# Patient Record
Sex: FEMALE | Race: BLACK OR AFRICAN AMERICAN | NOT HISPANIC OR LATINO | Employment: OTHER | URBAN - METROPOLITAN AREA
[De-identification: names, ages, dates, MRNs, and addresses within clinical notes are randomized per-mention and may not be internally consistent; named-entity substitution may affect disease eponyms.]

---

## 2017-01-13 ENCOUNTER — OFFICE VISIT (OUTPATIENT)
Dept: CARDIOLOGY | Facility: CLINIC | Age: 82
End: 2017-01-13
Payer: MEDICARE

## 2017-01-13 VITALS
SYSTOLIC BLOOD PRESSURE: 136 MMHG | HEART RATE: 74 BPM | WEIGHT: 85.56 LBS | BODY MASS INDEX: 15.16 KG/M2 | DIASTOLIC BLOOD PRESSURE: 63 MMHG | HEIGHT: 63 IN

## 2017-01-13 DIAGNOSIS — I25.83 CORONARY ARTERY DISEASE DUE TO LIPID RICH PLAQUE: ICD-10-CM

## 2017-01-13 DIAGNOSIS — E78.5 DYSLIPIDEMIA: Chronic | ICD-10-CM

## 2017-01-13 DIAGNOSIS — I25.10 CORONARY ARTERY DISEASE DUE TO LIPID RICH PLAQUE: ICD-10-CM

## 2017-01-13 DIAGNOSIS — I10 ESSENTIAL HYPERTENSION: Primary | Chronic | ICD-10-CM

## 2017-01-13 PROCEDURE — 1160F RVW MEDS BY RX/DR IN RCRD: CPT | Mod: S$GLB,,, | Performed by: INTERNAL MEDICINE

## 2017-01-13 PROCEDURE — 1126F AMNT PAIN NOTED NONE PRSNT: CPT | Mod: S$GLB,,, | Performed by: INTERNAL MEDICINE

## 2017-01-13 PROCEDURE — 99213 OFFICE O/P EST LOW 20 MIN: CPT | Mod: S$GLB,,, | Performed by: INTERNAL MEDICINE

## 2017-01-13 PROCEDURE — 1157F ADVNC CARE PLAN IN RCRD: CPT | Mod: S$GLB,,, | Performed by: INTERNAL MEDICINE

## 2017-01-13 PROCEDURE — 3075F SYST BP GE 130 - 139MM HG: CPT | Mod: S$GLB,,, | Performed by: INTERNAL MEDICINE

## 2017-01-13 PROCEDURE — 99499 UNLISTED E&M SERVICE: CPT | Mod: S$GLB,,, | Performed by: INTERNAL MEDICINE

## 2017-01-13 PROCEDURE — 99999 PR PBB SHADOW E&M-EST. PATIENT-LVL III: CPT | Mod: PBBFAC,,, | Performed by: INTERNAL MEDICINE

## 2017-01-13 PROCEDURE — 3078F DIAST BP <80 MM HG: CPT | Mod: S$GLB,,, | Performed by: INTERNAL MEDICINE

## 2017-01-13 PROCEDURE — 1159F MED LIST DOCD IN RCRD: CPT | Mod: S$GLB,,, | Performed by: INTERNAL MEDICINE

## 2017-01-13 RX ORDER — VALSARTAN 160 MG/1
320 TABLET ORAL
Refills: 0 | COMMUNITY
Start: 2016-12-06 | End: 2017-02-24 | Stop reason: ALTCHOICE

## 2017-01-13 RX ORDER — CHLORTHALIDONE 25 MG/1
TABLET ORAL
Qty: 30 TABLET | Refills: 5 | Status: SHIPPED | OUTPATIENT
Start: 2017-01-13 | End: 2017-12-21 | Stop reason: SDUPTHER

## 2017-01-13 NOTE — MR AVS SNAPSHOT
[facility] - Cardiology  [address]  [place] [address]  Phone: [phone]                  jM Summers   2017 1:30 PM   Office Visit    Description:  Female : 1935   Provider:  Man Morrow MD   Department:  [facility] - Cardiology           Reason for Visit     Coronary Artery Disease     Medication Problem           Diagnoses this Visit        Comments    Essential hypertension    -  Primary     Dyslipidemia         Coronary artery disease due to lipid rich plaque                To Do List           Future Appointments        Provider Department Dept Phone    2017 10:15 AM PERIMETRY, [facility] - Ophthalmology [phone]    2017 10:45 AM Aysha Triplett MD [facility] Ophthalmology [phone]    2017 1:45 PM Shasta Urbina MD [facility] - Internal Medicine [phone]      Goals (5 Years of Data)     None      Follow-Up and Disposition     Return in about 3 months (around 2017).       These Medications        Disp Refills Start End    chlorthalidone (HYGROTEN) 25 MG Tab 30 tablet 5 2017     1/2 tablet daily    [facility]: [facility] [address]  #: [phone]         [facility]     [facility] -  Assistance  Registered nurses in the [facility] provide clinical advisement, health education, appointment booking, and other advisory services.  Call for this free service at [phone].             Medications           Message regarding Medications     Verify the changes and/or additions to your medication regime listed below are the same as discussed with your clinician today.  If any of these changes or additions are incorrect, please notify your healthcare provider.        START taking these NEW medications        Refills    chlorthalidone (HYGROTEN) 25 MG Tab 5    Si/2 tablet daily    Class: Normal      STOP taking these medications      carvedilol (COREG) 6.25 MG tablet Take 1 tablet (6.25 mg total) by mouth 2 (two) times daily with meals.    hydrochlorothiazide (HYDRODIURIL) 12.5 MG Tab Take 1 tablet (12.5 mg total) by mouth once daily.           Verify that the below list of medications is an accurate representation of the medications you are currently taking.  If none reported, the list may be blank. If incorrect, please contact your healthcare provider. Carry this list with you in case of emergency.           Current Medications     aspirin 81 mg Tab Take 81 mg by mouth every evening. 1 Tablet Oral Every day    atorvastatin (LIPITOR) 40 MG tablet Take 1 tablet (40 mg total) by mouth once daily.    bimatoprost (LUMIGAN) 0.01 % Drop Place 1 drop into the right eye every evening.    brimonidine 0.15 % OPTH DROP (ALPHAGAN) 0.15 % ophthalmic solution Place 1 drop into the right eye 3 (three) times daily.    calcium carbonate (OS-KACI) 600 mg (1,500 mg) Tab Take 600 mg by mouth 2 (two) times daily with meals.    ergocalciferol (ERGOCALCIFEROL) 50,000 unit Cap Take 1 capsule (50,000 Units total) by mouth every 30 days.    ferrous sulfate 325 (65 FE) MG EC tablet Take 1 tablet (325 mg total) by mouth once daily.    multivitamin (ONE DAILY MULTIVITAMIN) per tablet Take 1 tablet by mouth once daily.    NEXIUM 40 mg capsule take 1 capsule by mouth once daily    pilocarpine HCL 4% (PILOCAR) 4 % ophthalmic solution Place 1 drop into the right eye 3 (three) times daily.    senna-docusate 8.6-50 mg (PERICOLACE) 8.6-50 mg per tablet Take 1 tablet by mouth 2 (two) times daily.    timolol maleate 0.5% (TIMOPTIC-XE) 0.5 % SolG Place 1 drop into the right eye every morning.    valsartan (DIOVAN) 160 MG tablet 320 mg. Pt taken  160 mg , 2 tablet once daily    chlorthalidone (HYGROTEN) 25 MG Tab 1/2 tablet daily           Clinical Reference Information           Vital Signs - Last Recorded  Most recent update: 1/13/2017  1:26 PM by Stephy Traylor MA    BP  "Pulse Ht Wt LMP BMI    136/63 (BP Location: Left arm, Patient Position: Sitting, BP Method: Automatic) 74 5' 3" (1.6 m) 38.8 kg (85 lb 8.6 oz) (LMP Unknown) 15.15 kg/m2      Blood Pressure          Most Recent Value    Right Arm BP - Sitting  148/65    Left Arm BP - Sitting  136/63    BP  136/63      Allergies as of 1/13/2017     Pcn [Penicillins]    Sulfa (Sulfonamide Antibiotics)      Immunizations Administered on Date of Encounter - 1/13/2017     None      Orders Placed During Today's Visit     Future Labs/Procedures Expected by Expires    Basic metabolic panel  1/27/2017 (Approximate) 1/13/2018      Instructions    Stop Carvedilol  Stop Hydrochlorothiazide and begin Chlorthalidone 1/2 tablet daily       "

## 2017-01-13 NOTE — PATIENT INSTRUCTIONS
Stop Carvedilol  Stop Hydrochlorothiazide and begin Chlorthalidone 1/2 tablet daily  Take Valsartan twice a day ( 160 mg twice a day )

## 2017-01-13 NOTE — LETTER
January 13, 2017      Shasta Urbina MD  [address]  [place] [address]           [facility] - Cardiology  [address]  [place] [address]  Phone: [phone]          Patient: Mj Summers   MR Number: [id number]   YOB: 1935   Date of Visit: 1/13/2017       Dear Dr. Shasta Urbina:    Thank you for referring Mj Summers to me for evaluation. Attached you will find relevant portions of my assessment and plan of care.    If you have questions, please do not hesitate to call me. I look forward to following Mj Summers along with you.    Sincerely,    Man Morrow MD    Enclosure  CC:  No Recipients    If you would like to receive this communication electronically, please contact [email or web address] or [phone] to request more information on [employer] Link access.    For providers and/or their staff who would like to refer a patient to [facility], please contact us through our one-stop-shop provider referral line, [facility] , at [phone].    If you feel you have received this communication in error or would no longer like to receive these types of communications, please e-mail [email or web address]

## 2017-01-13 NOTE — PROGRESS NOTES
Subjective:   Patient ID:  Mj Summers is a 81 y.o. female who presents for follow-up of Coronary Artery Disease (7 month f/u ) and Medication Problem (carvedilol)      HPI:   Mj Summers presents for follow up of Hypertension. States her blood pressure has ranges from the 120s/ to 150s/ at home. Feels fatigued when she takes the Carvedilol. Was placed on low dose amlodipine and didn't feel well. Palpitations have not been an issue. Mj Summers has known nonobstructive coronary artery disease . Mj Summers denies chest pain, shortness of breath, palpitations, presyncope , or syncope. Mj Summers has dyslipidemia  on high intensity statin.    Review of Systems   Constitution: Negative for weakness, malaise/fatigue, weight gain and weight loss.   HENT: Negative for headaches.    Eyes: Negative for blurred vision.   Cardiovascular: Negative for chest pain, claudication, cyanosis, dyspnea on exertion, irregular heartbeat, leg swelling, near-syncope, orthopnea, palpitations, paroxysmal nocturnal dyspnea and syncope.   Respiratory: Negative for cough, shortness of breath and wheezing.    Musculoskeletal: Positive for joint pain. Negative for falls and myalgias.   Gastrointestinal: Negative for abdominal pain, heartburn, nausea and vomiting.   Genitourinary: Negative for nocturia.   Neurological: Positive for loss of balance. Negative for brief paralysis, dizziness, focal weakness, numbness and paresthesias.   Psychiatric/Behavioral: Negative for altered mental status.       Current Outpatient Prescriptions   Medication Sig    aspirin 81 mg Tab Take 81 mg by mouth every evening. 1 Tablet Oral Every day    atorvastatin (LIPITOR) 40 MG tablet Take 1 tablet (40 mg total) by mouth once daily.    bimatoprost (LUMIGAN) 0.01 % Drop Place 1 drop into the right eye every evening.    brimonidine 0.15 % OPTH DROP (ALPHAGAN) 0.15 % ophthalmic solution Place 1 drop into the right eye 3 (three) times daily.  "   calcium carbonate (OS-KACI) 600 mg (1,500 mg) Tab Take 600 mg by mouth 2 (two) times daily with meals.    ergocalciferol (ERGOCALCIFEROL) 50,000 unit Cap Take 1 capsule (50,000 Units total) by mouth every 30 days.    ferrous sulfate 325 (65 FE) MG EC tablet Take 1 tablet (325 mg total) by mouth once daily.    multivitamin (ONE DAILY MULTIVITAMIN) per tablet Take 1 tablet by mouth once daily.    NEXIUM 40 mg capsule take 1 capsule by mouth once daily    pilocarpine HCL 4% (PILOCAR) 4 % ophthalmic solution Place 1 drop into the right eye 3 (three) times daily.    senna-docusate 8.6-50 mg (PERICOLACE) 8.6-50 mg per tablet Take 1 tablet by mouth 2 (two) times daily. (Patient taking differently: Take 1 tablet by mouth 2 (two) times daily as needed. )    timolol maleate 0.5% (TIMOPTIC-XE) 0.5 % SolG Place 1 drop into the right eye every morning.    valsartan (DIOVAN) 160 MG tablet 320 mg. Pt taken  160 mg , 2 tablet once daily    chlorthalidone (HYGROTEN) 25 MG Tab 1/2 tablet daily     Current Facility-Administered Medications   Medication    denosumab (PROLIA) injection 60 mg    denosumab (PROLIA) injection 60 mg     Objective:   Physical Exam   Constitutional: She is oriented to person, place, and time. She appears well-developed. No distress.   /63 (BP Location: Left arm, Patient Position: Sitting, BP Method: Automatic)  Pulse 74  Ht 5' 3" (1.6 m)  Wt 38.8 kg (85 lb 8.6 oz)  LMP  (LMP Unknown)  BMI 15.15 kg/m2   HENT:   Head: Normocephalic.   Eyes: Conjunctivae are normal. Pupils are equal, round, and reactive to light. No scleral icterus.   Neck: Neck supple. No JVD present. No thyromegaly present.   Cardiovascular: Normal rate, regular rhythm and intact distal pulses.  PMI is not displaced.  Exam reveals no gallop and no friction rub.    Murmur heard.   Medium-pitched midsystolic murmur is present with a grade of 2/6  at the lower left sternal border, apex Trace pedal edema " bilateral.  Pulmonary/Chest: Effort normal and breath sounds normal. No respiratory distress. She has no wheezes. She has no rales.   Abdominal: Soft. She exhibits no distension. There is no splenomegaly or hepatomegaly. There is no tenderness.   Musculoskeletal: She exhibits no edema or tenderness.   In a wheelchair and uses a walker and cane at home.   Neurological: She is alert and oriented to person, place, and time.   Skin: Skin is warm and dry. She is not diaphoretic.   Psychiatric: She has a normal mood and affect. Her behavior is normal.       Lab Results   Component Value Date     12/07/2016    K 4.3 12/07/2016     12/07/2016    CO2 28 12/07/2016    BUN 15 12/07/2016    CREATININE 0.8 12/07/2016    GLU 86 12/07/2016    MG 2.3 12/11/2015    AST 24 12/07/2016    ALT 26 12/07/2016    ALBUMIN 3.4 (L) 12/07/2016    PROT 7.3 12/07/2016    BILITOT 0.4 12/07/2016    WBC 5.10 07/27/2016    HGB 12.1 07/27/2016    HCT 38 07/27/2016    HCT 36.5 (L) 07/27/2016    MCV 90 07/27/2016     (L) 07/27/2016    TSH 1.757 04/05/2016    CHOL 194 12/30/2015    HDL 73 12/30/2015    LDLCALC 103.0 12/30/2015    TRIG 90 12/30/2015       Assessment:     1. Essential hypertension : Variation in readings at home   2. Dyslipidemia :  on high intensity statin   3. Coronary artery disease due to lipid rich plaque : nonobstructive.       Plan:     Mj was seen today for coronary artery disease and medication problem.    Diagnoses and all orders for this visit:    Essential hypertension  -     chlorthalidone (HYGROTEN) 25 MG Tab; 1/2 tablet daily in place of HCTZ  -     Basic metabolic panel; Future; Expected date: 1/27/17             D/C Carvedilol  Dyslipidemia  Continue current regimen    Coronary artery disease due to lipid rich plaque    Other orders  -     valsartan (DIOVAN) 160 MG tablet; 320 mg. Pt taken  160 mg , 2 tablet once daily ( To take twice daily )

## 2017-01-27 ENCOUNTER — CLINICAL SUPPORT (OUTPATIENT)
Dept: OPHTHALMOLOGY | Facility: CLINIC | Age: 82
End: 2017-01-27
Attending: OPHTHALMOLOGY
Payer: MEDICARE

## 2017-01-27 ENCOUNTER — LAB VISIT (OUTPATIENT)
Dept: LAB | Facility: HOSPITAL | Age: 82
End: 2017-01-27
Attending: INTERNAL MEDICINE
Payer: MEDICARE

## 2017-01-27 DIAGNOSIS — H16.142 SPK (SUPERFICIAL PUNCTATE KERATITIS), LEFT: ICD-10-CM

## 2017-01-27 DIAGNOSIS — H21.561 AFFERENT PUPILLARY DEFECT, RIGHT: ICD-10-CM

## 2017-01-27 DIAGNOSIS — H40.89 GLAUCOMA DUE TO COMBINATION OF MECHANISMS: ICD-10-CM

## 2017-01-27 DIAGNOSIS — H21.541 POSTERIOR SYNECHIAE, RIGHT: ICD-10-CM

## 2017-01-27 DIAGNOSIS — Z96.1 PSEUDOPHAKIA OF LEFT EYE: ICD-10-CM

## 2017-01-27 DIAGNOSIS — H18.421 BAND KERATOPATHY, RIGHT: ICD-10-CM

## 2017-01-27 DIAGNOSIS — I10 ESSENTIAL HYPERTENSION: Chronic | ICD-10-CM

## 2017-01-27 DIAGNOSIS — H35.031 BLIND HYPERTENSIVE RIGHT EYE: ICD-10-CM

## 2017-01-27 DIAGNOSIS — H40.89 GLAUCOMA DUE TO COMBINATION OF MECHANISMS: Primary | ICD-10-CM

## 2017-01-27 DIAGNOSIS — H25.11 SENILE NUCLEAR SCLEROSIS, RIGHT: ICD-10-CM

## 2017-01-27 DIAGNOSIS — H43.22 ASTEROID HYALOSIS, LEFT: ICD-10-CM

## 2017-01-27 LAB
ANION GAP SERPL CALC-SCNC: 7 MMOL/L
BUN SERPL-MCNC: 19 MG/DL
CALCIUM SERPL-MCNC: 9.9 MG/DL
CHLORIDE SERPL-SCNC: 105 MMOL/L
CO2 SERPL-SCNC: 29 MMOL/L
CREAT SERPL-MCNC: 0.8 MG/DL
EST. GFR  (AFRICAN AMERICAN): >60 ML/MIN/1.73 M^2
EST. GFR  (NON AFRICAN AMERICAN): >60 ML/MIN/1.73 M^2
GLUCOSE SERPL-MCNC: 88 MG/DL
POTASSIUM SERPL-SCNC: 4.5 MMOL/L
SODIUM SERPL-SCNC: 141 MMOL/L

## 2017-01-27 PROCEDURE — 99999 PR PBB SHADOW E&M-EST. PATIENT-LVL III: CPT | Mod: PBBFAC,,, | Performed by: OPHTHALMOLOGY

## 2017-01-27 PROCEDURE — 92134 CPTRZ OPH DX IMG PST SGM RTA: CPT | Mod: S$GLB,,, | Performed by: OPHTHALMOLOGY

## 2017-01-27 PROCEDURE — 92012 INTRM OPH EXAM EST PATIENT: CPT | Mod: S$GLB,,, | Performed by: OPHTHALMOLOGY

## 2017-01-27 PROCEDURE — 99499 UNLISTED E&M SERVICE: CPT | Mod: S$GLB,,, | Performed by: OPHTHALMOLOGY

## 2017-01-27 RX ORDER — MELOXICAM 7.5 MG/1
1 TABLET ORAL DAILY PRN
COMMUNITY
Start: 2017-01-26 | End: 2018-01-08

## 2017-01-27 NOTE — PROGRESS NOTES
"HPI     Glaucoma    Additional comments: HRT review today           Comments   DLS: 9/30/16    1) MMG  2) Blind Hypertensive OD  3) NS OD  4) FABY  5) APD OD  6) Band Keratopathy  7) Asteroid Hyalosis OS  8) Hx Acute Dacryocystitis   9) CRVO OD  10) Ant. Capsule Phimosis OS  11) PCIOL OS    MEDS:  T1/2 GFS QAM OD   Alphagan TID OD   Camacho 4% TID OD   Lumugan QHS OD  AT's PRN OU       Last edited by Yuridia Talamantes MA on 1/27/2017 11:01 AM. (History)            Assessment /Plan     For exam results, see Encounter Report.    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   Angle open after PI's ou   -Followed at [facility] since at least '01   First HVF 2001   First photos 2001   S/p PI OU   s/p Gonioplasty OU     Family history none   Glaucoma meds Lumigan, Istalol qAM, Agan tid, Camacho tid  - all OD ((off all gtts os a/p combined)   H/O adverse rxn to glaucoma drops Azopt "felt weird"   LASERS S/p PI OU, s/p Gonioplasty OU   GLAUCOMA SURGERIES    combined phaco/trab with mini shunt OS 5/22/2013   OTHER EYE SURGERIES    S/P DCR sx OD x 2 or 3 with Damaris    Combined phaco/IOL/ trab with mini shunt OS 5/22/2013   CDR 0.99 /0.9   Tbase 42/23   Tmax 42/23 (when stopped gtts)   Ttarget pain control od // 18 os   HVF 16 VF '01 -'16 - Ext to stim V od  // SALT / IAD os   Gonio +4/+3   /539   OCT 8 test 2006 to 2015 - RNFL - OD:dec. S/N/I , bord T // OS:dec N/I, bord S/T   HRT 13 test 2004 to 2017 - MR -  Dec. S od // dec. S/N/I os /// CDR 0.61 od // 0.84 os  Disc photos - 2001, 2003, 2006 - slides // 2008, 2010, 2016  - OIS     Ta " today 40/13 (states good compliance with gtts od - still pain free)   Test today - IOP, HRT     2.  Blind hypertensive Right Eye   Pain free - eye comfortable   Very limited vision CF at 3'   End stage glaucoma and S/P CRVO   No rubeosis   IOP is high but eye is pain free    3. Cataract OD -However, OD VA limited 2/2 CRVO and end stge glaucoma   S/P phaco/IOL/trab OS 5/22/2013    4. FABY OU -cont ATs     5. APD OD -   Old / stable 2/2 CRVO and glaucoma    6. Band Keratopathy OD -stable. Cont ATs- being followed by Dr. Saul;    had EDTA chelation on 2/13/14 OS - now clear     7. Asteroid Hyalosis OS -stable     8. Hx of Acute Dacryocystitis OS and recent NLD OD s/p surgery 11/12 and repeat for exposure w/ Dr. Ocampo 12/5/12 -stable. Cont f/u with Damaris     9. Hx of CRVO OD -limiting visual potential OD   -No NV on todays exam     10. Ant capsule phimosis os    Monitor     11. PC IOL os    Combined  W/ mini shunt 5/22/2013   doing well VA is 20/25 and the IOP is 13    Plan:   Off all gtts for now OS - s/p combined -- IOP stable - at 13 and VA is 20/25     Cont glaucoma gtts OD -- IOP very high, but comfortable/no pain // Blind hypertensive but pain free eye   Old  crvo     Ok to use OTC readers for now a +3.00 to +3.50 - not really able to improve distance vision with glasses at this time  Much improved from before the combined  Procedure    F/u 4  months - IOP check - gonio - other test are up to date   Keep IOP low os - only seeing eye  Keep eye comfortable od     I have seen and personally examined the patient.  I agree with the findings, assessment and plan of the resident and/or fellow.     Aysha Triplett MD

## 2017-01-30 ENCOUNTER — TELEPHONE (OUTPATIENT)
Dept: CARDIOLOGY | Facility: CLINIC | Age: 82
End: 2017-01-30

## 2017-01-30 NOTE — TELEPHONE ENCOUNTER
----- Message from Man Morrow MD sent at 1/27/2017 12:17 PM CST -----  Please inform the patient her lab looks good.

## 2017-02-24 ENCOUNTER — OFFICE VISIT (OUTPATIENT)
Dept: INTERNAL MEDICINE | Facility: CLINIC | Age: 82
End: 2017-02-24
Payer: MEDICARE

## 2017-02-24 VITALS
BODY MASS INDEX: 15.43 KG/M2 | WEIGHT: 87.06 LBS | HEIGHT: 63 IN | SYSTOLIC BLOOD PRESSURE: 152 MMHG | DIASTOLIC BLOOD PRESSURE: 78 MMHG

## 2017-02-24 DIAGNOSIS — Z87.81 HISTORY OF FRACTURE OF LEFT HIP: ICD-10-CM

## 2017-02-24 DIAGNOSIS — E78.5 DYSLIPIDEMIA: Chronic | ICD-10-CM

## 2017-02-24 DIAGNOSIS — K21.9 GASTROESOPHAGEAL REFLUX DISEASE, ESOPHAGITIS PRESENCE NOT SPECIFIED: ICD-10-CM

## 2017-02-24 DIAGNOSIS — E46 PROTEIN CALORIE MALNUTRITION: ICD-10-CM

## 2017-02-24 DIAGNOSIS — I77.9 BILATERAL CAROTID ARTERY DISEASE: ICD-10-CM

## 2017-02-24 DIAGNOSIS — M81.0 OSTEOPOROSIS, POSTMENOPAUSAL: ICD-10-CM

## 2017-02-24 DIAGNOSIS — I10 ESSENTIAL HYPERTENSION: Primary | ICD-10-CM

## 2017-02-24 DIAGNOSIS — D64.9 NORMOCYTIC ANEMIA: ICD-10-CM

## 2017-02-24 DIAGNOSIS — J43.9 PULMONARY EMPHYSEMA, UNSPECIFIED EMPHYSEMA TYPE: ICD-10-CM

## 2017-02-24 DIAGNOSIS — Z00.00 ENCOUNTER FOR PREVENTIVE HEALTH EXAMINATION: Primary | ICD-10-CM

## 2017-02-24 DIAGNOSIS — H34.8192 CRVO (CENTRAL RETINAL VEIN OCCLUSION): ICD-10-CM

## 2017-02-24 DIAGNOSIS — M81.0 OSTEOPOROSIS: ICD-10-CM

## 2017-02-24 DIAGNOSIS — I70.0 AORTIC ATHEROSCLEROSIS: ICD-10-CM

## 2017-02-24 DIAGNOSIS — I25.10 CORONARY ARTERY DISEASE INVOLVING NATIVE CORONARY ARTERY OF NATIVE HEART WITHOUT ANGINA PECTORIS: ICD-10-CM

## 2017-02-24 DIAGNOSIS — I10 ESSENTIAL HYPERTENSION: Chronic | ICD-10-CM

## 2017-02-24 DIAGNOSIS — H40.89 GLAUCOMA DUE TO COMBINATION OF MECHANISMS: ICD-10-CM

## 2017-02-24 PROCEDURE — 99499 UNLISTED E&M SERVICE: CPT | Mod: S$GLB,,, | Performed by: INTERNAL MEDICINE

## 2017-02-24 PROCEDURE — 99999 PR PBB SHADOW E&M-EST. PATIENT-LVL IV: CPT | Mod: PBBFAC,,, | Performed by: INTERNAL MEDICINE

## 2017-02-24 PROCEDURE — 1160F RVW MEDS BY RX/DR IN RCRD: CPT | Mod: S$GLB,,, | Performed by: INTERNAL MEDICINE

## 2017-02-24 PROCEDURE — 3075F SYST BP GE 130 - 139MM HG: CPT | Mod: S$GLB,,, | Performed by: INTERNAL MEDICINE

## 2017-02-24 PROCEDURE — 3079F DIAST BP 80-89 MM HG: CPT | Mod: S$GLB,,, | Performed by: INTERNAL MEDICINE

## 2017-02-24 PROCEDURE — 99215 OFFICE O/P EST HI 40 MIN: CPT | Mod: S$GLB,,, | Performed by: INTERNAL MEDICINE

## 2017-02-24 PROCEDURE — G0439 PPPS, SUBSEQ VISIT: HCPCS | Mod: S$GLB,,, | Performed by: NURSE PRACTITIONER

## 2017-02-24 PROCEDURE — 99499 UNLISTED E&M SERVICE: CPT | Mod: S$GLB,,, | Performed by: NURSE PRACTITIONER

## 2017-02-24 PROCEDURE — 3078F DIAST BP <80 MM HG: CPT | Mod: S$GLB,,, | Performed by: NURSE PRACTITIONER

## 2017-02-24 PROCEDURE — 1157F ADVNC CARE PLAN IN RCRD: CPT | Mod: S$GLB,,, | Performed by: INTERNAL MEDICINE

## 2017-02-24 PROCEDURE — 1126F AMNT PAIN NOTED NONE PRSNT: CPT | Mod: S$GLB,,, | Performed by: INTERNAL MEDICINE

## 2017-02-24 PROCEDURE — 3077F SYST BP >= 140 MM HG: CPT | Mod: S$GLB,,, | Performed by: NURSE PRACTITIONER

## 2017-02-24 PROCEDURE — 99999 PR PBB SHADOW E&M-EST. PATIENT-LVL IV: CPT | Mod: PBBFAC,,, | Performed by: NURSE PRACTITIONER

## 2017-02-24 PROCEDURE — 1159F MED LIST DOCD IN RCRD: CPT | Mod: S$GLB,,, | Performed by: INTERNAL MEDICINE

## 2017-02-24 RX ORDER — ATORVASTATIN CALCIUM 40 MG/1
40 TABLET, FILM COATED ORAL DAILY
Qty: 30 TABLET | Refills: 3 | Status: SHIPPED | OUTPATIENT
Start: 2017-02-24 | End: 2017-10-31 | Stop reason: SDUPTHER

## 2017-02-24 RX ORDER — VALSARTAN 160 MG/1
160 TABLET ORAL 2 TIMES DAILY
Qty: 60 TABLET | Refills: 3 | Status: SHIPPED | OUTPATIENT
Start: 2017-02-24 | End: 2017-10-31 | Stop reason: SDUPTHER

## 2017-02-24 RX ORDER — HYDROCHLOROTHIAZIDE 12.5 MG/1
12.5 CAPSULE ORAL DAILY
Refills: 0 | COMMUNITY
Start: 2016-12-16 | End: 2017-02-24

## 2017-02-24 NOTE — PATIENT INSTRUCTIONS
Counseling and Referral of Other Preventative  (Italic type indicates deductible and co-insurance are waived)    Patient Name: Mj Summers  Today's Date: 2/24/2017      SERVICE LIMITATIONS RECOMMENDATION    Vaccines    · Pneumococcal (once after 65)    · Influenza (annually)    · Hepatitis B (if medium/high risk)    · Prevnar 13      Hepatitis B medium/high risk factors:       - End-stage renal disease       - Hemophiliacs who received Factor VII or         IX concentrates       - Clients of institutions for the mentally             retarded       - Persons who live in the same house as          a HepB carrier       - Homosexual men       - Illicit injectable drug abusers     Pneumococcal: Done, no repeat necessary     Influenza: Done, repeat in one year     Hepatitis B: N/A Not indicated     Prevnar 13: N/A Done, no repeat necessary    Mammogram (biennial age 50-74)  Annually (age 40 or over)  Last done 8/24/16, recommend to repeat every 1  years    Pap (up to age 70 and after 70 if unknown history or abnormal study last 10 years)    N/A Not indicated     The USPSTF recommends against screening for cervical cancer in women older than age 65 years who have had adequate prior screening and are not otherwise at high risk for cervical cancer.      Colorectal cancer screening (to age 75)    · Fecal occult blood test (annual)  · Flexible sigmoidoscopy (5y)  · Screening colonoscopy (10y)  · Barium enema   N/A Not indicated    Diabetes self-management training (no USPSTF recommendations)  Requires referral by treating physician for patient with diabetes or renal disease. 10 hours of initial DSMT sessions of no less than 30 minutes each in a continuous 12-month period. 2 hours of follow-up DSMT in subsequent years.  N/A Not indicated    Bone mass measurements (age 65 & older, biennial)  Requires diagnosis related to osteoporosis or estrogen deficiency. Biennial benefit unless patient has history of long-term  glucocorticoid  Last done 12/07/16, recommend to repeat every 2  years    Glaucoma screening (no USPSTF recommendation)  Diabetes mellitus, family history   , age 50 or over    American, age 65 or over  Last done 1/27/17, recommend to repeat every 6  months    Medical nutrition therapy for diabetes or renal disease (no recommended schedule)  Requires referral by treating physician for patient with diabetes or renal disease or kidney transplant within the past 3 years.  Can be provided in same year as diabetes self-management training (DSMT), and CMS recommends medical nutrition therapy take place after DSMT. Up to 3 hours for initial year and 2 hours in subsequent years.  N/A Defer PCP    Cardiovascular screening blood tests (every 5 years)  · Fasting lipid panel  Order as a panel if possible  Last done 12/30/15, recommend to repeat every 3-5  years    Diabetes screening tests (at least every 3 years, Medicare covers annually or at 6-month intervals for prediabetic patients)  · Fasting blood sugar (FBS) or glucose tolerance test (GTT)  Patient must be diagnosed with one of the following:       - Hypertension       - Dyslipidemia       - Obesity (BMI 30kg/m2)       - Previous elevated impaired FBS or GTT       ... or any two of the following:       - Overweight (BMI 25 but <30)       - Family history of diabetes       - Age 65 or older       - History of gestational diabetes or birth of baby weighing more than 9 pounds  Last done 12/07/16, recommend to repeat every 1  years    Abdominal aortic aneurysm screening (once)  · Sonogram   Limited to patients who meet one of the following criteria:       - Men who are 65-75 years old and have smoked more than 100 cigarette in their lifetime       - Anyone with a family history of abdominal aortic aneurysm       - Anyone recommended for screening by the USPSTF  N/A Not indicated    HIV screening (annually for increased risk patients)  · HIV-1 and  HIV-2 by EIA, or MIRACLE, rapid antibody test or oral mucosa transudate  Patients must be at increased risk for HIV infection per USPSTF guidelines or pregnant. Tests covered annually for patient at increased risk or as requested by the patient. Pregnant patients may receive up to 3 tests during pregnancy.  Risks discussed, screening is not recommended    Smoking cessation counseling (up to 8 sessions per year)  Patients must be asymptomatic of tobacco-related conditions to receive as a preventative service.  Not indicated    Subsequent annual wellness visit  At least 12 months since last AWV  Return in one year     The following information is provided to all patients.  This information is to help you find resources for any of the problems found today that may be affecting your health:                Living healthy guide: www.[facility].[place].[facility]      Understanding Diabetes: www.diabetes.org      Eating healthy: www.cdc.gov/healthyweight      CDC home safety checklist: www.cdc.gov/steadi/patient.html      Agency on Aging: www.goea.[place].[facility]      Alcoholics anonymous (AA): www.aa.org      Physical Activity: www.cameron.nih.gov/ct7jads      Tobacco use: www.quitwithusla.org

## 2017-02-24 NOTE — MR AVS SNAPSHOT
[facility]  [address]  [facility] [address]  Phone: [phone]  Fax: [phone]                  Mj Summers   2017 3:00 PM   Office Visit    Description:  Female : 1935   Provider:  HRA, NOM 3   Department:  [facility]           Diagnoses this Visit        Comments    Encounter for preventive health examination    -  Primary            To Do List           Future Appointments        Provider Department Dept Phone    3/2/2017 9:40 AM LAB, APPOINTMENT [facility] [phone]    2017 10:45 AM Aysha Triplett MD [facility] Ophthalmology [phone]    2017 2:30 PM Shasta Urbina MD [facility] [phone]      Goals (5 Years of Data)     None      [facility]     [facility] Care [facility] -  Assistance  Registered nurses in the [facility] provide clinical advisement, health education, appointment booking, and other advisory services.  Call for this free service at [phone].             Medications           Message regarding Medications     Verify the changes and/or additions to your medication regime listed below are the same as discussed with your clinician today.  If any of these changes or additions are incorrect, please notify your healthcare provider.             Verify that the below list of medications is an accurate representation of the medications you are currently taking.  If none reported, the list may be blank. If incorrect, please contact your healthcare provider. Carry this list with you in case of emergency.           Current Medications     aspirin 81 mg Tab Take 81 mg by mouth every evening. 1 Tablet Oral Every day    atorvastatin (LIPITOR) 40 MG tablet Take 1 tablet (40 mg total) by mouth once daily.    bimatoprost (LUMIGAN) 0.01 % Drop Place 1 drop into the right eye every evening.    brimonidine 0.15 % OPTH DROP (ALPHAGAN)  0.15 % ophthalmic solution Place 1 drop into the right eye 3 (three) times daily.    calcium carbonate (OS-KACI) 600 mg (1,500 mg) Tab Take 600 mg by mouth 2 (two) times daily with meals.    chlorthalidone (HYGROTEN) 25 MG Tab 1/2 tablet daily    ergocalciferol (ERGOCALCIFEROL) 50,000 unit Cap Take 1 capsule (50,000 Units total) by mouth every 30 days.    ferrous sulfate 325 (65 FE) MG EC tablet Take 1 tablet (325 mg total) by mouth once daily.    meloxicam (MOBIC) 7.5 MG tablet Take 1 tablet by mouth daily as needed.    multivitamin (ONE DAILY MULTIVITAMIN) per tablet Take 1 tablet by mouth once daily.    NEXIUM 40 mg capsule take 1 capsule by mouth once daily    pilocarpine HCL 4% (PILOCAR) 4 % ophthalmic solution Place 1 drop into the right eye 3 (three) times daily.    senna-docusate 8.6-50 mg (PERICOLACE) 8.6-50 mg per tablet Take 1 tablet by mouth 2 (two) times daily.    timolol maleate 0.5% (TIMOPTIC-XE) 0.5 % SolG Place 1 drop into the right eye every morning.    valsartan (DIOVAN) 160 MG tablet Take 1 tablet (160 mg total) by mouth 2 (two) times daily.           Clinical Reference Information           Your Vitals Were     BP                   152/78           Blood Pressure          Most Recent Value    BP  (!)  152/78      Allergies as of 2/24/2017     Pcn [Penicillins]    Sulfa (Sulfonamide Antibiotics)      Immunizations Administered on Date of Encounter - 2/24/2017     None      [facility] Sign-Up     Activating your [facility] account is as easy as 1-2-3!     1) Visit [email or web address], select Sign Up Now, enter this activation code and your date of birth, then select Next.  Activation code not generated  Current Patient Portal Status: Account disabled      2) Create a username and password to use when you visit [facility] in the future and select a security question in case you lose your password and select Next.    3) Enter your e-mail address and click Sign Up!    Additional Information  If you have  questions, please e-mail [email or web address] or call [phone] to talk to our [facility] staff. Remember, [facility] is NOT to be used for urgent needs. For medical emergencies, dial 911.         Instructions      Counseling and Referral of Other Preventative  (Italic type indicates deductible and co-insurance are waived)    Patient Name: Mj Summers  Today's Date: 2/24/2017      SERVICE LIMITATIONS RECOMMENDATION    Vaccines    · Pneumococcal (once after 65)    · Influenza (annually)    · Hepatitis B (if medium/high risk)    · Prevnar 13      Hepatitis B medium/high risk factors:       - End-stage renal disease       - Hemophiliacs who received Factor VII or         IX concentrates       - Clients of institutions for the mentally             retarded       - Persons who live in the same house as          a HepB carrier       - Homosexual men       - Illicit injectable drug abusers     Pneumococcal: Done, no repeat necessary     Influenza: Done, repeat in one year     Hepatitis B: N/A Not indicated     Prevnar 13: N/A Done, no repeat necessary    Mammogram (biennial age 50-74)  Annually (age 40 or over)  Last done 8/24/16, recommend to repeat every 1  years    Pap (up to age 70 and after 70 if unknown history or abnormal study last 10 years)    N/A Not indicated     The USPSTF recommends against screening for cervical cancer in women older than age 65 years who have had adequate prior screening and are not otherwise at high risk for cervical cancer.      Colorectal cancer screening (to age 75)    · Fecal occult blood test (annual)  · Flexible sigmoidoscopy (5y)  · Screening colonoscopy (10y)  · Barium enema   N/A Not indicated    Diabetes self-management training (no USPSTF recommendations)  Requires referral by treating physician for patient with diabetes or renal disease. 10 hours of initial DSMT sessions of no less than 30 minutes each in a continuous 12-month period. 2 hours of follow-up DSMT in  subsequent years.  N/A Not indicated    Bone mass measurements (age 65 & older, biennial)  Requires diagnosis related to osteoporosis or estrogen deficiency. Biennial benefit unless patient has history of long-term glucocorticoid  Last done 12/07/16, recommend to repeat every 2  years    Glaucoma screening (no USPSTF recommendation)  Diabetes mellitus, family history   , age 50 or over    American, age 65 or over  Last done 1/27/17, recommend to repeat every 6  months    Medical nutrition therapy for diabetes or renal disease (no recommended schedule)  Requires referral by treating physician for patient with diabetes or renal disease or kidney transplant within the past 3 years.  Can be provided in same year as diabetes self-management training (DSMT), and CMS recommends medical nutrition therapy take place after DSMT. Up to 3 hours for initial year and 2 hours in subsequent years.  N/A Defer PCP    Cardiovascular screening blood tests (every 5 years)  · Fasting lipid panel  Order as a panel if possible  Last done 12/30/15, recommend to repeat every 3-5  years    Diabetes screening tests (at least every 3 years, Medicare covers annually or at 6-month intervals for prediabetic patients)  · Fasting blood sugar (FBS) or glucose tolerance test (GTT)  Patient must be diagnosed with one of the following:       - Hypertension       - Dyslipidemia       - Obesity (BMI 30kg/m2)       - Previous elevated impaired FBS or GTT       ... or any two of the following:       - Overweight (BMI 25 but <30)       - Family history of diabetes       - Age 65 or older       - History of gestational diabetes or birth of baby weighing more than 9 pounds  Last done 12/07/16, recommend to repeat every 1  years    Abdominal aortic aneurysm screening (once)  · Sonogram   Limited to patients who meet one of the following criteria:       - Men who are 65-75 years old and have smoked more than 100 cigarette in their  lifetime       - Anyone with a family history of abdominal aortic aneurysm       - Anyone recommended for screening by the USPSTF  N/A Not indicated    HIV screening (annually for increased risk patients)  · HIV-1 and HIV-2 by EIA, or MIRACLE, rapid antibody test or oral mucosa transudate  Patients must be at increased risk for HIV infection per USPSTF guidelines or pregnant. Tests covered annually for patient at increased risk or as requested by the patient. Pregnant patients may receive up to 3 tests during pregnancy.  Risks discussed, screening is not recommended    Smoking cessation counseling (up to 8 sessions per year)  Patients must be asymptomatic of tobacco-related conditions to receive as a preventative service.  Not indicated    Subsequent annual wellness visit  At least 12 months since last AWV  Return in one year     The following information is provided to all patients.  This information is to help you find resources for any of the problems found today that may be affecting your health:                Living healthy guide: www.[facility].[place].[facility]      Understanding Diabetes: www.diabetes.org      Eating healthy: www.cdc.gov/healthyweight      [facility] home safety checklist: www.cdc.gov/steadi/patient.html      Agency on Aging: www.goea.[place].[facility]      Alcoholics anonymous (AA): www.aa.org      Physical Activity: www.cameron.nih.gov/qw6wlhi      Tobacco use: www.quitwithusla.org          Language Assistance Services     ATTENTION: Language assistance services are available, free of charge. Please call [phone].      ATENCIÓN: Si habla español, tiene a gonzalez disposición servicios gratuitos de asistencia lingüística. Llame al [phone].     CHÚ Ý: N?u b?n nói Ti?ng Vi?t, có các d?ch v? h? tr? ngôn ng? mi?n phí dành cho b?n. G?i s? [phone].         [facility] complies with applicable Federal civil rights laws and does not discriminate on the basis of race, color, national origin,  age, disability, or sex.

## 2017-02-24 NOTE — PROGRESS NOTES
"Mj Summers presented for a  Medicare AWV and comprehensive Health Risk Assessment today. The following components were reviewed and updated:    · Medical history  · Family History  · Social history  · Allergies and Current Medications  · Health Risk Assessment  · Health Maintenance  · Care Team     ** See Completed Assessments for Annual Wellness Visit within the encounter summary.**       The following assessments were completed:  · Living Situation  · CAGE  · Depression Screening  · Timed Get Up and Go  · Whisper Test  · Cognitive Function Screening  ·   ·   ·   · Nutrition Screening  · ADL Screening  · PAQ Screening    Vitals:    02/24/17 1330   BP: (!) 152/78   Weight: 39.5 kg (87 lb 1.3 oz)   Height: 5' 3" (1.6 m)     Body mass index is 15.43 kg/(m^2).  Physical Exam   Constitutional: She is oriented to person, place, and time.   Thin, frail   Musculoskeletal:   In wheelchair - ambulates with cane at home   Neurological: She is alert and oriented to person, place, and time.   Skin: Skin is warm and dry.   Psychiatric: She has a normal mood and affect.         Diagnoses and health risks identified today and associated recommendations/orders:    1. Encounter for preventive health examination  Here for Health Risk Assessment. Follow up in one year.    2. Essential hypertension  Chronic, stable on current medications. Followed by PCP.    3. Aortic atherosclerosis  Chronic, stable on current medications. Noted on CT Chest 2/10/16 Followed by PCP, Cardiology.    4. Bilateral carotid artery disease  Chronic, stable on current medications. 20-39% bilateral stenosis noted Ultrasound 10/14/15. Followed by PCP.    5. Coronary artery disease involving native coronary artery of native heart without angina pectoris  Chronic, stable on current medications. Followed by Cardiology, PCP.    6. CRVO (central retinal vein occlusion) - Right Eye  Chronic, stable. Followed by Ophthalmology    7. Pulmonary emphysema, " unspecified emphysema type  Chronic, stable.  Followed by PCP.    8. Normocytic anemia  Chronic, stable on current medication. Followed by PCP.    9. Dyslipidemia  Chronic, stable on current medication. Followed by PCP.    10. Gastroesophageal reflux disease, esophagitis presence not specified  Chronic, stable on current medication. Followed by PCP.    11. Protein calorie malnutrition  Chronic, weight stable. BMI 15.43. Albumin  3.4 Followed by PCP.    12. 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 Followed by Ophthalmology.    13. Osteoporosis, postmenopausal  Chronic, stable on current medications. Followed by PCP.    14. History of fracture of left hip  Stable. Followed by Orthopedics.       Provided Mj with a 5-10 year written screening schedule and personal prevention plan. Recommendations were developed using the USPSTF age appropriate recommendations. Education, counseling, and referrals were provided as needed. After Visit Summary printed and given to patient which includes a list of additional screenings\tests needed.    Return in 4 months (on 6/26/2017).with PCP.    Isela Chaudhary NP

## 2017-02-24 NOTE — MR AVS SNAPSHOT
[facility]  [address] [address]  Phone: [phone]  Fax: [phone]                  Mj Summers   2017 1:45 PM   Office Visit    Description:  Female : 1935   Provider:  Shasta Urbina MD   Department:  [facility]           Reason for Visit     Annual Exam           Diagnoses this Visit        Comments    Essential hypertension    -  Primary     Dyslipidemia         Osteoporosis                To Do List           Future Appointments        Provider Department Dept Phone    2017 3:00 PM HRA, NOM 3 [facility] [phone]    3/2/2017 9:40 AM LAB, APPOINTMENT [facility] [phone]    2017 10:45 AM Aysha Triplett MD [facility] [phone]    2017 2:30 PM Shasta Urbina MD [facility] [phone]      Goals (5 Years of Data)     None      Follow-Up and Disposition     Return for EPP 4 months.       These Medications        Disp Refills Start End    atorvastatin (LIPITOR) 40 MG tablet 30 tablet 3 2017    Take 1 tablet (40 mg total) by mouth once daily. - Oral    Pharmacy: [address] Ph #: [phone]       valsartan (DIOVAN) 160 MG tablet 60 tablet 3 2017    Take 1 tablet (160 mg total) by mouth 2 (two) times daily. - Oral    Pharmacy: [address] Ph #: [phone]         [facility]     [facility] -  Assistance  Registered nurses in the [facility] provide clinical advisement, health education, appointment booking, and other advisory services.  Call for this free service at [phone].             Medications           Message regarding Medications     Verify the changes and/or additions to your medication regime  listed below are the same as discussed with your clinician today.  If any of these changes or additions are incorrect, please notify your healthcare provider.        START taking these NEW medications        Refills    valsartan (DIOVAN) 160 MG tablet 3    Sig: Take 1 tablet (160 mg total) by mouth 2 (two) times daily.    Class: Normal    Route: Oral      STOP taking these medications     hydrochlorothiazide (MICROZIDE) 12.5 mg capsule Take 12.5 mg by mouth once daily.           Verify that the below list of medications is an accurate representation of the medications you are currently taking.  If none reported, the list may be blank. If incorrect, please contact your healthcare provider. Carry this list with you in case of emergency.           Current Medications     aspirin 81 mg Tab Take 81 mg by mouth every evening. 1 Tablet Oral Every day    atorvastatin (LIPITOR) 40 MG tablet Take 1 tablet (40 mg total) by mouth once daily.    bimatoprost (LUMIGAN) 0.01 % Drop Place 1 drop into the right eye every evening.    brimonidine 0.15 % OPTH DROP (ALPHAGAN) 0.15 % ophthalmic solution Place 1 drop into the right eye 3 (three) times daily.    calcium carbonate (OS-KACI) 600 mg (1,500 mg) Tab Take 600 mg by mouth 2 (two) times daily with meals.    chlorthalidone (HYGROTEN) 25 MG Tab 1/2 tablet daily    ergocalciferol (ERGOCALCIFEROL) 50,000 unit Cap Take 1 capsule (50,000 Units total) by mouth every 30 days.    ferrous sulfate 325 (65 FE) MG EC tablet Take 1 tablet (325 mg total) by mouth once daily.    meloxicam (MOBIC) 7.5 MG tablet Take 1 tablet by mouth daily as needed.    multivitamin (ONE DAILY MULTIVITAMIN) per tablet Take 1 tablet by mouth once daily.    NEXIUM 40 mg capsule take 1 capsule by mouth once daily    pilocarpine HCL 4% (PILOCAR) 4 % ophthalmic solution Place 1 drop into the right eye 3 (three) times daily.    senna-docusate 8.6-50 mg (PERICOLACE) 8.6-50 mg per tablet Take 1 tablet by mouth 2 (two) times  daily.    timolol maleate 0.5% (TIMOPTIC-XE) 0.5 % SolG Place 1 drop into the right eye every morning.    valsartan (DIOVAN) 160 MG tablet Take 1 tablet (160 mg total) by mouth 2 (two) times daily.           Clinical Reference Information           Your Vitals Were     BP                   152/78           Blood Pressure          Most Recent Value    BP  (!)  152/78      Allergies as of 2/24/2017     Pcn [Penicillins]    Sulfa (Sulfonamide Antibiotics)      Immunizations Administered on Date of Encounter - 2/24/2017     None      Orders Placed During Today's Visit      Normal Orders This Visit    Ambulatory Referral to Endocrinology     Future Labs/Procedures Expected by Expires    Comprehensive metabolic panel  2/24/2017 2/24/2018    Lipid panel  2/24/2017 2/24/2018      [facility] Sign-Up     Activating your [facility] account is as easy as 1-2-3!     1) Visit [email or web address], select Sign Up Now, enter this activation code and your date of birth, then select Next.  Activation code not generated  Current Patient Portal Status: Account disabled      2) Create a username and password to use when you visit [facility] in the future and select a security question in case you lose your password and select Next.    3) Enter your e-mail address and click Sign Up!    Additional Information  If you have questions, please e-mail [email or web address] or call [phone] to talk to our [facility] staff. Remember, [facility] is NOT to be used for urgent needs. For medical emergencies, dial 911.         Language Assistance Services     ATTENTION: Language assistance services are available, free of charge. Please call [phone].      ATENCIÓN: Si habla español, tiene a gonzalez disposición servicios gratuitos de asistencia lingüística. Llame al [phone].     CHÚ Ý: N?u b?n nói Ti?ng Vi?t, có các d?ch v? h? tr? ngôn ng? mi?n phí dành cho b?n. G?i s? [phone].         [facility] complies with applicable  Federal civil rights laws and does not discriminate on the basis of race, color, national origin, age, disability, or sex.

## 2017-02-28 VITALS
SYSTOLIC BLOOD PRESSURE: 134 MMHG | HEIGHT: 63 IN | WEIGHT: 87.06 LBS | HEART RATE: 60 BPM | DIASTOLIC BLOOD PRESSURE: 82 MMHG | OXYGEN SATURATION: 99 % | BODY MASS INDEX: 15.43 KG/M2

## 2017-03-01 NOTE — PROGRESS NOTES
Patient, Mj Summers (MRN #[id number]), presented with a recent Platelet count less than 150 K/uL consistent with the definition of thrombocytopenia (ICD10 - D69.6).    Platelets   Date Value Ref Range Status   07/27/2016 110 (L) 150 - 350 K/uL Final     The patient's thrombocytopenia was monitored, evaluated, addressed and/or treated. This addendum to the medical record is made on 03/01/2017.

## 2017-03-01 NOTE — PROGRESS NOTES
Subjective:       Patient ID: Mj Summers is a 81 y.o. female.    Chief Complaint: Hypertension    HPI: She returns for management of hypertension.  She has had hypertension for over a year.  Current treatment has included medications outlined in medication list.  She denies chest pain or shortness of breath.  No palpitations.  Denies left arm or neck pain.  Review of Systems   Constitutional: Negative for chills, fatigue, fever and unexpected weight change.   Respiratory: Negative for chest tightness and shortness of breath.    Cardiovascular: Negative for chest pain and palpitations.   Gastrointestinal: Negative for abdominal pain and blood in stool.   Neurological: Negative for dizziness, syncope, numbness and headaches.       Objective:      Physical Exam   HENT:   Right Ear: External ear normal.   Left Ear: External ear normal.   Nose: Nose normal.   Mouth/Throat: Oropharynx is clear and moist.   Eyes: Pupils are equal, round, and reactive to light.   Neck: Normal range of motion.   Cardiovascular: Normal rate and regular rhythm.    Murmur heard.  Pulmonary/Chest: Breath sounds normal.   Abdominal: She exhibits no distension. There is no hepatosplenomegaly. There is no tenderness.   Lymphadenopathy:     She has no cervical adenopathy.     She has no axillary adenopathy.   Neurological: She has normal strength and normal reflexes. No cranial nerve deficit or sensory deficit.     rectal exam: Heme-negative, no mass palpated  Assessment:         assessment and plan: Hypertension: Check CMP and lipid panel  Plan:       As above

## 2017-03-02 ENCOUNTER — LAB VISIT (OUTPATIENT)
Dept: LAB | Facility: HOSPITAL | Age: 82
End: 2017-03-02
Attending: INTERNAL MEDICINE
Payer: MEDICARE

## 2017-03-02 DIAGNOSIS — I10 ESSENTIAL HYPERTENSION: ICD-10-CM

## 2017-03-02 LAB
ALBUMIN SERPL BCP-MCNC: 3.3 G/DL
ALP SERPL-CCNC: 79 U/L
ALT SERPL W/O P-5'-P-CCNC: 38 U/L
ANION GAP SERPL CALC-SCNC: 5 MMOL/L
AST SERPL-CCNC: 32 U/L
BILIRUB SERPL-MCNC: 0.4 MG/DL
BUN SERPL-MCNC: 17 MG/DL
CALCIUM SERPL-MCNC: 9.7 MG/DL
CHLORIDE SERPL-SCNC: 104 MMOL/L
CHOLEST/HDLC SERPL: 3.9 {RATIO}
CO2 SERPL-SCNC: 31 MMOL/L
CREAT SERPL-MCNC: 0.8 MG/DL
EST. GFR  (AFRICAN AMERICAN): >60 ML/MIN/1.73 M^2
EST. GFR  (NON AFRICAN AMERICAN): >60 ML/MIN/1.73 M^2
GLUCOSE SERPL-MCNC: 87 MG/DL
HDL/CHOLESTEROL RATIO: 25.6 %
HDLC SERPL-MCNC: 246 MG/DL
HDLC SERPL-MCNC: 63 MG/DL
LDLC SERPL CALC-MCNC: 165.6 MG/DL
NONHDLC SERPL-MCNC: 183 MG/DL
POTASSIUM SERPL-SCNC: 4.5 MMOL/L
PROT SERPL-MCNC: 7.3 G/DL
SODIUM SERPL-SCNC: 140 MMOL/L
TRIGL SERPL-MCNC: 87 MG/DL

## 2017-03-02 PROCEDURE — 80053 COMPREHEN METABOLIC PANEL: CPT

## 2017-03-02 PROCEDURE — 36415 COLL VENOUS BLD VENIPUNCTURE: CPT

## 2017-03-02 PROCEDURE — 80061 LIPID PANEL: CPT

## 2017-03-04 ENCOUNTER — TELEPHONE (OUTPATIENT)
Dept: INTERNAL MEDICINE | Facility: CLINIC | Age: 82
End: 2017-03-04

## 2017-03-04 DIAGNOSIS — E78.5 HYPERLIPIDEMIA, UNSPECIFIED HYPERLIPIDEMIA TYPE: Primary | ICD-10-CM

## 2017-03-04 NOTE — TELEPHONE ENCOUNTER
Contacted patient about her lab result. She admits to medication and diet noncompliance. Stressed the importance of compliance. Check lipid panel in 3 months

## 2017-03-22 ENCOUNTER — TELEPHONE (OUTPATIENT)
Dept: INTERNAL MEDICINE | Facility: CLINIC | Age: 82
End: 2017-03-22

## 2017-03-22 DIAGNOSIS — E78.5 HYPERLIPIDEMIA, UNSPECIFIED HYPERLIPIDEMIA TYPE: Primary | ICD-10-CM

## 2017-03-22 NOTE — TELEPHONE ENCOUNTER
----- Message from Matilde Rincon sent at 3/22/2017 11:32 AM CDT -----  Contact: self [phone]  Patient needs to speak with office regarding physical therapy that she was supposed to have started this was discussed previously and she has not heard from PT, please call patient back and advise.    Thank you

## 2017-03-22 NOTE — TELEPHONE ENCOUNTER
Spoke with patient informed patient that referral was put in and advised her to call back back if needs assist

## 2017-04-06 RX ORDER — TIMOLOL MALEATE 5 MG/ML
SOLUTION/ DROPS OPHTHALMIC
Qty: 5 ML | Refills: 12 | Status: SHIPPED | OUTPATIENT
Start: 2017-04-06 | End: 2017-05-19 | Stop reason: SDUPTHER

## 2017-05-05 ENCOUNTER — TELEPHONE (OUTPATIENT)
Dept: ENDOCRINOLOGY | Facility: CLINIC | Age: 82
End: 2017-05-05

## 2017-05-05 NOTE — TELEPHONE ENCOUNTER
Orders were still in from Dr Shah. Scheduled for next Friday at 1:00PM.  Pt accepted and mailed to her

## 2017-05-05 NOTE — TELEPHONE ENCOUNTER
----- Message from Rosamaria Marie sent at 5/4/2017  4:17 PM CDT -----  Contact: Self [phone]  Pablo's pt     Pt has not had her Prolia injection in almost two years. She states she is suppose to have the shot twice and a year and wants to know if she can have the shot sooner than September.

## 2017-05-12 ENCOUNTER — INFUSION (OUTPATIENT)
Dept: INFECTIOUS DISEASES | Facility: HOSPITAL | Age: 82
End: 2017-05-12
Attending: INTERNAL MEDICINE
Payer: MEDICARE

## 2017-05-12 VITALS — HEIGHT: 63 IN | WEIGHT: 87.06 LBS | BODY MASS INDEX: 15.43 KG/M2

## 2017-05-12 DIAGNOSIS — M81.0 OSTEOPOROSIS, POSTMENOPAUSAL: Primary | ICD-10-CM

## 2017-05-12 PROCEDURE — 63600175 PHARM REV CODE 636 W HCPCS: Performed by: INTERNAL MEDICINE

## 2017-05-12 PROCEDURE — 96401 CHEMO ANTI-NEOPL SQ/IM: CPT

## 2017-05-12 RX ADMIN — DENOSUMAB 60 MG: 60 INJECTION SUBCUTANEOUS at 01:05

## 2017-05-19 ENCOUNTER — OFFICE VISIT (OUTPATIENT)
Dept: OPHTHALMOLOGY | Facility: CLINIC | Age: 82
End: 2017-05-19
Payer: MEDICARE

## 2017-05-19 DIAGNOSIS — H21.541 POSTERIOR SYNECHIAE, RIGHT: ICD-10-CM

## 2017-05-19 DIAGNOSIS — H26.40 CAPSULAR OPACIFICATION: ICD-10-CM

## 2017-05-19 DIAGNOSIS — H34.8192 CRVO (CENTRAL RETINAL VEIN OCCLUSION): ICD-10-CM

## 2017-05-19 DIAGNOSIS — H18.421 BAND KERATOPATHY, RIGHT: ICD-10-CM

## 2017-05-19 DIAGNOSIS — H21.561 AFFERENT PUPILLARY DEFECT, RIGHT: ICD-10-CM

## 2017-05-19 DIAGNOSIS — H25.11 SENILE NUCLEAR SCLEROSIS, RIGHT: ICD-10-CM

## 2017-05-19 DIAGNOSIS — Z98.83 GLAUCOMA FILTERING BLEB OF LEFT EYE: ICD-10-CM

## 2017-05-19 DIAGNOSIS — H40.89 GLAUCOMA DUE TO COMBINATION OF MECHANISMS: Primary | ICD-10-CM

## 2017-05-19 DIAGNOSIS — H16.142 SPK (SUPERFICIAL PUNCTATE KERATITIS), LEFT: ICD-10-CM

## 2017-05-19 DIAGNOSIS — H35.031 BLIND HYPERTENSIVE RIGHT EYE: ICD-10-CM

## 2017-05-19 PROCEDURE — 99999 PR PBB SHADOW E&M-EST. PATIENT-LVL III: CPT | Mod: PBBFAC,,, | Performed by: OPHTHALMOLOGY

## 2017-05-19 PROCEDURE — 99499 UNLISTED E&M SERVICE: CPT | Mod: S$GLB,,, | Performed by: OPHTHALMOLOGY

## 2017-05-19 PROCEDURE — 92012 INTRM OPH EXAM EST PATIENT: CPT | Mod: S$GLB,,, | Performed by: OPHTHALMOLOGY

## 2017-05-19 RX ORDER — PILOCARPINE HYDROCHLORIDE 40 MG/ML
1 SOLUTION/ DROPS OPHTHALMIC 3 TIMES DAILY
Qty: 15 ML | Refills: 12 | Status: SHIPPED | OUTPATIENT
Start: 2017-05-19 | End: 2018-03-05 | Stop reason: SDUPTHER

## 2017-05-19 RX ORDER — BRIMONIDINE TARTRATE 1.5 MG/ML
1 SOLUTION/ DROPS OPHTHALMIC 3 TIMES DAILY
Qty: 1 BOTTLE | Refills: 12 | Status: SHIPPED | OUTPATIENT
Start: 2017-05-19 | End: 2018-03-05 | Stop reason: SDUPTHER

## 2017-05-19 RX ORDER — TIMOLOL MALEATE 5 MG/ML
1 SOLUTION OPHTHALMIC EVERY MORNING
Qty: 5 ML | Refills: 12 | Status: SHIPPED | OUTPATIENT
Start: 2017-05-19 | End: 2018-03-05 | Stop reason: SDUPTHER

## 2017-05-19 NOTE — PROGRESS NOTES
HPI     Glaucoma    Additional comments: IOP ck today           Dry Eye    Additional comments: Pt c/o dry eyes           Comments   PAtient denies eye pain, redness, photophobia. Taking gtts as prescribed.     DLS: 1/27/17    1) MMG  2) Blind Hypertensive OD  3) NS OD  4) FABY  5) APD OD  6) Band Keratopathy  7) Asteroid Hyalosis OS  8) Hx Acute Dacryocystitis   9) CRVO OD  10) Ant. Capsule Phimosis OS  11) PCIOL OS    MEDS:  T1/2 GFS QAM OD   Alphagan TID OD   Camacho 4% TID OD   Lumigan QHS OD  AT's PRN OU       Last edited by Thien Sheppard MD on 5/19/2017 11:45 AM. (History)            Assessment /Plan     For exam results, see Encounter Report.    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  Dispense: 1 Bottle; Refill: 12  -     brimonidine 0.15 % OPTH DROP (ALPHAGAN) 0.15 % ophthalmic solution; Place 1 drop into the right eye 3 (three) times daily.  Dispense: 1 Bottle; Refill: 12  -     pilocarpine HCL 4% (PILOCAR) 4 % ophthalmic solution; Place 1 drop into the right eye 3 (three) times daily.  Dispense: 15 mL; Refill: 12  -     timolol maleate 0.5% (TIMOPTIC-XE) 0.5 % SolG; Place 1 drop into the right eye every morning.  Dispense: 5 mL; Refill: 12    CRVO (central retinal vein occlusion) - Right Eye    Blind hypertensive right eye    Posterior synechiae, right    Senile nuclear sclerosis, right    Band keratopathy, right    Afferent pupillary defect, right    SPK (superficial punctate keratitis), left    Glaucoma filtering bleb of left eye    Capsular opacification            1. 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   Angle open after PI's ou   -Followed at [facility] since at least '01   First HVF 2001   First photos 2001   S/p PI OU   s/p Gonioplasty OU     Family history none   Glaucoma meds Lumigan, Istalol qAM, Agan tid, Camacho tid  - all OD ((off all gtts os a/p combined)  "  H/O adverse rxn to glaucoma drops Azopt "felt weird"   LASERS S/p PI OU, s/p Gonioplasty OU   GLAUCOMA SURGERIES    combined phaco/trab with mini shunt OS 5/22/2013   OTHER EYE SURGERIES    S/P DCR sx OD x 2 or 3 with Damaris    Combined phaco/IOL/ trab with mini shunt OS 5/22/2013   CDR 0.99 /0.9   Tbase 42/23   Tmax 42/23 (when stopped gtts)   Ttarget pain control od // 18 os   HVF 16 VF '01 -'16 - Ext to stim V od  // SALT / IAD os   Gonio +3/+3   /539   OCT 8 test 2006 to 2015 - RNFL - OD:dec. S/N/I , bord T // OS:dec N/I, bord S/T   HRT 13 test 2004 to 2017 - MR -  Dec. S od // dec. S/N/I os /// CDR 0.61 od // 0.84 os  Disc photos - 2001, 2003, 2006 - slides // 2008, 2010, 2016  - OIS     Ta today 36/11 (states good compliance with gtts od - still pain free)   Test today - IOP, gonio    2.  Blind hypertensive Right Eye   Pain free - eye comfortable   Very limited vision CF at 3'   End stage glaucoma and S/P CRVO   No rubeosis   IOP is high but eye is pain free    3. Cataract OD -However, OD VA limited 2/2 CRVO and end stge glaucoma   S/P phaco/IOL/trab OS 5/22/2013    4. FABY OU -cont ATs     5. APD OD -   Old / stable 2/2 CRVO and glaucoma    6. Band Keratopathy OD -stable. Cont ATs- being followed by Dr. Saul;    had EDTA chelation on 2/13/14 OS - now clear     7. Asteroid Hyalosis OS -stable     8. Hx of Acute Dacryocystitis OS and recent NLD OD s/p surgery 11/12 and repeat for exposure w/ Dr. Ocampo 12/5/12 -stable. Cont f/u with Damaris     9. Hx of CRVO OD -limiting visual potential OD   -No NV on todays exam     10. Ant capsule phimosis os    Monitor     11. PC IOL os    Combined  W/ mini shunt 5/22/2013   doing well VA is 20/25 and the IOP is 13    Plan:   Off all gtts for now OS - s/p combined -- IOP stable - at 13 and VA is 20/25     Cont glaucoma gtts OD -- IOP very high, but comfortable/no pain // Blind hypertensive but pain free eye   Old  crvo     Ok to use OTC readers for now a +3.00 to " +3.50 - not really able to improve distance vision with glasses at this time  Much improved from before the combined  Procedure      Pt C/o blurry vision os - ?? 2/2 ant capsule phimosis   F/u yag cap os - + ant capsule fibrosis and opacification - ? Cause vision disturbance and IOL decentration   Keep IOP low os - only seeing eye  Keep eye comfortable od     I have seen and personally examined the patient.  I agree with the findings, assessment and plan of the resident and/or fellow.     Aysha Triplett MD

## 2017-06-21 ENCOUNTER — OFFICE VISIT (OUTPATIENT)
Dept: CARDIOLOGY | Facility: CLINIC | Age: 82
End: 2017-06-21
Payer: MEDICARE

## 2017-06-21 VITALS
HEIGHT: 63 IN | HEART RATE: 54 BPM | DIASTOLIC BLOOD PRESSURE: 72 MMHG | SYSTOLIC BLOOD PRESSURE: 178 MMHG | BODY MASS INDEX: 15.12 KG/M2 | WEIGHT: 85.31 LBS

## 2017-06-21 DIAGNOSIS — D64.9 NORMOCYTIC ANEMIA: ICD-10-CM

## 2017-06-21 DIAGNOSIS — I25.10 CORONARY ARTERY DISEASE INVOLVING NATIVE CORONARY ARTERY OF NATIVE HEART WITHOUT ANGINA PECTORIS: Primary | Chronic | ICD-10-CM

## 2017-06-21 DIAGNOSIS — I45.10 RIGHT BUNDLE BRANCH BLOCK: ICD-10-CM

## 2017-06-21 DIAGNOSIS — R63.6 UNDERWEIGHT DUE TO INADEQUATE CALORIC INTAKE: ICD-10-CM

## 2017-06-21 DIAGNOSIS — I10 ESSENTIAL HYPERTENSION: Chronic | ICD-10-CM

## 2017-06-21 DIAGNOSIS — I77.9 BILATERAL CAROTID ARTERY DISEASE: Chronic | ICD-10-CM

## 2017-06-21 DIAGNOSIS — I70.0 AORTIC ATHEROSCLEROSIS: ICD-10-CM

## 2017-06-21 DIAGNOSIS — R01.1 MURMUR: ICD-10-CM

## 2017-06-21 DIAGNOSIS — J43.9 PULMONARY EMPHYSEMA, UNSPECIFIED EMPHYSEMA TYPE: ICD-10-CM

## 2017-06-21 DIAGNOSIS — E78.5 DYSLIPIDEMIA: Chronic | ICD-10-CM

## 2017-06-21 PROCEDURE — 99999 PR PBB SHADOW E&M-EST. PATIENT-LVL III: CPT | Mod: PBBFAC,,, | Performed by: NURSE PRACTITIONER

## 2017-06-21 PROCEDURE — 1126F AMNT PAIN NOTED NONE PRSNT: CPT | Mod: S$GLB,,, | Performed by: NURSE PRACTITIONER

## 2017-06-21 PROCEDURE — 99499 UNLISTED E&M SERVICE: CPT | Mod: S$GLB,,, | Performed by: NURSE PRACTITIONER

## 2017-06-21 PROCEDURE — 1159F MED LIST DOCD IN RCRD: CPT | Mod: S$GLB,,, | Performed by: NURSE PRACTITIONER

## 2017-06-21 PROCEDURE — 99214 OFFICE O/P EST MOD 30 MIN: CPT | Mod: S$GLB,,, | Performed by: NURSE PRACTITIONER

## 2017-06-21 NOTE — Clinical Note
Hi Dr. Urbina, I had the pleasure of seeing this patient today.  I am concerned about her weight and nutrition. She has lost 5 pounds over the last year that she didn't have to lose and is only 85 pounds.  We do not have social work in the office here.  I wasn't sure what resources you have and I thought I would mention it to you.   Thanks! Isela Faith, MARCELINO-C

## 2017-06-21 NOTE — PROGRESS NOTES
Ms. Summers is a patient of Dr. Morrow and was last seen in [facility] 1/13/2017.    Subjective:   Patient ID:  Mj Summers is a 81 y.o. female who presents for follow-up of Essential hypertension (3 months fu)    HPI  Ms. Summers is an 81y.o. Female with coronary artery disease ([facility] 2010 mid LAD 40%), mild bilateral carotid disease (20-39%), hyperlipidemia, hypertension, and pulmonary emphysema.  CAD treated with low dose ASA and high intensity statin therapy.  Patient denies chest pain with exertion or at rest, palpitations, SOB, YOUNGBLOOD, dizziness, syncope, edema, orthopnea, PND, or claudication.  Last echo 2009 with concentric remodeling but normal systolic and diastolic function.  Hyperlipidemia is treated with atorvastatin 40mg and last  on 3/2/17.  Hypertension treated with a thiazide diuretic (chlorthalidone 12.5mg) and an ARB (valsartan 160mg BID).  Reports home blood pressures are 130-140s at home.  States that she eats a low salt diet.  She participates in the [employer] fitness program after her hip fracture and surgery.  States that she has some trouble getting to [employer] because she does not drive.     Review of Systems   Constitution: Negative for decreased appetite, diaphoresis, weakness, malaise/fatigue, weight gain and weight loss.   HENT: Negative for headaches.    Eyes: Negative for visual disturbance.   Cardiovascular: Negative for chest pain, claudication, dyspnea on exertion, irregular heartbeat, leg swelling, near-syncope, orthopnea, palpitations, paroxysmal nocturnal dyspnea and syncope.        Denies chest pressure   Respiratory: Negative for cough, hemoptysis, shortness of breath, sleep disturbances due to breathing and snoring.    Endocrine: Negative for cold intolerance and heat intolerance.   Hematologic/Lymphatic: Negative for bleeding problem. Does not bruise/bleed easily.   Musculoskeletal: Negative for myalgias.   Gastrointestinal: Negative for bloating, abdominal pain,  anorexia, change in bowel habit, constipation, diarrhea, nausea and vomiting.   Neurological: Negative for difficulty with concentration, disturbances in coordination, excessive daytime sleepiness, dizziness, light-headedness, loss of balance and numbness.   Psychiatric/Behavioral: The patient does not have insomnia.      Allergies and current medications updated and reviewed:  Review of patient's allergies indicates:   Allergen Reactions    Pcn [penicillins] Rash    Sulfa (sulfonamide antibiotics) Itching     Current Outpatient Prescriptions   Medication Sig    aspirin 81 mg Tab Take 81 mg by mouth every evening. 1 Tablet Oral Every day    atorvastatin (LIPITOR) 40 MG tablet Take 1 tablet (40 mg total) by mouth once daily.    bimatoprost (LUMIGAN) 0.01 % Drop Place 1 drop into the right eye every evening.    brimonidine 0.15 % OPTH DROP (ALPHAGAN) 0.15 % ophthalmic solution Place 1 drop into the right eye 3 (three) times daily.    calcium carbonate (OS-KACI) 600 mg (1,500 mg) Tab Take 600 mg by mouth 2 (two) times daily with meals.    chlorthalidone (HYGROTEN) 25 MG Tab 1/2 tablet daily    ergocalciferol (ERGOCALCIFEROL) 50,000 unit Cap Take 1 capsule (50,000 Units total) by mouth every 30 days.    ferrous sulfate 325 (65 FE) MG EC tablet Take 1 tablet (325 mg total) by mouth once daily.    meloxicam (MOBIC) 7.5 MG tablet Take 1 tablet by mouth daily as needed.    multivitamin (ONE DAILY MULTIVITAMIN) per tablet Take 1 tablet by mouth once daily.    NEXIUM 40 mg capsule take 1 capsule by mouth once daily    pilocarpine HCL 4% (PILOCAR) 4 % ophthalmic solution Place 1 drop into the right eye 3 (three) times daily.    senna-docusate 8.6-50 mg (PERICOLACE) 8.6-50 mg per tablet Take 1 tablet by mouth 2 (two) times daily. (Patient taking differently: Take 1 tablet by mouth 2 (two) times daily as needed. )    timolol maleate 0.5% (TIMOPTIC-XE) 0.5 % SolG Place 1 drop into the right eye every morning.     "valsartan (DIOVAN) 160 MG tablet Take 1 tablet (160 mg total) by mouth 2 (two) times daily.     No current facility-administered medications for this visit.      Objective:     Right Arm BP - Sittin/68 (17 1409)  Left Arm BP - Sittin/72 (17 1409)    BP (!) 178/72 (BP Location: Left arm, Patient Position: Sitting, BP Method: Automatic)   Pulse (!) 54   Ht 5' 3" (1.6 m)   Wt 38.7 kg (85 lb 5.1 oz)   LMP  (LMP Unknown)   BMI 15.11 kg/m²       Physical Exam   Constitutional: She is oriented to person, place, and time. Vital signs are normal. She appears well-developed and well-nourished. She is active. No distress.   Exam performed in wheelchair d/t weakness of patient preventing getting onto exam table.    HENT:   Head: Normocephalic and atraumatic.   Eyes: Conjunctivae and lids are normal. No scleral icterus.   Neck: Neck supple. Normal carotid pulses, no hepatojugular reflux and no JVD present. Carotid bruit is not present.   Cardiovascular: Normal rate, regular rhythm, S1 normal, S2 normal and intact distal pulses.  PMI is not displaced.  Exam reveals no gallop and no friction rub.    No murmur heard.  Pulses:       Carotid pulses are 2+ on the right side, and 2+ on the left side.       Radial pulses are 2+ on the right side, and 2+ on the left side.        Dorsalis pedis pulses are 2+ on the right side, and 2+ on the left side.        Posterior tibial pulses are 1+ on the right side, and 1+ on the left side.   Pulmonary/Chest: Effort normal and breath sounds normal. No respiratory distress. She has no decreased breath sounds. She has no wheezes. She has no rhonchi. She has no rales. She exhibits no tenderness.   Abdominal: Soft. Normal appearance and bowel sounds are normal. She exhibits no distension, no fluid wave, no abdominal bruit, no ascites and no pulsatile midline mass. There is no hepatosplenomegaly. There is no tenderness.   Musculoskeletal: She exhibits edema (BLE trace to " +1).   Neurological: She is alert and oriented to person, place, and time. Gait normal.   Skin: Skin is warm, dry and intact. No rash noted. She is not diaphoretic. Nails show no clubbing.   Psychiatric: She has a normal mood and affect. Her speech is normal and behavior is normal. Judgment and thought content normal. Cognition and memory are normal.   Nursing note and vitals reviewed.      Chemistry        Component Value Date/Time     03/02/2017 0959    K 4.5 03/02/2017 0959     03/02/2017 0959    CO2 31 (H) 03/02/2017 0959    BUN 17 03/02/2017 0959    CREATININE 0.8 03/02/2017 0959    GLU 87 03/02/2017 0959        Component Value Date/Time    CALCIUM 9.7 03/02/2017 0959    ALKPHOS 79 03/02/2017 0959    AST 32 03/02/2017 0959    ALT 38 03/02/2017 0959    BILITOT 0.4 03/02/2017 0959          Recent Labs  Lab 04/05/16  1746  07/27/16  1915 07/27/16 2003 03/02/17  0959   WHITE BLOOD CELL COUNT 4.67  < > 5.10  --   --    HEMOGLOBIN 11.6 L  < > 12.1  --   --    POC HEMATOCRIT  --   --   --  38  --    HEMATOCRIT 34.2 L  < > 36.5 L  --   --    MCV 86  < > 90  --   --    PLATELETS 234  < > 110 L  --   --    TSH 1.757  --   --   --   --    CHOLESTEROL  --   --   --   --  246 H   HDL  --   --   --   --  63   LDL CHOLESTEROL  --   --   --   --  165.6 H   TRIGLYCERIDES  --   --   --   --  87   HDL/CHOLESTEROL RATIO  --   --   --   --  25.6   < > = values in this interval not displayed.      Recent Labs  Lab 05/23/16  1558   INR 1.0        Test(s) Reviewed  I have reviewed the following in detail:  [] Stress test   [] Angiography   [] Echocardiogram   [x] Labs   [] Other:       Assessment:     1. Coronary artery disease involving native coronary artery of native heart without angina pectoris  Asymptomatic. Prescribed GDMT.  and patient admits to missing doses d/t not taking with her other medications in the morning   2. Bilateral carotid artery disease  Mild disease, 20-39%   3. Aortic atherosclerosis      4. Essential hypertension  Home blood pressures at goal for age, <150/90 per JNC8 guidelines.   5. Dyslipidemia  Not consistently taking atorvastatin.    6. Normocytic anemia  Unclear etiology   7. Pulmonary emphysema, unspecified emphysema type     8. Right bundle branch block     9. Underweight Relies on family to bring her to the grocery store and prepares her own meals. Denies having food access issues.       Plan:     Coronary artery disease involving native coronary artery of native heart without angina pectoris  Comments:  Continue GDMT    Bilateral carotid artery disease    Aortic atherosclerosis    Essential hypertension  Comments:  Continue current regimen. Instructed to keep blood pressure log and bring to next visit. Discussed low salt diet, <2gm/day    Dyslipidemia  Comments:  encouraged to take atorvastatin when she takes her other medications so that she takes it more consistently.     Normocytic anemia    Pulmonary emphysema, unspecified emphysema type    Right bundle branch block    Underweight due to inadequate caloric intake  Comments:  Encouraged to drink 2 boosts a day in between meals.  She would benefit from meals on wheels or another program that could offer her ready made meals.  Encouraged her to discuss with PCP at next visit.     Murmur  -     2D Echo w/ Color Flow Doppler; Future      Return in about 6 months (around 12/21/2017).

## 2017-06-22 ENCOUNTER — OFFICE VISIT (OUTPATIENT)
Dept: OPHTHALMOLOGY | Facility: CLINIC | Age: 82
End: 2017-06-22
Payer: MEDICARE

## 2017-06-22 VITALS — DIASTOLIC BLOOD PRESSURE: 68 MMHG | SYSTOLIC BLOOD PRESSURE: 177 MMHG | HEART RATE: 59 BPM

## 2017-06-22 DIAGNOSIS — H18.421 BAND KERATOPATHY, RIGHT: ICD-10-CM

## 2017-06-22 DIAGNOSIS — H34.8192 CRVO (CENTRAL RETINAL VEIN OCCLUSION): ICD-10-CM

## 2017-06-22 DIAGNOSIS — Z98.83 GLAUCOMA FILTERING BLEB OF LEFT EYE: ICD-10-CM

## 2017-06-22 DIAGNOSIS — H25.11 SENILE NUCLEAR SCLEROSIS, RIGHT: ICD-10-CM

## 2017-06-22 DIAGNOSIS — H21.541 POSTERIOR SYNECHIAE, RIGHT: ICD-10-CM

## 2017-06-22 DIAGNOSIS — H21.561 AFFERENT PUPILLARY DEFECT, RIGHT: ICD-10-CM

## 2017-06-22 DIAGNOSIS — H40.89 GLAUCOMA DUE TO COMBINATION OF MECHANISMS: ICD-10-CM

## 2017-06-22 DIAGNOSIS — H26.40 CAPSULAR OPACIFICATION: Primary | ICD-10-CM

## 2017-06-22 DIAGNOSIS — Z96.1 PSEUDOPHAKIA OF LEFT EYE: ICD-10-CM

## 2017-06-22 DIAGNOSIS — H16.142 SPK (SUPERFICIAL PUNCTATE KERATITIS), LEFT: ICD-10-CM

## 2017-06-22 DIAGNOSIS — H35.031 BLIND HYPERTENSIVE RIGHT EYE: ICD-10-CM

## 2017-06-22 PROCEDURE — 66821 AFTER CATARACT LASER SURGERY: CPT | Mod: LT,S$GLB,, | Performed by: OPHTHALMOLOGY

## 2017-06-22 PROCEDURE — 99499 UNLISTED E&M SERVICE: CPT | Mod: S$GLB,,, | Performed by: OPHTHALMOLOGY

## 2017-06-22 PROCEDURE — 99999 PR PBB SHADOW E&M-EST. PATIENT-LVL II: CPT | Mod: PBBFAC,,, | Performed by: OPHTHALMOLOGY

## 2017-06-22 NOTE — PROGRESS NOTES
"HPI     Laser Treatment    Additional comments: yag cap os           Comments   YAG CAP OS TODAY    1) MMG  2) Blind Hypertensive OD  3) NS OD  4) FABY  5) APD OD  6) Band Keratopathy  7) Asteroid Hyalosis OS  8) Hx Acute Dacryocystitis   9) CRVO OD  10) Ant. Capsule Phimosis OS  11) PCIOL OS    MEDS:  T1/2 GFS QAM OD   Alphagan TID OD   Camacho 4% TID OD   Lumugan QHS OD  AT's PRN OU       Last edited by Yuridia Talamantes MA on 6/22/2017  9:59 AM. (History)            Assessment /Plan     For exam results, see Encounter Report.    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   Angle open after PI's ou   -Followed at [facility] since at least '01   First HVF 2001   First photos 2001   S/p PI OU   s/p Gonioplasty OU     Family history none   Glaucoma meds Lumigan, Istalol qAM, Agan tid, Camacho tid  - all OD ((off all gtts os a/p combined)   H/O adverse rxn to glaucoma drops Azopt "felt weird"   LASERS S/p PI OU, s/p Gonioplasty OU   GLAUCOMA SURGERIES    combined phaco/trab with mini shunt OS 5/22/2013   OTHER EYE SURGERIES    S/P DCR sx OD x 2 or 3 with Damaris    Combined phaco/IOL/ trab with mini shunt OS 5/22/2013   CDR 0.99 /0.9   Tbase 42/23   Tmax 42/23 (when stopped gtts)   Ttarget pain control od // 18 os   HVF 16 VF '01 -'16 - Ext to stim V od  // SALT / IAD os   Gonio +3/+3   /539   OCT 8 test 2006 to 2015 - RNFL - OD:dec. S/N/I , bord T // OS:dec N/I, bord S/T   HRT 13 test 2004 to 2017 - MR -  Dec. S od // dec. S/N/I os /// CDR 0.61 od // 0.84 os  Disc photos - 2001, " 2003, 2006 - slides // 2008, 2010, 2016  - OIS     Ta today 36/11 (states good compliance with gtts od - still pain free)   Test today - IOP, gonio    2.  Blind hypertensive Right Eye   Pain free - eye comfortable   Very limited vision CF at 3'   End stage glaucoma and S/P CRVO   No rubeosis   IOP is high but eye is pain free    3. Cataract OD -However, OD VA limited 2/2 CRVO and end stge glaucoma   S/P phaco/IOL/trab OS 5/22/2013    4. FABY OU -cont ATs     5. APD OD -   Old / stable 2/2 CRVO and glaucoma    6. Band Keratopathy OD -stable. Cont ATs- being followed by Dr. Saul;    had EDTA chelation on 2/13/14 OS - now clear     7. Asteroid Hyalosis OS -stable     8. Hx of Acute Dacryocystitis OS and recent NLD OD s/p surgery 11/12 and repeat for exposure w/ Dr. Ocampo 12/5/12 -stable. Cont f/u with Damaris     9. Hx of CRVO OD -limiting visual potential OD   -No NV on todays exam     10. Ant capsule phimosis os    Monitor     11. PC IOL os    Combined  W/ mini shunt 5/22/2013   doing well VA is 20/25 and the IOP is 13    Plan:   Off all gtts for now OS - s/p combined -- IOP stable - at 13 and VA is 20/25     Cont glaucoma gtts OD -- IOP very high, but comfortable/no pain // Blind hypertensive but pain free eye   Old  crvo     Ok to use OTC readers for now a +3.00 to +3.50 - not really able to improve distance vision with glasses at this time  Much improved from before the combined  Procedure      Pt C/o blurry vision os - ?? 2/2 ant capsule phimosis   F/u yag cap os - + ant capsule fibrosis and opacification - ? Cause vision disturbance and IOL decentration   Keep IOP low os - only seeing eye  Keep eye comfortable od     yag laser to opacified / phimotic capsule - very dense - took a lot of laser power to cut thru opacified / phimotic ring   -may improve centration and anterior p/posterior location of IOL in eye   - may help vision and change refraction   6/22/2017 - decadron steroid taper -OS   IOP ok os post trab      Cont all glaucoma gtts od   Blind hypertensive right eye - pain free     F/U 4 months with HVF - 24-2 ss os only // DFE // OCT - update flow sheet with yag cap for ant phimosis      I have seen and personally examined the patient.  I agree with the findings, assessment and plan of the resident and/or fellow.     Aysha Triplett MD

## 2017-06-27 ENCOUNTER — OFFICE VISIT (OUTPATIENT)
Dept: INTERNAL MEDICINE | Facility: CLINIC | Age: 82
End: 2017-06-27
Payer: MEDICARE

## 2017-06-27 VITALS
DIASTOLIC BLOOD PRESSURE: 70 MMHG | BODY MASS INDEX: 15.04 KG/M2 | SYSTOLIC BLOOD PRESSURE: 118 MMHG | HEIGHT: 63 IN | WEIGHT: 84.88 LBS | OXYGEN SATURATION: 98 % | HEART RATE: 65 BPM

## 2017-06-27 DIAGNOSIS — R26.89 POOR BALANCE: Primary | ICD-10-CM

## 2017-06-27 DIAGNOSIS — R63.6 UNDERWEIGHT DUE TO INADEQUATE CALORIC INTAKE: ICD-10-CM

## 2017-06-27 DIAGNOSIS — E46 PROTEIN CALORIE MALNUTRITION: ICD-10-CM

## 2017-06-27 DIAGNOSIS — I12.9 HYPERTENSIVE CHRONIC KIDNEY DISEASE WITH STAGE 1 THROUGH STAGE 4 CHRONIC KIDNEY DISEASE, OR UNSPECIFIED CHRONIC KIDNEY DISEASE: ICD-10-CM

## 2017-06-27 DIAGNOSIS — R53.1 WEAKNESS: ICD-10-CM

## 2017-06-27 PROCEDURE — 1159F MED LIST DOCD IN RCRD: CPT | Mod: S$GLB,,, | Performed by: NURSE PRACTITIONER

## 2017-06-27 PROCEDURE — 99999 PR PBB SHADOW E&M-EST. PATIENT-LVL V: CPT | Mod: PBBFAC,,, | Performed by: NURSE PRACTITIONER

## 2017-06-27 PROCEDURE — 99499 UNLISTED E&M SERVICE: CPT | Mod: S$GLB,,, | Performed by: NURSE PRACTITIONER

## 2017-06-27 PROCEDURE — 1126F AMNT PAIN NOTED NONE PRSNT: CPT | Mod: S$GLB,,, | Performed by: NURSE PRACTITIONER

## 2017-06-27 PROCEDURE — 99213 OFFICE O/P EST LOW 20 MIN: CPT | Mod: S$GLB,,, | Performed by: NURSE PRACTITIONER

## 2017-06-27 NOTE — PROGRESS NOTES
INTERNAL MEDICINE PROGRESS/URGENT CARE NOTE    CHIEF COMPLAINT     Chief Complaint   Patient presents with    Difficulty Walking     pt requesting walker instead of cane    Other     more PT orders     Extremity Weakness       HPI     Mj Summers is a 81 y.o. female who presents for an urgent visit today with c/o weakness.     Hip fracture 5/2016- with hip replacement. Had home PT following. Was started with cane, which she states makes her feel off balance. Was unable to coordinate two wheeled walker. Requesting rollator.     Weight loss/underweight- currently weighs 84.88 lbs. Appears stable over past year, highest weight 87lbs. Would like dietician consult to increase weight. Does not like ensure or other sweet drinks.     Lives at home, feels comfortable getting around home.     Denies weakness.     Past Medical History:  Past Medical History:   Diagnosis Date    Anemia     Arthritis 2015    back    Cataract     s/p surgery    Coronary artery disease     [facility] 1/2010 - mid LAD 40%    GERD (gastroesophageal reflux disease)     Glaucoma (increased eye pressure)     Hyperlipidemia     Hypertension     Lactose intolerance     Legally blind in right eye, as defined in [place]     Osteoporosis, post-menopausal     Pneumonia 12/13/2015    Proximal muscle weakness     Renal cyst     left kidney    Vitamin D deficiency disease        Home Medications:  Prior to Admission medications    Medication Sig Start Date End Date Taking? Authorizing Provider   aspirin 81 mg Tab Take 81 mg by mouth every evening. 1 Tablet Oral Every day    Historical Provider, MD   atorvastatin (LIPITOR) 40 MG tablet Take 1 tablet (40 mg total) by mouth once daily. 2/24/17 2/24/18  Shasta Urbina MD   bimatoprost (LUMIGAN) 0.01 % Drop Place 1 drop into the right eye every evening. 5/19/17   Aysha Triplett MD   brimonidine 0.15 % OPTH DROP (ALPHAGAN) 0.15 % ophthalmic solution Place 1 drop into the right eye 3 (three) times  daily. 5/19/17   Aysha Triplett MD   calcium carbonate (OS-KACI) 600 mg (1,500 mg) Tab Take 600 mg by mouth 2 (two) times daily with meals.    Historical Provider, MD   chlorthalidone (HYGROTEN) 25 MG Tab 1/2 tablet daily 1/13/17   Man Morrow MD   ergocalciferol (ERGOCALCIFEROL) 50,000 unit Cap Take 1 capsule (50,000 Units total) by mouth every 30 days. 6/16/16   Daniela Conrad MD   ferrous sulfate 325 (65 FE) MG EC tablet Take 1 tablet (325 mg total) by mouth once daily. 5/23/16   Layton Denney MD   meloxicam (MOBIC) 7.5 MG tablet Take 1 tablet by mouth daily as needed. 1/26/17   Historical Provider, MD   multivitamin (ONE DAILY MULTIVITAMIN) per tablet Take 1 tablet by mouth once daily.    Historical Provider, MD   NEXIUM 40 mg capsule take 1 capsule by mouth once daily 10/21/16   Shasta Urbina MD   pilocarpine HCL 4% (PILOCAR) 4 % ophthalmic solution Place 1 drop into the right eye 3 (three) times daily. 5/19/17   Aysha Triplett MD   senna-docusate 8.6-50 mg (PERICOLACE) 8.6-50 mg per tablet Take 1 tablet by mouth 2 (two) times daily.  Patient taking differently: Take 1 tablet by mouth 2 (two) times daily as needed.  5/12/16   Isabella Ernst MD   timolol maleate 0.5% (TIMOPTIC-XE) 0.5 % SolG Place 1 drop into the right eye every morning. 5/19/17   Aysha Triplett MD   valsartan (DIOVAN) 160 MG tablet Take 1 tablet (160 mg total) by mouth 2 (two) times daily. 2/24/17 2/24/18  Shasta Urbina MD       Review of Systems:  Review of Systems   Constitutional: Negative for activity change, appetite change, chills, diaphoresis, fatigue and fever.   Respiratory: Negative for chest tightness and shortness of breath.    Cardiovascular: Negative for chest pain and palpitations.   Musculoskeletal: Negative for arthralgias and myalgias.        Mild generalized weakness   Neurological: Negative for dizziness, light-headedness and numbness    Health Maintainence:  "  Immunizations:  Health Maintenance       Date Due Completion Date    Mammogram 08/24/2017 8/24/2016    Zoster Vaccine 08/31/2017 (Originally 7/17/1995) ---    TETANUS VACCINE 08/31/2017 (Originally 7/17/1953) ---    Influenza Vaccine 08/01/2017 9/15/2016    Lipid Panel 03/02/2018 3/2/2017    DEXA SCAN 12/07/2018 12/7/2016           PHYSICAL EXAM     /70 (BP Location: Left arm, Patient Position: Sitting, BP Method: Manual)   Pulse 65   Ht 5' 3" (1.6 m)   Wt 38.5 kg (84 lb 14 oz)   LMP  (LMP Unknown)   SpO2 98%   BMI 15.04 kg/m²     Physical Exam   Constitutional: She is oriented to person, place, and time. She appears well-developed.   underweight   HENT:   Head: Normocephalic.   Eyes: EOM are normal. Pupils are equal, round, and reactive to light.   Neck: Normal range of motion. Neck supple.   Cardiovascular: Normal rate and regular rhythm.    Pulmonary/Chest: Effort normal and breath sounds normal.   Musculoskeletal: She exhibits no tenderness.        Left hip: She exhibits decreased range of motion and decreased strength.        Legs:  L hip strength +2, R hip +3   Neurological: She is alert and oriented to person, place, and time.   Skin: Skin is warm and dry.   Psychiatric: She has a normal mood and affect. Her behavior is normal. Thought content normal.   Vitals reviewed.    LABS     No results found for: LABA1C, HGBA1C  CMP  Sodium   Date Value Ref Range Status   03/02/2017 140 136 - 145 mmol/L Final     Potassium   Date Value Ref Range Status   03/02/2017 4.5 3.5 - 5.1 mmol/L Final     Chloride   Date Value Ref Range Status   03/02/2017 104 95 - 110 mmol/L Final     CO2   Date Value Ref Range Status   03/02/2017 31 (H) 23 - 29 mmol/L Final     Glucose   Date Value Ref Range Status   03/02/2017 87 70 - 110 mg/dL Final     BUN, Bld   Date Value Ref Range Status   03/02/2017 17 8 - 23 mg/dL Final     Creatinine   Date Value Ref Range Status   03/02/2017 0.8 0.5 - 1.4 mg/dL Final     Calcium   Date " Value Ref Range Status   03/02/2017 9.7 8.7 - 10.5 mg/dL Final     Total Protein   Date Value Ref Range Status   03/02/2017 7.3 6.0 - 8.4 g/dL Final     Albumin   Date Value Ref Range Status   03/02/2017 3.3 (L) 3.5 - 5.2 g/dL Final     Total Bilirubin   Date Value Ref Range Status   03/02/2017 0.4 0.1 - 1.0 mg/dL Final     Comment:     For infants and newborns, interpretation of results should be based  on gestational age, weight and in agreement with clinical  observations.  Premature Infant recommended reference ranges:  Up to 24 hours.............<8.0 mg/dL  Up to 48 hours............<12.0 mg/dL  3-5 days..................<15.0 mg/dL  6-29 days.................<15.0 mg/dL       Alkaline Phosphatase   Date Value Ref Range Status   03/02/2017 79 55 - 135 U/L Final     AST   Date Value Ref Range Status   03/02/2017 32 10 - 40 U/L Final     ALT   Date Value Ref Range Status   03/02/2017 38 10 - 44 U/L Final     Anion Gap   Date Value Ref Range Status   03/02/2017 5 (L) 8 - 16 mmol/L Final     eGFR if    Date Value Ref Range Status   03/02/2017 >60.0 >60 mL/min/1.73 m^2 Final     eGFR if non    Date Value Ref Range Status   03/02/2017 >60.0 >60 mL/min/1.73 m^2 Final     Comment:     Calculation used to obtain the estimated glomerular filtration  rate (eGFR) is the CKD-EPI equation. Since race is unknown   in our information system, the eGFR values for   -American and Non--American patients are given   for each creatinine result.       Lab Results   Component Value Date    WBC 5.10 07/27/2016    HGB 12.1 07/27/2016    HCT 38 07/27/2016    MCV 90 07/27/2016     (L) 07/27/2016     Lab Results   Component Value Date    CHOL 246 (H) 03/02/2017    CHOL 194 12/30/2015    CHOL 272 (H) 11/10/2015     Lab Results   Component Value Date    HDL 63 03/02/2017    HDL 73 12/30/2015    HDL 79 (H) 11/10/2015     Lab Results   Component Value Date    LDLCALC 165.6 (H) 03/02/2017     LDLCALC 103.0 12/30/2015    LDLCALC 179.8 (H) 11/10/2015     Lab Results   Component Value Date    TRIG 87 03/02/2017    TRIG 90 12/30/2015    TRIG 66 11/10/2015     Lab Results   Component Value Date    CHOLHDL 25.6 03/02/2017    CHOLHDL 37.6 12/30/2015    CHOLHDL 29.0 11/10/2015     Lab Results   Component Value Date    TSH 1.757 04/05/2016       ASSESSMENT/PLAN     Mj Summers is a 81 y.o. female with  Past Medical History:   Diagnosis Date    Anemia     Arthritis 2015    back    Cataract     s/p surgery    Coronary artery disease     [facility] 1/2010 - mid LAD 40%    GERD (gastroesophageal reflux disease)     Glaucoma (increased eye pressure)     Hyperlipidemia     Hypertension     Lactose intolerance     Legally blind in right eye, as defined in [place]     Osteoporosis, post-menopausal     Pneumonia 12/13/2015    Proximal muscle weakness     Renal cyst     left kidney    Vitamin D deficiency disease      Poor balance  -     walker Misc; 1 each by Misc.(Non-Drug; Combo Route) route once daily.     Protein calorie malnutrition  -     Ambulatory consult to Nutrition Services  -     Ambulatory consult to Social Work     Underweight due to inadequate caloric intake  -     Ambulatory consult to Nutrition Services  -     Ambulatory consult to Social Work     Weakness  -     walker Misc; 1 each by Misc.(Non-Drug; Combo Route) route once daily.  - Will call therapist, Moshe, at [facility] to have patient receive education on use of rollator.      Hypertensive chronic kidney disease with stage 1 through stage 4 chronic kidney disease, or unspecified chronic kidney disease   -     Ambulatory consult to Nutrition Services     RTC as needed and follow up with PCP annually.        Patient education provided from [facility]. Patient was counseled on when and how to seek emergent care.       Luzmaria TOWNSEND, APRN, FNP-c   Department of Internal Medicine - [facility]  1:09 PM

## 2017-06-27 NOTE — MEDICAL/APP STUDENT
Subjective:       Patient ID: Mj Summers is a 81 y.o. female.    Chief Complaint: Difficulty Walking (pt requesting walker instead of cane); Other (more PT orders ); and Extremity Weakness    Patient presents today due to an increased amount of weakness yesterday. She had an appointment yesterday but was rescheduled due to missing appt time. Patient states she feels much better today, but is concerned with her mobility. Patient has a four-pronged cane at home, but feels unsteady with the cane recently and is afraid she will fall. Patient had a fall 5/9/2016 with a hip fracture and had surgery the following day. After the surgery she received PT/OT at [facility] inpatient facility and then home health until July 2016. Since that time she has been attending therapy at [facility]. Patient is currently hoping to have a rollator walker with a seat for more comfort and stability. Patient also interested in a referral to dietician for healthy options to gain weight. Mild weakness today, none more than usual. Feels unsteady when using the four pronged cane. Feels comfortable getting around at home. Denies numbness, tingling, fever, chills, fatigue.   Reports compliance with daily medications.      Review of Systems   Constitutional: Negative for activity change, appetite change, chills, diaphoresis, fatigue and fever.   Respiratory: Negative for chest tightness and shortness of breath.    Cardiovascular: Negative for chest pain and palpitations.   Musculoskeletal: Negative for arthralgias and myalgias.        Mild generalized weakness   Neurological: Negative for dizziness, light-headedness and numbness.       Objective:      Physical Exam   Constitutional: She is oriented to person, place, and time. She appears well-developed.   underweight   HENT:   Head: Normocephalic.   Eyes: EOM are normal. Pupils are equal, round, and reactive to light.   Neck: Normal range of motion. Neck supple.    Cardiovascular: Normal rate and regular rhythm.    Pulmonary/Chest: Effort normal and breath sounds normal.   Musculoskeletal: She exhibits no tenderness.        Left hip: She exhibits decreased range of motion and decreased strength.        Legs:  L hip strength +2, R hip +3   Neurological: She is alert and oriented to person, place, and time.   Skin: Skin is warm and dry.   Psychiatric: She has a normal mood and affect. Her behavior is normal. Thought content normal.   Vitals reviewed.      Assessment:     1. Poor balance    2. Protein calorie malnutrition    3. Underweight due to inadequate caloric intake    4. Weakness    5. Hypertensive chronic kidney disease with stage 1 through stage 4 chronic kidney disease, or unspecified chronic kidney disease           Plan:   Mj was seen today for difficulty walking, other and extremity weakness.    Diagnoses and all orders for this visit:    Poor balance  -     walker Misc; 1 each by Misc.(Non-Drug; Combo Route) route once daily.    Protein calorie malnutrition  -     Ambulatory consult to Nutrition Services  -     Ambulatory consult to Social Work    Underweight due to inadequate caloric intake  -     Ambulatory consult to Nutrition Services  -     Ambulatory consult to Social Work    Weakness  -     walker Misc; 1 each by Misc.(Non-Drug; Combo Route) route once daily.  - Will call therapist, Moshe, at [facility] to have patient receive education on use of rollator.     Hypertensive chronic kidney disease with stage 1 through stage 4 chronic kidney disease, or unspecified chronic kidney disease   -     Ambulatory consult to Nutrition Services    RTC as needed and follow up with PCP annually.

## 2017-07-06 ENCOUNTER — NUTRITION (OUTPATIENT)
Dept: NUTRITION | Facility: CLINIC | Age: 82
End: 2017-07-06
Payer: MEDICARE

## 2017-07-06 ENCOUNTER — TELEPHONE (OUTPATIENT)
Dept: INTERNAL MEDICINE | Facility: CLINIC | Age: 82
End: 2017-07-06

## 2017-07-06 VITALS — HEIGHT: 63 IN | WEIGHT: 86 LBS | BODY MASS INDEX: 15.24 KG/M2

## 2017-07-06 DIAGNOSIS — R63.4 UNINTENTIONAL WEIGHT LOSS: Primary | ICD-10-CM

## 2017-07-06 DIAGNOSIS — Z71.3 DIETARY COUNSELING: ICD-10-CM

## 2017-07-06 PROCEDURE — 99999 PR PBB SHADOW E&M-EST. PATIENT-LVL III: CPT | Mod: PBBFAC,,,

## 2017-07-06 PROCEDURE — 97803 MED NUTRITION INDIV SUBSEQ: CPT | Mod: S$GLB,,, | Performed by: DIETITIAN, REGISTERED

## 2017-07-06 NOTE — TELEPHONE ENCOUNTER
Spoke to pt, pt wanted to inform me that Sapna called and told her that the walker was approved and she shall be receiving it soon.

## 2017-07-06 NOTE — PATIENT INSTRUCTIONS
1.  Aim for at least 6 meals and snacks each day  2.  Eat more fat (butter, song, salad dressing, peanut butter)  3.  Choose high-calorie drinks, like whole milk, juice, regular soda  4.  Eat fruits to be healthy.  5.  Fix up vegetables to add calories and protein (add cheese sauce, butter, margarine, gravy, oil or salad dressing.

## 2017-07-06 NOTE — PROGRESS NOTES
"Referring Physician:Luzmaria Rios,*     Reason for visit:  Chief Complaint   Patient presents with    Weight Loss    Nutrition Counseling        :1935     Allergies Reviewed  Meds Reviewed    Anthropometrics  Weight:39 kg (85 lb 15.7 oz)  Height:5' 3" (1.6 m)  BMI:Body mass index is 15.23 kg/m².   IBW:   50 kg    Meds:  Outpatient Medications Prior to Visit   Medication Sig Dispense Refill    aspirin 81 mg Tab Take 81 mg by mouth every evening. 1 Tablet Oral Every day      atorvastatin (LIPITOR) 40 MG tablet Take 1 tablet (40 mg total) by mouth once daily. 30 tablet 3    bimatoprost (LUMIGAN) 0.01 % Drop Place 1 drop into the right eye every evening. 1 Bottle 12    brimonidine 0.15 % OPTH DROP (ALPHAGAN) 0.15 % ophthalmic solution Place 1 drop into the right eye 3 (three) times daily. 1 Bottle 12    calcium carbonate (OS-KACI) 600 mg (1,500 mg) Tab Take 600 mg by mouth 2 (two) times daily with meals.      chlorthalidone (HYGROTEN) 25 MG Tab 1/2 tablet daily 30 tablet 5    ergocalciferol (ERGOCALCIFEROL) 50,000 unit Cap Take 1 capsule (50,000 Units total) by mouth every 30 days. 3 capsule 3    ferrous sulfate 325 (65 FE) MG EC tablet Take 1 tablet (325 mg total) by mouth once daily. 30 tablet 0    meloxicam (MOBIC) 7.5 MG tablet Take 1 tablet by mouth daily as needed.      multivitamin (ONE DAILY MULTIVITAMIN) per tablet Take 1 tablet by mouth once daily.      NEXIUM 40 mg capsule take 1 capsule by mouth once daily 30 capsule 6    pilocarpine HCL 4% (PILOCAR) 4 % ophthalmic solution Place 1 drop into the right eye 3 (three) times daily. 15 mL 12    senna-docusate 8.6-50 mg (PERICOLACE) 8.6-50 mg per tablet Take 1 tablet by mouth 2 (two) times daily. (Patient taking differently: Take 1 tablet by mouth 2 (two) times daily as needed. )      timolol maleate 0.5% (TIMOPTIC-XE) 0.5 % SolG Place 1 drop into the right eye every morning. 5 mL 12    valsartan (DIOVAN) 160 MG tablet Take 1 " tablet (160 mg total) by mouth 2 (two) times daily. 60 tablet 3     No facility-administered medications prior to visit.          Labs:   Reviewed     Estimated Nutrition Needs:   1500 Kcals/day( 30 kcal/kg IBW),  39 g protein( 1.0 g/kg)     Diet Hx:   Pt lives alone, and states she does her own grocery shopping and cooking at home.  Reports she tries to eat 3 meals/day, with snacks occasionally (apple slices with peanut butter or grapes).  Drinks water with lunch & dinner; apple juice with bkfst.     Breakfast:   Cereal with Fairlife milk or grits, eggs and ham.  Apple juice.  Lunch:   Angela with song.  Water.  Dinner: last night at daughter's:  Grilled chicken breast, macaroni & cheese, green beans.  Water.    Assessment:   Pt attentive and states she has information packet/handouts from 1/26/15 dietitian visit.  Asked numerous relevant questions about foods recommended & to avoid; less-sweet alternative to Ensure; reading food labels for sodium content/what is low sodium diet.  All questions answered, and pt verbalized understanding of information.  Encouraged pt to eat 3 meals with between meal snacks daily.  Do not skip meals.  include calorie-rich beverages with meals.    Nutrition Diagnosis:   Underweight RT inadequate energy intake AEB BMI < 23 and food recall.    Recommendations:   1.  Aim for at least 6 meals and snacks each day  2.  Eat more fat (butter, song, salad dressing, peanut butter)  3.  Choose high-calorie drinks, like whole milk, juice, regular soda  4.  Eat fruits to be healthy.  5.  Fix up vegetables to add calories and protein (add cheese sauce, butter, margarine, gravy, oil or salad dressing.        Consultation Time:30 minutes.    Follow Up:  Pt provided with dietitian contact number and advised to call with questions or make future appointment if further intervention needed.

## 2017-07-06 NOTE — TELEPHONE ENCOUNTER
----- Message from Sindhu Green sent at 7/6/2017 11:37 AM CDT -----  Contact: self [phone] or [phone]  Patient would like a call back from the office in regards to her appointment she had on 06/27/17. Please advise, Thanks !

## 2017-07-07 ENCOUNTER — TELEPHONE (OUTPATIENT)
Dept: INTERNAL MEDICINE | Facility: CLINIC | Age: 82
End: 2017-07-07

## 2017-07-07 DIAGNOSIS — E55.9 VITAMIN D DEFICIENCY DISEASE: ICD-10-CM

## 2017-07-07 DIAGNOSIS — M81.0 OSTEOPOROSIS, POSTMENOPAUSAL: ICD-10-CM

## 2017-07-07 RX ORDER — ERGOCALCIFEROL 1.25 MG/1
50000 CAPSULE ORAL
Qty: 4 CAPSULE | Refills: 5 | Status: SHIPPED | OUTPATIENT
Start: 2017-07-07 | End: 2017-09-22 | Stop reason: SDUPTHER

## 2017-07-07 NOTE — TELEPHONE ENCOUNTER
----- Message from Rosamaria Marie sent at 7/7/2017 11:26 AM CDT -----  Contact: Self [phone]  sana's pt   Pt is requesting a prescription refill for ergocalciferol (ERGOCALCIFEROL) 50,000 unit Cap     [facility] [address] - [address]  [phone] (Phone)  [phone] (Fax)

## 2017-07-07 NOTE — TELEPHONE ENCOUNTER
----- Message from Sindhu Green sent at 7/6/2017 11:40 AM CDT -----  Contact: self [phone]   Patient is returning a call from the office . Please advise , Thanks !

## 2017-07-11 ENCOUNTER — TELEPHONE (OUTPATIENT)
Dept: INTERNAL MEDICINE | Facility: CLINIC | Age: 82
End: 2017-07-11

## 2017-07-11 NOTE — TELEPHONE ENCOUNTER
Spoke to DME rep and she informed me that the pt's insurance company stated that the pt is ineligible for a rolator due to pt receiving a walker in 2016.will call pt to explain information

## 2017-07-11 NOTE — TELEPHONE ENCOUNTER
----- Message from Ary Quintana sent at 7/11/2017  3:28 PM CDT -----  Contact: Self/[phone] home or [phone] cell  Patient stated that her insurance approved the walker but she called last week and has not heard anything back about picking it up. Would like to speak to you about it.    Please call and advise.    Thank You

## 2017-07-11 NOTE — TELEPHONE ENCOUNTER
Pt stated that Sapna called on July 2nd to inform her that Pola was approved, informed pt of advice from DME. Pt states that she will call Sapna

## 2017-09-08 ENCOUNTER — TELEPHONE (OUTPATIENT)
Dept: INTERNAL MEDICINE | Facility: CLINIC | Age: 82
End: 2017-09-08

## 2017-09-08 DIAGNOSIS — Z12.31 ENCOUNTER FOR SCREENING MAMMOGRAM FOR BREAST CANCER: Primary | ICD-10-CM

## 2017-09-13 ENCOUNTER — HOSPITAL ENCOUNTER (OUTPATIENT)
Dept: RADIOLOGY | Facility: HOSPITAL | Age: 82
Discharge: HOME | End: 2017-09-13
Attending: INTERNAL MEDICINE
Payer: MEDICARE

## 2017-09-13 VITALS — WEIGHT: 85 LBS | HEIGHT: 63 IN | BODY MASS INDEX: 15.06 KG/M2

## 2017-09-13 DIAGNOSIS — Z12.31 ENCOUNTER FOR SCREENING MAMMOGRAM FOR BREAST CANCER: ICD-10-CM

## 2017-09-13 PROCEDURE — 77067 SCR MAMMO BI INCL CAD: CPT | Mod: 26,,, | Performed by: RADIOLOGY

## 2017-09-13 PROCEDURE — 77067 SCR MAMMO BI INCL CAD: CPT | Mod: TC

## 2017-09-15 ENCOUNTER — LAB VISIT (OUTPATIENT)
Dept: LAB | Facility: HOSPITAL | Age: 82
End: 2017-09-15
Attending: INTERNAL MEDICINE
Payer: MEDICARE

## 2017-09-15 DIAGNOSIS — I10 ESSENTIAL HYPERTENSION: ICD-10-CM

## 2017-09-15 DIAGNOSIS — E78.5 DYSLIPIDEMIA: Chronic | ICD-10-CM

## 2017-09-15 DIAGNOSIS — M81.0 OSTEOPOROSIS, POSTMENOPAUSAL: ICD-10-CM

## 2017-09-15 DIAGNOSIS — E55.9 VITAMIN D DEFICIENCY DISEASE: ICD-10-CM

## 2017-09-15 LAB
ALBUMIN SERPL BCP-MCNC: 3.3 G/DL
ALP SERPL-CCNC: 66 U/L
ALT SERPL W/O P-5'-P-CCNC: 22 U/L
ANION GAP SERPL CALC-SCNC: 9 MMOL/L
AST SERPL-CCNC: 23 U/L
BILIRUB SERPL-MCNC: 0.5 MG/DL
BUN SERPL-MCNC: 22 MG/DL
CALCIUM SERPL-MCNC: 9.1 MG/DL
CHLORIDE SERPL-SCNC: 105 MMOL/L
CO2 SERPL-SCNC: 27 MMOL/L
CREAT SERPL-MCNC: 0.8 MG/DL
EST. GFR  (AFRICAN AMERICAN): >60 ML/MIN/1.73 M^2
EST. GFR  (NON AFRICAN AMERICAN): >60 ML/MIN/1.73 M^2
GLUCOSE SERPL-MCNC: 92 MG/DL
POTASSIUM SERPL-SCNC: 3.7 MMOL/L
PROT SERPL-MCNC: 7.3 G/DL
SODIUM SERPL-SCNC: 141 MMOL/L

## 2017-09-15 PROCEDURE — 80053 COMPREHEN METABOLIC PANEL: CPT

## 2017-09-15 PROCEDURE — 36415 COLL VENOUS BLD VENIPUNCTURE: CPT

## 2017-09-22 ENCOUNTER — OFFICE VISIT (OUTPATIENT)
Dept: ENDOCRINOLOGY | Facility: CLINIC | Age: 82
End: 2017-09-22
Payer: MEDICARE

## 2017-09-22 VITALS
HEIGHT: 63 IN | SYSTOLIC BLOOD PRESSURE: 134 MMHG | WEIGHT: 84.44 LBS | BODY MASS INDEX: 14.96 KG/M2 | DIASTOLIC BLOOD PRESSURE: 80 MMHG | HEART RATE: 78 BPM

## 2017-09-22 DIAGNOSIS — I10 ESSENTIAL HYPERTENSION: Chronic | ICD-10-CM

## 2017-09-22 DIAGNOSIS — M81.0 OSTEOPOROSIS, POSTMENOPAUSAL: Primary | ICD-10-CM

## 2017-09-22 DIAGNOSIS — E55.9 VITAMIN D DEFICIENCY DISEASE: ICD-10-CM

## 2017-09-22 DIAGNOSIS — E78.5 DYSLIPIDEMIA: Chronic | ICD-10-CM

## 2017-09-22 PROCEDURE — 99214 OFFICE O/P EST MOD 30 MIN: CPT | Mod: S$GLB,,, | Performed by: INTERNAL MEDICINE

## 2017-09-22 PROCEDURE — 99999 PR PBB SHADOW E&M-EST. PATIENT-LVL III: CPT | Mod: PBBFAC,,, | Performed by: INTERNAL MEDICINE

## 2017-09-22 PROCEDURE — 1159F MED LIST DOCD IN RCRD: CPT | Mod: S$GLB,,, | Performed by: INTERNAL MEDICINE

## 2017-09-22 PROCEDURE — 1126F AMNT PAIN NOTED NONE PRSNT: CPT | Mod: S$GLB,,, | Performed by: INTERNAL MEDICINE

## 2017-09-22 PROCEDURE — 3079F DIAST BP 80-89 MM HG: CPT | Mod: S$GLB,,, | Performed by: INTERNAL MEDICINE

## 2017-09-22 PROCEDURE — 3075F SYST BP GE 130 - 139MM HG: CPT | Mod: S$GLB,,, | Performed by: INTERNAL MEDICINE

## 2017-09-22 PROCEDURE — 3008F BODY MASS INDEX DOCD: CPT | Mod: S$GLB,,, | Performed by: INTERNAL MEDICINE

## 2017-09-22 PROCEDURE — 99499 UNLISTED E&M SERVICE: CPT | Mod: S$GLB,,, | Performed by: INTERNAL MEDICINE

## 2017-09-22 RX ORDER — ERGOCALCIFEROL 1.25 MG/1
50000 CAPSULE ORAL
Qty: 3 CAPSULE | Refills: 11 | Status: SHIPPED | OUTPATIENT
Start: 2017-09-22 | End: 2018-10-15 | Stop reason: SDUPTHER

## 2017-09-22 NOTE — PROGRESS NOTES
Subjective:      Patient ID: Mj Hernandez is a 82 y.o. female.    Chief Complaint:  Osteoporosis      History of Present Illness  Ms. Hernandez presents for follow up of osteoporosis. Last seen by Dr. Shah in 12/2016.    With Osteoporosis since 2001 that was taking Reclast IV and last dose was on 12/21/11. She got her first dose of Prolia 60 mg in Oct of 2013 followed by 4/30/14 and 7/13/15 was the last dose. She was admitted in the hospital for UTI in 12/15 and missed Prolia dose in January 2016. Also had left hip fracture surgery on May/2016. Last Prolia injection in 5/2017. She is currently tolerating Prolia without issue.    Has been doing physical therapy for exercise. Able to ambulate with cane. Denies any falls since the hip surgery.    For vitamin D deficiency she is taking vitamin D 50,000 units monthly plus 1400 units of D3 daily. Also takes calcium carbonate 1200 mg daily.    Weight loss continues to be a problem. Trying to eat more calories.    For Dyslipidemia she is taking Lipitor 40 mg daily.    Bp stable on current regimen.    Bone density 12/2016:  Lumbar Spine: Lumbar bone mineral density L1-L4 is 0.728g/cm2, which is a t-score of -2.9. The z-score is -0.8.    Total Hip: The total hip bone mineral density is 0.590g/cm2.  The t-score is -2.9, and the z-score is -1.2.  Femoral neck BMD is 0.513g/cm2 and the t-score is -3.0.      COMPARISONS:  Date Location BMD T-score  04/30/14 L-spine 0.729 -3.8  Total Hip 0.664 -2.4    Review of Systems   Constitutional: Negative for chills and fever.   Gastrointestinal: Negative for nausea.   No CP  No SOB    Objective:   Physical Exam   Nursing note and vitals reviewed.  No thyromegaly  No tremor  DTR's 2 +  Lungs CTA b/l  + 1 pitting edema at ankles    Lab Review:   Results for MJ HERNANDEZ (MRN [id number]) as of 9/22/2017 15:46   Ref. Range 9/15/2017 09:35   Sodium Latest Ref Range: 136 - 145 mmol/L 141   Potassium Latest Ref Range: 3.5 - 5.1 mmol/L 3.7    Chloride Latest Ref Range: 95 - 110 mmol/L 105   CO2 Latest Ref Range: 23 - 29 mmol/L 27   Anion Gap Latest Ref Range: 8 - 16 mmol/L 9   BUN, Bld Latest Ref Range: 8 - 23 mg/dL 22   Creatinine Latest Ref Range: 0.5 - 1.4 mg/dL 0.8   eGFR if non African American Latest Ref Range: >60 mL/min/1.73 m^2 >60.0   eGFR if African American Latest Ref Range: >60 mL/min/1.73 m^2 >60.0   Glucose Latest Ref Range: 70 - 110 mg/dL 92   Calcium Latest Ref Range: 8.7 - 10.5 mg/dL 9.1   Alkaline Phosphatase Latest Ref Range: 55 - 135 U/L 66   Total Protein Latest Ref Range: 6.0 - 8.4 g/dL 7.3   Albumin Latest Ref Range: 3.5 - 5.2 g/dL 3.3 (L)   Total Bilirubin Latest Ref Range: 0.1 - 1.0 mg/dL 0.5   AST Latest Ref Range: 10 - 40 U/L 23   ALT Latest Ref Range: 10 - 44 U/L 22     Results for PRASHANT HERNANDEZ (MRN [id number]) as of 9/22/2017 15:46   Ref. Range 3/7/2011 10:51 5/7/2013 11:17 8/19/2014 11:15 2/16/2016 10:05 4/5/2016 17:46   TSH Latest Ref Range: 0.400 - 4.000 uIU/mL 0.860  1.289  1.757   PTH Latest Ref Range: 9.0 - 77.0 pg/mL    50.0        Assessment:     1. Osteoporosis, postmenopausal    2. Vitamin D deficiency disease    3. Dyslipidemia    4. Essential hypertension        Plan:     1. Patient with severe osteoporosis based on recent hip fracture  --Continue Prolia 60 mg every 6 months, next dose due in 11/2017  --Continue calcium and vit D supplementation  --Malnutrition contributing - encouraged her to eat more calories  --Cautioned on falls avoidance  --Exercises with PT, but limited due to hip fracture      2. Vitamin D deficiency is replaced. To continue taking vitamin D 50,000 units monthly plus calcium with vitamin D twice daily.    3. Dyslipidemia   --On statin    4. HTN  --Controlled on current regimen    RTC in 1 year with BMP and Vit D prior to appt    John Mandel M.D. Staff Endocrinology

## 2017-10-09 ENCOUNTER — CLINICAL SUPPORT (OUTPATIENT)
Dept: OPHTHALMOLOGY | Facility: CLINIC | Age: 82
End: 2017-10-09
Payer: MEDICARE

## 2017-10-09 ENCOUNTER — OFFICE VISIT (OUTPATIENT)
Dept: OPHTHALMOLOGY | Facility: CLINIC | Age: 82
End: 2017-10-09
Payer: MEDICARE

## 2017-10-09 DIAGNOSIS — H18.421 BAND KERATOPATHY, RIGHT: ICD-10-CM

## 2017-10-09 DIAGNOSIS — H34.8192 CRVO (CENTRAL RETINAL VEIN OCCLUSION): Primary | ICD-10-CM

## 2017-10-09 DIAGNOSIS — H21.561 AFFERENT PUPILLARY DEFECT, RIGHT: ICD-10-CM

## 2017-10-09 DIAGNOSIS — H40.89 GLAUCOMA DUE TO COMBINATION OF MECHANISMS: ICD-10-CM

## 2017-10-09 DIAGNOSIS — Z96.1 PSEUDOPHAKIA OF LEFT EYE: ICD-10-CM

## 2017-10-09 DIAGNOSIS — H21.541 POSTERIOR SYNECHIAE, RIGHT: ICD-10-CM

## 2017-10-09 DIAGNOSIS — H25.11 SENILE NUCLEAR SCLEROSIS, RIGHT: ICD-10-CM

## 2017-10-09 DIAGNOSIS — Z98.83 GLAUCOMA FILTERING BLEB OF LEFT EYE: ICD-10-CM

## 2017-10-09 DIAGNOSIS — H16.142 SPK (SUPERFICIAL PUNCTATE KERATITIS), LEFT: ICD-10-CM

## 2017-10-09 DIAGNOSIS — H35.031 BLIND HYPERTENSIVE RIGHT EYE: ICD-10-CM

## 2017-10-09 PROCEDURE — 99499 UNLISTED E&M SERVICE: CPT | Mod: S$GLB,,, | Performed by: OPHTHALMOLOGY

## 2017-10-09 PROCEDURE — 92083 EXTENDED VISUAL FIELD XM: CPT | Mod: S$GLB,,, | Performed by: OPHTHALMOLOGY

## 2017-10-09 PROCEDURE — 99999 PR PBB SHADOW E&M-EST. PATIENT-LVL II: CPT | Mod: PBBFAC,,, | Performed by: OPHTHALMOLOGY

## 2017-10-09 PROCEDURE — 92133 CPTRZD OPH DX IMG PST SGM ON: CPT | Mod: S$GLB,,, | Performed by: OPHTHALMOLOGY

## 2017-10-09 PROCEDURE — 92014 COMPRE OPH EXAM EST PT 1/>: CPT | Mod: S$GLB,,, | Performed by: OPHTHALMOLOGY

## 2017-10-09 NOTE — PROGRESS NOTES
"HPI     DLS: 6/22/17    Pt here for HVF review;    Meds: T 1/2 GFS qam od             Alphagan tid od             Camacho 4% tid od             Lumigan qhs od             AT's prn ou    1. Capsular opacification   2. 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 CRVO (central retinal vein occlusion), right eye  4. Blind hypertensive right eye  5. Posterior synechiae, right  6. Senile nuclear sclerosis, right  7. Band keratopathy, right  8. Afferent pupillary defect, right  9. SPK (superficail punctate keratitis), left  10. Glaucoma filtering bleb of left eye  11. Pseudophakia, left eye     Last edited by Kristi Ray on 10/9/2017  2:02 PM. (History)            Assessment /Plan     For exam results, see Encounter Report.    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   Angle open after PI's ou   -Followed at [facility] since at least '01   First HVF 2001   First photos 2001   S/p PI OU   s/p Gonioplasty OU     Family history none   Glaucoma meds Lumigan, timolol gfs  qAM, Agan tid, Camacho tid  - all OD ((off all gtts os s/p combined)   H/O adverse rxn to glaucoma drops Azopt "felt weird"   LASERS S/p PI OU, s/p Gonioplasty OU   GLAUCOMA SURGERIES    combined phaco/trab with mini shunt OS 5/22/2013   OTHER EYE SURGERIES    S/P DCR sx OD x 2 or 3 with [facility]    Combined phaco/IOL/ trab with mini shunt OS 5/22/2013   CDR 0.99 /0.9   Tbase 42/23   Tmax 42/23 (when stopped gtts)   Ttarget pain control od // 18 os   HVF 17 VF '01 -'17 - Ext to stim V od  // SALT " / IAD os   Gonio +3/+3   /539   OCT 9 test 2006 to 2017 - RNFL - OD:dec. S/N/I  // OS:dec N/I, lora S/T   HRT 13 test 2004 to 2017 - MR -  Dec. S od // dec. S/N/I os /// CDR 0.61 od // 0.84 os  Disc photos - 2001, 2003, 2006 - slides // 2008, 2010, 2016  - OIS     Ta today 36/15 (states good compliance with gtts od - still pain free)   Test today - IOP, HVF / DFE / OCT     2.  Blind hypertensive Right Eye   Pain free - eye comfortable   Very limited vision CF at 3'   End stage glaucoma and S/P CRVO   No rubeosis   IOP is high but eye is pain free    3. Cataract OD -However, OD VA limited 2/2 CRVO and end stge glaucoma   S/P phaco/IOL/trab OS 5/22/2013    4. FABY OU -cont ATs     5. APD OD -   Old / stable 2/2 CRVO and glaucoma    6. Band Keratopathy OD -stable. Cont ATs- being followed by Dr. Saul;    had EDTA chelation on 2/13/14 OS - now clear     7. Asteroid Hyalosis OS -stable     8. Hx of Acute Dacryocystitis OS and recent NLD OD s/p surgery 11/12 and repeat for exposure w/ Dr. Ocampo 12/5/12 -stable. Cont f/u with Damaris     9. Hx of CRVO OD -limiting visual potential OD   -No NV on todays exam     10. Ant capsule phimosis os    Monitor     11. PC IOL os    Combined  W/ mini shunt 5/22/2013   doing well VA is 20/25 and the IOP is 13    Plan:   Off all gtts for now OS - s/p combined -- IOP stable - at 15 and VA is 20/30-20/40    Cont glaucoma gtts OD -- IOP very high, but comfortable/no pain // Blind hypertensive but pain free eye   Old  crvo     Ok to use OTC readers for now a +3.00 to +3.50 - not really able to improve distance vision with glasses at this time  Much improved from before the combined  Procedure    yag laser to opacified / phimotic capsule - very dense - took a lot of laser power to cut thru opacified / phimotic ring   -may improve centration and anterior p/posterior location of IOL in eye   - may help vision and change refraction     IOP ok os post trab     Cont all glaucoma gtts od    Blind hypertensive right eye - pain free     F/U 4 months with HRT / gonio  Pt does NOT feel that the yag laser helped her vision  Refer to [place] to see if the glasses can be improved / help her vision in her only seeing eye       I have seen and personally examined the patient.  I agree with the findings, assessment and plan of the resident and/or fellow.     Aysha Triplett MD

## 2017-10-31 ENCOUNTER — OFFICE VISIT (OUTPATIENT)
Dept: INTERNAL MEDICINE | Facility: CLINIC | Age: 82
End: 2017-10-31
Payer: MEDICARE

## 2017-10-31 ENCOUNTER — IMMUNIZATION (OUTPATIENT)
Dept: INTERNAL MEDICINE | Facility: CLINIC | Age: 82
End: 2017-10-31
Payer: MEDICARE

## 2017-10-31 DIAGNOSIS — Z01.419 ENCOUNTER FOR WELL WOMAN EXAM WITH ROUTINE GYNECOLOGICAL EXAM: ICD-10-CM

## 2017-10-31 DIAGNOSIS — E78.5 DYSLIPIDEMIA: Chronic | ICD-10-CM

## 2017-10-31 DIAGNOSIS — I10 HYPERTENSION, UNSPECIFIED TYPE: ICD-10-CM

## 2017-10-31 DIAGNOSIS — E35 DISORDERS OF ENDOCRINE GLANDS IN DISEASES CLASSIFIED ELSEWHERE: ICD-10-CM

## 2017-10-31 DIAGNOSIS — R13.10 DYSPHAGIA, UNSPECIFIED TYPE: Primary | ICD-10-CM

## 2017-10-31 PROCEDURE — 90662 IIV NO PRSV INCREASED AG IM: CPT | Mod: S$GLB,,, | Performed by: INTERNAL MEDICINE

## 2017-10-31 PROCEDURE — 99215 OFFICE O/P EST HI 40 MIN: CPT | Mod: S$GLB,,, | Performed by: INTERNAL MEDICINE

## 2017-10-31 PROCEDURE — 99499 UNLISTED E&M SERVICE: CPT | Mod: S$GLB,,, | Performed by: INTERNAL MEDICINE

## 2017-10-31 PROCEDURE — 99999 PR PBB SHADOW E&M-EST. PATIENT-LVL V: CPT | Mod: PBBFAC,,, | Performed by: INTERNAL MEDICINE

## 2017-10-31 PROCEDURE — G0008 ADMIN INFLUENZA VIRUS VAC: HCPCS | Mod: S$GLB,,, | Performed by: INTERNAL MEDICINE

## 2017-10-31 RX ORDER — ATORVASTATIN CALCIUM 40 MG/1
40 TABLET, FILM COATED ORAL DAILY
Qty: 30 TABLET | Refills: 3 | Status: SHIPPED | OUTPATIENT
Start: 2017-10-31 | End: 2017-11-13 | Stop reason: ALTCHOICE

## 2017-10-31 RX ORDER — VALSARTAN 160 MG/1
160 TABLET ORAL 2 TIMES DAILY
Qty: 60 TABLET | Refills: 3 | Status: SHIPPED | OUTPATIENT
Start: 2017-10-31 | End: 2017-12-21 | Stop reason: SDUPTHER

## 2017-10-31 NOTE — PROGRESS NOTES
High Dose Flu Vaccine ordered per Dr Urbina .Two patient identifiers , allergies reviewed and vaccine verified. Administered to  Right deltoid. Pt tolerated injection well; no swelling, redness, or bruising noted at injection site. Pt advised to remain in clinic 15 mins following injection for observation , verbalizes understanding .

## 2017-11-05 VITALS
BODY MASS INDEX: 15.24 KG/M2 | OXYGEN SATURATION: 99 % | HEART RATE: 62 BPM | DIASTOLIC BLOOD PRESSURE: 80 MMHG | WEIGHT: 86 LBS | HEIGHT: 63 IN | TEMPERATURE: 98 F | SYSTOLIC BLOOD PRESSURE: 138 MMHG

## 2017-11-05 NOTE — PROGRESS NOTES
Subjective:       Patient ID: Mj Summers is a 82 y.o. female.    Chief Complaint: Hypertension    HPI:  She returns for management of hypertension.  She has had hypertension for over a year.  Current treatment has included medications outlined in medication list.  She denies chest pain or shortness of breath.  No palpitations.  Denies left arm or neck pain.  She complains of dysphagia.  Denies vomiting.  No abdominal pain.  No blood in stool    PAST MEDICAL HISTORY: Hypertension, hyperlipidemia, osteoporosis, anemia, coronary artery disease,   maxillary fracture, leukopenia, glaucoma, positive PPD, GERD, ORIF left hip,   hyperparathyroidism, tricuspid regurgitation. She had a   colonoscopy in September 2007.     PAST SURGICAL HISTORY: Breast cyst removal, benign.     MEDICATIONS: Lipitor 40 mg daily, Nexium when necessary, one aspirin daily,   timolol drops, prolia, Diovan 320 mg daily,  chlorthalidone     ALLERGIES: Penicillin and sulfa. .       Review of Systems   Constitutional: Negative for chills, fatigue, fever and unexpected weight change.   Respiratory: Negative for chest tightness and shortness of breath.    Cardiovascular: Negative for chest pain and palpitations.   Gastrointestinal: Negative for abdominal pain and blood in stool.   Neurological: Negative for dizziness, syncope, numbness and headaches.       Objective:      Physical Exam   HENT:   Right Ear: External ear normal.   Left Ear: External ear normal.   Nose: Nose normal.   Mouth/Throat: Oropharynx is clear and moist.   Eyes: Pupils are equal, round, and reactive to light.   Neck: Normal range of motion.   Cardiovascular: Normal rate and regular rhythm.    Murmur heard.  Pulmonary/Chest: Breath sounds normal.   Abdominal: She exhibits no distension. There is no hepatosplenomegaly. There is no tenderness.   Lymphadenopathy:     She has no cervical adenopathy.     She has no axillary adenopathy.   Neurological: She has normal strength and  normal reflexes. No cranial nerve deficit or sensory deficit.       Assessment:     assessment and plan: 1.  Hypertension: Check CMP, lipid panel, CBC, TSH  2.  Dysphagia: Schedule gastroenterology appointment      Plan:       As above

## 2017-11-06 ENCOUNTER — LAB VISIT (OUTPATIENT)
Dept: LAB | Facility: HOSPITAL | Age: 82
End: 2017-11-06
Attending: INTERNAL MEDICINE
Payer: MEDICARE

## 2017-11-06 DIAGNOSIS — R13.10 DYSPHAGIA, UNSPECIFIED TYPE: ICD-10-CM

## 2017-11-06 DIAGNOSIS — E78.5 DYSLIPIDEMIA: Chronic | ICD-10-CM

## 2017-11-06 DIAGNOSIS — E35 DISORDERS OF ENDOCRINE GLANDS IN DISEASES CLASSIFIED ELSEWHERE: ICD-10-CM

## 2017-11-06 LAB
ALBUMIN SERPL BCP-MCNC: 3.4 G/DL
ALP SERPL-CCNC: 68 U/L
ALT SERPL W/O P-5'-P-CCNC: 22 U/L
ANION GAP SERPL CALC-SCNC: 8 MMOL/L
AST SERPL-CCNC: 25 U/L
BASOPHILS # BLD AUTO: 0.04 K/UL
BASOPHILS NFR BLD: 1 %
BILIRUB SERPL-MCNC: 0.4 MG/DL
BUN SERPL-MCNC: 15 MG/DL
CALCIUM SERPL-MCNC: 9.3 MG/DL
CHLORIDE SERPL-SCNC: 107 MMOL/L
CHOLEST SERPL-MCNC: 237 MG/DL
CHOLEST/HDLC SERPL: 3.9 {RATIO}
CO2 SERPL-SCNC: 27 MMOL/L
CREAT SERPL-MCNC: 0.7 MG/DL
DIFFERENTIAL METHOD: ABNORMAL
EOSINOPHIL # BLD AUTO: 0.1 K/UL
EOSINOPHIL NFR BLD: 2.1 %
ERYTHROCYTE [DISTWIDTH] IN BLOOD BY AUTOMATED COUNT: 12.7 %
EST. GFR  (AFRICAN AMERICAN): >60 ML/MIN/1.73 M^2
EST. GFR  (NON AFRICAN AMERICAN): >60 ML/MIN/1.73 M^2
GLUCOSE SERPL-MCNC: 91 MG/DL
HCT VFR BLD AUTO: 33.2 %
HDLC SERPL-MCNC: 61 MG/DL
HDLC SERPL: 25.7 %
HGB BLD-MCNC: 10.7 G/DL
LDLC SERPL CALC-MCNC: 157.6 MG/DL
LYMPHOCYTES # BLD AUTO: 1.2 K/UL
LYMPHOCYTES NFR BLD: 30.8 %
MCH RBC QN AUTO: 29.9 PG
MCHC RBC AUTO-ENTMCNC: 32.2 G/DL
MCV RBC AUTO: 93 FL
MONOCYTES # BLD AUTO: 0.4 K/UL
MONOCYTES NFR BLD: 9.8 %
NEUTROPHILS # BLD AUTO: 2.2 K/UL
NEUTROPHILS NFR BLD: 56 %
NONHDLC SERPL-MCNC: 176 MG/DL
PLATELET # BLD AUTO: 213 K/UL
PMV BLD AUTO: 10.5 FL
POTASSIUM SERPL-SCNC: 4.2 MMOL/L
PROT SERPL-MCNC: 7.3 G/DL
RBC # BLD AUTO: 3.58 M/UL
SODIUM SERPL-SCNC: 142 MMOL/L
TRIGL SERPL-MCNC: 92 MG/DL
TSH SERPL DL<=0.005 MIU/L-ACNC: 0.93 UIU/ML
WBC # BLD AUTO: 3.86 K/UL

## 2017-11-06 PROCEDURE — 85025 COMPLETE CBC W/AUTO DIFF WBC: CPT

## 2017-11-06 PROCEDURE — 80061 LIPID PANEL: CPT

## 2017-11-06 PROCEDURE — 80053 COMPREHEN METABOLIC PANEL: CPT

## 2017-11-06 PROCEDURE — 84443 ASSAY THYROID STIM HORMONE: CPT

## 2017-11-06 PROCEDURE — 36415 COLL VENOUS BLD VENIPUNCTURE: CPT

## 2017-11-07 ENCOUNTER — INITIAL CONSULT (OUTPATIENT)
Dept: OPTOMETRY | Facility: CLINIC | Age: 82
End: 2017-11-07
Payer: MEDICARE

## 2017-11-07 DIAGNOSIS — H52.202 ASTIGMATISM OF LEFT EYE, UNSPECIFIED TYPE: ICD-10-CM

## 2017-11-07 DIAGNOSIS — H53.8 BLURRED VISION, RIGHT EYE: ICD-10-CM

## 2017-11-07 DIAGNOSIS — H25.89 CATARACT MATURE, TOTAL SENILE: Primary | ICD-10-CM

## 2017-11-07 DIAGNOSIS — Z98.42 S/P CATARACT SURGERY, LEFT: ICD-10-CM

## 2017-11-07 DIAGNOSIS — H34.8192 HISTORY OF RETINAL VEIN OCCLUSION: ICD-10-CM

## 2017-11-07 DIAGNOSIS — H54.8 LEGAL BLINDNESS OF RIGHT EYE, AS DEFINED IN UNITED STATES OF AMERICA: ICD-10-CM

## 2017-11-07 DIAGNOSIS — Z96.1 PSEUDOPHAKIA OF BOTH EYES: ICD-10-CM

## 2017-11-07 DIAGNOSIS — H40.89 GLAUCOMA DUE TO COMBINATION OF MECHANISMS: ICD-10-CM

## 2017-11-07 PROCEDURE — 99999 PR PBB SHADOW E&M-EST. PATIENT-LVL III: CPT | Mod: PBBFAC,,, | Performed by: OPTOMETRIST

## 2017-11-07 PROCEDURE — 92012 INTRM OPH EXAM EST PATIENT: CPT | Mod: S$GLB,,, | Performed by: OPTOMETRIST

## 2017-11-07 PROCEDURE — 92015 DETERMINE REFRACTIVE STATE: CPT | Mod: S$GLB,,, | Performed by: OPTOMETRIST

## 2017-11-07 NOTE — LETTER
November 13, 2017      Aysha Triplett MD  [address]  [facility] [address]           [facility] - Optometry  [address]  [facility] [address]  Phone: [phone]  Fax: [phone]          Patient: Mj Summers   MR Number: [id number]   YOB: 1935   Date of Visit: 11/7/2017       Dear Dr. Aysha Triplett:    Thank you for referring Mj Summers to me for evaluation. Attached you will find relevant portions of my assessment and plan of care.    If you have questions, please do not hesitate to call me. I look forward to following Mj Summers along with you.    Sincerely,    Po Sommers, OD    Enclosure  CC:  No Recipients    If you would like to receive this communication electronically, please contact [email or web address] or [phone] to request more information on [employer] Link access.    For providers and/or their staff who would like to refer a patient to [facility], please contact us through our one-stop-shop provider referral line, [facility], at [phone].    If you feel you have received this communication in error or would no longer like to receive these types of communications, please e-mail [email or web address]

## 2017-11-07 NOTE — PATIENT INSTRUCTIONS
History of central retinal vein occlusion in the right eye.  Advanced cataract in the right eye.  Cataract surgery not done in the right eye, presumably secondary to poor visual potential in that eye.  S/P cataract surgery with posterior chamber intraocular lens in the left eye.  Followed by Dr. Triplett for bilateral glaucoma.  S/P glaucoma surgery in the left eye.  Using drops for IOP control/reduction in the right eye only since glaucoma surgery done in the left eye.   Residual astigmatic refractive error in the left eye, with satisfactory correctable visual acuity in that eye.   New spectacle lens Rx issued for use as desired.  Continue drops for IOP control in the right eye, as prescribed by Dr. Triplett.  Follow up with Dr. Triplett as she recommends.   Recheck refraction as deemed necessary by Dr. Triplett.

## 2017-11-08 NOTE — PROGRESS NOTES
"HPI     Concerns About Ocular Health    Additional comments: Eye exam - referred for refraction by Dr. Triplett.              Comments   Patient's age: 82 y.o. AA female   Occupation: Retired   Approximate date of last eye examination:  10/09/2017  Name of last eye doctor seen: Dr. Aysha Triplett  City/State: [facility]   Wears glasses? No - has no glasses presently      Any problem with VA with glasses?  Patient would like a new rx for eye   glasses  Wears CLs?:  No  Headaches?  No  Eye pain/discomfort?  No                                                                                   Flashes? No   Floaters?  No  Diplopia/Double vision?  No  Patient's Ocular History:         Any eye surgeries? Cataract surgery OU, Retinal surgery 37 years   ago          Any eye injury?  No         Any treatment for eye disease?  CRVO in the right eye in 1980 -   states "several lasers" , Glaucoma - uses drops for IOP control/reduction   in the right eye only -  Did previously use drops for IOP control in the   left eye, but drops discontinued "once the surgery was done" (in the left   eye).  Followed by Dr. Triplett.    Family history of eye disease?  Glaucoma - mother   Significant patient medical history:         1. Diabetes?  No       If yes, IDDM or NIDDM?    2. HBP?  Yes, managed with medication                 ! OTC eyedrops currently using:  No   ! Prescription eye meds currently using:  No   ! Any history of allergy/adverse reaction to any eye meds used   previously?  No    ! Any history of allergy/adverse reaction to eyedrops used during prior   eye exam(s)? No    ! Any history of allergy/adverse reaction to Novacaine or similar meds?   No   ! Any history of allergy/adverse reaction to Epinephrine or similar meds?   No    ! Patient okay with use of anesthetic eyedrops to check eye pressure?    Yes         ! Patient okay with use of eyedrops to dilate pupils today?  Patient was   recently dilated on 10/09/17   !  " "Allergies/Medications/Medical History/Family History reviewed today?    Yes       PD =   60/56  Desired reading distance =  18"                                                                Last edited by Po Sommers, OD on 11/7/2017  2:43 PM. (History)            Assessment /Plan     For exam results, see Encounter Report.    1. Cataract mature, total senile     2. History of retinal vein occlusion     3. S/P cataract surgery, left     4. Pseudophakia of both eyes     5. Astigmatism of left eye, unspecified type     6. Blurred vision, right eye     7. Legal blindness of right eye, as defined in [place]     8. 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  Advanced cataract in the right eye.  Cataract surgery not done in the right eye, presumably secondary to poor visual potential in that eye.  S/P cataract surgery with posterior chamber intraocular lens in the left eye.  Followed by Dr. Triplett for bilateral glaucoma.  S/P glaucoma surgery in the left eye.  Using drops for IOP control/reduction in the right eye only since glaucoma surgery done in the left eye.   Residual astigmatic refractive error in the left eye, with satisfactory correctable visual acuity in that eye.   New spectacle lens Rx issued for use as desired.  Continue drops for IOP control in the right eye, as prescribed by Dr. Triplett.  Follow up with Dr. Triplett as she recommends.   Recheck refraction as deemed necessary by Dr. Triplett.     "

## 2017-11-10 ENCOUNTER — OFFICE VISIT (OUTPATIENT)
Dept: OBSTETRICS AND GYNECOLOGY | Facility: CLINIC | Age: 82
End: 2017-11-10
Payer: MEDICARE

## 2017-11-10 VITALS
DIASTOLIC BLOOD PRESSURE: 64 MMHG | HEIGHT: 63 IN | WEIGHT: 86 LBS | BODY MASS INDEX: 15.24 KG/M2 | SYSTOLIC BLOOD PRESSURE: 160 MMHG

## 2017-11-10 DIAGNOSIS — Z01.419 ENCOUNTER FOR ROUTINE GYNECOLOGIC EXAMINATION IN MEDICARE PATIENT: Primary | ICD-10-CM

## 2017-11-10 PROCEDURE — 99999 PR PBB SHADOW E&M-EST. PATIENT-LVL III: CPT | Mod: PBBFAC,,, | Performed by: OBSTETRICS & GYNECOLOGY

## 2017-11-10 PROCEDURE — G0101 CA SCREEN;PELVIC/BREAST EXAM: HCPCS | Mod: S$GLB,,, | Performed by: OBSTETRICS & GYNECOLOGY

## 2017-11-10 NOTE — PROGRESS NOTES
Subjective:       Patient ID: Mj Summers is a 82 y.o. female.    Chief Complaint:  Annual Exam      History of Present Illness  HPI    Mj Summers is a 82 y.o. female  NEW TO ME here for her  GYN exam.  She states that she has not had a GYN exam in many years.  She describes her periods as stopped with menopause age 50.  Denies break through bleeding.   Denies vaginal itching or irritation.  Denies vaginal discharge.  She is not sexually active.    History of abnormal pap: No  Last Pap: was normal  Last MMG: normal--routine follow-up in 12 months  Last Colonoscopy:  Several years ago: normal  Denies domestic violence. She does feel safe at home.     Past Medical History:   Diagnosis Date    Anemia     Arthritis     back    Cataract     s/p surgery    Coronary artery disease     [facility] 2010 - mid LAD 40%    GERD (gastroesophageal reflux disease)     Glaucoma (increased eye pressure)     Hyperlipidemia     Hypertension     Lactose intolerance     Legally blind in right eye, as defined in [place]     Osteoporosis, post-menopausal     Pneumonia 2015    Proximal muscle weakness     Renal cyst     left kidney    Vitamin D deficiency disease      Past Surgical History:   Procedure Laterality Date    ADENOIDECTOMY      BREAST BIOPSY      left breast    CARDIAC CATHETERIZATION  2010    mid LAD, 40% stenosis; LCX, luminal irregularities;                 CATARACT EXTRACTION W/  INTRAOCULAR LENS IMPLANT Left 13    OS with trab (Dr. Triplett)    CATARACT EXTRACTION W/ INTRAOCULAR LENS IMPLANTW/ TRABECULECTOMY  13    OS (Dr. Triplett)    EDTA CHELATION Left     OS ()    FEMUR FRACTURE SURGERY Left 2016    TEAR DUCT SURGERY      right eye    TONSILLECTOMY      YAG CAPSULOTOMY Left 2017         Social History     Social History    Marital status:      Spouse name: N/A    Number of children: N/A    Years of education:  "N/A     Occupational History    Not on file.     Social History Main Topics    Smoking status: Never Smoker    Smokeless tobacco: Never Used    Alcohol use No    Drug use: No    Sexual activity: No     Other Topics Concern    Not on file     Social History Narrative    No narrative on file     Family History   Problem Relation Age of Onset    Asthma Sister     Rheum arthritis Sister     Osteoporosis Sister     Arthritis Sister     Hyperlipidemia Brother     Rheum arthritis Brother     Diabetes Brother     Osteoporosis Mother     Heart disease Mother     Glaucoma Mother     Hypertension Father     Peripheral vascular disease Father     Alzheimer's disease Father     Osteoporosis Sister     Hypertension Brother     Stroke Brother     Peripheral vascular disease Brother      bilateral leg amputation    Hypertension Son     Hypertension Daughter     Colon cancer Other 44     death from colon cancer at age 44    Stroke Brother     Heart disease Brother     Diabetes Daughter      GDM that is resolved    Macular degeneration Neg Hx     Retinal detachment Neg Hx     Strabismus Neg Hx     Amblyopia Neg Hx     Blindness Neg Hx      OB History      Para Term  AB Living    3 3 3          SAB TAB Ectopic Multiple Live Births                       BP (!) 160/64   Ht 5' 3" (1.6 m)   Wt 39 kg (85 lb 15.7 oz)   LMP 11/10/1987 (Approximate)   BMI 15.23 kg/m²         GYN & OB History  Patient's last menstrual period was 11/10/1987 (approximate).   Date of Last Pap: No result found    OB History    Para Term  AB Living   3 3 3         SAB TAB Ectopic Multiple Live Births                  # Outcome Date GA Lbr Jovanni/2nd Weight Sex Delivery Anes PTL Lv   3 Term            2 Term            1 Term                   Review of Systems  Review of Systems   Constitutional: Negative for activity change, appetite change, fatigue and unexpected weight change.   HENT: Negative.  "   Eyes: Negative for visual disturbance.   Respiratory: Negative for shortness of breath and wheezing.    Cardiovascular: Negative for chest pain, palpitations and leg swelling.   Gastrointestinal: Negative for abdominal pain, bloating and blood in stool.   Endocrine: Negative for diabetes and hair loss.   Genitourinary: Negative for decreased libido, dyspareunia and postmenopausal bleeding.   Musculoskeletal: Negative for back pain and joint swelling.   Skin:  Negative for no acne and hair changes.   Neurological: Negative for headaches.   Hematological: Does not bruise/bleed easily.   Psychiatric/Behavioral: Negative for depression and sleep disturbance. The patient is not nervous/anxious.    Breast: Negative for breast pain and nipple discharge          Objective:    Physical Exam:   Constitutional: She is oriented to person, place, and time. She appears well-developed and well-nourished.    HENT:   Head: Normocephalic and atraumatic.    Eyes: EOM are normal. Pupils are equal, round, and reactive to light.    Neck: Normal range of motion. Neck supple.    Cardiovascular: Normal rate and regular rhythm.     Pulmonary/Chest: Effort normal and breath sounds normal.        Abdominal: Soft. Bowel sounds are normal.     Genitourinary: Pelvic exam was performed with patient supine.   Genitourinary Comments: PELVIC: Normal external genitalia without lesions.  Normal hair distribution.  Adequate perineal body, normal urethral meatus.  Vagina  Dry and poorly rugated, atrophic, without lesions or discharge.  Cervix pink, without lesions, discharge or tenderness.  No significant cystocele or rectocele.  Bimanual exam shows uterus to be normal size, regular, mobile and nontender.  Adnexa without masses or tenderness.    RECTAL:Deferred             Musculoskeletal: Normal range of motion and moves all extremeties.       Neurological: She is alert and oriented to person, place, and time.    Skin: Skin is warm and dry.     Psychiatric: She has a normal mood and affect.          Assessment:        1. Encounter for routine gynecologic examination in Medicare patient                Plan:        1. Encounter for routine gynecologic examination in Medicare patient         Return in about 5 years (around 11/10/2022).

## 2017-11-10 NOTE — LETTER
November 10, 2017      Shasta Urbina MD  [address]  [place] [address]           [facility] - OB/GYN 5th Floor  [address]  [facility] [address]  Phone: [phone]          Patient: Mj Summers   MR Number: [id number]   YOB: 1935   Date of Visit: 11/10/2017       Dear Dr. Shasta Urbina:    Thank you for referring Mj Summers to me for evaluation. Attached you will find relevant portions of my assessment and plan of care.    If you have questions, please do not hesitate to call me. I look forward to following Mj Summers along with you.    Sincerely,    Marie Lynn MD    Enclosure  CC:  No Recipients    If you would like to receive this communication electronically, please contact [email or web address] or [phone] to request more information on [employer] Link access.    For providers and/or their staff who would like to refer a patient to [facility], please contact us through our one-stop-shop provider referral line, [facility], at [phone].    If you feel you have received this communication in error or would no longer like to receive these types of communications, please e-mail [email or web address]

## 2017-11-13 ENCOUNTER — INFUSION (OUTPATIENT)
Dept: INFECTIOUS DISEASES | Facility: HOSPITAL | Age: 82
End: 2017-11-13
Attending: INTERNAL MEDICINE
Payer: MEDICARE

## 2017-11-13 ENCOUNTER — TELEPHONE (OUTPATIENT)
Dept: INTERNAL MEDICINE | Facility: CLINIC | Age: 82
End: 2017-11-13

## 2017-11-13 VITALS — BODY MASS INDEX: 15.24 KG/M2 | HEIGHT: 63 IN | WEIGHT: 86 LBS

## 2017-11-13 DIAGNOSIS — D64.9 ANEMIA, UNSPECIFIED TYPE: Primary | ICD-10-CM

## 2017-11-13 DIAGNOSIS — R45.84 ANHEDONIA: ICD-10-CM

## 2017-11-13 DIAGNOSIS — M81.0 OSTEOPOROSIS, POSTMENOPAUSAL: Primary | ICD-10-CM

## 2017-11-13 PROCEDURE — 63600175 PHARM REV CODE 636 W HCPCS: Performed by: INTERNAL MEDICINE

## 2017-11-13 PROCEDURE — 96372 THER/PROPH/DIAG INJ SC/IM: CPT

## 2017-11-13 RX ORDER — ATORVASTATIN CALCIUM 80 MG/1
80 TABLET, FILM COATED ORAL DAILY
Qty: 30 TABLET | Refills: 3 | Status: SHIPPED | OUTPATIENT
Start: 2017-11-13 | End: 2017-12-21 | Stop reason: SINTOL

## 2017-11-13 RX ADMIN — DENOSUMAB 60 MG: 60 INJECTION SUBCUTANEOUS at 02:11

## 2017-11-13 NOTE — PLAN OF CARE
Problem: Health Knowledge, Opportunity to Enhance (Adult,NICU,[place],Obstetrics,Pediatric)  Goal: Knowledgeable about Health Subject/Topic  Patient will demonstrate the desired outcomes by discharge/transition of care.  Outcome: Ongoing (interventions implemented as appropriate)  PATIENT EDUCATED ON HAND WASHING AND INFECTION CONTROL. PATIENT VERBALIZED UNDERSTANDING

## 2017-11-13 NOTE — TELEPHONE ENCOUNTER
Contacted patient about her lab result. Will increase lipitor to 80mg daily. Will check iron, TIBC, ferritin, B12, folate retic count

## 2017-11-18 ENCOUNTER — TELEPHONE (OUTPATIENT)
Dept: INTERNAL MEDICINE | Facility: CLINIC | Age: 82
End: 2017-11-18

## 2017-11-18 DIAGNOSIS — D64.9 ANEMIA, UNSPECIFIED TYPE: Primary | ICD-10-CM

## 2017-11-18 NOTE — TELEPHONE ENCOUNTER
Please contact patient and inform her that her labwork shows she is mildly anemic. I would like for her to see hematology. Order in,please schedule

## 2017-11-21 ENCOUNTER — HOSPITAL ENCOUNTER (OUTPATIENT)
Dept: CARDIOLOGY | Facility: CLINIC | Age: 82
Discharge: HOME | End: 2017-11-21
Attending: NURSE PRACTITIONER
Payer: MEDICARE

## 2017-11-21 DIAGNOSIS — R01.1 MURMUR: ICD-10-CM

## 2017-11-21 LAB
DIASTOLIC DYSFUNCTION: NO
ESTIMATED PA SYSTOLIC PRESSURE: 30.85
RETIRED EF AND QEF - SEE NOTES: 60 (ref 55–65)
TRICUSPID VALVE REGURGITATION: NORMAL

## 2017-11-21 PROCEDURE — 93306 TTE W/DOPPLER COMPLETE: CPT | Mod: S$GLB,,, | Performed by: INTERNAL MEDICINE

## 2017-11-28 ENCOUNTER — TELEPHONE (OUTPATIENT)
Dept: CARDIOLOGY | Facility: CLINIC | Age: 82
End: 2017-11-28

## 2017-11-28 NOTE — TELEPHONE ENCOUNTER
Left message for patient letting her know that her echo looks good.  The murmur that we appreciated was from calcification on the valve leaflets, which is not concerning.  Patient has a f/u on 12/21/17.

## 2017-12-21 ENCOUNTER — OFFICE VISIT (OUTPATIENT)
Dept: CARDIOLOGY | Facility: CLINIC | Age: 82
End: 2017-12-21
Payer: MEDICARE

## 2017-12-21 VITALS
BODY MASS INDEX: 14.96 KG/M2 | WEIGHT: 84.44 LBS | HEIGHT: 63 IN | HEART RATE: 92 BPM | DIASTOLIC BLOOD PRESSURE: 83 MMHG | SYSTOLIC BLOOD PRESSURE: 201 MMHG

## 2017-12-21 DIAGNOSIS — I45.10 RIGHT BUNDLE BRANCH BLOCK: ICD-10-CM

## 2017-12-21 DIAGNOSIS — R13.10 DYSPHAGIA, UNSPECIFIED TYPE: ICD-10-CM

## 2017-12-21 DIAGNOSIS — D64.9 NORMOCYTIC ANEMIA: ICD-10-CM

## 2017-12-21 DIAGNOSIS — I10 ESSENTIAL HYPERTENSION: Chronic | ICD-10-CM

## 2017-12-21 DIAGNOSIS — I77.9 BILATERAL CAROTID ARTERY DISEASE: Chronic | ICD-10-CM

## 2017-12-21 DIAGNOSIS — J43.9 PULMONARY EMPHYSEMA, UNSPECIFIED EMPHYSEMA TYPE: ICD-10-CM

## 2017-12-21 DIAGNOSIS — E78.5 DYSLIPIDEMIA: Chronic | ICD-10-CM

## 2017-12-21 DIAGNOSIS — R64 CACHECTIC: Chronic | ICD-10-CM

## 2017-12-21 DIAGNOSIS — I70.0 AORTIC ATHEROSCLEROSIS: ICD-10-CM

## 2017-12-21 DIAGNOSIS — I25.10 CORONARY ARTERY DISEASE INVOLVING NATIVE CORONARY ARTERY OF NATIVE HEART WITHOUT ANGINA PECTORIS: Primary | Chronic | ICD-10-CM

## 2017-12-21 PROCEDURE — 99999 PR PBB SHADOW E&M-EST. PATIENT-LVL IV: CPT | Mod: PBBFAC,,, | Performed by: NURSE PRACTITIONER

## 2017-12-21 PROCEDURE — 99499 UNLISTED E&M SERVICE: CPT | Mod: S$GLB,,, | Performed by: NURSE PRACTITIONER

## 2017-12-21 PROCEDURE — 99214 OFFICE O/P EST MOD 30 MIN: CPT | Mod: S$GLB,,, | Performed by: NURSE PRACTITIONER

## 2017-12-21 RX ORDER — VALSARTAN 160 MG/1
160 TABLET ORAL 2 TIMES DAILY
Qty: 60 TABLET | Refills: 11 | Status: SHIPPED | OUTPATIENT
Start: 2017-12-21 | End: 2018-03-14 | Stop reason: SDUPTHER

## 2017-12-21 RX ORDER — LOVASTATIN 40 MG/1
40 TABLET ORAL NIGHTLY
Qty: 90 TABLET | Refills: 3 | Status: SHIPPED | OUTPATIENT
Start: 2017-12-21 | End: 2018-03-14 | Stop reason: SDUPTHER

## 2017-12-21 RX ORDER — CHLORTHALIDONE 25 MG/1
TABLET ORAL
Qty: 30 TABLET | Refills: 6 | Status: SHIPPED | OUTPATIENT
Start: 2017-12-21 | End: 2018-03-14 | Stop reason: SDUPTHER

## 2017-12-21 NOTE — PATIENT INSTRUCTIONS
Stop the atorvastatin 80mg.    Start lovastatin 40mg    Take over the counter CoQ10 200mg one time a day.    Try drinking boost 2 times a day in between meals.

## 2017-12-21 NOTE — PROGRESS NOTES
Ms. Summers is a patient of Dr. Morrow and was last seen in [facility] Cardiology Visit 6/21/17.    Subjective:   Patient ID:  Mj Summers is a 82 y.o. female who presents for follow-up of Coronary Artery Disease (6 month f/u )    Problems:  Coronary artery disease ([facility] 2010 mid LAD 40%)   mild bilateral carotid disease (20-39% bilateral in 2015 )   hyperlipidemia   hypertension   pulmonary emphysema.      HPI  Ms. Summers is in clinic today routine follow up.  Patient denies chest pain with exertion or at rest, palpitations, SOB, YOUNGBLOOD, dizziness, syncope, edema, orthopnea, PND, or claudication.  Reports no routine exercise.  She is treated with low dose ASA and high intensity statin.  Patient is taking atorvastatin 80mg and LDL is 157.  Hypertension is treated with a thiazide diuretic (chlorathalidone 12.5mg) and ARB (valsartan 160mg).  Reports following a low salt diet. Home blood pressure readings are 140-150s.      Review of Systems   Constitution: Positive for decreased appetite. Negative for diaphoresis, weakness, malaise/fatigue, weight gain and weight loss.   Eyes: Negative for visual disturbance.   Cardiovascular: Negative for chest pain, claudication, dyspnea on exertion, irregular heartbeat, leg swelling, near-syncope, orthopnea, palpitations, paroxysmal nocturnal dyspnea and syncope.        Denies chest pressure   Respiratory: Negative for cough, hemoptysis, shortness of breath, sleep disturbances due to breathing and snoring.    Endocrine: Negative for cold intolerance and heat intolerance.   Hematologic/Lymphatic: Negative for bleeding problem. Does not bruise/bleed easily.   Musculoskeletal: Negative for myalgias.   Gastrointestinal: Negative for bloating, abdominal pain, anorexia, change in bowel habit, constipation, diarrhea, nausea and vomiting.   Neurological: Negative for difficulty with concentration, disturbances in coordination, excessive daytime sleepiness, dizziness, headaches,  light-headedness, loss of balance and numbness.   Psychiatric/Behavioral: The patient does not have insomnia.      Allergies and current medications updated and reviewed:  Review of patient's allergies indicates:   Allergen Reactions    Pcn [penicillins] Rash    Sulfa (sulfonamide antibiotics) Itching     Current Outpatient Prescriptions   Medication Sig    aspirin 81 mg Tab Take 81 mg by mouth every evening. 1 Tablet Oral Every day    atorvastatin (LIPITOR) 80 MG tablet Take 1 tablet (80 mg total) by mouth once daily.    bimatoprost (LUMIGAN) 0.01 % Drop Place 1 drop into the right eye every evening.    brimonidine 0.15 % OPTH DROP (ALPHAGAN) 0.15 % ophthalmic solution Place 1 drop into the right eye 3 (three) times daily.    calcium carbonate (OS-KACI) 600 mg (1,500 mg) Tab Take 600 mg by mouth 2 (two) times daily with meals.    chlorthalidone (HYGROTEN) 25 MG Tab 1/2 tablet daily    ergocalciferol (ERGOCALCIFEROL) 50,000 unit Cap Take 1 capsule (50,000 Units total) by mouth every 30 days.    ferrous sulfate 325 (65 FE) MG EC tablet Take 1 tablet (325 mg total) by mouth once daily.    meloxicam (MOBIC) 7.5 MG tablet Take 1 tablet by mouth daily as needed.    multivitamin (ONE DAILY MULTIVITAMIN) per tablet Take 1 tablet by mouth once daily.    NEXIUM 40 mg capsule take 1 capsule by mouth once daily    pilocarpine HCL 4% (PILOCAR) 4 % ophthalmic solution Place 1 drop into the right eye 3 (three) times daily.    senna-docusate 8.6-50 mg (PERICOLACE) 8.6-50 mg per tablet Take 1 tablet by mouth 2 (two) times daily. (Patient taking differently: Take 1 tablet by mouth 2 (two) times daily as needed. )    timolol maleate 0.5% (TIMOPTIC-XE) 0.5 % SolG Place 1 drop into the right eye every morning.    valsartan (DIOVAN) 160 MG tablet Take 1 tablet (160 mg total) by mouth 2 (two) times daily.     No current facility-administered medications for this visit.      Objective:     Right Arm BP - Sittin/86  "(17 1451)  Left Arm BP - Sittin/83 (17 1451)    BP (!) 201/83 (BP Location: Left arm, Patient Position: Sitting, BP Method: Small (Automatic))   Pulse 92   Ht 5' 3" (1.6 m)   Wt 38.3 kg (84 lb 7 oz)   LMP 11/10/1987 (Approximate)   BMI 14.96 kg/m²     Physical Exam   Constitutional: She is oriented to person, place, and time. Vital signs are normal. She appears well-developed and well-nourished. She is active. No distress.   HENT:   Head: Normocephalic and atraumatic.   Eyes: Conjunctivae and lids are normal. No scleral icterus.   Neck: Neck supple. Normal carotid pulses, no hepatojugular reflux and no JVD present. Carotid bruit is not present.   Cardiovascular: Normal rate, regular rhythm, S1 normal, S2 normal and intact distal pulses.  PMI is not displaced.  Exam reveals no gallop and no friction rub.    No murmur heard.  Pulses:       Carotid pulses are 2+ on the right side, and 2+ on the left side.       Radial pulses are 2+ on the right side, and 2+ on the left side.        Dorsalis pedis pulses are 2+ on the right side, and 2+ on the left side.        Posterior tibial pulses are 1+ on the right side, and 1+ on the left side.   Pulmonary/Chest: Effort normal and breath sounds normal. No respiratory distress. She has no decreased breath sounds. She has no wheezes. She has no rhonchi. She has no rales. She exhibits no tenderness.   Abdominal: Soft. Normal appearance and bowel sounds are normal. She exhibits no distension, no fluid wave, no abdominal bruit, no ascites and no pulsatile midline mass. There is no hepatosplenomegaly. There is no tenderness.   Musculoskeletal: She exhibits no edema.   Neurological: She is alert and oriented to person, place, and time. Gait normal.   Skin: Skin is warm, dry and intact. No rash noted. She is not diaphoretic. Nails show no clubbing.   Psychiatric: She has a normal mood and affect. Her speech is normal and behavior is normal. Judgment and thought " content normal. Cognition and memory are normal.   Nursing note and vitals reviewed.      Chemistry        Component Value Date/Time     11/06/2017 0811    K 4.2 11/06/2017 0811     11/06/2017 0811    CO2 27 11/06/2017 0811    BUN 15 11/06/2017 0811    CREATININE 0.7 11/06/2017 0811    GLU 91 11/06/2017 0811        Component Value Date/Time    CALCIUM 9.3 11/06/2017 0811    ALKPHOS 68 11/06/2017 0811    AST 25 11/06/2017 0811    ALT 22 11/06/2017 0811    BILITOT 0.4 11/06/2017 0811    ESTGFRAFRICA >60 11/06/2017 0811    EGFRNONAA >60 11/06/2017 0811          Recent Labs  Lab 11/06/17  0811 11/13/17  1548   WHITE BLOOD CELL COUNT 3.86 L 4.26   HEMOGLOBIN 10.7 L 10.2 L   HEMATOCRIT 33.2 L 31.7 L   MCV 93 92   PLATELETS 213 158   TSH 0.934  --    CHOLESTEROL 237 H  --    HDL 61  --    LDL CHOLESTEROL 157.6  --    TRIGLYCERIDES 92  --    HDL/CHOLESTEROL RATIO 25.7  --        Recent Labs  Lab 05/23/16  1558   INR 1.0        Test(s) Reviewed  I have reviewed the following in detail:  [] Stress test   [] Angiography   [x] Echocardiogram   [x] Labs   [] Other:       Assessment/Plan:     Coronary artery disease involving native coronary artery of native heart without angina pectoris  Comments:  Asymptomatic. Taking low dose ASA and high intensity statin. No changes. Encouraged increased exercise of 30minutes 4-5 days a week.   Orders:  -     lovastatin (MEVACOR) 40 MG tablet; Take 1 tablet (40 mg total) by mouth every evening.  Dispense: 90 tablet; Refill: 3  -     Lipid panel; Future; Expected date: 03/21/2018    Bilateral carotid artery disease, mild  Comments:  Mild disease seen on ultrasound in 2015. Continue ASA and high intensity statin therapy.   Orders:  -     lovastatin (MEVACOR) 40 MG tablet; Take 1 tablet (40 mg total) by mouth every evening.  Dispense: 90 tablet; Refill: 3  -     Lipid panel; Future; Expected date: 03/21/2018    Aortic atherosclerosis    Dyslipidemia  Comments:  LDL is not controlled.  Reports back pain associated with atorvastatin and not taking it. Stop atorvastatin. Start lovastatin 40mg. Repeat lipid panel   Orders:  -     lovastatin (MEVACOR) 40 MG tablet; Take 1 tablet (40 mg total) by mouth every evening.  Dispense: 90 tablet; Refill: 3  -     Lipid panel; Future; Expected date: 03/21/2018  -     Comprehensive metabolic panel; Future; Expected date: 03/21/2018    Essential hypertension  Comments:  Not well controlled. Didn't take chlorthalidone today. Instructed to take bp at home and call with log in 2 weeks.  Orders:  -     valsartan (DIOVAN) 160 MG tablet; Take 1 tablet (160 mg total) by mouth 2 (two) times daily.  Dispense: 60 tablet; Refill: 11  -     chlorthalidone (HYGROTEN) 25 MG Tab; 1/2 tablet daily  Dispense: 30 tablet; Refill: 6  -     Comprehensive metabolic panel; Future; Expected date: 03/21/2018    Right bundle branch block    Normocytic anemia    Pulmonary emphysema, unspecified emphysema type    Dysphagia, unspecified type  Comments:  Reports difficulty with swallowing recently. Has GI consult next month.     Cachectic  Comments:  Emaciated on exam. BMI with clothes and shoes is only 14. Instructed to attempt drinking 2 boosts a day in between meals. Denies food access problems.        Return in about 3 months (around 3/21/2018).

## 2018-01-03 ENCOUNTER — OFFICE VISIT (OUTPATIENT)
Dept: HEMATOLOGY/ONCOLOGY | Facility: CLINIC | Age: 83
End: 2018-01-03
Payer: MEDICARE

## 2018-01-03 ENCOUNTER — LAB VISIT (OUTPATIENT)
Dept: LAB | Facility: HOSPITAL | Age: 83
End: 2018-01-03
Attending: INTERNAL MEDICINE
Payer: MEDICARE

## 2018-01-03 VITALS
OXYGEN SATURATION: 99 % | SYSTOLIC BLOOD PRESSURE: 157 MMHG | TEMPERATURE: 98 F | DIASTOLIC BLOOD PRESSURE: 65 MMHG | RESPIRATION RATE: 18 BRPM | WEIGHT: 83 LBS | HEIGHT: 63 IN | BODY MASS INDEX: 14.71 KG/M2 | HEART RATE: 57 BPM

## 2018-01-03 DIAGNOSIS — E46 PROTEIN-CALORIE MALNUTRITION, UNSPECIFIED SEVERITY: ICD-10-CM

## 2018-01-03 DIAGNOSIS — D64.9 ANEMIA, UNSPECIFIED TYPE: ICD-10-CM

## 2018-01-03 DIAGNOSIS — N18.30 STAGE 3 CHRONIC KIDNEY DISEASE: ICD-10-CM

## 2018-01-03 DIAGNOSIS — D64.9 ANEMIA, UNSPECIFIED TYPE: Primary | ICD-10-CM

## 2018-01-03 LAB
BASOPHILS # BLD AUTO: 0.03 K/UL
BASOPHILS NFR BLD: 0.7 %
DIFFERENTIAL METHOD: ABNORMAL
EOSINOPHIL # BLD AUTO: 0.1 K/UL
EOSINOPHIL NFR BLD: 2 %
ERYTHROCYTE [DISTWIDTH] IN BLOOD BY AUTOMATED COUNT: 12.3 %
HAPTOGLOB SERPL-MCNC: 109 MG/DL
HCT VFR BLD AUTO: 31.2 %
HGB BLD-MCNC: 10.2 G/DL
IMM GRANULOCYTES # BLD AUTO: 0.01 K/UL
IMM GRANULOCYTES NFR BLD AUTO: 0.2 %
LDH SERPL L TO P-CCNC: 244 U/L
LYMPHOCYTES # BLD AUTO: 1.3 K/UL
LYMPHOCYTES NFR BLD: 29.7 %
MCH RBC QN AUTO: 29.7 PG
MCHC RBC AUTO-ENTMCNC: 32.7 G/DL
MCV RBC AUTO: 91 FL
MONOCYTES # BLD AUTO: 0.5 K/UL
MONOCYTES NFR BLD: 10.1 %
NEUTROPHILS # BLD AUTO: 2.6 K/UL
NEUTROPHILS NFR BLD: 57.3 %
NRBC BLD-RTO: 0 /100 WBC
PLATELET # BLD AUTO: 237 K/UL
PMV BLD AUTO: 10.3 FL
RBC # BLD AUTO: 3.43 M/UL
WBC # BLD AUTO: 4.45 K/UL

## 2018-01-03 PROCEDURE — 83010 ASSAY OF HAPTOGLOBIN QUANT: CPT

## 2018-01-03 PROCEDURE — 86334 IMMUNOFIX E-PHORESIS SERUM: CPT

## 2018-01-03 PROCEDURE — 99499 UNLISTED E&M SERVICE: CPT | Mod: S$GLB,,, | Performed by: INTERNAL MEDICINE

## 2018-01-03 PROCEDURE — 99999 PR PBB SHADOW E&M-EST. PATIENT-LVL III: CPT | Mod: PBBFAC,,, | Performed by: INTERNAL MEDICINE

## 2018-01-03 PROCEDURE — 85025 COMPLETE CBC W/AUTO DIFF WBC: CPT

## 2018-01-03 PROCEDURE — 86334 IMMUNOFIX E-PHORESIS SERUM: CPT | Mod: 26,,, | Performed by: PATHOLOGY

## 2018-01-03 PROCEDURE — 84165 PROTEIN E-PHORESIS SERUM: CPT | Mod: 26,,, | Performed by: PATHOLOGY

## 2018-01-03 PROCEDURE — 84165 PROTEIN E-PHORESIS SERUM: CPT

## 2018-01-03 PROCEDURE — 82525 ASSAY OF COPPER: CPT

## 2018-01-03 PROCEDURE — 83615 LACTATE (LD) (LDH) ENZYME: CPT

## 2018-01-03 PROCEDURE — 99204 OFFICE O/P NEW MOD 45 MIN: CPT | Mod: S$GLB,,, | Performed by: INTERNAL MEDICINE

## 2018-01-03 NOTE — LETTER
January 3, 2018      Shasta Urbina MD  [address]  [facility] [address]           [facility] Hematology Oncology  [address]  [facility] [address]  Phone: [phone]          Patient: Mj Summers   MR Number: [id number]   YOB: 1935   Date of Visit: 1/3/2018       Dear Dr. Shasta Urbina:    Thank you for referring Mj Summers to me for evaluation. Attached you will find relevant portions of my assessment and plan of care.    If you have questions, please do not hesitate to call me. I look forward to following Mj Summers along with you.    Sincerely,    Geo Myers MD    Enclosure  CC:  No Recipients    If you would like to receive this communication electronically, please contact [email or web address] or [phone] to request more information on [facility] [employer] Link access.    For providers and/or their staff who would like to refer a patient to [facility], please contact us through our one-stop-shop provider referral line, [facility] , at [phone].    If you feel you have received this communication in error or would no longer like to receive these types of communications, please e-mail [email or web address]

## 2018-01-03 NOTE — PROGRESS NOTES
CC:  anemia    HPI:  Ms. Summers is a very pleasant 83 yo woman with HTN, CAD, b/l carotid disease, GERD, malnutrition, who presents today for further evaluation of anemia. On review of her records, her Hb has been ranging between 10 and 12 since . In May 2016 it dropped to 5-7 after she underwent a surgery on 5/10/16 for left intertrochanteric fracture from a fall. Her H/H on 16 was 11.4/35.1. On 17, WBC 3.86, H/H 10.7/33.2, plt count 213. On 11/3/17, WBC 4.26, H/H 10.2/31.7, MCV 92, plt count 158. Vit B12 338, folate 17.2, ferritin 179, iron 68, TIBC 333, iron saturation 20%, retic 1.1%. CMP on 17 showed Cr 0.7, total protein 7.3, albumin 3.4, corrected calcium 9.8. Calculated CrCl 37.46 mL/min (cockroft). She currently feels fine. She has been having dysphagia over the past 2 months, which she attributed to GERD. Has been taking iron pills for many years and has been having dark stool from the iron pills. Denies any signs of bleeding.     ECO      Past Medical History:   Diagnosis Date    Anemia     Arthritis     back    Cataract     s/p surgery    Coronary artery disease     [facility] 2010 - mid LAD 40%    GERD (gastroesophageal reflux disease)     Glaucoma (increased eye pressure)     Hyperlipidemia     Hypertension     Lactose intolerance     Legally blind in right eye, as defined in [place]     Osteoporosis, post-menopausal     Pneumonia 2015    Proximal muscle weakness     Renal cyst     left kidney    Vitamin D deficiency disease          Family History   Problem Relation Age of Onset    Asthma Sister     Rheum arthritis Sister     Osteoporosis Sister     Arthritis Sister     Hyperlipidemia Brother     Rheum arthritis Brother     Diabetes Brother     Osteoporosis Mother     Heart disease Mother     Glaucoma Mother     Hypertension Father     Peripheral vascular disease Father     Alzheimer's disease Father     Osteoporosis Sister     Hypertension  Brother     Stroke Brother     Peripheral vascular disease Brother      bilateral leg amputation    Hypertension Son     Hypertension Daughter     Colon cancer Other 44     death from colon cancer at age 44    Stroke Brother     Heart disease Brother     Diabetes Daughter      GDM that is resolved    Macular degeneration Neg Hx     Retinal detachment Neg Hx     Strabismus Neg Hx     Amblyopia Neg Hx     Blindness Neg Hx        Past Surgical History:   Procedure Laterality Date    ADENOIDECTOMY      BREAST BIOPSY      left breast    CARDIAC CATHETERIZATION  01/14/2010    mid LAD, 40% stenosis; LCX, luminal irregularities;                 CATARACT EXTRACTION W/  INTRAOCULAR LENS IMPLANT Left 5/22/13    OS with trab (Dr. Triplett)    CATARACT EXTRACTION W/ INTRAOCULAR LENS IMPLANTW/ TRABECULECTOMY  5/22/13    OS (Dr. Triplett)    EDTA CHELATION Left 2/14    OS ()    FEMUR FRACTURE SURGERY Left 05/09/2016    TEAR DUCT SURGERY      right eye    TONSILLECTOMY      YAG CAPSULOTOMY Left 06/22/2017           Social History     Social History    Marital status:      Spouse name: N/A    Number of children: N/A    Years of education: N/A     Occupational History    Not on file.     Social History Main Topics    Smoking status: Never Smoker    Smokeless tobacco: Never Used    Alcohol use No    Drug use: No    Sexual activity: No     Other Topics Concern    Not on file     Social History Narrative    No narrative on file       Review of Systems   Constitutional: Negative for activity change, appetite change, chills, fatigue, fever and unexpected weight change.   HENT: Positive for trouble swallowing. Negative for mouth sores, nosebleeds, tinnitus and voice change.    Eyes: Negative for pain, redness and visual disturbance.   Respiratory: Negative for cough, chest tightness, shortness of breath and wheezing.    Cardiovascular: Negative for chest pain, palpitations  and leg swelling.   Gastrointestinal: Negative for abdominal distention, blood in stool, constipation, diarrhea, nausea and vomiting.   Endocrine: Negative for polydipsia, polyphagia and polyuria.   Genitourinary: Negative for flank pain, hematuria and pelvic pain.   Musculoskeletal: Positive for gait problem. Negative for back pain, myalgias, neck pain and neck stiffness.   Skin: Negative for color change, pallor, rash and wound.   Neurological: Negative for dizziness, tremors, seizures, syncope, speech difficulty, weakness, numbness and headaches.   Psychiatric/Behavioral: Negative for agitation, confusion and dysphoric mood. The patient is not nervous/anxious.        Objective:  Physical Exam   Constitutional: She is oriented to person, place, and time. She appears well-developed. No distress.   Looks thin   HENT:   Head: Normocephalic and atraumatic.   Mouth/Throat: Oropharynx is clear and moist. No oropharyngeal exudate.   Eyes: Conjunctivae and EOM are normal. Pupils are equal, round, and reactive to light. No scleral icterus.   Neck: Normal range of motion. Neck supple. No JVD present. No thyromegaly present.   Cardiovascular: Normal rate, regular rhythm, normal heart sounds and intact distal pulses.  Exam reveals no gallop and no friction rub.    No murmur heard.  Pulmonary/Chest: Effort normal and breath sounds normal. No respiratory distress. She has no wheezes. She has no rales.   Abdominal: Soft. Bowel sounds are normal. She exhibits no distension and no mass. There is no hepatosplenomegaly. There is no tenderness. No hernia.   Musculoskeletal: Normal range of motion. She exhibits no edema, tenderness or deformity.   Lymphadenopathy:     She has no cervical adenopathy.   Neurological: She is alert and oriented to person, place, and time. No cranial nerve deficit. She exhibits normal muscle tone.   Skin: Skin is warm and dry. No rash noted. She is not diaphoretic. No erythema. No pallor.   Psychiatric:  She has a normal mood and affect. Her behavior is normal. Judgment and thought content normal.       Labs:  Her Hb has been ranging between 10 and 12 since 2010. In May 2016 it dropped to 5-7 after she underwent a surgery on 5/10/16 for left intertrochanteric fracture from a fall. Her H/H on 7/12/16 was 11.4/35.1. On 11/6/17, WBC 3.86, H/H 10.7/33.2, plt count 213. On 11/3/17, WBC 4.26, H/H 10.2/31.7, MCV 92, plt count 158. Vit B12 338, folate 17.2, ferritin 179, iron 68, TIBC 333, iron saturation 20%, retic 1.1%. CMP on 11/6/17 showed Cr 0.7, total protein 7.3, albumin 3.4, corrected calcium 9.8. Calculated CrCl 37.46 mL/min (cockroft).       Assessment and Plan:  1. Anemia, unspecified type    2. Stage 3 chronic kidney disease    3. Protein-calorie malnutrition, unspecified severity       - Ms. Summers is an 83 yo woman who presents today for further evaluation of chronic anemia. She has been having mild anemia with Hb above 10 since 2010. Recent workup showed normal iron levels, ferritin, vitamin B12, folic acid. She does have chronic kidney disease despite normal creatinine. Her calculated CrCl is 37 mL/min. Discussed with patient that patients with chronic kidney disease can also have anemia because kidneys are not making enough EPO, which is a stimulator for bone marrow to make red blood cells.   - check copper, LDH, haptoglobin, SPEP/IFX  - She also has dysphagia. She needs EGD and colonoscopy to r/o GI bleeds as part of the workup for anemia. She is scheduled to see GI next Monday for dysphagia. I messaged Judith Hernandez NP who is seeing her next Monday. She will order EGD and colonoscopy for her.   - I will call her when I have the lab results back. If the results are unremarkable, I am seeing her back in 3 months to monitor.   - All questions answered in the clinic. Ms. Summers understands and agrees with the plan.

## 2018-01-04 LAB
ALBUMIN SERPL ELPH-MCNC: 3.9 G/DL
ALPHA1 GLOB SERPL ELPH-MCNC: 0.32 G/DL
ALPHA2 GLOB SERPL ELPH-MCNC: 0.77 G/DL
B-GLOBULIN SERPL ELPH-MCNC: 0.85 G/DL
GAMMA GLOB SERPL ELPH-MCNC: 1.36 G/DL
INTERPRETATION SERPL IFE-IMP: NORMAL
PROT SERPL-MCNC: 7.2 G/DL

## 2018-01-05 LAB
PATHOLOGIST INTERPRETATION IFE: NORMAL
PATHOLOGIST INTERPRETATION SPE: NORMAL

## 2018-01-08 ENCOUNTER — OFFICE VISIT (OUTPATIENT)
Dept: GASTROENTEROLOGY | Facility: CLINIC | Age: 83
End: 2018-01-08
Payer: MEDICARE

## 2018-01-08 VITALS — HEIGHT: 63 IN | BODY MASS INDEX: 15.08 KG/M2 | WEIGHT: 85.13 LBS

## 2018-01-08 DIAGNOSIS — D50.8 OTHER IRON DEFICIENCY ANEMIA: Primary | ICD-10-CM

## 2018-01-08 DIAGNOSIS — R13.19 OTHER DYSPHAGIA: ICD-10-CM

## 2018-01-08 PROCEDURE — 99214 OFFICE O/P EST MOD 30 MIN: CPT | Mod: S$GLB,,, | Performed by: NURSE PRACTITIONER

## 2018-01-08 PROCEDURE — 99999 PR PBB SHADOW E&M-EST. PATIENT-LVL III: CPT | Mod: PBBFAC,,, | Performed by: NURSE PRACTITIONER

## 2018-01-08 NOTE — LETTER
January 9, 2018      Shasta Urbina MD  [address]  [place] [address]           [facility]  [address]  [place] [address]  Phone: [phone]  Fax: [phone]          Patient: Mj Summers   MR Number: [id number]   YOB: 1935   Date of Visit: 1/8/2018       Dear Dr. Shasta Urbina:    Thank you for referring Mj Summers to me for evaluation. Attached you will find relevant portions of my assessment and plan of care.    If you have questions, please do not hesitate to call me. I look forward to following Mj Summers along with you.    Sincerely,    Judith Hernandez, MARCELINO    Enclosure  CC:  No Recipients    If you would like to receive this communication electronically, please contact [email or web address] or [phone] to request more information on [employer] Link access.    For providers and/or their staff who would like to refer a patient to [facility], please contact us through our one-stop-shop provider referral line, [facility], at [phone].    If you feel you have received this communication in error or would no longer like to receive these types of communications, please e-mail [email or web address]

## 2018-01-09 LAB — COPPER SERPL-MCNC: 1496 UG/L (ref 810–1990)

## 2018-01-09 NOTE — PROGRESS NOTES
[facility] Note    Reason for Visit:  The primary encounter diagnosis was Other iron deficiency anemia. A diagnosis of Other dysphagia was also pertinent to this visit.    PCP:   Shasta Urbina   [address] / [place] [address]    Referring MD:  Shasta Urbina Md  [address] [place] [address]    HPI:  This is a 82 y.o. female here for f/u evaluation of dysphagia. Ms. Summers was last seen in clinic 10/2016 with BILL Mccall for dysphagia. Here today in wheelchair. Hx of CKD.     Hx of dysphagia. Has been feeling solids not go down. Occurring a couple times per month. Denies difficulty swallowing liquids, painful swallowing, inducing vomiting. Last EGD 6/2014 normal. NSAID usage- none. Hx of Gerd. Has not been taking Nexium daily, taking every couple days.     Hx of Anemia, recently followed up with Hematology recommending EGD and Colonoscopy to rule out GI bleed. Hgb ranging 10-12 since 2010. Taking iron daily for years and will see darker stool. Denies blood in stool and melena. Having normal formed stool daily.     ROS:  Constitutional: No fevers, no chills, No unintentional weight loss, no fatigue   ENT: No allergies  CV: No chest pain, no palpitations, no perif. edema  Pulm: No cough, No shortness of breath, no wheezes, no sputum  Ophtho: No vision changes  GI: see HPI  Derm: No rash  Heme: No lymphadenopathy, No bruising  MSK: No arthritis, no muscle pain, no muscle weakness  : No dysuria, No hematuria  Endo: No hot or cold intolerance  Neuro: No syncope, No seizure     Medical History:  has a past medical history of Anemia; Arthritis (2015); Cataract; Coronary artery disease; GERD (gastroesophageal reflux disease); Glaucoma (increased eye pressure); Hyperlipidemia; Hypertension; Lactose intolerance; Legally blind in right eye, as defined in [place]; Osteoporosis, post-menopausal; Pneumonia (12/13/2015); Proximal muscle weakness; Renal cyst; and Vitamin D  deficiency disease.    Surgical History:  has a past surgical history that includes Tonsillectomy; Tear duct surgery; Breast biopsy; Cardiac catheterization (01/14/2010); Cataract extraction w/ intraocular lens implant w/ trabeculectomy (5/22/13); EDTA CHELATION (Left, 2/14); Adenoidectomy; Femur fracture surgery (Left, 05/09/2016); Cataract extraction w/  intraocular lens implant (Left, 5/22/13); YAG CAPSULOTOMY (Left, 06/22/2017); and Upper gastrointestinal endoscopy.    Family History: family history includes Alzheimer's disease in her father; Arthritis in her sister; Asthma in her sister; Colon cancer (age of onset: 44) in her other; Diabetes in her brother and daughter; Glaucoma in her mother; Heart disease in her brother and mother; Hyperlipidemia in her brother; Hypertension in her brother, daughter, father, and son; Osteoporosis in her mother, sister, and sister; Peripheral vascular disease in her brother and father; Rheum arthritis in her brother and sister; Stroke in her brother and brother..     Social History:  reports that she has never smoked. She has never used smokeless tobacco. She reports that she does not drink alcohol or use drugs.    Review of patient's allergies indicates:   Allergen Reactions    Pcn [penicillins] Rash    Sulfa (sulfonamide antibiotics) Itching     Current Outpatient Prescriptions   Medication Sig    aspirin 81 mg Tab Take 81 mg by mouth every evening. 1 Tablet Oral Every day    bimatoprost (LUMIGAN) 0.01 % Drop Place 1 drop into the right eye every evening.    brimonidine 0.15 % OPTH DROP (ALPHAGAN) 0.15 % ophthalmic solution Place 1 drop into the right eye 3 (three) times daily.    calcium carbonate (OS-KACI) 600 mg (1,500 mg) Tab Take 600 mg by mouth 2 (two) times daily with meals.    chlorthalidone (HYGROTEN) 25 MG Tab 1/2 tablet daily    ergocalciferol (ERGOCALCIFEROL) 50,000 unit Cap Take 1 capsule (50,000 Units total) by mouth every 30 days.    ferrous sulfate  "325 (65 FE) MG EC tablet Take 1 tablet (325 mg total) by mouth once daily.    lovastatin (MEVACOR) 40 MG tablet Take 1 tablet (40 mg total) by mouth every evening.    multivitamin (ONE DAILY MULTIVITAMIN) per tablet Take 1 tablet by mouth once daily.    NEXIUM 40 mg capsule take 1 capsule by mouth once daily    pilocarpine HCL 4% (PILOCAR) 4 % ophthalmic solution Place 1 drop into the right eye 3 (three) times daily.    senna-docusate 8.6-50 mg (PERICOLACE) 8.6-50 mg per tablet Take 1 tablet by mouth 2 (two) times daily. (Patient taking differently: Take 1 tablet by mouth 2 (two) times daily as needed. )    timolol maleate 0.5% (TIMOPTIC-XE) 0.5 % SolG Place 1 drop into the right eye every morning.    valsartan (DIOVAN) 160 MG tablet Take 1 tablet (160 mg total) by mouth 2 (two) times daily.     No current facility-administered medications for this visit.      Objective Findings:    Vital Signs:  Ht 5' 3" (1.6 m)   Wt 38.6 kg (85 lb 1.6 oz)   LMP 11/10/1987 (Approximate)   BMI 15.07 kg/m²   Body mass index is 15.07 kg/m².    Physical Exam:  General Appearance: Well appearing in no acute distress  Head: Normocephalic, without obvious abnormality  Eyes: No scleral icterus, EOMI  ENT: Neck supple, Lips, mucosa, and tongue normal; teeth and gums normal  Lungs: CTA bilaterally in anterior and posterior fields, no wheezes, no crackles.  Heart: Regular rate and rhythm, no murmurs heard  Abdomen: Soft, non tender, non distended with positive bowel sounds in all four quadrants.  Extremities: No clubbing, cyanosis or edema  Skin: No rash to exposed areas  Neurologic: AAOx4    Labs:  Lab Results   Component Value Date    WBC 4.45 01/03/2018    HGB 10.2 (L) 01/03/2018    HCT 31.2 (L) 01/03/2018     01/03/2018    CHOL 237 (H) 11/06/2017    TRIG 92 11/06/2017    HDL 61 11/06/2017    ALT 22 11/06/2017    AST 25 11/06/2017     11/06/2017    K 4.2 11/06/2017     11/06/2017    CREATININE 0.7 11/06/2017    " BUN 15 11/06/2017    CO2 27 11/06/2017    TSH 0.934 11/06/2017    INR 1.0 05/23/2016     Endoscopy:    EGD- 6/2014 normal.     Colonoscopy- 2007- Tortuous colon, otherwise normal. Repeat in 5-10 years.     Assessment:  1. Other iron deficiency anemia    2. Other dysphagia      Recommendations:  1. Ordered occult stool studies x 3 to rule out GI bleed. Labs today- Iron studies. Schedule EGD and Colonoscopy to rule out GI bleed.   2. Schedule EGD to rule out stricture.     Follow up after EGD and Colonoscopy.     Order summary:  Orders Placed This Encounter    IRON AND TIBC    Occult blood x 1, stool    Occult blood x 1, stool    Occult blood x 1, stool    Case request GI: ESOPHAGOGASTRODUODENOSCOPY (EGD), COLONOSCOPY     Thank you so much for allowing me to participate in the care of EDILMA Fagan, FNP-C

## 2018-01-11 ENCOUNTER — TELEPHONE (OUTPATIENT)
Dept: HEMATOLOGY/ONCOLOGY | Facility: CLINIC | Age: 83
End: 2018-01-11

## 2018-01-11 NOTE — TELEPHONE ENCOUNTER
Spoke with Ms. Summers regarding the lab results over the phone. Her anemia is stable. The other lab results are normal. We still need to rule out GI blood loss. She has seen Gi. Stool occult blood, EGD/colonoscopy were ordered for her. She has sent the stool card back yesterday. She will follow up with me in the clinic in April. She understands and agrees with the plan.

## 2018-01-18 ENCOUNTER — TELEPHONE (OUTPATIENT)
Dept: GASTROENTEROLOGY | Facility: CLINIC | Age: 83
End: 2018-01-18

## 2018-01-18 NOTE — TELEPHONE ENCOUNTER
----- Message from Judith Hernandez NP sent at 1/18/2018  2:49 PM CST -----  Can we give MsJuan F Summers number to schedule EGD and Colonoscopy.     Judith

## 2018-01-18 NOTE — TELEPHONE ENCOUNTER
Tried calling pt. No answer and no voicemail. Sent message to the GI schedulers to call patient to schedule procedures.

## 2018-01-19 ENCOUNTER — TELEPHONE (OUTPATIENT)
Dept: ENDOSCOPY | Facility: HOSPITAL | Age: 83
End: 2018-01-19

## 2018-01-19 ENCOUNTER — TELEPHONE (OUTPATIENT)
Dept: GASTROENTEROLOGY | Facility: CLINIC | Age: 83
End: 2018-01-19

## 2018-01-19 DIAGNOSIS — D50.8 OTHER IRON DEFICIENCY ANEMIA: Primary | ICD-10-CM

## 2018-01-19 NOTE — TELEPHONE ENCOUNTER
----- Message from Ami Gifty sent at 1/19/2018  2:02 PM CST -----  Contact: nevin/internal medicine lab - [phone]  jeancarlos - has fit kit test - needs order for echo fobt - please call nevin/internal medicine lab - [phone]

## 2018-01-23 ENCOUNTER — LAB VISIT (OUTPATIENT)
Dept: LAB | Facility: HOSPITAL | Age: 83
End: 2018-01-23
Attending: NURSE PRACTITIONER
Payer: MEDICARE

## 2018-01-23 DIAGNOSIS — D50.8 OTHER IRON DEFICIENCY ANEMIA: ICD-10-CM

## 2018-01-23 LAB — HEMOCCULT STL QL IA: NEGATIVE

## 2018-01-23 PROCEDURE — 82274 ASSAY TEST FOR BLOOD FECAL: CPT

## 2018-01-26 ENCOUNTER — TELEPHONE (OUTPATIENT)
Dept: GASTROENTEROLOGY | Facility: CLINIC | Age: 83
End: 2018-01-26

## 2018-01-26 RX ORDER — ESOMEPRAZOLE MAGNESIUM 40 MG/1
CAPSULE, DELAYED RELEASE ORAL
Qty: 30 CAPSULE | Refills: 2 | Status: SHIPPED | OUTPATIENT
Start: 2018-01-26 | End: 2018-01-29 | Stop reason: SDUPTHER

## 2018-01-26 NOTE — TELEPHONE ENCOUNTER
----- Message from Matt Montoya MA sent at 1/26/2018  3:17 PM CST -----  Contact: self/[phone]  Please call and advise  ----- Message -----  From: Lor Whelan  Sent: 1/26/2018   2:32 PM  To: Carlitos ZEPEDA Staff    Patient called in regards needing to talk with Dr Urbina about patient medication and blood pressure reading. Please call and advise.       Thank you!!!

## 2018-01-26 NOTE — TELEPHONE ENCOUNTER
----- Message from Judith Hernandze NP sent at 1/25/2018  9:32 PM CST -----  Negative for blood in stool. Can we get her to schedule EGD and Colonoscopy with dx of anemia and dysphagia to evaluate. Thanks.     Judith

## 2018-01-29 RX ORDER — ESOMEPRAZOLE MAGNESIUM 40 MG/1
40 CAPSULE, DELAYED RELEASE ORAL DAILY
Qty: 30 CAPSULE | Refills: 0 | Status: SHIPPED | OUTPATIENT
Start: 2018-01-29 | End: 2018-02-20 | Stop reason: SDUPTHER

## 2018-01-30 ENCOUNTER — OFFICE VISIT (OUTPATIENT)
Dept: INTERNAL MEDICINE | Facility: CLINIC | Age: 83
End: 2018-01-30
Payer: MEDICARE

## 2018-01-30 DIAGNOSIS — M79.673 PAIN OF FOOT, UNSPECIFIED LATERALITY: Primary | ICD-10-CM

## 2018-01-30 DIAGNOSIS — I10 HYPERTENSION, UNSPECIFIED TYPE: ICD-10-CM

## 2018-01-30 PROCEDURE — 1159F MED LIST DOCD IN RCRD: CPT | Mod: S$GLB,,, | Performed by: INTERNAL MEDICINE

## 2018-01-30 PROCEDURE — 99999 PR PBB SHADOW E&M-EST. PATIENT-LVL V: CPT | Mod: PBBFAC,,, | Performed by: INTERNAL MEDICINE

## 2018-01-30 PROCEDURE — 1126F AMNT PAIN NOTED NONE PRSNT: CPT | Mod: S$GLB,,, | Performed by: INTERNAL MEDICINE

## 2018-01-30 PROCEDURE — 99499 UNLISTED E&M SERVICE: CPT | Mod: S$GLB,,, | Performed by: INTERNAL MEDICINE

## 2018-01-30 PROCEDURE — 99215 OFFICE O/P EST HI 40 MIN: CPT | Mod: S$GLB,,, | Performed by: INTERNAL MEDICINE

## 2018-01-30 PROCEDURE — 3008F BODY MASS INDEX DOCD: CPT | Mod: S$GLB,,, | Performed by: INTERNAL MEDICINE

## 2018-01-30 RX ORDER — ATORVASTATIN CALCIUM 80 MG/1
TABLET, FILM COATED ORAL
COMMUNITY
Start: 2018-01-18 | End: 2018-01-30

## 2018-01-31 ENCOUNTER — PES CALL (OUTPATIENT)
Dept: ADMINISTRATIVE | Facility: CLINIC | Age: 83
End: 2018-01-31

## 2018-01-31 DIAGNOSIS — D50.9 IRON DEFICIENCY ANEMIA, UNSPECIFIED IRON DEFICIENCY ANEMIA TYPE: Primary | ICD-10-CM

## 2018-01-31 RX ORDER — POLYETHYLENE GLYCOL 3350, SODIUM SULFATE ANHYDROUS, SODIUM BICARBONATE, SODIUM CHLORIDE, POTASSIUM CHLORIDE 236; 22.74; 6.74; 5.86; 2.97 G/4L; G/4L; G/4L; G/4L; G/4L
4 POWDER, FOR SOLUTION ORAL ONCE
Qty: 4000 ML | Refills: 0 | Status: SHIPPED | OUTPATIENT
Start: 2018-01-31 | End: 2018-01-31

## 2018-02-02 VITALS
BODY MASS INDEX: 14.71 KG/M2 | HEART RATE: 62 BPM | WEIGHT: 83 LBS | OXYGEN SATURATION: 99 % | HEIGHT: 63 IN | SYSTOLIC BLOOD PRESSURE: 138 MMHG | TEMPERATURE: 98 F | DIASTOLIC BLOOD PRESSURE: 60 MMHG

## 2018-02-02 NOTE — PROGRESS NOTES
Subjective:       Patient ID: Mj Summers is a 82 y.o. female.    Chief Complaint: Hypertension    HPI  She returns for management of hypertension.  She has had hypertension for over a year.  Current treatment has included medications outlined in medication list.  She denies chest pain or shortness of breath.  No palpitations.  Denies left arm or neck pain.    PAST MEDICAL HISTORY: Hypertension, hyperlipidemia, osteoporosis, anemia, coronary artery disease,   maxillary fracture, leukopenia, glaucoma, positive PPD, GERD, ORIF left hip,   hyperparathyroidism, tricuspid regurgitation. She had a   colonoscopy in September 2007.     PAST SURGICAL HISTORY: Breast cyst removal, benign.     MEDICATIONS: Nexium when necessary, one aspirin daily,   timolol drops, prolia, Diovan 320 mg daily,  chlorthalidone , lovastatin 40 mg daily     ALLERGIES: Penicillin and sulfa. .       Review of Systems   Constitutional: Negative for chills, fatigue, fever and unexpected weight change.   Respiratory: Negative for chest tightness and shortness of breath.    Cardiovascular: Negative for chest pain and palpitations.   Gastrointestinal: Negative for abdominal pain and blood in stool.   Neurological: Negative for dizziness, syncope, numbness and headaches.       Objective:      Physical Exam   HENT:   Right Ear: External ear normal.   Left Ear: External ear normal.   Nose: Nose normal.   Mouth/Throat: Oropharynx is clear and moist.   Eyes: Pupils are equal, round, and reactive to light.   Neck: Normal range of motion.   Cardiovascular: Normal rate and regular rhythm.    Murmur heard.  Pulmonary/Chest: Breath sounds normal.   Abdominal: She exhibits no distension. There is no hepatosplenomegaly. There is no tenderness.   Lymphadenopathy:     She has no cervical adenopathy.     She has no axillary adenopathy.   Neurological: She has normal strength and normal reflexes. No cranial nerve deficit or sensory deficit.       Assessment/Plan        Assessment and plan: Hypertension: Continue Diovan.  She was here for an urgent care only appointment.  She will return to clinic for a physical

## 2018-02-06 ENCOUNTER — ANESTHESIA EVENT (OUTPATIENT)
Dept: ENDOSCOPY | Facility: HOSPITAL | Age: 83
End: 2018-02-06
Payer: MEDICARE

## 2018-02-06 ENCOUNTER — HOSPITAL ENCOUNTER (OUTPATIENT)
Facility: HOSPITAL | Age: 83
Discharge: HOME | End: 2018-02-06
Attending: INTERNAL MEDICINE | Admitting: INTERNAL MEDICINE
Payer: MEDICARE

## 2018-02-06 ENCOUNTER — ANESTHESIA (OUTPATIENT)
Dept: ENDOSCOPY | Facility: HOSPITAL | Age: 83
End: 2018-02-06
Payer: MEDICARE

## 2018-02-06 ENCOUNTER — SURGERY (OUTPATIENT)
Age: 83
End: 2018-02-06

## 2018-02-06 VITALS
TEMPERATURE: 98 F | DIASTOLIC BLOOD PRESSURE: 77 MMHG | SYSTOLIC BLOOD PRESSURE: 112 MMHG | OXYGEN SATURATION: 98 % | RESPIRATION RATE: 12 BRPM | HEART RATE: 77 BPM | BODY MASS INDEX: 14.71 KG/M2 | WEIGHT: 83 LBS | HEIGHT: 63 IN

## 2018-02-06 DIAGNOSIS — R13.19 ESOPHAGEAL DYSPHAGIA: Primary | ICD-10-CM

## 2018-02-06 PROCEDURE — 45378 DIAGNOSTIC COLONOSCOPY: CPT | Performed by: INTERNAL MEDICINE

## 2018-02-06 PROCEDURE — 43239 EGD BIOPSY SINGLE/MULTIPLE: CPT | Performed by: INTERNAL MEDICINE

## 2018-02-06 PROCEDURE — 43239 EGD BIOPSY SINGLE/MULTIPLE: CPT | Mod: 59,,, | Performed by: INTERNAL MEDICINE

## 2018-02-06 PROCEDURE — 43249 ESOPH EGD DILATION <30 MM: CPT | Mod: 51,,, | Performed by: INTERNAL MEDICINE

## 2018-02-06 PROCEDURE — D9220A PRA ANESTHESIA: Mod: CRNA,,, | Performed by: NURSE ANESTHETIST, CERTIFIED REGISTERED

## 2018-02-06 PROCEDURE — 63600175 PHARM REV CODE 636 W HCPCS: Performed by: NURSE ANESTHETIST, CERTIFIED REGISTERED

## 2018-02-06 PROCEDURE — 25000003 PHARM REV CODE 250: Performed by: INTERNAL MEDICINE

## 2018-02-06 PROCEDURE — D9220A PRA ANESTHESIA: Mod: ANES,,, | Performed by: ANESTHESIOLOGY

## 2018-02-06 PROCEDURE — 88305 TISSUE EXAM BY PATHOLOGIST: CPT | Mod: 26,,, | Performed by: PATHOLOGY

## 2018-02-06 PROCEDURE — 25000003 PHARM REV CODE 250: Performed by: NURSE ANESTHETIST, CERTIFIED REGISTERED

## 2018-02-06 PROCEDURE — 43249 ESOPH EGD DILATION <30 MM: CPT | Performed by: INTERNAL MEDICINE

## 2018-02-06 PROCEDURE — 37000009 HC ANESTHESIA EA ADD 15 MINS: Performed by: INTERNAL MEDICINE

## 2018-02-06 PROCEDURE — 37000008 HC ANESTHESIA 1ST 15 MINUTES: Performed by: INTERNAL MEDICINE

## 2018-02-06 PROCEDURE — 27201012 HC FORCEPS, HOT/COLD, DISP: Performed by: INTERNAL MEDICINE

## 2018-02-06 PROCEDURE — 88305 TISSUE EXAM BY PATHOLOGIST: CPT | Performed by: PATHOLOGY

## 2018-02-06 PROCEDURE — 45378 DIAGNOSTIC COLONOSCOPY: CPT | Mod: ,,, | Performed by: INTERNAL MEDICINE

## 2018-02-06 RX ORDER — SODIUM CHLORIDE 9 MG/ML
INJECTION, SOLUTION INTRAVENOUS CONTINUOUS
Status: DISCONTINUED | OUTPATIENT
Start: 2018-02-06 | End: 2018-02-06 | Stop reason: HOSPADM

## 2018-02-06 RX ORDER — PROPOFOL 10 MG/ML
VIAL (ML) INTRAVENOUS CONTINUOUS PRN
Status: DISCONTINUED | OUTPATIENT
Start: 2018-02-06 | End: 2018-02-06

## 2018-02-06 RX ORDER — SODIUM CHLORIDE 9 MG/ML
INJECTION, SOLUTION INTRAVENOUS CONTINUOUS PRN
Status: DISCONTINUED | OUTPATIENT
Start: 2018-02-06 | End: 2018-02-06

## 2018-02-06 RX ORDER — PROPOFOL 10 MG/ML
VIAL (ML) INTRAVENOUS
Status: DISCONTINUED | OUTPATIENT
Start: 2018-02-06 | End: 2018-02-06

## 2018-02-06 RX ORDER — LIDOCAINE HYDROCHLORIDE 10 MG/ML
INJECTION, SOLUTION INTRAVENOUS
Status: DISCONTINUED | OUTPATIENT
Start: 2018-02-06 | End: 2018-02-06

## 2018-02-06 RX ADMIN — SODIUM CHLORIDE: 0.9 INJECTION, SOLUTION INTRAVENOUS at 10:02

## 2018-02-06 RX ADMIN — PROPOFOL 50 MG: 10 INJECTION, EMULSION INTRAVENOUS at 10:02

## 2018-02-06 RX ADMIN — PROPOFOL 100 MCG/KG/MIN: 10 INJECTION, EMULSION INTRAVENOUS at 10:02

## 2018-02-06 RX ADMIN — LIDOCAINE HYDROCHLORIDE 50 MG: 10 INJECTION, SOLUTION INTRAVENOUS at 10:02

## 2018-02-06 NOTE — ANESTHESIA PREPROCEDURE EVALUATION
02/06/2018  Mj Summers is a 82 y.o., female.    Anesthesia Evaluation    I have reviewed the Patient Summary Reports.    I have reviewed the Nursing Notes.      Review of Systems  Anesthesia Hx:  No problems with previous Anesthesia    Hematology/Oncology:  Hematology Normal   Oncology Normal     EENT/Dental:EENT/Dental Normal   Cardiovascular:   Hypertension CAD  Dysrhythmias     Pulmonary:   Pneumonia    Renal/:   Chronic Renal Disease    Hepatic/GI:   GERD    Musculoskeletal:  Musculoskeletal Normal    Neurological:  Neurology Normal    Endocrine:  Endocrine Normal    Dermatological:  Skin Normal    Psych:  Psychiatric Normal           Physical Exam  General:  Well nourished    Airway/Jaw/Neck:  Airway Findings: Mouth Opening: Normal Tongue: Normal  General Airway Assessment: Adult  Mallampati: I  TM Distance: Normal, at least 6 cm        Eyes/Ears/Nose:  EYES/EARS/NOSE FINDINGS: Normal   Dental:  Dental Findings: Upper Dentures, Lower Dentures   Chest/Lungs:  Chest/Lungs Clear    Heart/Vascular:  Heart Findings: Normal Heart murmur: negative Vascular Findings: Normal    Abdomen:  Abdomen Findings: Normal    Musculoskeletal:  Musculoskeletal Findings: Normal   Skin:  Skin Findings: Normal    Mental Status:  Mental Status Findings: Normal        Anesthesia Plan  Type of Anesthesia, risks & benefits discussed:  Anesthesia Type:  general  Patient's Preference:   Intra-op Monitoring Plan:   Intra-op Monitoring Plan Comments:   Post Op Pain Control Plan:   Post Op Pain Control Plan Comments:   Induction:   IV  Beta Blocker:  Patient is not currently on a Beta-Blocker (No further documentation required).       Informed Consent: Patient understands risks and agrees with Anesthesia plan.  Questions answered. Anesthesia consent signed with patient.  ASA Score: 3     Day of Surgery Review of History &  Physical:    H&P update referred to the surgeon.         Ready For Surgery From Anesthesia Perspective.

## 2018-02-06 NOTE — PROVATION PATIENT INSTRUCTIONS
Discharge Summary/Instructions after an Endoscopic Procedure  Patient Name: Mj Summers  Patient MRN: [id number]  Patient YOB: 1935 Tuesday, February 06, 2018  Rufus Villela MD  RESTRICTIONS:  During your procedure today, you received medications for sedation.  These   medications may affect your judgment, balance and coordination.  Therefore,   for 24 hours, you have the following restrictions:   - DO NOT drive a car, operate machinery, make legal/financial decisions,   sign important papers or drink alcohol.    ACTIVITY:  The following day: return to full activity including work, except no heavy   lifting, straining or running for 3 days if polyps were removed.  DIET:  Eat and drink normally unless instructed otherwise.     TREATMENT FOR COMMON SIDE EFFECTS:  - Mild abdominal pain, belching, bloating or excessive gas: rest, eat   lightly and use a heating pad.  - Sore Throat: treat with throat lozenges and/or gargle with warm salt   water.  SYMPTOMS TO WATCH FOR AND REPORT TO YOUR PHYSICIAN:  1. Abdominal pain or bloating, other than gas cramps.  2. Chest pain.  3. Back pain.  4. Chills or fever occurring within 24 hours after the procedure.  5. Rectal bleeding, which would show as bright red, maroon, or black stools.   (A tablespoon of blood from the rectum is not serious, especially if   hemorrhoids are present.)  6. Vomiting.  7. Weakness or dizziness.  8. Because air was used during the procedure, expelling large amounts of air   from your rectum or belching is normal.  9. If a bowel prep was taken, you may not have a bowel movement for 1-3   days.  This is normal.  GO DIRECTLY TO THE NEAREST EMERGENCY ROOM IF YOU HAVE ANY OF THE FOLLOWING:      Difficulty breathing  Chills and/or fever over 101 F   Persistent vomiting and/or vomiting blood   Severe abdominal pain   Severe chest pain   Black, tarry stools   Bleeding- more than one tablespoon   Any other symptom or condition that you may feel  needs urgent attention  Your doctor recommends these additional instructions:  If any biopsies were taken, your doctor s clinic will contact you in 1 to 2   weeks with any results.  You have a contact number available for emergencies.  The signs and symptoms   of potential delayed complications were discussed with you.  You may return   to normal activities tomorrow.  Written discharge instructions were   provided to you.   You are being discharged to home.   Resume your previous diet.   Continue your present medications.   Return to your referring physician after studies are complete.  For questions, problems or results please call your physician - Rufus Villela MD at Work:  [phone].  [facility], EMERGENCY ROOM PHONE NUMBER: [phone]  IF A COMPLICATION OR EMERGENCY SITUATION ARISES AND YOU ARE UNABLE TO REACH   YOUR PHYSICIAN - GO DIRECTLY TO THE EMERGENCY ROOM.  Rufus Villela MD  2/6/2018 11:19:55 AM  This report has been verified and signed electronically.

## 2018-02-06 NOTE — TRANSFER OF CARE
"Anesthesia Transfer of Care Note    Patient: Mj Summers    Procedure(s) Performed: Procedure(s) (LRB):  ESOPHAGOGASTRODUODENOSCOPY (EGD) (N/A)  COLONOSCOPY (N/A)    Patient location: PACU    Anesthesia Type: general    Transport from OR: Transported from OR on 6-10 L/min O2 by face mask with adequate spontaneous ventilation. Transported from OR on room air with adequate spontaneous ventilation    Post pain: adequate analgesia    Post assessment: no apparent anesthetic complications and tolerated procedure well    Post vital signs: stable    Level of consciousness: awake and alert    Nausea/Vomiting: no nausea/vomiting    Complications: none    Transfer of care protocol was followed      Last vitals:   Visit Vitals  BP (!) 184/88   Pulse 62   Temp 36.8 °C (98.2 °F)   Resp 16   Ht 5' 3" (1.6 m)   Wt 37.6 kg (83 lb)   LMP 11/10/1987 (Approximate)   SpO2 100%   Breastfeeding? No   BMI 14.70 kg/m²     "

## 2018-02-06 NOTE — PROVATION PATIENT INSTRUCTIONS
Discharge Summary/Instructions after an Endoscopic Procedure  Patient Name: Mj Summers  Patient MRN: [id number]  Patient YOB: 1935 Tuesday, February 06, 2018  Rufus Villela MD  RESTRICTIONS:  During your procedure today, you received medications for sedation.  These   medications may affect your judgment, balance and coordination.  Therefore,   for 24 hours, you have the following restrictions:   - DO NOT drive a car, operate machinery, make legal/financial decisions,   sign important papers or drink alcohol.    ACTIVITY:  The following day: return to full activity including work, except no heavy   lifting, straining or running for 3 days if polyps were removed.  DIET:  Eat and drink normally unless instructed otherwise.     TREATMENT FOR COMMON SIDE EFFECTS:  - Mild abdominal pain, belching, bloating or excessive gas: rest, eat   lightly and use a heating pad.  - Sore Throat: treat with throat lozenges and/or gargle with warm salt   water.  SYMPTOMS TO WATCH FOR AND REPORT TO YOUR PHYSICIAN:  1. Abdominal pain or bloating, other than gas cramps.  2. Chest pain.  3. Back pain.  4. Chills or fever occurring within 24 hours after the procedure.  5. Rectal bleeding, which would show as bright red, maroon, or black stools.   (A tablespoon of blood from the rectum is not serious, especially if   hemorrhoids are present.)  6. Vomiting.  7. Weakness or dizziness.  8. Because air was used during the procedure, expelling large amounts of air   from your rectum or belching is normal.  9. If a bowel prep was taken, you may not have a bowel movement for 1-3   days.  This is normal.  GO DIRECTLY TO THE NEAREST EMERGENCY ROOM IF YOU HAVE ANY OF THE FOLLOWING:      Difficulty breathing  Chills and/or fever over 101 F   Persistent vomiting and/or vomiting blood   Severe abdominal pain   Severe chest pain   Black, tarry stools   Bleeding- more than one tablespoon   Any other symptom or condition that you may feel  needs urgent attention  Your doctor recommends these additional instructions:  If any biopsies were taken, your doctor s clinic will contact you in 1 to 2   weeks with any results.  You have a contact number available for emergencies.  The signs and symptoms   of potential delayed complications were discussed with you.  You may return   to normal activities tomorrow.  Written discharge instructions were   provided to you.   You are being discharged to home.   Resume your previous diet.   Continue your present medications.   We are waiting for your pathology results.   Return to your referring physician after studies are complete.  For questions, problems or results please call your physician - Rufus Villela MD at Work:  [phone].  [facility], EMERGENCY ROOM PHONE NUMBER: [phone]  IF A COMPLICATION OR EMERGENCY SITUATION ARISES AND YOU ARE UNABLE TO REACH   YOUR PHYSICIAN - GO DIRECTLY TO THE EMERGENCY ROOM.  Rufus Villela MD  2/6/2018 10:57:35 AM  This report has been verified and signed electronically.

## 2018-02-06 NOTE — ANESTHESIA POSTPROCEDURE EVALUATION
"Anesthesia Post Evaluation    Patient: Mj Summers    Procedure(s) Performed: Procedure(s) (LRB):  ESOPHAGOGASTRODUODENOSCOPY (EGD) (N/A)  COLONOSCOPY (N/A)    Final Anesthesia Type: general  Patient location during evaluation: PACU  Patient participation: Yes- Able to Participate  Level of consciousness: awake and alert  Post-procedure vital signs: reviewed and stable  Pain management: adequate  Airway patency: patent  PONV status at discharge: No PONV  Anesthetic complications: no      Cardiovascular status: blood pressure returned to baseline  Respiratory status: spontaneous ventilation and room air  Hydration status: euvolemic  Follow-up not needed.        Visit Vitals  /77   Pulse 77   Temp 36.8 °C (98.2 °F)   Resp 12   Ht 5' 3" (1.6 m)   Wt 37.6 kg (83 lb)   LMP 11/10/1987 (Approximate)   SpO2 98%   Breastfeeding? No   BMI 14.70 kg/m²       Pain/Monik Score: Presence of Pain: denies (2/6/2018 11:23 AM)  Monik Score: 10 (2/6/2018 11:44 AM)      "

## 2018-02-06 NOTE — H&P (VIEW-ONLY)
[facility] Note    Reason for Visit:  The primary encounter diagnosis was Other iron deficiency anemia. A diagnosis of Other dysphagia was also pertinent to this visit.    PCP:   Shasta Urbina   [address] / [place] [address]    Referring MD:  Shasta Urbina Md  [address] [place] [address]    HPI:  This is a 82 y.o. female here for f/u evaluation of dysphagia. Ms. Summers was last seen in clinic 10/2016 with BILL Mccall for dysphagia. Here today in wheelchair. Hx of CKD.     Hx of dysphagia. Has been feeling solids not go down. Occurring a couple times per month. Denies difficulty swallowing liquids, painful swallowing, inducing vomiting. Last EGD 6/2014 normal. NSAID usage- none. Hx of Gerd. Has not been taking Nexium daily, taking every couple days.     Hx of Anemia, recently followed up with Hematology recommending EGD and Colonoscopy to rule out GI bleed. Hgb ranging 10-12 since 2010. Taking iron daily for years and will see darker stool. Denies blood in stool and melena. Having normal formed stool daily.     ROS:  Constitutional: No fevers, no chills, No unintentional weight loss, no fatigue   ENT: No allergies  CV: No chest pain, no palpitations, no perif. edema  Pulm: No cough, No shortness of breath, no wheezes, no sputum  Ophtho: No vision changes  GI: see HPI  Derm: No rash  Heme: No lymphadenopathy, No bruising  MSK: No arthritis, no muscle pain, no muscle weakness  : No dysuria, No hematuria  Endo: No hot or cold intolerance  Neuro: No syncope, No seizure     Medical History:  has a past medical history of Anemia; Arthritis (2015); Cataract; Coronary artery disease; GERD (gastroesophageal reflux disease); Glaucoma (increased eye pressure); Hyperlipidemia; Hypertension; Lactose intolerance; Legally blind in right eye, as defined in [place]; Osteoporosis, post-menopausal; Pneumonia (12/13/2015); Proximal muscle weakness; Renal cyst; and Vitamin D  deficiency disease.    Surgical History:  has a past surgical history that includes Tonsillectomy; Tear duct surgery; Breast biopsy; Cardiac catheterization (01/14/2010); Cataract extraction w/ intraocular lens implant w/ trabeculectomy (5/22/13); EDTA CHELATION (Left, 2/14); Adenoidectomy; Femur fracture surgery (Left, 05/09/2016); Cataract extraction w/  intraocular lens implant (Left, 5/22/13); YAG CAPSULOTOMY (Left, 06/22/2017); and Upper gastrointestinal endoscopy.    Family History: family history includes Alzheimer's disease in her father; Arthritis in her sister; Asthma in her sister; Colon cancer (age of onset: 44) in her other; Diabetes in her brother and daughter; Glaucoma in her mother; Heart disease in her brother and mother; Hyperlipidemia in her brother; Hypertension in her brother, daughter, father, and son; Osteoporosis in her mother, sister, and sister; Peripheral vascular disease in her brother and father; Rheum arthritis in her brother and sister; Stroke in her brother and brother..     Social History:  reports that she has never smoked. She has never used smokeless tobacco. She reports that she does not drink alcohol or use drugs.    Review of patient's allergies indicates:   Allergen Reactions    Pcn [penicillins] Rash    Sulfa (sulfonamide antibiotics) Itching     Current Outpatient Prescriptions   Medication Sig    aspirin 81 mg Tab Take 81 mg by mouth every evening. 1 Tablet Oral Every day    bimatoprost (LUMIGAN) 0.01 % Drop Place 1 drop into the right eye every evening.    brimonidine 0.15 % OPTH DROP (ALPHAGAN) 0.15 % ophthalmic solution Place 1 drop into the right eye 3 (three) times daily.    calcium carbonate (OS-KACI) 600 mg (1,500 mg) Tab Take 600 mg by mouth 2 (two) times daily with meals.    chlorthalidone (HYGROTEN) 25 MG Tab 1/2 tablet daily    ergocalciferol (ERGOCALCIFEROL) 50,000 unit Cap Take 1 capsule (50,000 Units total) by mouth every 30 days.    ferrous sulfate  "325 (65 FE) MG EC tablet Take 1 tablet (325 mg total) by mouth once daily.    lovastatin (MEVACOR) 40 MG tablet Take 1 tablet (40 mg total) by mouth every evening.    multivitamin (ONE DAILY MULTIVITAMIN) per tablet Take 1 tablet by mouth once daily.    NEXIUM 40 mg capsule take 1 capsule by mouth once daily    pilocarpine HCL 4% (PILOCAR) 4 % ophthalmic solution Place 1 drop into the right eye 3 (three) times daily.    senna-docusate 8.6-50 mg (PERICOLACE) 8.6-50 mg per tablet Take 1 tablet by mouth 2 (two) times daily. (Patient taking differently: Take 1 tablet by mouth 2 (two) times daily as needed. )    timolol maleate 0.5% (TIMOPTIC-XE) 0.5 % SolG Place 1 drop into the right eye every morning.    valsartan (DIOVAN) 160 MG tablet Take 1 tablet (160 mg total) by mouth 2 (two) times daily.     No current facility-administered medications for this visit.      Objective Findings:    Vital Signs:  Ht 5' 3" (1.6 m)   Wt 38.6 kg (85 lb 1.6 oz)   LMP 11/10/1987 (Approximate)   BMI 15.07 kg/m²   Body mass index is 15.07 kg/m².    Physical Exam:  General Appearance: Well appearing in no acute distress  Head: Normocephalic, without obvious abnormality  Eyes: No scleral icterus, EOMI  ENT: Neck supple, Lips, mucosa, and tongue normal; teeth and gums normal  Lungs: CTA bilaterally in anterior and posterior fields, no wheezes, no crackles.  Heart: Regular rate and rhythm, no murmurs heard  Abdomen: Soft, non tender, non distended with positive bowel sounds in all four quadrants.  Extremities: No clubbing, cyanosis or edema  Skin: No rash to exposed areas  Neurologic: AAOx4    Labs:  Lab Results   Component Value Date    WBC 4.45 01/03/2018    HGB 10.2 (L) 01/03/2018    HCT 31.2 (L) 01/03/2018     01/03/2018    CHOL 237 (H) 11/06/2017    TRIG 92 11/06/2017    HDL 61 11/06/2017    ALT 22 11/06/2017    AST 25 11/06/2017     11/06/2017    K 4.2 11/06/2017     11/06/2017    CREATININE 0.7 11/06/2017    " BUN 15 11/06/2017    CO2 27 11/06/2017    TSH 0.934 11/06/2017    INR 1.0 05/23/2016     Endoscopy:    EGD- 6/2014 normal.     Colonoscopy- 2007- Tortuous colon, otherwise normal. Repeat in 5-10 years.     Assessment:  1. Other iron deficiency anemia    2. Other dysphagia      Recommendations:  1. Ordered occult stool studies x 3 to rule out GI bleed. Labs today- Iron studies. Schedule EGD and Colonoscopy to rule out GI bleed.   2. Schedule EGD to rule out stricture.     Follow up after EGD and Colonoscopy.     Order summary:  Orders Placed This Encounter    IRON AND TIBC    Occult blood x 1, stool    Occult blood x 1, stool    Occult blood x 1, stool    Case request GI: ESOPHAGOGASTRODUODENOSCOPY (EGD), COLONOSCOPY     Thank you so much for allowing me to participate in the care of EDILMA Fagan, FNP-C

## 2018-02-08 ENCOUNTER — TELEPHONE (OUTPATIENT)
Dept: GASTROENTEROLOGY | Facility: CLINIC | Age: 83
End: 2018-02-08

## 2018-02-08 NOTE — TELEPHONE ENCOUNTER
----- Message from Rufus Villela MD sent at 2/8/2018  2:05 PM CST -----  Please call, the biopsy showed gastritis, I want her to stay on the nexium like we discussed

## 2018-02-09 ENCOUNTER — TELEPHONE (OUTPATIENT)
Dept: GASTROENTEROLOGY | Facility: CLINIC | Age: 83
End: 2018-02-09

## 2018-02-09 NOTE — TELEPHONE ENCOUNTER
----- Message from Светлана Villagomez MA sent at 2/9/2018  2:41 PM CST -----  Contact: [phone] au [phone]  Manohar  Returning a call regarding pathology results.    [phone]

## 2018-02-12 ENCOUNTER — TELEPHONE (OUTPATIENT)
Dept: ENDOSCOPY | Facility: HOSPITAL | Age: 83
End: 2018-02-12

## 2018-02-15 ENCOUNTER — TELEPHONE (OUTPATIENT)
Dept: GASTROENTEROLOGY | Facility: CLINIC | Age: 83
End: 2018-02-15

## 2018-02-15 NOTE — TELEPHONE ENCOUNTER
----- Message from Rufus Villela MD sent at 2/15/2018  2:05 PM CST -----  Contact: self - [phone] or [phone]  tums or any antacid is fine  ----- Message -----  From: Halina Melendrez MA  Sent: 2/15/2018   1:59 PM  To: Rufus Villela MD        ----- Message -----  From: Ami Durbin  Sent: 2/15/2018   1:54 PM  To: Manohar Villela - is asking what antacid she can take  - please call patient at [phone] or [phone]

## 2018-02-20 ENCOUNTER — OFFICE VISIT (OUTPATIENT)
Dept: INTERNAL MEDICINE | Facility: CLINIC | Age: 83
End: 2018-02-20
Payer: MEDICARE

## 2018-02-20 ENCOUNTER — OFFICE VISIT (OUTPATIENT)
Dept: GASTROENTEROLOGY | Facility: CLINIC | Age: 83
End: 2018-02-20
Payer: MEDICARE

## 2018-02-20 VITALS
SYSTOLIC BLOOD PRESSURE: 132 MMHG | HEART RATE: 55 BPM | BODY MASS INDEX: 14.61 KG/M2 | DIASTOLIC BLOOD PRESSURE: 52 MMHG | HEIGHT: 63 IN | WEIGHT: 82.44 LBS

## 2018-02-20 DIAGNOSIS — R13.19 OTHER DYSPHAGIA: ICD-10-CM

## 2018-02-20 DIAGNOSIS — I10 HYPERTENSION, UNSPECIFIED TYPE: Primary | ICD-10-CM

## 2018-02-20 DIAGNOSIS — D64.9 ANEMIA, UNSPECIFIED TYPE: Primary | ICD-10-CM

## 2018-02-20 DIAGNOSIS — K21.9 GASTROESOPHAGEAL REFLUX DISEASE WITHOUT ESOPHAGITIS: ICD-10-CM

## 2018-02-20 PROCEDURE — 1159F MED LIST DOCD IN RCRD: CPT | Mod: S$GLB,,, | Performed by: INTERNAL MEDICINE

## 2018-02-20 PROCEDURE — 99215 OFFICE O/P EST HI 40 MIN: CPT | Mod: S$GLB,,, | Performed by: INTERNAL MEDICINE

## 2018-02-20 PROCEDURE — 1126F AMNT PAIN NOTED NONE PRSNT: CPT | Mod: S$GLB,,, | Performed by: NURSE PRACTITIONER

## 2018-02-20 PROCEDURE — 3008F BODY MASS INDEX DOCD: CPT | Mod: S$GLB,,, | Performed by: INTERNAL MEDICINE

## 2018-02-20 PROCEDURE — 1126F AMNT PAIN NOTED NONE PRSNT: CPT | Mod: S$GLB,,, | Performed by: INTERNAL MEDICINE

## 2018-02-20 PROCEDURE — 1159F MED LIST DOCD IN RCRD: CPT | Mod: S$GLB,,, | Performed by: NURSE PRACTITIONER

## 2018-02-20 PROCEDURE — 99499 UNLISTED E&M SERVICE: CPT | Mod: S$GLB,,, | Performed by: INTERNAL MEDICINE

## 2018-02-20 PROCEDURE — 99499 UNLISTED E&M SERVICE: CPT | Mod: S$GLB,,, | Performed by: NURSE PRACTITIONER

## 2018-02-20 PROCEDURE — 3008F BODY MASS INDEX DOCD: CPT | Mod: S$GLB,,, | Performed by: NURSE PRACTITIONER

## 2018-02-20 PROCEDURE — 99999 PR PBB SHADOW E&M-EST. PATIENT-LVL IV: CPT | Mod: PBBFAC,,, | Performed by: INTERNAL MEDICINE

## 2018-02-20 PROCEDURE — 99999 PR PBB SHADOW E&M-EST. PATIENT-LVL III: CPT | Mod: PBBFAC,,, | Performed by: NURSE PRACTITIONER

## 2018-02-20 PROCEDURE — 99214 OFFICE O/P EST MOD 30 MIN: CPT | Mod: S$GLB,,, | Performed by: NURSE PRACTITIONER

## 2018-02-20 RX ORDER — ESOMEPRAZOLE MAGNESIUM 40 MG/1
40 CAPSULE, DELAYED RELEASE ORAL DAILY
Qty: 30 CAPSULE | Refills: 6 | Status: SHIPPED | OUTPATIENT
Start: 2018-02-20 | End: 2018-03-14 | Stop reason: SDUPTHER

## 2018-02-20 NOTE — PROGRESS NOTES
[facility] Note    Reason for Visit:  The primary encounter diagnosis was Anemia, unspecified type. Diagnoses of Gastroesophageal reflux disease without esophagitis and Other dysphagia were also pertinent to this visit.    PCP:   Shasta Urbina       Referring MD:  No referring provider defined for this encounter.    HPI:  This is a 82 y.o. female here for f/u evaluation of dysphagia and anemia. Ms. Summers was last seen in clinic 1/8/18. Here today in wheelchair. Hx of CKD.     Hx of dysphagia. Was feeling solids not go down. Occurring a couple times per month. No difficulty swallowing liquids, painful swallowing, inducing vomiting. NSAID usage- none. Hx of Gerd not taking Nexium daily, taking every couple days. EGD done 2/6/18 Small hiatal hernia. Mild Schatzki ring. Dilated. Gastric mucosal atrophy. Currently, pt reports improvement in dysphagia and taking Nexium daily.     Hx of Anemia, recently followed up with Hematology recommending EGD and Colonoscopy to rule out GI bleed. Hgb ranging 10-12 since 2010. Taking iron daily for years and will see darker stool. Denies blood in stool and melena. Having normal formed stool daily. FIT test 1/23/18 negative. Iron studies 1/8/18 normal. Colonoscopy done 2/6/18 Tortuous colon, otherwise normal. Currently, pt denies SOB at rest, CP, fatigue, lightheadedness, syncope, blood in stool.     ROS:  Constitutional: No fevers, no chills, No unintentional weight loss, no fatigue   ENT: No allergies  CV: No chest pain, no palpitations, no perif. edema  Pulm: No cough, No shortness of breath, no wheezes, no sputum  Ophtho: No vision changes  GI: see HPI  Derm: No rash  Heme: No lymphadenopathy, No bruising  MSK: No arthritis, no muscle pain, no muscle weakness  : No dysuria, No hematuria  Endo: No hot or cold intolerance  Neuro: No syncope, No seizure     Medical History:  has a past medical history of Anemia; Arthritis (2015); Cataract; Coronary  artery disease; GERD (gastroesophageal reflux disease); Glaucoma (increased eye pressure); Hyperlipidemia; Hypertension; Lactose intolerance; Legally blind in right eye, as defined in [place]; Osteoporosis, post-menopausal; Pneumonia (12/13/2015); Proximal muscle weakness; Renal cyst; and Vitamin D deficiency disease.    Surgical History:  has a past surgical history that includes Tonsillectomy; Tear duct surgery; Breast biopsy; Cardiac catheterization (01/14/2010); Cataract extraction w/ intraocular lens implant w/ trabeculectomy (5/22/13); EDTA CHELATION (Left, 2/14); Adenoidectomy; Femur fracture surgery (Left, 05/09/2016); Cataract extraction w/  intraocular lens implant (Left, 5/22/13); YAG CAPSULOTOMY (Left, 06/22/2017); Upper gastrointestinal endoscopy; and Colonoscopy (N/A, 2/6/2018).    Family History: family history includes Alzheimer's disease in her father; Arthritis in her sister; Asthma in her sister; Colon cancer (age of onset: 44) in her other; Diabetes in her brother and daughter; Glaucoma in her mother; Heart disease in her brother and mother; Hyperlipidemia in her brother; Hypertension in her brother, daughter, father, and son; Osteoporosis in her mother, sister, and sister; Peripheral vascular disease in her brother and father; Rheum arthritis in her brother and sister; Stroke in her brother and brother..     Social History:  reports that she has never smoked. She has never used smokeless tobacco. She reports that she does not drink alcohol or use drugs.    Review of patient's allergies indicates:   Allergen Reactions    Pcn [penicillins] Rash    Sulfa (sulfonamide antibiotics) Itching     Current Outpatient Prescriptions   Medication Sig    aspirin 81 mg Tab Take 81 mg by mouth every evening. 1 Tablet Oral Every day    bimatoprost (LUMIGAN) 0.01 % Drop Place 1 drop into the right eye every evening.    brimonidine 0.15 % OPTH DROP (ALPHAGAN) 0.15 % ophthalmic solution Place 1 drop into the right  "eye 3 (three) times daily.    calcium carbonate (OS-KACI) 600 mg (1,500 mg) Tab Take 600 mg by mouth 2 (two) times daily with meals.    chlorthalidone (HYGROTEN) 25 MG Tab 1/2 tablet daily    ergocalciferol (ERGOCALCIFEROL) 50,000 unit Cap Take 1 capsule (50,000 Units total) by mouth every 30 days.    esomeprazole (NEXIUM) 40 MG capsule Take 1 capsule (40 mg total) by mouth once daily. Take 30-45 minutes before first protein containing meal.    ferrous sulfate 325 (65 FE) MG EC tablet Take 1 tablet (325 mg total) by mouth once daily.    lovastatin (MEVACOR) 40 MG tablet Take 1 tablet (40 mg total) by mouth every evening.    multivitamin (ONE DAILY MULTIVITAMIN) per tablet Take 1 tablet by mouth once daily.    pilocarpine HCL 4% (PILOCAR) 4 % ophthalmic solution Place 1 drop into the right eye 3 (three) times daily.    senna-docusate 8.6-50 mg (PERICOLACE) 8.6-50 mg per tablet Take 1 tablet by mouth 2 (two) times daily. (Patient taking differently: Take 1 tablet by mouth 2 (two) times daily as needed. )    timolol maleate 0.5% (TIMOPTIC-XE) 0.5 % SolG Place 1 drop into the right eye every morning.    valsartan (DIOVAN) 160 MG tablet Take 1 tablet (160 mg total) by mouth 2 (two) times daily.     No current facility-administered medications for this visit.      Objective Findings:    Vital Signs:  BP (!) 132/52   Pulse (!) 55   Ht 5' 3" (1.6 m)   Wt 37.4 kg (82 lb 7.2 oz)   LMP 11/10/1987 (Approximate)   BMI 14.61 kg/m²   Body mass index is 14.61 kg/m².    Physical Exam:  General Appearance: Well appearing in no acute distress  Head: Normocephalic, without obvious abnormality  Eyes: No scleral icterus, EOMI  ENT: Neck supple, Lips, mucosa, and tongue normal; teeth and gums normal  Extremities: No clubbing, cyanosis or edema  Skin: No rash to exposed areas  Neurologic: AAOx4    Labs:  Lab Results   Component Value Date    WBC 4.45 01/03/2018    HGB 10.2 (L) 01/03/2018    HCT 31.2 (L) 01/03/2018     " 01/03/2018    CHOL 237 (H) 11/06/2017    TRIG 92 11/06/2017    HDL 61 11/06/2017    ALT 22 11/06/2017    AST 25 11/06/2017     11/06/2017    K 4.2 11/06/2017     11/06/2017    CREATININE 0.7 11/06/2017    BUN 15 11/06/2017    CO2 27 11/06/2017    TSH 0.934 11/06/2017    INR 1.0 05/23/2016     Endoscopy:    EGD- 6/2014 normal.     EGD- 2/6/18- Small hiatal hernia. Mild Schatzki ring. Dilated. Gastric mucosal atrophy    Colonoscopy- 2007- Tortuous colon, otherwise normal. Repeat in 5-10 years.     Assessment:  1. Anemia, unspecified type    2. Gastroesophageal reflux disease without esophagitis    3. Other dysphagia      Recommendations:  1. Discussed VCE and pt deferring at this time. Is not symptomatic and Iron levels normal. Will recheck CBC ordered today and recommending to continue to follow up with Hematology. Will notify Dr. Myers. If blood counts drop or symptomatic pt reports will notify clinic to schedule VCE.   2. Continue taking Nexium daily, sent in rx. Pt understood.   3. Dysphagia has improved, if worsens notify clinic can reschedule EGD.     Follow up in 6 months or sooner if symptoms worsen.     Order summary:  Orders Placed This Encounter    CBC auto differential    esomeprazole (NEXIUM) 40 MG capsule     Thank you so much for allowing me to participate in the care of Mj Hernandez, APRN, FNP-C

## 2018-02-24 VITALS
BODY MASS INDEX: 14.68 KG/M2 | SYSTOLIC BLOOD PRESSURE: 134 MMHG | WEIGHT: 82.88 LBS | HEIGHT: 63 IN | HEART RATE: 64 BPM | DIASTOLIC BLOOD PRESSURE: 78 MMHG

## 2018-02-25 NOTE — PROGRESS NOTES
Subjective:       Patient ID: Mj Summers is a 82 y.o. female.    Chief Complaint: Hypertension    HPI  She returns for management of hypertension.  She has had hypertension for over a year.  Current treatment has included medications outlined in medication list.  She denies chest pain or shortness of breath.  No palpitations.  Denies left arm or neck pain.  Review of Systems   Constitutional: Negative for chills, fatigue, fever and unexpected weight change.   Respiratory: Negative for chest tightness and shortness of breath.    Cardiovascular: Negative for chest pain and palpitations.   Gastrointestinal: Negative for abdominal pain and blood in stool.   Neurological: Negative for dizziness, syncope, numbness and headaches.       Objective:      Physical Exam   HENT:   Right Ear: External ear normal.   Left Ear: External ear normal.   Nose: Nose normal.   Mouth/Throat: Oropharynx is clear and moist.   Eyes: Pupils are equal, round, and reactive to light.   Neck: Normal range of motion.   Cardiovascular: Normal rate and regular rhythm.    Murmur heard.  Pulmonary/Chest: Breath sounds normal.   Abdominal: She exhibits no distension. There is no hepatosplenomegaly. There is no tenderness.   Lymphadenopathy:     She has no cervical adenopathy.     She has no axillary adenopathy.   Neurological: She has normal strength and normal reflexes. No cranial nerve deficit or sensory deficit.       Assessment/Plan           assessment and plan: Hypertension: Controlled

## 2018-03-05 ENCOUNTER — CLINICAL SUPPORT (OUTPATIENT)
Dept: OPHTHALMOLOGY | Facility: CLINIC | Age: 83
End: 2018-03-05
Payer: MEDICARE

## 2018-03-05 ENCOUNTER — OFFICE VISIT (OUTPATIENT)
Dept: OPHTHALMOLOGY | Facility: CLINIC | Age: 83
End: 2018-03-05
Payer: MEDICARE

## 2018-03-05 DIAGNOSIS — H35.031 BLIND HYPERTENSIVE RIGHT EYE: ICD-10-CM

## 2018-03-05 DIAGNOSIS — Z96.1 PSEUDOPHAKIA OF LEFT EYE: ICD-10-CM

## 2018-03-05 DIAGNOSIS — H21.541 POSTERIOR SYNECHIAE, RIGHT: ICD-10-CM

## 2018-03-05 DIAGNOSIS — Z98.83 GLAUCOMA FILTERING BLEB OF LEFT EYE: ICD-10-CM

## 2018-03-05 DIAGNOSIS — H34.8192 CRVO (CENTRAL RETINAL VEIN OCCLUSION): ICD-10-CM

## 2018-03-05 DIAGNOSIS — H40.89 GLAUCOMA DUE TO COMBINATION OF MECHANISMS: Primary | ICD-10-CM

## 2018-03-05 DIAGNOSIS — H16.142 SPK (SUPERFICIAL PUNCTATE KERATITIS), LEFT: ICD-10-CM

## 2018-03-05 DIAGNOSIS — H25.11 SENILE NUCLEAR SCLEROSIS, RIGHT: ICD-10-CM

## 2018-03-05 DIAGNOSIS — H21.561 AFFERENT PUPILLARY DEFECT, RIGHT: ICD-10-CM

## 2018-03-05 DIAGNOSIS — H18.421 BAND KERATOPATHY, RIGHT: ICD-10-CM

## 2018-03-05 DIAGNOSIS — H26.40 CAPSULAR OPACIFICATION: ICD-10-CM

## 2018-03-05 PROCEDURE — 92012 INTRM OPH EXAM EST PATIENT: CPT | Mod: S$GLB,,, | Performed by: OPHTHALMOLOGY

## 2018-03-05 PROCEDURE — 99999 PR PBB SHADOW E&M-EST. PATIENT-LVL II: CPT | Mod: PBBFAC,,, | Performed by: OPHTHALMOLOGY

## 2018-03-05 PROCEDURE — 99499 UNLISTED E&M SERVICE: CPT | Mod: S$GLB,,, | Performed by: OPHTHALMOLOGY

## 2018-03-05 PROCEDURE — 92134 CPTRZ OPH DX IMG PST SGM RTA: CPT | Mod: S$GLB,,, | Performed by: OPHTHALMOLOGY

## 2018-03-05 RX ORDER — PILOCARPINE HYDROCHLORIDE 40 MG/ML
1 SOLUTION/ DROPS OPHTHALMIC 3 TIMES DAILY
Qty: 15 ML | Refills: 12 | Status: SHIPPED | OUTPATIENT
Start: 2018-03-05 | End: 2019-03-19 | Stop reason: SDUPTHER

## 2018-03-05 RX ORDER — TIMOLOL MALEATE 5 MG/ML
1 SOLUTION OPHTHALMIC EVERY MORNING
Qty: 5 ML | Refills: 12 | Status: SHIPPED | OUTPATIENT
Start: 2018-03-05 | End: 2019-03-19 | Stop reason: SDUPTHER

## 2018-03-05 RX ORDER — BRIMONIDINE TARTRATE 1.5 MG/ML
1 SOLUTION/ DROPS OPHTHALMIC 3 TIMES DAILY
Qty: 1 BOTTLE | Refills: 12 | Status: SHIPPED | OUTPATIENT
Start: 2018-03-05 | End: 2019-03-17 | Stop reason: SDUPTHER

## 2018-03-05 NOTE — PROGRESS NOTES
"HPI     Glaucoma    Additional comments: HRT review today           Comments   DLS: 10/9/17    1) MMG  2) Blind Hypertensive OD  3) NS OD  4) FABY  5) APD OD  6) Band Keratopathy  7) Asteroid Hyalosis OS  8) Hx Acute Dacryocystitis   9) CRVO OD  10) Ant. Capsule Phimosis OS  11) PCIOL OS    MEDS:  T1/2 GFS QAM OD   Alphagan TID OD   Camacho 4% TID OD   Lumugan QHS OD  AT's PRN OU       Last edited by Yuridia Talamantes MA on 3/5/2018  2:54 PM. (History)            Assessment /Plan     For exam results, see Encounter Report.    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   Angle open after PI's ou   -Followed at [facility] since at least '01   First HVF 2001   First photos 2001   S/p PI OU   s/p Gonioplasty OU     Family history none   Glaucoma meds Lumigan, timolol gfs  qAM, Agan tid, Camacho tid  - all OD ((off all gtts os s/p combined)   H/O adverse rxn to glaucoma drops Azopt "felt weird"   LASERS S/p PI OU, s/p Gonioplasty OU   GLAUCOMA SURGERIES    combined phaco/trab with mini shunt OS 5/22/2013   OTHER EYE SURGERIES    S/P DCR sx OD x 2 or 3 with [facility]    Combined phaco/IOL/ trab with mini shunt OS 5/22/2013   CDR 0.99 /0.9   Tbase 42/23   Tmax 42/23 (when stopped gtts)   Ttarget pain control od // 18 os   HVF 17 VF '01 -'17 - Ext to stim V od  // SALT / IAD os   Gonio +3/+3   /539   OCT 9 test 2006 to 2017 - RNFL - OD:dec. S/N/I  // OS:dec N/I, lora S/T   HRT 14 test 2004 to 2018 - MR -  Dec. S/I od // dec. S/N/I os /// CDR 0.66 od // 0.83 os  Disc photos - 2001, 2003, 2006 - " slides // 2008, 2010, 2016  - OIS     Ta today 34/14 (states good compliance with gtts od - still pain free)   Test today - IOP, HRT     2.  Blind hypertensive Right Eye   Pain free - eye comfortable   Very limited vision CF at 3'   End stage glaucoma and S/P CRVO   No rubeosis   IOP is high but eye is pain free    3. Cataract OD -However, OD VA limited 2/2 CRVO and end stge glaucoma   S/P phaco/IOL/trab OS 5/22/2013    4. FABY OU -cont ATs     5. APD OD -   Old / stable 2/2 CRVO and glaucoma    6. Band Keratopathy OD -stable. Cont ATs- being followed by Dr. Saul;    had EDTA chelation on 2/13/14 OS - now clear     7. Asteroid Hyalosis OS -stable     8. Hx of Acute Dacryocystitis OS and recent NLD OD s/p surgery 11/12 and repeat for exposure w/ Dr. Ocampo 12/5/12 -stable. Cont f/u with [facility]     9. Hx of CRVO OD -limiting visual potential OD   -No NV on todays exam     10. Ant capsule phimosis os    Monitor     11. PC IOL os    Combined  W/ mini shunt 5/22/2013   doing well VA is 20/25 and the IOP is 13    Plan:   Off all gtts for now OS - s/p combined -- IOP stable - at 15 and VA is 20/30-20/40    Cont glaucoma gtts OD -- IOP very high, but comfortable/no pain // Blind hypertensive but pain free eye   Old  crvo     Ok to use OTC readers for now a +3.00 to +3.50 - not really able to improve distance vision with glasses at this time  Much improved from before the combined  Procedure    yag laser to opacified / phimotic capsule - very dense - took a lot of laser power to cut thru opacified / phimotic ring   -may improve centration and anterior p/posterior location of IOL in eye   - may help vision and change refraction     IOP ok os post trab     Cont all glaucoma gtts od   Blind hypertensive right eye - pain free     Glasses - Matthieu - 11/7/2017     F/U 4 months with IOP check // gonio     I have seen and personally examined the patient.  I agree with the findings, assessment and plan of the resident and/or  fellow.     Aysha Triplett MD

## 2018-03-14 ENCOUNTER — OFFICE VISIT (OUTPATIENT)
Dept: INTERNAL MEDICINE | Facility: CLINIC | Age: 83
End: 2018-03-14
Payer: MEDICARE

## 2018-03-14 VITALS
HEIGHT: 63 IN | SYSTOLIC BLOOD PRESSURE: 138 MMHG | TEMPERATURE: 98 F | OXYGEN SATURATION: 99 % | DIASTOLIC BLOOD PRESSURE: 82 MMHG | WEIGHT: 86.19 LBS | HEART RATE: 59 BPM | BODY MASS INDEX: 15.27 KG/M2

## 2018-03-14 DIAGNOSIS — K21.9 GASTROESOPHAGEAL REFLUX DISEASE, ESOPHAGITIS PRESENCE NOT SPECIFIED: ICD-10-CM

## 2018-03-14 DIAGNOSIS — E78.5 DYSLIPIDEMIA: Chronic | ICD-10-CM

## 2018-03-14 DIAGNOSIS — M81.0 OSTEOPOROSIS, POSTMENOPAUSAL: ICD-10-CM

## 2018-03-14 DIAGNOSIS — E44.1 MILD PROTEIN-CALORIE MALNUTRITION: ICD-10-CM

## 2018-03-14 DIAGNOSIS — I10 ESSENTIAL HYPERTENSION: Chronic | ICD-10-CM

## 2018-03-14 DIAGNOSIS — I77.9 BILATERAL CAROTID ARTERY DISEASE: ICD-10-CM

## 2018-03-14 DIAGNOSIS — K21.9 GASTROESOPHAGEAL REFLUX DISEASE WITHOUT ESOPHAGITIS: ICD-10-CM

## 2018-03-14 DIAGNOSIS — I25.10 CORONARY ARTERY DISEASE INVOLVING NATIVE CORONARY ARTERY OF NATIVE HEART WITHOUT ANGINA PECTORIS: Chronic | ICD-10-CM

## 2018-03-14 DIAGNOSIS — I25.10 CORONARY ARTERY DISEASE INVOLVING NATIVE CORONARY ARTERY OF NATIVE HEART WITHOUT ANGINA PECTORIS: ICD-10-CM

## 2018-03-14 DIAGNOSIS — I45.10 RIGHT BUNDLE BRANCH BLOCK: ICD-10-CM

## 2018-03-14 DIAGNOSIS — H34.8192 CRVO (CENTRAL RETINAL VEIN OCCLUSION): ICD-10-CM

## 2018-03-14 DIAGNOSIS — H40.89 GLAUCOMA DUE TO COMBINATION OF MECHANISMS: ICD-10-CM

## 2018-03-14 DIAGNOSIS — Z00.00 ENCOUNTER FOR PREVENTIVE HEALTH EXAMINATION: Primary | ICD-10-CM

## 2018-03-14 DIAGNOSIS — R13.19 ESOPHAGEAL DYSPHAGIA: ICD-10-CM

## 2018-03-14 DIAGNOSIS — I77.9 BILATERAL CAROTID ARTERY DISEASE: Chronic | ICD-10-CM

## 2018-03-14 DIAGNOSIS — Z87.81 HISTORY OF FRACTURE OF LEFT HIP: ICD-10-CM

## 2018-03-14 DIAGNOSIS — E55.9 VITAMIN D DEFICIENCY DISEASE: ICD-10-CM

## 2018-03-14 PROCEDURE — G0439 PPPS, SUBSEQ VISIT: HCPCS | Mod: S$GLB,,, | Performed by: NURSE PRACTITIONER

## 2018-03-14 PROCEDURE — 99499 UNLISTED E&M SERVICE: CPT | Mod: S$GLB,,, | Performed by: NURSE PRACTITIONER

## 2018-03-14 PROCEDURE — 99999 PR PBB SHADOW E&M-EST. PATIENT-LVL IV: CPT | Mod: PBBFAC,,, | Performed by: NURSE PRACTITIONER

## 2018-03-14 RX ORDER — CHLORTHALIDONE 25 MG/1
TABLET ORAL
Qty: 30 TABLET | Refills: 0 | Status: SHIPPED | OUTPATIENT
Start: 2018-03-14 | End: 2018-05-15 | Stop reason: SDUPTHER

## 2018-03-14 RX ORDER — ESOMEPRAZOLE MAGNESIUM 40 MG/1
40 CAPSULE, DELAYED RELEASE ORAL DAILY
Qty: 30 CAPSULE | Refills: 6 | Status: SHIPPED | OUTPATIENT
Start: 2018-03-14 | End: 2018-08-01

## 2018-03-14 RX ORDER — VALSARTAN 160 MG/1
160 TABLET ORAL 2 TIMES DAILY
Qty: 60 TABLET | Refills: 0 | Status: SHIPPED | OUTPATIENT
Start: 2018-03-14 | End: 2018-06-11 | Stop reason: SDUPTHER

## 2018-03-14 RX ORDER — LOVASTATIN 40 MG/1
40 TABLET ORAL NIGHTLY
Qty: 90 TABLET | Refills: 0 | Status: SHIPPED | OUTPATIENT
Start: 2018-03-14 | End: 2018-06-11 | Stop reason: SDUPTHER

## 2018-03-14 NOTE — PATIENT INSTRUCTIONS
Counseling and Referral of Other Preventative  (Italic type indicates deductible and co-insurance are waived)    Patient Name: Mj Summers  Today's Date: 3/14/2018    Health Maintenance       Date Due Completion Date    TETANUS VACCINE 07/17/1953 Discussed.    Zoster Vaccine 07/17/1995 Discussed.    Lipid Panel 11/06/2018 11/6/2017    DEXA SCAN 12/07/2018 12/7/2016        No orders of the defined types were placed in this encounter.    The following information is provided to all patients.  This information is to help you find resources for any of the problems found today that may be affecting your health:                Living healthy guide: www.[facility].[place].gov      Understanding Diabetes: www.diabetes.org      Eating healthy: www.cdc.gov/healthyweight      CDC home safety checklist: www.cdc.gov/steadi/patient.html      Agency on Aging: www.goea.[place].[facility]      Alcoholics anonymous (AA): www.aa.org      Physical Activity: www.cameron.nih.gov/tf1gcyl      Tobacco use: www.quitwithus[place].org

## 2018-03-14 NOTE — PROGRESS NOTES
"Mj Summers presented for a  Medicare AWV and comprehensive Health Risk Assessment today. The following components were reviewed and updated:    · Medical history  · Family History  · Social history  · Allergies and Current Medications  · Health Risk Assessment  · Health Maintenance  · Care Team     ** See Completed Assessments for Annual Wellness Visit within the encounter summary.**       The following assessments were completed:  · Living Situation  · CAGE  · Depression Screening  · Timed Get Up and Go  · Whisper Test  · Cognitive Function Screening  ·   ·   · Nutrition Screening  · ADL Screening  · PAQ Screening    Vitals:    03/14/18 1431   BP: 138/82   Pulse: (!) 59   Temp: 97.9 °F (36.6 °C)   SpO2: 99%   Weight: 39.1 kg (86 lb 3.2 oz)   Height: 5' 3" (1.6 m)     Body mass index is 15.27 kg/m².  Physical Exam   Constitutional: She is oriented to person, place, and time. She appears well-developed and well-nourished.   HENT:   Head: Normocephalic and atraumatic.   Nose: Nose normal.   Eyes: Conjunctivae and EOM are normal.   Cardiovascular: Normal rate and regular rhythm.    Murmur heard.  Pulmonary/Chest: Effort normal and breath sounds normal.   Musculoskeletal: Normal range of motion.   Neurological: She is alert and oriented to person, place, and time.   Skin: Skin is warm and dry.   Psychiatric: She has a normal mood and affect. Her behavior is normal. Judgment and thought content normal.   Nursing note and vitals reviewed.        Diagnoses and health risks identified today and associated recommendations/orders:    1. Encounter for preventive health examination  Assessment performed. Health maintenance updated. Chart review completed.  Discussed tetanus and zoster.    2. Mild protein-calorie malnutrition  BMI 15. History of dysphagia. Osteoporosis. Chronic. Stable. Followed by PCP.  Discussed nutritional shakes.    3. Gastroesophageal reflux disease, esophagitis presence not specified  Chronic. Stable " on current regimen. Followed by Endocrinology.    4. Esophageal dysphagia  With certain foods like wheat thins and peanut butter. Discussed. Followed Gastroenterology.    5. Osteoporosis, postmenopausal  Chronic. Stable on current regimen. Followed by Endocrinology.    6. History of fracture of left hip  Chronic. Stable on current regimen. Followed by Endocrinology.    7. Dyslipidemia  Chronic.Stable with current regimen. Followed by PCP.    8. Essential hypertension  Chronic.Stable with current regimen. Followed by PCP.    9. Coronary artery disease involving native coronary artery of native heart without angina pectoris  Chronic. Stable. Followed by Cardiology.    10. CRVO (central retinal vein occlusion) - Right Eye  Chronic. Continue current regimen. Followed by Opthalmology.    11. 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 Continue current regimen. Followed by Opthalmology.    12. Bilateral carotid artery disease, mild  Chronic. Stable. Followed by Cardiology.    13. Right bundle branch block  Chronic. Stable. Followed by Cardiology.    Provided Mj with a 5-10 year written screening schedule and personal prevention plan. Recommendations were developed using the USPSTF age appropriate recommendations. Education, counseling, and referrals were provided as needed. After Visit Summary printed and given to patient which includes a list of additional screenings\tests needed.    Follow-up for follow up with Primary Care Provider as instructed, ;sooner if problems, HRA in 1 year.    WELSY Lucas

## 2018-03-15 ENCOUNTER — LAB VISIT (OUTPATIENT)
Dept: LAB | Facility: HOSPITAL | Age: 83
End: 2018-03-15
Attending: NURSE PRACTITIONER
Payer: MEDICARE

## 2018-03-15 DIAGNOSIS — E78.5 DYSLIPIDEMIA: Chronic | ICD-10-CM

## 2018-03-15 DIAGNOSIS — I25.10 CORONARY ARTERY DISEASE INVOLVING NATIVE CORONARY ARTERY OF NATIVE HEART WITHOUT ANGINA PECTORIS: Chronic | ICD-10-CM

## 2018-03-15 DIAGNOSIS — I77.9 BILATERAL CAROTID ARTERY DISEASE: Chronic | ICD-10-CM

## 2018-03-15 DIAGNOSIS — I10 ESSENTIAL HYPERTENSION: Chronic | ICD-10-CM

## 2018-03-15 LAB
ALBUMIN SERPL BCP-MCNC: 3.8 G/DL
ALP SERPL-CCNC: 74 U/L
ALT SERPL W/O P-5'-P-CCNC: 22 U/L
ANION GAP SERPL CALC-SCNC: 10 MMOL/L
AST SERPL-CCNC: 27 U/L
BILIRUB SERPL-MCNC: 0.4 MG/DL
BUN SERPL-MCNC: 23 MG/DL
CALCIUM SERPL-MCNC: 10 MG/DL
CHLORIDE SERPL-SCNC: 105 MMOL/L
CHOLEST SERPL-MCNC: 231 MG/DL
CHOLEST/HDLC SERPL: 3.2 {RATIO}
CO2 SERPL-SCNC: 27 MMOL/L
CREAT SERPL-MCNC: 0.8 MG/DL
EST. GFR  (AFRICAN AMERICAN): >60 ML/MIN/1.73 M^2
EST. GFR  (NON AFRICAN AMERICAN): >60 ML/MIN/1.73 M^2
GLUCOSE SERPL-MCNC: 96 MG/DL
HDLC SERPL-MCNC: 72 MG/DL
HDLC SERPL: 31.2 %
LDLC SERPL CALC-MCNC: 138 MG/DL
NONHDLC SERPL-MCNC: 159 MG/DL
POTASSIUM SERPL-SCNC: 4.1 MMOL/L
PROT SERPL-MCNC: 7.7 G/DL
SODIUM SERPL-SCNC: 142 MMOL/L
TRIGL SERPL-MCNC: 105 MG/DL

## 2018-03-15 PROCEDURE — 80061 LIPID PANEL: CPT

## 2018-03-15 PROCEDURE — 36415 COLL VENOUS BLD VENIPUNCTURE: CPT

## 2018-03-15 PROCEDURE — 80053 COMPREHEN METABOLIC PANEL: CPT

## 2018-03-20 ENCOUNTER — OFFICE VISIT (OUTPATIENT)
Dept: CARDIOLOGY | Facility: CLINIC | Age: 83
End: 2018-03-20
Payer: MEDICARE

## 2018-03-20 VITALS
SYSTOLIC BLOOD PRESSURE: 135 MMHG | WEIGHT: 84.44 LBS | BODY MASS INDEX: 16.58 KG/M2 | DIASTOLIC BLOOD PRESSURE: 55 MMHG | HEART RATE: 55 BPM | HEIGHT: 60 IN

## 2018-03-20 DIAGNOSIS — I10 ESSENTIAL HYPERTENSION: Chronic | ICD-10-CM

## 2018-03-20 DIAGNOSIS — E44.1 MILD PROTEIN-CALORIE MALNUTRITION: ICD-10-CM

## 2018-03-20 DIAGNOSIS — I45.10 RIGHT BUNDLE BRANCH BLOCK: ICD-10-CM

## 2018-03-20 DIAGNOSIS — R00.1 BRADYCARDIA: ICD-10-CM

## 2018-03-20 DIAGNOSIS — Z78.9 STATIN INTOLERANCE: ICD-10-CM

## 2018-03-20 DIAGNOSIS — I77.9 BILATERAL CAROTID ARTERY DISEASE: Chronic | ICD-10-CM

## 2018-03-20 DIAGNOSIS — I25.10 CORONARY ARTERY DISEASE INVOLVING NATIVE CORONARY ARTERY OF NATIVE HEART WITHOUT ANGINA PECTORIS: Primary | Chronic | ICD-10-CM

## 2018-03-20 DIAGNOSIS — E78.5 DYSLIPIDEMIA: Chronic | ICD-10-CM

## 2018-03-20 PROCEDURE — 93000 ELECTROCARDIOGRAM COMPLETE: CPT | Mod: S$GLB,,, | Performed by: INTERNAL MEDICINE

## 2018-03-20 PROCEDURE — 99214 OFFICE O/P EST MOD 30 MIN: CPT | Mod: S$GLB,,, | Performed by: NURSE PRACTITIONER

## 2018-03-20 PROCEDURE — 3078F DIAST BP <80 MM HG: CPT | Mod: CPTII,S$GLB,, | Performed by: NURSE PRACTITIONER

## 2018-03-20 PROCEDURE — 99499 UNLISTED E&M SERVICE: CPT | Mod: S$GLB,,, | Performed by: NURSE PRACTITIONER

## 2018-03-20 PROCEDURE — 99999 PR PBB SHADOW E&M-EST. PATIENT-LVL III: CPT | Mod: PBBFAC,,, | Performed by: NURSE PRACTITIONER

## 2018-03-20 PROCEDURE — 3075F SYST BP GE 130 - 139MM HG: CPT | Mod: CPTII,S$GLB,, | Performed by: NURSE PRACTITIONER

## 2018-03-20 RX ORDER — EZETIMIBE 10 MG/1
5 TABLET ORAL DAILY
Qty: 15 TABLET | Refills: 11 | Status: SHIPPED | OUTPATIENT
Start: 2018-03-20 | End: 2019-03-12

## 2018-03-20 NOTE — PATIENT INSTRUCTIONS
Try the boost breeze or the ensure clear.  You may find a store brand that's cheaper.     Start zetia (ezetamibe) 1/2 tablet (5mg) by mouth daily.    Please have your blood drawn in 6 weeks.  Please get labs drawn fasting for 12 hours after dinner.  You can have water and take your medications while fasting.

## 2018-03-20 NOTE — PROGRESS NOTES
Ms. Summers is a patient of Dr. Morrow and was last seen in [facility] 12/21/2017.    Subjective:   Patient ID:  Mj Summers is a 82 y.o. female who presents for follow-up of Coronary Artery Disease (3 month f/u )    Problems:  Coronary artery disease ([facility] 2010 mid LAD 40%)   mild bilateral carotid disease (20-39% bilateral in 2015 )   hyperlipidemia   hypertension   pulmonary emphysema.    HPI  Ms. Summers is in clinic today for routine follow up.  Patient denies chest pain with exertion or at rest, palpitations, SOB, YOUNGBLOOD, dizziness, syncope, edema, orthopnea, PND, or claudication.  Reports no routine exercise.  She is treated with low dose ASA and low intensity statin.  Patient is taking lovastatin 40mg and LDL is 138. She did not tolerate atorvastatin d/t myalgias.  Reports following a low salt diet. Home blood pressure readings are 130s.      Review of Systems   Constitution: Negative for decreased appetite, diaphoresis, weakness, malaise/fatigue, weight gain and weight loss.   Eyes: Negative for visual disturbance.   Cardiovascular: Negative for chest pain, claudication, dyspnea on exertion, irregular heartbeat, leg swelling, near-syncope, orthopnea, palpitations, paroxysmal nocturnal dyspnea and syncope.        Denies chest pressure   Respiratory: Negative for cough, hemoptysis, shortness of breath, sleep disturbances due to breathing and snoring.    Endocrine: Negative for cold intolerance and heat intolerance.   Hematologic/Lymphatic: Negative for bleeding problem. Does not bruise/bleed easily.   Musculoskeletal: Positive for falls and myalgias.   Gastrointestinal: Negative for bloating, abdominal pain, anorexia, change in bowel habit, constipation, diarrhea, nausea and vomiting.   Neurological: Positive for light-headedness. Negative for difficulty with concentration, disturbances in coordination, excessive daytime sleepiness, dizziness, headaches, loss of balance and numbness.    Psychiatric/Behavioral: The patient does not have insomnia.      Allergies and current medications updated and reviewed:  Review of patient's allergies indicates:   Allergen Reactions    Strawberries [strawberry]      B/p elevation    Chocolate flavor     Pcn [penicillins] Rash    Sulfa (sulfonamide antibiotics) Itching     Current Outpatient Prescriptions   Medication Sig    aspirin 81 mg Tab Take 81 mg by mouth every evening. 1 Tablet Oral Every day    bimatoprost (LUMIGAN) 0.01 % Drop Place 1 drop into the right eye every evening.    brimonidine 0.15 % OPTH DROP (ALPHAGAN) 0.15 % ophthalmic solution Place 1 drop into the right eye 3 (three) times daily.    calcium carbonate (OS-KACI) 600 mg (1,500 mg) Tab Take 600 mg by mouth 2 (two) times daily with meals.    chlorthalidone (HYGROTEN) 25 MG Tab 1/2 tablet daily    ergocalciferol (ERGOCALCIFEROL) 50,000 unit Cap Take 1 capsule (50,000 Units total) by mouth every 30 days.    esomeprazole (NEXIUM) 40 MG capsule Take 1 capsule (40 mg total) by mouth once daily. Prn    ferrous sulfate 325 (65 FE) MG EC tablet Take 1 tablet (325 mg total) by mouth once daily.    lovastatin (MEVACOR) 40 MG tablet Take 1 tablet (40 mg total) by mouth every evening.    multivitamin (ONE DAILY MULTIVITAMIN) per tablet Take 1 tablet by mouth once daily.    pilocarpine HCL 4% (PILOCAR) 4 % ophthalmic solution Place 1 drop into the right eye 3 (three) times daily.    senna-docusate 8.6-50 mg (PERICOLACE) 8.6-50 mg per tablet Take 1 tablet by mouth 2 (two) times daily. (Patient taking differently: Take 1 tablet by mouth 2 (two) times daily as needed. )    timolol maleate 0.5% (TIMOPTIC-XE) 0.5 % SolG Place 1 drop into the right eye every morning.    valsartan (DIOVAN) 160 MG tablet Take 1 tablet (160 mg total) by mouth 2 (two) times daily.    ezetimibe (ZETIA) 10 mg tablet Take 0.5 tablets (5 mg total) by mouth once daily.     No current facility-administered medications  for this visit.      Objective:     Right Arm BP - Sittin/62 (18 1425)  Left Arm BP - Sittin/55 (18 1425)    BP (!) 135/55 (BP Location: Left arm, Patient Position: Sitting, BP Method: Small (Automatic))   Pulse (!) 55   Ht 5' (1.524 m)   Wt 38.3 kg (84 lb 7 oz)   LMP 11/10/1987 (Approximate)   BMI 16.49 kg/m²     Physical Exam   Constitutional: She is oriented to person, place, and time. Vital signs are normal. She appears well-developed. She appears cachectic. She is active. No distress.   HENT:   Head: Normocephalic and atraumatic.   Eyes: Conjunctivae and lids are normal. No scleral icterus.   Neck: Neck supple. Normal carotid pulses, no hepatojugular reflux and no JVD present. Carotid bruit is not present.   Cardiovascular: Regular rhythm, S1 normal, S2 normal and intact distal pulses.  Bradycardia present.  PMI is not displaced.  Exam reveals no gallop and no friction rub.    Murmur heard.   Harsh mid to late systolic murmur is present  at the upper right sternal border radiating to the neck  Pulses:       Carotid pulses are 2+ on the right side, and 2+ on the left side.       Radial pulses are 2+ on the right side, and 2+ on the left side.        Dorsalis pedis pulses are 2+ on the right side, and 2+ on the left side.        Posterior tibial pulses are 2+ on the right side, and 2+ on the left side.   Pulmonary/Chest: Effort normal and breath sounds normal. No respiratory distress. She has no decreased breath sounds. She has no wheezes. She has no rhonchi. She has no rales. She exhibits no tenderness.   Abdominal: Soft. Normal appearance and bowel sounds are normal. She exhibits no distension, no fluid wave, no abdominal bruit, no ascites and no pulsatile midline mass. There is no hepatosplenomegaly. There is no tenderness.   Musculoskeletal: She exhibits no edema.   Neurological: She is alert and oriented to person, place, and time. Gait normal.   Skin: Skin is warm, dry and  intact. No rash noted. She is not diaphoretic. Nails show no clubbing.   Psychiatric: She has a normal mood and affect. Her speech is normal and behavior is normal. Judgment and thought content normal. Cognition and memory are normal.   Nursing note and vitals reviewed.      Chemistry        Component Value Date/Time     03/15/2018 0942    K 4.1 03/15/2018 0942     03/15/2018 0942    CO2 27 03/15/2018 0942    BUN 23 03/15/2018 0942    CREATININE 0.8 03/15/2018 0942    GLU 96 03/15/2018 0942        Component Value Date/Time    CALCIUM 10.0 03/15/2018 0942    ALKPHOS 74 03/15/2018 0942    AST 27 03/15/2018 0942    ALT 22 03/15/2018 0942    BILITOT 0.4 03/15/2018 0942    ESTGFRAFRICA >60 03/15/2018 0942    EGFRNONAA >60 03/15/2018 0942        No results found for: LABA1C, HGBA1C      Recent Labs  Lab 11/06/17  0811  01/03/18  1248 03/15/18  0942   WHITE BLOOD CELL COUNT 3.86 L  < > 4.45  --    HEMOGLOBIN 10.7 L  < > 10.2 L  --    HEMATOCRIT 33.2 L  < > 31.2 L  --    MCV 93  < > 91  --    PLATELETS 213  < > 237  --    TSH 0.934  --   --   --    CHOLESTEROL 237 H  --   --  231 H   HDL 61  --   --  72   LDL CHOLESTEROL 157.6  --   --  138.0   TRIGLYCERIDES 92  --   --  105   HDL/CHOLESTEROL RATIO 25.7  --   --  31.2   < > = values in this interval not displayed.      Recent Labs  Lab 05/23/16  1558   INR 1.0        Test(s) Reviewed  I have reviewed the following in detail:  [] Stress test   [] Angiography   [x] Echocardiogram   [x] Labs   [] Other:         Assessment/Plan:     Coronary artery disease involving native coronary artery of native heart without angina pectoris  Comments:  Asymptomatic. Taking low intensity statin therapy and ASA. No changes.  Orders:  -     ezetimibe (ZETIA) 10 mg tablet; Take 0.5 tablets (5 mg total) by mouth once daily.  Dispense: 15 tablet; Refill: 11  -     Comprehensive metabolic panel; Future; Expected date: 05/01/2018  -     Lipid panel; Future; Expected date:  05/04/2018    Bilateral carotid artery disease, mild  Comments:  Mild disease by ultrasound. No bruit on exam. Continue statin and aspirin therapy.     Dyslipidemia  Comments:  LDL not at goal <70. Add 1/2 tablet zetia (5mg) by mouth daily. Repeat cmp/lipid in 6-8 weeks.   Orders:  -     ezetimibe (ZETIA) 10 mg tablet; Take 0.5 tablets (5 mg total) by mouth once daily.  Dispense: 15 tablet; Refill: 11    Statin intolerance  Comments:  Myalgias with pravastatin and atorvastatin. Mild myalgias with lovastatin.     Essential hypertension  Comments:  BP fairly well controlled, 130s/60s. Continue current regimen. 2gm sodium diet.     Bradycardia  Comments:  HR in the 50s at intake. ECG shows SR with HR in the 70s and RBBB. If lightheadedness persists or worsens, consider holter monitor.   Orders:  -     EKG 12-lead    Right bundle branch block  -     EKG 12-lead    Mild protein-calorie malnutrition  Comments:  Not tolerating boost because of lactose intolerance. Encouraged to try boost breeze or ensure clear which are lactose free.       Follow-up in about 6 months (around 9/20/2018).

## 2018-04-09 ENCOUNTER — OFFICE VISIT (OUTPATIENT)
Dept: HEMATOLOGY/ONCOLOGY | Facility: CLINIC | Age: 83
End: 2018-04-09
Payer: MEDICARE

## 2018-04-09 ENCOUNTER — LAB VISIT (OUTPATIENT)
Dept: LAB | Facility: OTHER | Age: 83
End: 2018-04-09
Attending: INTERNAL MEDICINE
Payer: MEDICARE

## 2018-04-09 VITALS
OXYGEN SATURATION: 100 % | SYSTOLIC BLOOD PRESSURE: 188 MMHG | TEMPERATURE: 98 F | HEART RATE: 58 BPM | WEIGHT: 86.19 LBS | RESPIRATION RATE: 17 BRPM | BODY MASS INDEX: 16.83 KG/M2 | DIASTOLIC BLOOD PRESSURE: 75 MMHG

## 2018-04-09 DIAGNOSIS — E44.1 MILD PROTEIN-CALORIE MALNUTRITION: ICD-10-CM

## 2018-04-09 DIAGNOSIS — E46 PROTEIN-CALORIE MALNUTRITION, UNSPECIFIED SEVERITY: ICD-10-CM

## 2018-04-09 DIAGNOSIS — I10 ESSENTIAL HYPERTENSION: ICD-10-CM

## 2018-04-09 DIAGNOSIS — D64.9 ANEMIA, UNSPECIFIED TYPE: ICD-10-CM

## 2018-04-09 DIAGNOSIS — D64.9 ANEMIA, UNSPECIFIED TYPE: Primary | ICD-10-CM

## 2018-04-09 DIAGNOSIS — N18.30 STAGE 3 CHRONIC KIDNEY DISEASE: ICD-10-CM

## 2018-04-09 LAB
ALBUMIN SERPL BCP-MCNC: 3.8 G/DL
ALP SERPL-CCNC: 72 U/L
ALT SERPL W/O P-5'-P-CCNC: 21 U/L
ANION GAP SERPL CALC-SCNC: 6 MMOL/L
AST SERPL-CCNC: 22 U/L
BASOPHILS # BLD AUTO: 0.04 K/UL
BASOPHILS NFR BLD: 0.9 %
BILIRUB SERPL-MCNC: 0.4 MG/DL
BUN SERPL-MCNC: 19 MG/DL
CALCIUM SERPL-MCNC: 10 MG/DL
CHLORIDE SERPL-SCNC: 104 MMOL/L
CO2 SERPL-SCNC: 30 MMOL/L
CREAT SERPL-MCNC: 0.8 MG/DL
DIFFERENTIAL METHOD: ABNORMAL
EOSINOPHIL # BLD AUTO: 0.1 K/UL
EOSINOPHIL NFR BLD: 1.5 %
ERYTHROCYTE [DISTWIDTH] IN BLOOD BY AUTOMATED COUNT: 13.1 %
EST. GFR  (AFRICAN AMERICAN): >60 ML/MIN/1.73 M^2
EST. GFR  (NON AFRICAN AMERICAN): >60 ML/MIN/1.73 M^2
FERRITIN SERPL-MCNC: 179 NG/ML
FOLATE SERPL-MCNC: 15.6 NG/ML
GLUCOSE SERPL-MCNC: 89 MG/DL
HCT VFR BLD AUTO: 34.2 %
HGB BLD-MCNC: 11 G/DL
IRON SERPL-MCNC: 73 UG/DL
LYMPHOCYTES # BLD AUTO: 1.2 K/UL
LYMPHOCYTES NFR BLD: 26 %
MCH RBC QN AUTO: 29.9 PG
MCHC RBC AUTO-ENTMCNC: 32.2 G/DL
MCV RBC AUTO: 93 FL
MONOCYTES # BLD AUTO: 0.4 K/UL
MONOCYTES NFR BLD: 8.2 %
NEUTROPHILS # BLD AUTO: 2.9 K/UL
NEUTROPHILS NFR BLD: 63.2 %
PLATELET # BLD AUTO: 236 K/UL
PMV BLD AUTO: 9.6 FL
POTASSIUM SERPL-SCNC: 4.4 MMOL/L
PROT SERPL-MCNC: 7.8 G/DL
RBC # BLD AUTO: 3.68 M/UL
RETICS/RBC NFR AUTO: 1.2 %
SATURATED IRON: 21 %
SODIUM SERPL-SCNC: 140 MMOL/L
TOTAL IRON BINDING CAPACITY: 352 UG/DL
TRANSFERRIN SERPL-MCNC: 238 MG/DL
VIT B12 SERPL-MCNC: 398 PG/ML
WBC # BLD AUTO: 4.61 K/UL

## 2018-04-09 PROCEDURE — 99999 PR PBB SHADOW E&M-EST. PATIENT-LVL III: CPT | Mod: PBBFAC,,, | Performed by: INTERNAL MEDICINE

## 2018-04-09 PROCEDURE — 82746 ASSAY OF FOLIC ACID SERUM: CPT

## 2018-04-09 PROCEDURE — 99499 UNLISTED E&M SERVICE: CPT | Mod: S$GLB,,, | Performed by: INTERNAL MEDICINE

## 2018-04-09 PROCEDURE — 99214 OFFICE O/P EST MOD 30 MIN: CPT | Mod: S$GLB,,, | Performed by: INTERNAL MEDICINE

## 2018-04-09 PROCEDURE — 82728 ASSAY OF FERRITIN: CPT

## 2018-04-09 PROCEDURE — 85025 COMPLETE CBC W/AUTO DIFF WBC: CPT

## 2018-04-09 PROCEDURE — 36415 COLL VENOUS BLD VENIPUNCTURE: CPT

## 2018-04-09 PROCEDURE — 82607 VITAMIN B-12: CPT

## 2018-04-09 PROCEDURE — 80053 COMPREHEN METABOLIC PANEL: CPT

## 2018-04-09 PROCEDURE — 3077F SYST BP >= 140 MM HG: CPT | Mod: CPTII,S$GLB,, | Performed by: INTERNAL MEDICINE

## 2018-04-09 PROCEDURE — 83540 ASSAY OF IRON: CPT

## 2018-04-09 PROCEDURE — 3078F DIAST BP <80 MM HG: CPT | Mod: CPTII,S$GLB,, | Performed by: INTERNAL MEDICINE

## 2018-04-09 PROCEDURE — 85045 AUTOMATED RETICULOCYTE COUNT: CPT

## 2018-04-09 NOTE — PROGRESS NOTES
"                                                         PROGRESS NOTE    Subjective:       Patient ID: Mj Summers is a 82 y.o. female.    Chief Complaint: follow up for anemia    Hematologic History:  1. Ms. Summers is a very pleasant 83 yo woman with HTN, CAD, b/l carotid disease, GERD, malnutrition, who initially saw me on 1/3/18 for further evaluation of anemia. On review of her records, her Hb has been ranging between 10 and 12 since 2010. In May 2016 it dropped to 5-7 after she underwent a surgery on 5/10/16 for left intertrochanteric fracture from a fall. Her H/H on 7/12/16 was 11.4/35.1. On 11/6/17, WBC 3.86, H/H 10.7/33.2, plt count 213. On 11/3/17, WBC 4.26, H/H 10.2/31.7, MCV 92, plt count 158. Vit B12 338, folate 17.2, ferritin 179, iron 68, TIBC 333, iron saturation 20%, retic 1.1%. CMP on 11/6/17 showed Cr 0.7, total protein 7.3, albumin 3.4, corrected calcium 9.8. Calculated CrCl 37.46 mL/min (cockroft). She has been having dysphagia over the past 2 months, which she attributed to GERD. Has been taking iron pills for many years and has been having dark stool from the iron pills. Denies any signs of bleeding.   2. Workup on 1/3/18 showed normal LDH, haptoglobin, copper levels. SPEP negative for M protein. Seen by Gi. Guaiac stool negative.   3. EGD on 2/6/18 showed Small hiatal hernia. Mild Schatzki ring. Dilated. Gastric mucosal atrophy. Biopsied. Normal examined duodenum. Pathology of the gastric antrum showed "Severe chronic inactive gastritis. No evidence of active acute inflammation. Negative for Helicobacter like organisms on H&E. Intestinal metaplasia, complete type. Reactive atypia. No evidence of malignancy."  4. Colonoscopy on 2/6/18 showed "The colon (entire examined portion) was significantly tortuous.The exam was otherwise without abnormality. The cecum was partially obscured by solid material, but by washing and repositioning patient, I feel that adequate visualization was " "obtained. I cannot rule out an AVM in the cecum, but I don't think any mass, lesion, or significant polyp was missed"    INTERVAL HISTORY:   Ms. Summers returns today for follow up of anemia. Since I last saw her, she underwent an EGD on 2/6/18 showed Small hiatal hernia. Mild Schatzki ring. Dilated. Gastric mucosal atrophy. Biopsied. Normal examined duodenum. Pathology of the gastric antrum showed "Severe chronic inactive gastritis. No evidence of active acute inflammation. Negative for Helicobacter like organisms on H&E. Intestinal metaplasia, complete type. Reactive atypia. No evidence of malignancy." Colonoscopy on 2/6/18 showed "The colon (entire examined portion) was significantly tortuous.The exam was otherwise without abnormality. The cecum was partially obscured by solid material, but by washing and repositioning patient, I feel that adequate visualization was obtained. I cannot rule out an AVM in the cecum, but I don't think any mass, lesion, or significant polyp was missed". Her guaiac stool was negative. She currently feels well. Still has some dysphagia with Triscuits and peanut butter. Denies weight loss.     ROS:   See Interval History. Otherwise negative.     Physical Examination:   Vital signs reviewed. /75 mmHg  Gen: well hydrated, well developed, in no acute distress.  HEENT: normocephalic, anicteric sclerae, PERRLA, EOMI, oropharynx clear  Neck: supple, no JVD, thyromegaly, cervical or supraclavicular LAD  Lungs: CTAB, no wheezes or rales  Heart: RRR, no M/R/G  Abdomen: soft, no tenderness, non-distended, no hepatosplenomegaly, mass, or hernia. BS present  Ext: no clubbing, cyanosis, or edema  Neuro: alert and oriented x 4, no focal neuro deficit  Skin: no rash, erythema, open wound or ulcers  Psych: pleasant and appropriate mood and affect      Objective:     Diagnostic Tests:  EGD on 2/6/18 showed Small hiatal hernia. Mild Schatzki ring. Dilated. Gastric mucosal atrophy. Biopsied. Normal " "examined duodenum. Pathology of the gastric antrum showed "Severe chronic inactive gastritis. No evidence of active acute inflammation. Negative for Helicobacter like organisms on H&E. Intestinal metaplasia, complete type. Reactive atypia. No evidence of malignancy."    Colonoscopy on 2/6/18 showed "The colon (entire examined portion) was significantly tortuous.The exam was otherwise without abnormality. The cecum was partially obscured by solid material, but by washing and repositioning patient, I feel that adequate visualization was obtained. I cannot rule out an AVM in the cecum, but I don't think any mass, lesion, or significant polyp was missed"    Laboratory Data:  Labs from today reviewed. Hb 11.0, stable.     Assessment/Plan:     1. Anemia, unspecified type    2. Stage 3 chronic kidney disease    3. Essential hypertension    4. Mild protein-calorie malnutrition    5. Protein-calorie malnutrition, unspecified severity       1 - Ms. Summers is an 83 yo woman with normocytic anemia. Previous workup was negative. EGD showed severe chronic inactive gastritis but no s/o malignancy. Colonoscopy did not show bleeding sites. Guaiac stool was negative.   - Her CrCl is 37 min/mL. She could have anemia of chronic kidney disease. Low risk MDS is also on the differential, but given her mild anemia, normal WBC and plt counts, the stability of her Hb over the past 7 years, and her age, I would defer a BMBx at this point as it would not change the management.   - RTC in 3 months with repeat labs to monitor    2 - last Cr in March stable. Continue to monitor    3. Her BP is 188/75 mmHg today in the office. She is asymptomatic. I asked to call Dr. Urbina to discuss further management of her HTN.     4.5 - Discussed with her regarding a healthy diet with good protein source, daily multivitamin, daily vitD and calcium. Will monitor her vit B12 and folic acid given her malnutrition status and atrophic gastritis.     - RTC in 3 " months with labs.     Electronically signed by Geo Myers        Distress Screening Results: Psychosocial Distress screening score of Distress Score: 0 noted and reviewed. No intervention indicated.

## 2018-04-23 ENCOUNTER — TELEPHONE (OUTPATIENT)
Dept: INTERNAL MEDICINE | Facility: CLINIC | Age: 83
End: 2018-04-23

## 2018-04-23 NOTE — TELEPHONE ENCOUNTER
----- Message from Sindhu Green sent at 4/23/2018  4:14 PM CDT -----  Contact: self [phone]  Patient requesting a call from the office in regards to right shoulder pain , stated she  an object last Monday and pain  started 04/19/18. On scale 1-10 shoulder pain level is a 10 only when using right hand she starts feeling pain  , stated no other symptoms   Please advise , Thanks

## 2018-04-26 ENCOUNTER — TELEPHONE (OUTPATIENT)
Dept: INTERNAL MEDICINE | Facility: CLINIC | Age: 83
End: 2018-04-26

## 2018-04-26 ENCOUNTER — TELEPHONE (OUTPATIENT)
Dept: CARDIOLOGY | Facility: CLINIC | Age: 83
End: 2018-04-26

## 2018-04-26 NOTE — TELEPHONE ENCOUNTER
----- Message from Janette Singh sent at 4/26/2018  4:31 PM CDT -----  Contact: Pt  Pt is requesting a callback says she needs to speak with nurse regarding her upcoming appts    Pt can be reached at [phone]    Thanks

## 2018-05-11 ENCOUNTER — TELEPHONE (OUTPATIENT)
Dept: INFECTIOUS DISEASES | Facility: HOSPITAL | Age: 83
End: 2018-05-11

## 2018-05-11 NOTE — TELEPHONE ENCOUNTER
----- Message from Savannah Mahmood MA sent at 5/11/2018  9:57 AM CDT -----  PT has an EPO injection scheduled for Monday and wants to know if she will be able to have injection since she has a shoulder fracture. Dr Mandel patient not sure if you could help with this question thanks

## 2018-05-12 ENCOUNTER — LAB VISIT (OUTPATIENT)
Dept: LAB | Facility: HOSPITAL | Age: 83
End: 2018-05-12
Payer: MEDICARE

## 2018-05-12 DIAGNOSIS — I25.10 CORONARY ARTERY DISEASE INVOLVING NATIVE CORONARY ARTERY OF NATIVE HEART WITHOUT ANGINA PECTORIS: Chronic | ICD-10-CM

## 2018-05-12 LAB
ALBUMIN SERPL BCP-MCNC: 3.6 G/DL
ALP SERPL-CCNC: 77 U/L
ALT SERPL W/O P-5'-P-CCNC: 26 U/L
ANION GAP SERPL CALC-SCNC: 8 MMOL/L
AST SERPL-CCNC: 26 U/L
BILIRUB SERPL-MCNC: 0.3 MG/DL
BUN SERPL-MCNC: 22 MG/DL
CALCIUM SERPL-MCNC: 9.2 MG/DL
CHLORIDE SERPL-SCNC: 107 MMOL/L
CHOLEST SERPL-MCNC: 241 MG/DL
CHOLEST/HDLC SERPL: 3.7 {RATIO}
CO2 SERPL-SCNC: 25 MMOL/L
CREAT SERPL-MCNC: 0.8 MG/DL
EST. GFR  (AFRICAN AMERICAN): >60 ML/MIN/1.73 M^2
EST. GFR  (NON AFRICAN AMERICAN): >60 ML/MIN/1.73 M^2
GLUCOSE SERPL-MCNC: 92 MG/DL
HDLC SERPL-MCNC: 65 MG/DL
HDLC SERPL: 27 %
LDLC SERPL CALC-MCNC: 158.6 MG/DL
NONHDLC SERPL-MCNC: 176 MG/DL
POTASSIUM SERPL-SCNC: 4 MMOL/L
PROT SERPL-MCNC: 7.6 G/DL
SODIUM SERPL-SCNC: 140 MMOL/L
TRIGL SERPL-MCNC: 87 MG/DL

## 2018-05-12 PROCEDURE — 80061 LIPID PANEL: CPT

## 2018-05-12 PROCEDURE — 36415 COLL VENOUS BLD VENIPUNCTURE: CPT

## 2018-05-12 PROCEDURE — 80053 COMPREHEN METABOLIC PANEL: CPT

## 2018-05-14 ENCOUNTER — INFUSION (OUTPATIENT)
Dept: INFECTIOUS DISEASES | Facility: HOSPITAL | Age: 83
End: 2018-05-14
Attending: INTERNAL MEDICINE
Payer: MEDICARE

## 2018-05-14 VITALS — BODY MASS INDEX: 16.92 KG/M2 | WEIGHT: 86.19 LBS | HEIGHT: 60 IN

## 2018-05-14 DIAGNOSIS — M81.0 OSTEOPOROSIS, POSTMENOPAUSAL: Primary | ICD-10-CM

## 2018-05-14 PROCEDURE — 96372 THER/PROPH/DIAG INJ SC/IM: CPT

## 2018-05-14 PROCEDURE — 63600175 PHARM REV CODE 636 W HCPCS: Mod: JG | Performed by: INTERNAL MEDICINE

## 2018-05-14 RX ADMIN — DENOSUMAB 60 MG: 60 INJECTION SUBCUTANEOUS at 02:05

## 2018-05-15 ENCOUNTER — OFFICE VISIT (OUTPATIENT)
Dept: CARDIOLOGY | Facility: CLINIC | Age: 83
End: 2018-05-15
Payer: MEDICARE

## 2018-05-15 VITALS
WEIGHT: 86.63 LBS | SYSTOLIC BLOOD PRESSURE: 152 MMHG | HEART RATE: 63 BPM | BODY MASS INDEX: 15.35 KG/M2 | HEIGHT: 63 IN | DIASTOLIC BLOOD PRESSURE: 67 MMHG

## 2018-05-15 DIAGNOSIS — I77.9 BILATERAL CAROTID ARTERY DISEASE: ICD-10-CM

## 2018-05-15 DIAGNOSIS — I10 ESSENTIAL HYPERTENSION: Chronic | ICD-10-CM

## 2018-05-15 DIAGNOSIS — I10 ESSENTIAL HYPERTENSION: Primary | Chronic | ICD-10-CM

## 2018-05-15 DIAGNOSIS — E78.5 DYSLIPIDEMIA: Chronic | ICD-10-CM

## 2018-05-15 DIAGNOSIS — I25.10 CORONARY ARTERY DISEASE INVOLVING NATIVE CORONARY ARTERY OF NATIVE HEART WITHOUT ANGINA PECTORIS: ICD-10-CM

## 2018-05-15 PROCEDURE — 99214 OFFICE O/P EST MOD 30 MIN: CPT | Mod: S$GLB,,, | Performed by: INTERNAL MEDICINE

## 2018-05-15 PROCEDURE — 3078F DIAST BP <80 MM HG: CPT | Mod: CPTII,S$GLB,, | Performed by: INTERNAL MEDICINE

## 2018-05-15 PROCEDURE — 3077F SYST BP >= 140 MM HG: CPT | Mod: CPTII,S$GLB,, | Performed by: INTERNAL MEDICINE

## 2018-05-15 PROCEDURE — 99499 UNLISTED E&M SERVICE: CPT | Mod: S$GLB,,, | Performed by: INTERNAL MEDICINE

## 2018-05-15 PROCEDURE — 99999 PR PBB SHADOW E&M-EST. PATIENT-LVL III: CPT | Mod: PBBFAC,,, | Performed by: INTERNAL MEDICINE

## 2018-05-15 RX ORDER — CHLORTHALIDONE 25 MG/1
TABLET ORAL
Qty: 30 TABLET | Refills: 6 | Status: SHIPPED | OUTPATIENT
Start: 2018-05-15 | End: 2019-01-20 | Stop reason: SDUPTHER

## 2018-05-15 NOTE — PROGRESS NOTES
Subjective:   Patient ID:  Mj Summers is a 82 y.o. female who presents for follow-up of Essential hypertension (6 weeks fu)      HPI:   Mj Summers presents for follow up of hypertension. BP at home mostly in the 140s/ She is limited in activity and recently tore her rotator cuff on the right. Mj Summers has dyslipidemia and was placed on Zetia but she has not been taking it regularly since her injury with lipids worse. She has moderate CAD. Mj Summers denies chest pain, shortness of breath, palpitations, presyncope , or syncope. She has mild carotid stenosis..  Review of Systems   Constitution: Negative for weakness, malaise/fatigue, weight gain and weight loss.   Eyes: Negative for blurred vision.   Cardiovascular: Negative for chest pain, claudication, cyanosis, dyspnea on exertion, irregular heartbeat, leg swelling, near-syncope, orthopnea, palpitations, paroxysmal nocturnal dyspnea and syncope.   Respiratory: Negative for cough, shortness of breath and wheezing.    Musculoskeletal: Positive for joint pain. Negative for falls and myalgias.        Legs weak   Gastrointestinal: Positive for nausea. Negative for abdominal pain, heartburn and vomiting.   Genitourinary: Negative for nocturia.   Neurological: Positive for loss of balance. Negative for brief paralysis, dizziness, focal weakness, headaches, numbness and paresthesias.   Psychiatric/Behavioral: Negative for altered mental status.       Current Outpatient Prescriptions   Medication Sig    aspirin 81 mg Tab Take 81 mg by mouth every evening. 1 Tablet Oral Every day    bimatoprost (LUMIGAN) 0.01 % Drop Place 1 drop into the right eye every evening.    brimonidine 0.15 % OPTH DROP (ALPHAGAN) 0.15 % ophthalmic solution Place 1 drop into the right eye 3 (three) times daily.    calcium carbonate (OS-KACI) 600 mg (1,500 mg) Tab Take 600 mg by mouth 2 (two) times daily with meals.    chlorthalidone (HYGROTEN) 25 MG Tab Take one tablet  "daily    ergocalciferol (ERGOCALCIFEROL) 50,000 unit Cap Take 1 capsule (50,000 Units total) by mouth every 30 days.    esomeprazole (NEXIUM) 40 MG capsule Take 1 capsule (40 mg total) by mouth once daily. Prn    ezetimibe (ZETIA) 10 mg tablet Take 0.5 tablets (5 mg total) by mouth once daily.    ferrous sulfate 325 (65 FE) MG EC tablet Take 1 tablet (325 mg total) by mouth once daily.    lovastatin (MEVACOR) 40 MG tablet Take 1 tablet (40 mg total) by mouth every evening.    multivitamin (ONE DAILY MULTIVITAMIN) per tablet Take 1 tablet by mouth once daily.    pilocarpine HCL 4% (PILOCAR) 4 % ophthalmic solution Place 1 drop into the right eye 3 (three) times daily.    senna-docusate 8.6-50 mg (PERICOLACE) 8.6-50 mg per tablet Take 1 tablet by mouth 2 (two) times daily. (Patient taking differently: Take 1 tablet by mouth 2 (two) times daily as needed. )    timolol maleate 0.5% (TIMOPTIC-XE) 0.5 % SolG Place 1 drop into the right eye every morning.    valsartan (DIOVAN) 160 MG tablet Take 1 tablet (160 mg total) by mouth 2 (two) times daily.     No current facility-administered medications for this visit.      Objective:   Physical Exam   Constitutional: She is oriented to person, place, and time. She appears well-developed. No distress.   BP (!) 152/67 (BP Location: Left arm, Patient Position: Sitting, BP Method: Pediatric (Automatic))   Pulse 63   Ht 5' 3" (1.6 m)   Wt 39.3 kg (86 lb 10.3 oz)   LMP 11/10/1987 (Approximate)   BMI 15.35 kg/m²    HENT:   Head: Normocephalic.   Eyes: Conjunctivae are normal. Pupils are equal, round, and reactive to light. No scleral icterus.   Neck: Neck supple. No JVD present. No thyromegaly present.   Cardiovascular: Normal rate, regular rhythm and intact distal pulses.  PMI is not displaced.  Exam reveals no gallop and no friction rub.    Murmur heard.   Medium-pitched midsystolic murmur is present with a grade of 2/6  at the lower left sternal border, apex  1+ " bilateral ankle edema   Pulmonary/Chest: Effort normal and breath sounds normal. No respiratory distress. She has no wheezes. She has no rales.   Abdominal: Soft. She exhibits no distension. There is no splenomegaly or hepatomegaly. There is no tenderness.   Musculoskeletal: She exhibits no edema or tenderness.   In a wheelchair   Neurological: She is alert and oriented to person, place, and time.   Skin: Skin is warm and dry. She is not diaphoretic.   Psychiatric: She has a normal mood and affect. Her behavior is normal.       Lab Results   Component Value Date     05/12/2018    K 4.0 05/12/2018     05/12/2018    CO2 25 05/12/2018    BUN 22 05/12/2018    CREATININE 0.8 05/12/2018    GLU 92 05/12/2018    MG 2.3 12/11/2015    AST 26 05/12/2018    ALT 26 05/12/2018    ALBUMIN 3.6 05/12/2018    PROT 7.6 05/12/2018    BILITOT 0.3 05/12/2018    WBC 4.61 04/09/2018    HGB 11.0 (L) 04/09/2018    HCT 34.2 (L) 04/09/2018    HCT 38 07/27/2016    MCV 93 04/09/2018     04/09/2018    TSH 0.934 11/06/2017    CHOL 241 (H) 05/12/2018    HDL 65 05/12/2018    LDLCALC 158.6 05/12/2018    TRIG 87 05/12/2018       Assessment:     1. Essential hypertension : Mild persistent elevation   2. Dyslipidemia : Not at goal partially due to medication noncompliance   3. Coronary artery disease involving native coronary artery of native heart without angina pectoris : Stable   4. Bilateral carotid artery disease, mild : Stable           Plan:     Mj was seen today for essential hypertension.    Diagnoses and all orders for this visit:    Essential hypertension  -     chlorthalidone (HYGROTEN) 25 MG Tab; Take one tablet daily( an increase )  -     Basic metabolic panel; Future; Expected date: 05/29/2018    Dyslipidemia  -     Lipid panel; Future; Expected date: 07/14/2018    Coronary artery disease involving native coronary artery of native heart without angina pectoris    Bilateral carotid artery disease, mild    Periodic  recheck

## 2018-05-15 NOTE — PROGRESS NOTES
Subjective:   Patient ID:  Mj Summers is a 82 y.o. female who presents for evaluation of Essential hypertension (6 weeks fu)      HPI:     ROS    Current Outpatient Prescriptions   Medication Sig    aspirin 81 mg Tab Take 81 mg by mouth every evening. 1 Tablet Oral Every day    bimatoprost (LUMIGAN) 0.01 % Drop Place 1 drop into the right eye every evening.    brimonidine 0.15 % OPTH DROP (ALPHAGAN) 0.15 % ophthalmic solution Place 1 drop into the right eye 3 (three) times daily.    calcium carbonate (OS-KACI) 600 mg (1,500 mg) Tab Take 600 mg by mouth 2 (two) times daily with meals.    chlorthalidone (HYGROTEN) 25 MG Tab Take one tablet daily    ergocalciferol (ERGOCALCIFEROL) 50,000 unit Cap Take 1 capsule (50,000 Units total) by mouth every 30 days.    esomeprazole (NEXIUM) 40 MG capsule Take 1 capsule (40 mg total) by mouth once daily. Prn    ezetimibe (ZETIA) 10 mg tablet Take 0.5 tablets (5 mg total) by mouth once daily.    ferrous sulfate 325 (65 FE) MG EC tablet Take 1 tablet (325 mg total) by mouth once daily.    lovastatin (MEVACOR) 40 MG tablet Take 1 tablet (40 mg total) by mouth every evening.    multivitamin (ONE DAILY MULTIVITAMIN) per tablet Take 1 tablet by mouth once daily.    pilocarpine HCL 4% (PILOCAR) 4 % ophthalmic solution Place 1 drop into the right eye 3 (three) times daily.    senna-docusate 8.6-50 mg (PERICOLACE) 8.6-50 mg per tablet Take 1 tablet by mouth 2 (two) times daily. (Patient taking differently: Take 1 tablet by mouth 2 (two) times daily as needed. )    timolol maleate 0.5% (TIMOPTIC-XE) 0.5 % SolG Place 1 drop into the right eye every morning.    valsartan (DIOVAN) 160 MG tablet Take 1 tablet (160 mg total) by mouth 2 (two) times daily.     No current facility-administered medications for this visit.      Objective:   Physical Exam    Lab Results   Component Value Date     05/12/2018    K 4.0 05/12/2018     05/12/2018    CO2 25 05/12/2018    BUN  22 05/12/2018    CREATININE 0.8 05/12/2018    GLU 92 05/12/2018    MG 2.3 12/11/2015    AST 26 05/12/2018    ALT 26 05/12/2018    ALBUMIN 3.6 05/12/2018    PROT 7.6 05/12/2018    BILITOT 0.3 05/12/2018    WBC 4.61 04/09/2018    HGB 11.0 (L) 04/09/2018    HCT 34.2 (L) 04/09/2018    HCT 38 07/27/2016    MCV 93 04/09/2018     04/09/2018    TSH 0.934 11/06/2017    CHOL 241 (H) 05/12/2018    HDL 65 05/12/2018    LDLCALC 158.6 05/12/2018    TRIG 87 05/12/2018       Assessment:     1. Essential hypertension    2. Dyslipidemia    3. Coronary artery disease involving native coronary artery of native heart without angina pectoris    4. Bilateral carotid artery disease, mild    5. Essential hypertension        Plan:     Mj was seen today for essential hypertension.    Diagnoses and all orders for this visit:    Essential hypertension  -     chlorthalidone (HYGROTEN) 25 MG Tab; Take one tablet daily  -     Basic metabolic panel; Future; Expected date: 05/29/2018    Dyslipidemia  -     Lipid panel; Future; Expected date: 07/14/2018    Coronary artery disease involving native coronary artery of native heart without angina pectoris    Bilateral carotid artery disease, mild    Essential hypertension  Comments:  Not well controlled. Didn't take chlorthalidone today. Instructed to take bp at home and call with log in 2 weeks.  Orders:  -     chlorthalidone (HYGROTEN) 25 MG Tab; Take one tablet daily  -     Basic metabolic panel; Future; Expected date: 05/29/2018

## 2018-05-15 NOTE — PATIENT INSTRUCTIONS
Increase chlorthalidone to a whole tablet daily ( 25 mg )  Take the ezetimibe along with Lovastatin to lower your cholesterol.

## 2018-05-29 ENCOUNTER — LAB VISIT (OUTPATIENT)
Dept: LAB | Facility: HOSPITAL | Age: 83
End: 2018-05-29
Attending: INTERNAL MEDICINE
Payer: MEDICARE

## 2018-05-29 DIAGNOSIS — I10 ESSENTIAL HYPERTENSION: Chronic | ICD-10-CM

## 2018-05-29 LAB
ANION GAP SERPL CALC-SCNC: 5 MMOL/L
BUN SERPL-MCNC: 19 MG/DL
CALCIUM SERPL-MCNC: 9.7 MG/DL
CHLORIDE SERPL-SCNC: 108 MMOL/L
CO2 SERPL-SCNC: 26 MMOL/L
CREAT SERPL-MCNC: 0.9 MG/DL
EST. GFR  (AFRICAN AMERICAN): >60 ML/MIN/1.73 M^2
EST. GFR  (NON AFRICAN AMERICAN): 60 ML/MIN/1.73 M^2
GLUCOSE SERPL-MCNC: 94 MG/DL
POTASSIUM SERPL-SCNC: 3.9 MMOL/L
SODIUM SERPL-SCNC: 139 MMOL/L

## 2018-05-29 PROCEDURE — 36415 COLL VENOUS BLD VENIPUNCTURE: CPT

## 2018-05-29 PROCEDURE — 80048 BASIC METABOLIC PNL TOTAL CA: CPT

## 2018-06-11 DIAGNOSIS — I25.10 CORONARY ARTERY DISEASE INVOLVING NATIVE CORONARY ARTERY OF NATIVE HEART WITHOUT ANGINA PECTORIS: Chronic | ICD-10-CM

## 2018-06-11 DIAGNOSIS — I77.9 BILATERAL CAROTID ARTERY DISEASE: Chronic | ICD-10-CM

## 2018-06-11 DIAGNOSIS — I10 ESSENTIAL HYPERTENSION: Chronic | ICD-10-CM

## 2018-06-11 DIAGNOSIS — E78.5 DYSLIPIDEMIA: Chronic | ICD-10-CM

## 2018-06-11 RX ORDER — LOVASTATIN 40 MG/1
40 TABLET ORAL NIGHTLY
Qty: 90 TABLET | Refills: 0 | Status: SHIPPED | OUTPATIENT
Start: 2018-06-11 | End: 2018-09-07 | Stop reason: SDUPTHER

## 2018-06-11 RX ORDER — VALSARTAN 160 MG/1
160 TABLET ORAL 2 TIMES DAILY
Qty: 60 TABLET | Refills: 0 | Status: SHIPPED | OUTPATIENT
Start: 2018-06-11 | End: 2018-07-09 | Stop reason: SDUPTHER

## 2018-06-11 NOTE — TELEPHONE ENCOUNTER
----- Message from Matilde Rincon sent at 6/11/2018 10:09 AM CDT -----  Contact: [facility] [phone]  Type: Rx    Name of medication(s): valsartan (DIOVAN) 160 MG tablet and lovastatin (MEVACOR) 40 MG tablet    Is this a refill? New rx? refill    Who prescribed medication? Carlitos    Pharmacy Name, Phone, & Location: [facility]/pharmacy #[id number] - [place] - [address] [phone] (     Comments: Pharmacy is requesting above refills

## 2018-06-25 ENCOUNTER — OFFICE VISIT (OUTPATIENT)
Dept: PODIATRY | Facility: CLINIC | Age: 83
End: 2018-06-25
Payer: MEDICARE

## 2018-06-25 VITALS
WEIGHT: 86 LBS | SYSTOLIC BLOOD PRESSURE: 153 MMHG | HEART RATE: 56 BPM | HEIGHT: 63 IN | BODY MASS INDEX: 15.24 KG/M2 | DIASTOLIC BLOOD PRESSURE: 61 MMHG

## 2018-06-25 DIAGNOSIS — R25.2 NOCTURNAL FOOT CRAMPS: Primary | ICD-10-CM

## 2018-06-25 DIAGNOSIS — M79.671 BILATERAL FOOT PAIN: ICD-10-CM

## 2018-06-25 DIAGNOSIS — B35.1 DERMATOPHYTOSIS OF NAIL: ICD-10-CM

## 2018-06-25 DIAGNOSIS — L84 PRE-ULCERATIVE CORN OR CALLOUS: ICD-10-CM

## 2018-06-25 DIAGNOSIS — M79.672 BILATERAL FOOT PAIN: ICD-10-CM

## 2018-06-25 PROCEDURE — 11721 DEBRIDE NAIL 6 OR MORE: CPT | Mod: Q9,S$GLB,, | Performed by: PODIATRIST

## 2018-06-25 PROCEDURE — 99999 PR PBB SHADOW E&M-EST. PATIENT-LVL III: CPT | Mod: PBBFAC,,, | Performed by: PODIATRIST

## 2018-06-25 PROCEDURE — 3077F SYST BP >= 140 MM HG: CPT | Mod: CPTII,S$GLB,, | Performed by: PODIATRIST

## 2018-06-25 PROCEDURE — 99204 OFFICE O/P NEW MOD 45 MIN: CPT | Mod: 25,S$GLB,, | Performed by: PODIATRIST

## 2018-06-25 PROCEDURE — 3078F DIAST BP <80 MM HG: CPT | Mod: CPTII,S$GLB,, | Performed by: PODIATRIST

## 2018-06-25 NOTE — PROGRESS NOTES
Chief Complaint   Patient presents with    Callouses     bilateral pcp Dr Mckenzie 3/14/18           HPI:   Mj Summers is a 82 y.o. female who presents to clinic with complaints of fungal toenails, bilateral calluses on soles, and nocturnal foot pain, present for a long time.  No acute trauma or foot surgery noted to her feet.   She is unable to reach and care for her feet.       PCP:  [facility] U[facility] MD   Last PCP visit:   3/14/18      Past Medical History:   Diagnosis Date    Anemia     Arthritis 2015    back    Cataract     s/p surgery    Coronary artery disease     [facility] 1/2010 - mid LAD 40%    GERD (gastroesophageal reflux disease)     Glaucoma (increased eye pressure)     Hyperlipidemia     Hypertension     Lactose intolerance     Legally blind in right eye, as defined in [place]     Osteoporosis, post-menopausal     Pneumonia 12/13/2015    Proximal muscle weakness     Renal cyst     left kidney    Vitamin D deficiency disease            Current Outpatient Prescriptions on File Prior to Visit   Medication Sig Dispense Refill    aspirin 81 mg Tab Take 81 mg by mouth every evening. 1 Tablet Oral Every day      bimatoprost (LUMIGAN) 0.01 % Drop Place 1 drop into the right eye every evening. 1 Bottle 12    brimonidine 0.15 % OPTH DROP (ALPHAGAN) 0.15 % ophthalmic solution Place 1 drop into the right eye 3 (three) times daily. 1 Bottle 12    calcium carbonate (OS-KACI) 600 mg (1,500 mg) Tab Take 600 mg by mouth 2 (two) times daily with meals.      chlorthalidone (HYGROTEN) 25 MG Tab Take one tablet daily 30 tablet 6    ergocalciferol (ERGOCALCIFEROL) 50,000 unit Cap Take 1 capsule (50,000 Units total) by mouth every 30 days. 3 capsule 11    esomeprazole (NEXIUM) 40 MG capsule Take 1 capsule (40 mg total) by mouth once daily. Prn 30 capsule 6    ezetimibe (ZETIA) 10 mg tablet Take 0.5 tablets (5 mg total) by mouth once daily. 15 tablet 11    ferrous sulfate 325 (65 FE) MG EC tablet Take 1  tablet (325 mg total) by mouth once daily. 30 tablet 0    lovastatin (MEVACOR) 40 MG tablet Take 1 tablet (40 mg total) by mouth every evening. 90 tablet 0    multivitamin (ONE DAILY MULTIVITAMIN) per tablet Take 1 tablet by mouth once daily.      pilocarpine HCL 4% (PILOCAR) 4 % ophthalmic solution Place 1 drop into the right eye 3 (three) times daily. 15 mL 12    senna-docusate 8.6-50 mg (PERICOLACE) 8.6-50 mg per tablet Take 1 tablet by mouth 2 (two) times daily. (Patient taking differently: Take 1 tablet by mouth 2 (two) times daily as needed. )      timolol maleate 0.5% (TIMOPTIC-XE) 0.5 % SolG Place 1 drop into the right eye every morning. 5 mL 12    valsartan (DIOVAN) 160 MG tablet Take 1 tablet (160 mg total) by mouth 2 (two) times daily. 60 tablet 0     No current facility-administered medications on file prior to visit.           ALLG:  Review of patient's allergies indicates:   Allergen Reactions    Strawberries [strawberry]      B/p elevation    Chocolate flavor     Atorvastatin      Myalgias    Pravastatin      Myalgias    Pcn [penicillins] Rash    Sulfa (sulfonamide antibiotics) Itching           Social History     Social History    Marital status:      Spouse name: N/A    Number of children: N/A    Years of education: N/A     Occupational History    Not on file.     Social History Main Topics    Smoking status: Never Smoker    Smokeless tobacco: Never Used    Alcohol use No    Drug use: No    Sexual activity: No     Other Topics Concern    Not on file     Social History Narrative    No narrative on file             ROS:    General ROS: negative for - chills, fatigue or fever  Cardiovascular ROS: no chest pain or dyspnea on exertion  Musculoskeletal ROS: negative for - joint pain or joint stiffness   Skin: Negative for rash, Positive for nail or hair changes.  no itching.       EXAM:   Vitals:    06/25/18 1455   BP: (!) 153/61   Pulse: (!) 56   Weight: 39 kg (86 lb)  "  Height: 5' 3" (1.6 m)        General:  alert    Vasc:  Pedal pulses barely palpable;  Cap refill <5secs;  Toes cool to touch.  1+ non pitting edema.     Neuro Motor:  Light touch and Sharp/dull sensation:  intact to light touch    Derm: dystrophic mycotic toenails x 10 with subungual debris,  No paronychia   No presence of pigment involving the periungual skin.   Nucleated Calluses bilateral soles.  No open wounds.  Skin thin and atrophic    Msk:  5/5 muscle strength.   Right foot: flat foot and hammertoe   Left foot: flat foot and hammertoe      ASSESSMENT / PLAN:     I counseled the patient on the patient's conditions, their implications and medical management.       Nocturnal foot cramps  -     Cardiology Lab LOLI Resting, Lower Extremities; Future    Bilateral foot pain    Pre-ulcerative corn or callous    Dermatophytosis of nail    Other orders  -     Foot Care        Trim nails.     May also consider "Kerasal Nail" for toenails.  Available over the counter.     See orders above      Routine Foot Care  Date/Time: 6/25/2018 3:16 PM  Performed by: VICTORIANO MICHAEL  Authorized by: VICTORIANO MICHAEL     Consent Done?:  Yes (Verbal)    Nail Care Type:  Debride  Location(s): All  (Left 1st Toe, Left 3rd Toe, Left 2nd Toe, Left 4th Toe, Left 5th Toe, Right 1st Toe, Right 2nd Toe, Right 3rd Toe, Right 4th Toe and Right 5th Toe)  Patient tolerance:  Patient tolerated the procedure well with no immediate complications     With patient's permission, the toenails mentioned above were aggressively reduced and debrided using a nail nipper, removing all offending nail and debris. The patient will continue to monitor the areas daily, inspect the feet, wear protective shoe gear when ambulatory, and moisturizer to maintain skin integrity.       follow up prn proc B to trim nails and calluses.      "

## 2018-07-06 ENCOUNTER — CLINICAL SUPPORT (OUTPATIENT)
Dept: CARDIOLOGY | Facility: CLINIC | Age: 83
End: 2018-07-06
Attending: PODIATRIST
Payer: MEDICARE

## 2018-07-06 DIAGNOSIS — R25.2 NOCTURNAL FOOT CRAMPS: ICD-10-CM

## 2018-07-06 LAB — VASCULAR ANKLE BRACHIAL INDEX (ABI) RIGHT: 0.55 (ref 0.9–1.2)

## 2018-07-06 PROCEDURE — 93922 UPR/L XTREMITY ART 2 LEVELS: CPT | Mod: S$GLB,,, | Performed by: INTERNAL MEDICINE

## 2018-07-09 ENCOUNTER — OFFICE VISIT (OUTPATIENT)
Dept: HEMATOLOGY/ONCOLOGY | Facility: CLINIC | Age: 83
End: 2018-07-09
Payer: MEDICARE

## 2018-07-09 ENCOUNTER — TELEPHONE (OUTPATIENT)
Dept: PODIATRY | Facility: CLINIC | Age: 83
End: 2018-07-09

## 2018-07-09 ENCOUNTER — LAB VISIT (OUTPATIENT)
Dept: LAB | Facility: OTHER | Age: 83
End: 2018-07-09
Attending: INTERNAL MEDICINE
Payer: MEDICARE

## 2018-07-09 VITALS
SYSTOLIC BLOOD PRESSURE: 182 MMHG | HEART RATE: 60 BPM | HEIGHT: 63 IN | DIASTOLIC BLOOD PRESSURE: 67 MMHG | TEMPERATURE: 98 F | OXYGEN SATURATION: 100 % | RESPIRATION RATE: 12 BRPM | BODY MASS INDEX: 14.68 KG/M2 | WEIGHT: 82.88 LBS

## 2018-07-09 DIAGNOSIS — D64.9 ANEMIA, UNSPECIFIED TYPE: ICD-10-CM

## 2018-07-09 DIAGNOSIS — E78.5 DYSLIPIDEMIA: Chronic | ICD-10-CM

## 2018-07-09 DIAGNOSIS — I10 ESSENTIAL HYPERTENSION: ICD-10-CM

## 2018-07-09 DIAGNOSIS — R25.2 NOCTURNAL FOOT CRAMPS: Primary | ICD-10-CM

## 2018-07-09 DIAGNOSIS — I10 ESSENTIAL HYPERTENSION: Chronic | ICD-10-CM

## 2018-07-09 DIAGNOSIS — D53.9 NUTRITIONAL ANEMIA: ICD-10-CM

## 2018-07-09 DIAGNOSIS — I73.9 PVD (PERIPHERAL VASCULAR DISEASE): ICD-10-CM

## 2018-07-09 DIAGNOSIS — E46 PROTEIN-CALORIE MALNUTRITION, UNSPECIFIED SEVERITY: ICD-10-CM

## 2018-07-09 DIAGNOSIS — D64.9 ANEMIA, UNSPECIFIED TYPE: Primary | ICD-10-CM

## 2018-07-09 LAB
ALBUMIN SERPL BCP-MCNC: 3.9 G/DL
ALP SERPL-CCNC: 77 U/L
ALT SERPL W/O P-5'-P-CCNC: 23 U/L
ANION GAP SERPL CALC-SCNC: 10 MMOL/L
AST SERPL-CCNC: 25 U/L
BASOPHILS # BLD AUTO: 0.03 K/UL
BASOPHILS NFR BLD: 0.5 %
BILIRUB SERPL-MCNC: 0.5 MG/DL
BUN SERPL-MCNC: 20 MG/DL
CALCIUM SERPL-MCNC: 9.8 MG/DL
CHLORIDE SERPL-SCNC: 103 MMOL/L
CHOLEST SERPL-MCNC: 266 MG/DL
CHOLEST/HDLC SERPL: 3.8 {RATIO}
CO2 SERPL-SCNC: 27 MMOL/L
CREAT SERPL-MCNC: 0.8 MG/DL
DIFFERENTIAL METHOD: ABNORMAL
EOSINOPHIL # BLD AUTO: 0.1 K/UL
EOSINOPHIL NFR BLD: 1.1 %
ERYTHROCYTE [DISTWIDTH] IN BLOOD BY AUTOMATED COUNT: 12.8 %
EST. GFR  (AFRICAN AMERICAN): >60 ML/MIN/1.73 M^2
EST. GFR  (NON AFRICAN AMERICAN): >60 ML/MIN/1.73 M^2
FERRITIN SERPL-MCNC: 205 NG/ML
FOLATE SERPL-MCNC: 18.7 NG/ML
GLUCOSE SERPL-MCNC: 101 MG/DL
HCT VFR BLD AUTO: 35.7 %
HDLC SERPL-MCNC: 70 MG/DL
HDLC SERPL: 26.3 %
HGB BLD-MCNC: 11.6 G/DL
IRON SERPL-MCNC: 73 UG/DL
LDLC SERPL CALC-MCNC: 173.8 MG/DL
LYMPHOCYTES # BLD AUTO: 1.6 K/UL
LYMPHOCYTES NFR BLD: 28.2 %
MCH RBC QN AUTO: 29.8 PG
MCHC RBC AUTO-ENTMCNC: 32.5 G/DL
MCV RBC AUTO: 92 FL
MONOCYTES # BLD AUTO: 0.4 K/UL
MONOCYTES NFR BLD: 6.4 %
NEUTROPHILS # BLD AUTO: 3.5 K/UL
NEUTROPHILS NFR BLD: 63.6 %
NONHDLC SERPL-MCNC: 196 MG/DL
PLATELET # BLD AUTO: 266 K/UL
PMV BLD AUTO: 9.9 FL
POTASSIUM SERPL-SCNC: 3.8 MMOL/L
PROT SERPL-MCNC: 8.2 G/DL
RBC # BLD AUTO: 3.89 M/UL
RETICS/RBC NFR AUTO: 0.9 %
SATURATED IRON: 21 %
SODIUM SERPL-SCNC: 140 MMOL/L
TOTAL IRON BINDING CAPACITY: 352 UG/DL
TRANSFERRIN SERPL-MCNC: 238 MG/DL
TRIGL SERPL-MCNC: 111 MG/DL
VIT B12 SERPL-MCNC: 367 PG/ML
WBC # BLD AUTO: 5.5 K/UL

## 2018-07-09 PROCEDURE — 99214 OFFICE O/P EST MOD 30 MIN: CPT | Mod: S$GLB,,, | Performed by: INTERNAL MEDICINE

## 2018-07-09 PROCEDURE — 36415 COLL VENOUS BLD VENIPUNCTURE: CPT

## 2018-07-09 PROCEDURE — 3078F DIAST BP <80 MM HG: CPT | Mod: CPTII,S$GLB,, | Performed by: INTERNAL MEDICINE

## 2018-07-09 PROCEDURE — 3077F SYST BP >= 140 MM HG: CPT | Mod: CPTII,S$GLB,, | Performed by: INTERNAL MEDICINE

## 2018-07-09 PROCEDURE — 80053 COMPREHEN METABOLIC PANEL: CPT

## 2018-07-09 PROCEDURE — 85025 COMPLETE CBC W/AUTO DIFF WBC: CPT

## 2018-07-09 PROCEDURE — 99999 PR PBB SHADOW E&M-EST. PATIENT-LVL III: CPT | Mod: PBBFAC,,, | Performed by: INTERNAL MEDICINE

## 2018-07-09 PROCEDURE — 80061 LIPID PANEL: CPT

## 2018-07-09 PROCEDURE — 85045 AUTOMATED RETICULOCYTE COUNT: CPT

## 2018-07-09 PROCEDURE — 82746 ASSAY OF FOLIC ACID SERUM: CPT

## 2018-07-09 PROCEDURE — 82607 VITAMIN B-12: CPT

## 2018-07-09 PROCEDURE — 83540 ASSAY OF IRON: CPT

## 2018-07-09 PROCEDURE — 82728 ASSAY OF FERRITIN: CPT

## 2018-07-09 RX ORDER — VALSARTAN 160 MG/1
160 TABLET ORAL 2 TIMES DAILY
Qty: 60 TABLET | Refills: 0 | Status: SHIPPED | OUTPATIENT
Start: 2018-07-09 | End: 2018-08-10 | Stop reason: SDUPTHER

## 2018-07-09 NOTE — Clinical Note
RTC in 6 months with CBC, CMP, retic, LDH, ferritin, iron, vit B12, folic acid 2 days prior to return

## 2018-07-09 NOTE — TELEPHONE ENCOUNTER
----- Message from Chapis Solitario DPM sent at 7/9/2018 12:39 PM CDT -----  Please call patient to schedule an appointment with interventional cardiology.  ABIs were abnormal and poor circulation may be cause of leg pain/cramps.

## 2018-07-09 NOTE — PROGRESS NOTES
"   Subjective:       Patient ID: Mj Summers is a 82 y.o. female.     Chief Complaint: follow up for anemia     Hematologic History:  1. Ms. Summers is a very pleasant 83 yo woman with HTN, CAD, b/l carotid disease, GERD, malnutrition, who initially saw me on 1/3/18 for further evaluation of anemia. On review of her records, her Hb has been ranging between 10 and 12 since 2010. In May 2016 it dropped to 5-7 after she underwent a surgery on 5/10/16 for left intertrochanteric fracture from a fall. Her H/H on 7/12/16 was 11.4/35.1. On 11/6/17, WBC 3.86, H/H 10.7/33.2, plt count 213. On 11/3/17, WBC 4.26, H/H 10.2/31.7, MCV 92, plt count 158. Vit B12 338, folate 17.2, ferritin 179, iron 68, TIBC 333, iron saturation 20%, retic 1.1%. CMP on 11/6/17 showed Cr 0.7, total protein 7.3, albumin 3.4, corrected calcium 9.8. Calculated CrCl 37.46 mL/min (cockroft). She has been having dysphagia over the past 2 months, which she attributed to GERD. Has been taking iron pills for many years and has been having dark stool from the iron pills. Denies any signs of bleeding.   2. Workup on 1/3/18 showed normal LDH, haptoglobin, copper levels. SPEP negative for M protein. Seen by Gi. Guaiac stool negative.   3. EGD on 2/6/18 showed Small hiatal hernia. Mild Schatzki ring. Dilated. Gastric mucosal atrophy. Biopsied. Normal examined duodenum. Pathology of the gastric antrum showed "Severe chronic inactive gastritis. No evidence of active acute inflammation. Negative for Helicobacter like organisms on H&E. Intestinal metaplasia, complete type. Reactive atypia. No evidence of malignancy."  4. Colonoscopy on 2/6/18 showed "The colon (entire examined portion) was significantly tortuous.The exam was otherwise without abnormality. The cecum was partially obscured by solid material, but by washing and repositioning patient, I feel that adequate visualization was obtained. I cannot rule out an AVM in the cecum, but I don't think any mass, " "lesion, or significant polyp was missed"     INTERVAL HISTORY:   Ms. Summers returns today for follow up of anemia. She has been feeling well. No complaints. No signs of bleeding.      ROS:   A ten-point system review is obtained and negative.      Physical Examination:   Vital signs reviewed.   Gen: well hydrated, well developed, in no acute distress.  HEENT: normocephalic, anicteric sclerae, PERRLA, EOMI, oropharynx clear  Neck: supple, no JVD, thyromegaly, cervical or supraclavicular LAD  Lungs: CTAB, no wheezes or rales  Heart: RRR, no M/R/G  Abdomen: soft, no tenderness, non-distended, no hepatosplenomegaly, mass, or hernia. BS present  Ext: no clubbing, cyanosis, or edema  Neuro: alert and oriented x 4, no focal neuro deficit  Skin: no rash, erythema, open wound or ulcers  Psych: pleasant and appropriate mood and affect        Objective:      Diagnostic Tests:  EGD on 2/6/18 showed Small hiatal hernia. Mild Schatzki ring. Dilated. Gastric mucosal atrophy. Biopsied. Normal examined duodenum. Pathology of the gastric antrum showed "Severe chronic inactive gastritis. No evidence of active acute inflammation. Negative for Helicobacter like organisms on H&E. Intestinal metaplasia, complete type. Reactive atypia. No evidence of malignancy."     Colonoscopy on 2/6/18 showed "The colon (entire examined portion) was significantly tortuous.The exam was otherwise without abnormality. The cecum was partially obscured by solid material, but by washing and repositioning patient, I feel that adequate visualization was obtained. I cannot rule out an AVM in the cecum, but I don't think any mass, lesion, or significant polyp was missed"     Laboratory Data:  Labs from today reviewed. Hb 11.6. improved     Assessment/Plan:      1. Anemia, unspecified type    2. Nutritional anemia     3. Essential hypertension        1.2   - Ms. Summers is an 83 yo woman with normocytic anemia. Previous workup was negative. EGD showed severe " chronic inactive gastritis but no s/o malignancy. Colonoscopy did not show bleeding sites. Guaiac stool was negative.   - Her anemia is very stable, and has improved this time to 11.6.   - Her CrCl is 32 min/mL. She could have anemia of chronic kidney disease. Low risk MDS is also on the differential, but given her mild anemia, normal WBC and plt counts, the stability of her Hb over the past 7 years, and her age, I would defer a BMBx at this point as it would not change the management.   - RTC in 6 months with repeat labs to monitor     3. Her BP is elevated today in the office. She is asymptomatic. F/u with Dr. Urbina          - RTC in 6 months with labs.

## 2018-07-10 ENCOUNTER — INITIAL CONSULT (OUTPATIENT)
Dept: CARDIOLOGY | Facility: CLINIC | Age: 83
End: 2018-07-10
Payer: MEDICARE

## 2018-07-10 VITALS
BODY MASS INDEX: 14.38 KG/M2 | HEART RATE: 69 BPM | WEIGHT: 81.13 LBS | HEIGHT: 63 IN | SYSTOLIC BLOOD PRESSURE: 165 MMHG | DIASTOLIC BLOOD PRESSURE: 58 MMHG

## 2018-07-10 DIAGNOSIS — I73.9 PAD (PERIPHERAL ARTERY DISEASE): ICD-10-CM

## 2018-07-10 PROCEDURE — 99999 PR PBB SHADOW E&M-EST. PATIENT-LVL III: CPT | Mod: PBBFAC,,, | Performed by: INTERNAL MEDICINE

## 2018-07-10 PROCEDURE — 99203 OFFICE O/P NEW LOW 30 MIN: CPT | Mod: S$GLB,,, | Performed by: INTERNAL MEDICINE

## 2018-07-10 PROCEDURE — 3077F SYST BP >= 140 MM HG: CPT | Mod: CPTII,S$GLB,, | Performed by: INTERNAL MEDICINE

## 2018-07-10 PROCEDURE — 3078F DIAST BP <80 MM HG: CPT | Mod: CPTII,S$GLB,, | Performed by: INTERNAL MEDICINE

## 2018-07-10 NOTE — LETTER
July 11, 2018      Chapis Solitario DPM  [address]  [facility] [address]           [facility]  [address]  [facility] [address]  Phone: [phone]          Patient: Mj Summers   MR Number: [id number]   YOB: 1935   Date of Visit: 7/10/2018       Dear Dr. Chapis Solitario:    Thank you for referring Mj Summers to me for evaluation. Attached you will find relevant portions of my assessment and plan of care.    If you have questions, please do not hesitate to call me. I look forward to following Mj Summers along with you.    Sincerely,    Tray Baldwin MD    Enclosure  CC:  No Recipients    If you would like to receive this communication electronically, please contact [email or web address] or [phone] to request more information on [facility] Link access.    For providers and/or their staff who would like to refer a patient to [facility], please contact us through our one-stop-shop provider referral line, [facility], at [phone].    If you feel you have received this communication in error or would no longer like to receive these types of communications, please e-mail [email or web address]

## 2018-07-11 ENCOUNTER — TELEPHONE (OUTPATIENT)
Dept: CARDIOLOGY | Facility: CLINIC | Age: 83
End: 2018-07-11

## 2018-07-11 PROBLEM — I73.9 PAD (PERIPHERAL ARTERY DISEASE): Status: ACTIVE | Noted: 2018-07-11

## 2018-07-11 NOTE — PROGRESS NOTES
Subjective:   Referring provider: Chapis Solitario DPM  Patient ID: Mj Summers is 82 y.o. female who presents for Evaluation of Claudication        Patient followed by podiatry. She has noted cramping pain of her calfs L > R for a couple months. Occurs sporadically. Has not occurred in the past month when she last saw Dr. Solitario. She has no pain when walking about her house which is the extent of her ambulation. She comes to clinic today and is seated in a wheel chair. No episodes of cold foot. No radiation. When she moves leg it improves the pain. Does not have to dangle it.    LOLI was ordered which is abnormal but the rightis much more severely abnormal than the left.      Review of Systems   Constitution: Negative for decreased appetite, fever, malaise/fatigue, weight gain and weight loss.   HENT: Negative for congestion, hoarse voice and sore throat.    Eyes: Negative for blurred vision, vision loss in left eye, vision loss in right eye, visual disturbance and visual halos.   Cardiovascular: Negative for chest pain, claudication, dyspnea on exertion, irregular heartbeat, leg swelling, near-syncope, orthopnea and palpitations.   Respiratory: Negative for cough, shortness of breath and snoring.    Hematologic/Lymphatic: Negative for bleeding problem. Does not bruise/bleed easily.   Skin: Negative for color change and itching.   Musculoskeletal: Negative for falls, muscle cramps and myalgias.   Gastrointestinal: Negative for abdominal pain, change in bowel habit, bowel incontinence, heartburn, nausea and vomiting.   Genitourinary: Negative for flank pain.   Neurological: Negative for excessive daytime sleepiness, dizziness, focal weakness, headaches, light-headedness, loss of balance, sensory change and vertigo.   Psychiatric/Behavioral: Negative for depression. The patient does not have insomnia.      Family History   Problem Relation Age of Onset    Asthma Sister     Rheum arthritis Sister     Osteoporosis  Sister     Arthritis Sister     Hyperlipidemia Brother     Rheum arthritis Brother     Diabetes Brother     Osteoporosis Mother     Heart disease Mother     Glaucoma Mother     Hypertension Father     Peripheral vascular disease Father     Alzheimer's disease Father     Osteoporosis Sister     Hypertension Brother     Peripheral vascular disease Brother         bilateral leg amputation    Hypertension Son     Hypertension Daughter     Colon cancer Other 44        death from colon cancer at age 44    Stroke Brother     Heart disease Brother     Diabetes Daughter         GDM that is resolved    Macular degeneration Neg Hx     Retinal detachment Neg Hx     Strabismus Neg Hx     Amblyopia Neg Hx     Blindness Neg Hx     Esophageal cancer Neg Hx      Past Surgical History:   Procedure Laterality Date    ADENOIDECTOMY      BREAST BIOPSY      left breast    CARDIAC CATHETERIZATION  01/14/2010    mid LAD, 40% stenosis; LCX, luminal irregularities;                 CATARACT EXTRACTION W/  INTRAOCULAR LENS IMPLANT Left 5/22/13    OS with trab (Dr. Triplett)    CATARACT EXTRACTION W/ INTRAOCULAR LENS IMPLANTW/ TRABECULECTOMY  5/22/13    OS (Dr. Triplett)    COLONOSCOPY N/A 2/6/2018    Procedure: COLONOSCOPY;  Surgeon: Rufus Villela MD;  Location: [facility] ([address] [place] [address]);  Service: Endoscopy;  Laterality: N/A;    EDTA CHELATION Left 2/14    OS ()    FEMUR FRACTURE SURGERY Left 05/09/2016    TEAR DUCT SURGERY      right eye    TONSILLECTOMY      UPPER GASTROINTESTINAL ENDOSCOPY      YAG CAPSULOTOMY Left 06/22/2017         Family History   Problem Relation Age of Onset    Asthma Sister     Rheum arthritis Sister     Osteoporosis Sister     Arthritis Sister     Hyperlipidemia Brother     Rheum arthritis Brother     Diabetes Brother     Osteoporosis Mother     Heart disease Mother     Glaucoma Mother     Hypertension Father     Peripheral vascular disease Father   "   Alzheimer's disease Father     Osteoporosis Sister     Hypertension Brother     Peripheral vascular disease Brother         bilateral leg amputation    Hypertension Son     Hypertension Daughter     Colon cancer Other 44        death from colon cancer at age 44    Stroke Brother     Heart disease Brother     Diabetes Daughter         GDM that is resolved    Macular degeneration Neg Hx     Retinal detachment Neg Hx     Strabismus Neg Hx     Amblyopia Neg Hx     Blindness Neg Hx     Esophageal cancer Neg Hx      BP (!) 165/58 (BP Location: Right arm, Patient Position: Sitting, BP Method: Large (Automatic))   Pulse 69   Ht 5' 3" (1.6 m)   Wt 36.8 kg (81 lb 2.1 oz)   LMP 11/10/1987 (Approximate)   BMI 14.37 kg/m²     Objective:   Physical Exam   Constitutional: She is oriented to person, place, and time. She appears well-developed and well-nourished.   Eyes: Pupils are equal, round, and reactive to light.   Neck: Neck supple. No JVD present. Carotid bruit is not present.   Cardiovascular: Normal rate, regular rhythm, S1 normal, S2 normal and normal pulses.  Exam reveals no gallop and no friction rub.    No murmur heard.  Pulmonary/Chest: Effort normal and breath sounds normal. No respiratory distress. She has no wheezes. She has no rales.   Abdominal: Soft. Bowel sounds are normal.   Musculoskeletal:   No kyphoscoliosis   Neurological: She is alert and oriented to person, place, and time.   Skin: Skin is warm and dry.   Psychiatric: She has a normal mood and affect. Thought content normal.     Assessment:     1. PAD (peripheral artery disease)      Plan:     PAD (peripheral artery disease)  LOLI severely abnormal R > L. Her nighttime symptoms are L > R. These symptoms are not suggestive of claudication. Restless leg may be amanifestation of PAD. However, she walks about her house without limitation. She has no impact on activities of daily life.  She has a normal LVEF. First line therapy for her " PAD would be walking program and cilostazol.      Follow-up if symptoms worsen or fail to improve.

## 2018-07-11 NOTE — ASSESSMENT & PLAN NOTE
LOLI severely abnormal R > L. Her nighttime symptoms are L > R. These symptoms are not suggestive of claudication. Restless leg may be amanifestation of PAD. However, she walks about her house without limitation. She has no impact on activities of daily life.  She has a normal LVEF. First line therapy for her PAD would be walking program and cilostazol.

## 2018-07-11 NOTE — TELEPHONE ENCOUNTER
----- Message from Man Morrow MD sent at 7/10/2018  9:59 PM CDT -----  Suspect it may  Have been more and if not she must have missed just prior to the test. Re enforce compliance.  ----- Message -----  From: Anitha Rodríguez MA  Sent: 7/10/2018   9:18 AM  To: MD Dr Cristhian Gomez she says she is taking the medication that she might have missed one or two days.  ----- Message -----  From: Man Morrow MD  Sent: 7/9/2018  10:19 PM  To: Anitha Rodríguez MA    Please inform the patient her cholesterol is higher and inquire as to whether she is taking the Lovastatin and Zetia regularly.

## 2018-07-12 ENCOUNTER — OFFICE VISIT (OUTPATIENT)
Dept: OPHTHALMOLOGY | Facility: CLINIC | Age: 83
End: 2018-07-12
Payer: MEDICARE

## 2018-07-12 DIAGNOSIS — H35.031 BLIND HYPERTENSIVE RIGHT EYE: ICD-10-CM

## 2018-07-12 DIAGNOSIS — H25.11 SENILE NUCLEAR SCLEROSIS, RIGHT: ICD-10-CM

## 2018-07-12 DIAGNOSIS — H26.40 CAPSULAR OPACIFICATION: ICD-10-CM

## 2018-07-12 DIAGNOSIS — Z96.1 PSEUDOPHAKIA OF LEFT EYE: ICD-10-CM

## 2018-07-12 DIAGNOSIS — H18.421 BAND KERATOPATHY, RIGHT: ICD-10-CM

## 2018-07-12 DIAGNOSIS — H40.89 GLAUCOMA DUE TO COMBINATION OF MECHANISMS: Primary | ICD-10-CM

## 2018-07-12 DIAGNOSIS — Z98.83 GLAUCOMA FILTERING BLEB OF LEFT EYE: ICD-10-CM

## 2018-07-12 DIAGNOSIS — H34.8112 CENTRAL RETINAL VEIN OCCLUSION OF RIGHT EYE, UNSPECIFIED COMPLICATION STATUS: ICD-10-CM

## 2018-07-12 DIAGNOSIS — H21.541 POSTERIOR SYNECHIAE, RIGHT: ICD-10-CM

## 2018-07-12 DIAGNOSIS — H21.561 AFFERENT PUPILLARY DEFECT, RIGHT: ICD-10-CM

## 2018-07-12 PROCEDURE — 92012 INTRM OPH EXAM EST PATIENT: CPT | Mod: S$GLB,,, | Performed by: OPHTHALMOLOGY

## 2018-07-12 PROCEDURE — 92020 GONIOSCOPY: CPT | Mod: S$GLB,,, | Performed by: OPHTHALMOLOGY

## 2018-07-12 PROCEDURE — 99999 PR PBB SHADOW E&M-EST. PATIENT-LVL II: CPT | Mod: PBBFAC,,, | Performed by: OPHTHALMOLOGY

## 2018-07-12 NOTE — PROGRESS NOTES
"HPI     DLS: 3/05/18    Pt here for 4 month check;    Meds:T1/2 GFS QAM OD   Alphagan TID OD   Camacho 4% TID OD   Lumugan QHS OD   AT's PRN OU    No glaucoma gtts os - off gtts post filtering bleb     1) MMG   2) Blind Hypertensive OD   3) NS OD   4) FABY   5) APD OD   6) Band Keratopathy   7) Asteroid Hyalosis OS   8) Hx Acute Dacryocystitis   9) CRVO OD   10) Ant. Capsule Phimosis OS   11) PCIOL OS     Last edited by Aysha Triplett MD on 7/12/2018  3:20 PM. (History)            Assessment /Plan     For exam results, see Encounter Report.    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   Angle open after PI's ou   -Followed at [facility] since at least '01   First HVF 2001   First photos 2001   S/p PI OU   s/p Gonioplasty OU     Family history none   Glaucoma meds Lumigan, timolol gfs  qAM, Agan tid, Camacho tid  - all OD ((off all gtts os s/p combined)   H/O adverse rxn to glaucoma drops Azopt "felt weird"   LASERS S/p PI OU, s/p Gonioplasty OU   GLAUCOMA SURGERIES    combined phaco/trab with mini shunt OS 5/22/2013   OTHER EYE SURGERIES    S/P DCR sx OD x 2 or 3 with [facility]    Combined phaco/IOL/ trab with mini shunt OS 5/22/2013   CDR 0.99 /0.9   Tbase 42/23   Tmax 42/23 (when stopped gtts)   Ttarget pain control od // 18 os   HVF 17 VF '01 -'17 - Ext to stim V od  // SALT / IAD os   Gonio +3/+3   /539   OCT 9 test 2006 to 2017 - RNFL - OD:dec. S/N/I  // OS:dec N/I, lora S/T   HRT 14 test 2004 to 2018 - MR -  Dec. S/I od // dec. S/N/I os /// CDR 0.66 od // 0.83 os  Disc photos - " 2001, 2003, 2006 - slides // 2008, 2010, 2016  - OIS     Ta today 33/16 (states good compliance with gtts od - still pain free)   Test today - IOP, gonio     2.  Blind hypertensive Right Eye   Pain free - eye comfortable   Very limited vision CF at 3'   End stage glaucoma and S/P CRVO   No rubeosis   IOP is high but eye is pain free    3. Cataract OD -However, OD VA limited 2/2 CRVO and end stge glaucoma   S/P phaco/IOL/trab OS 5/22/2013    4. FABY OU -cont ATs     5. APD OD -   Old / stable 2/2 CRVO and glaucoma    6. Band Keratopathy OD -stable. Cont ATs- being followed by Dr. Saul;    had EDTA chelation on 2/13/14 OS - now with recurrent band     7. Asteroid Hyalosis OS -stable     8. Hx of Acute Dacryocystitis OS and recent NLD OD s/p surgery 11/12 and repeat for exposure w/ Dr. Ocampo 12/5/12 -stable. Cont f/u with [facility]     9. Hx of CRVO OD -limiting visual potential OD   -No NV on todays exam     10. Ant capsule phimosis os    S/P yag laser     11. PC IOL os    Combined  W/ mini shunt 5/22/2013   doing well VA is 20/25 and the IOP is 13    Plan:   Off all gtts for now OS - s/p combined -- IOP stable - at 16 and VA is 20/30-20/40 - can add gtts back if IOP goes any higher     Cont glaucoma gtts OD -- IOP very high, but comfortable/no pain // Blind hypertensive but pain free eye   Old  crvo     Ok to use OTC readers for now a +3.00 to +3.50 - not really able to improve distance vision with glasses at this time  Much improved from before the combined  Procedure    Cont all glaucoma gtts od   Blind hypertensive right eye - pain free     Glasses - Matthieu - 11/7/2017     F/U 4 months with IOP check // HVF os // DFE // photos     I have seen and personally examined the patient.  I agree with the findings, assessment and plan of the resident and/or fellow.     Aysha Triplett MD

## 2018-08-01 ENCOUNTER — OFFICE VISIT (OUTPATIENT)
Dept: GASTROENTEROLOGY | Facility: CLINIC | Age: 83
End: 2018-08-01
Payer: MEDICARE

## 2018-08-01 VITALS
HEIGHT: 63 IN | WEIGHT: 83.81 LBS | DIASTOLIC BLOOD PRESSURE: 48 MMHG | SYSTOLIC BLOOD PRESSURE: 118 MMHG | HEART RATE: 62 BPM | BODY MASS INDEX: 14.85 KG/M2

## 2018-08-01 DIAGNOSIS — R13.12 OROPHARYNGEAL DYSPHAGIA: Primary | ICD-10-CM

## 2018-08-01 DIAGNOSIS — D64.9 ANEMIA, UNSPECIFIED TYPE: ICD-10-CM

## 2018-08-01 DIAGNOSIS — K21.9 GASTROESOPHAGEAL REFLUX DISEASE WITHOUT ESOPHAGITIS: ICD-10-CM

## 2018-08-01 PROCEDURE — 99999 PR PBB SHADOW E&M-EST. PATIENT-LVL IV: CPT | Mod: PBBFAC,,, | Performed by: PHYSICIAN ASSISTANT

## 2018-08-01 PROCEDURE — 3078F DIAST BP <80 MM HG: CPT | Mod: CPTII,S$GLB,, | Performed by: PHYSICIAN ASSISTANT

## 2018-08-01 PROCEDURE — 3074F SYST BP LT 130 MM HG: CPT | Mod: CPTII,S$GLB,, | Performed by: PHYSICIAN ASSISTANT

## 2018-08-01 PROCEDURE — 99213 OFFICE O/P EST LOW 20 MIN: CPT | Mod: S$GLB,,, | Performed by: PHYSICIAN ASSISTANT

## 2018-08-01 RX ORDER — HYDROGEN PEROXIDE 3 %
20 SOLUTION, NON-ORAL MISCELLANEOUS
Qty: 30 CAPSULE | Refills: 11 | Status: SHIPPED | OUTPATIENT
Start: 2018-08-01 | End: 2019-08-01 | Stop reason: ALTCHOICE

## 2018-08-01 NOTE — LETTER
August 3, 2018      Shasta Urbina MD  [address]  [place] [address]           [facility]  [address]  [place] [address]  Phone: [phone]  Fax: [phone]          Patient: Mj Summers   MR Number: [id number]   YOB: 1935   Date of Visit: 8/1/2018       Dear Dr. Shasta Urbina:    Thank you for referring Mj Summers to me for evaluation. Attached you will find relevant portions of my assessment and plan of care.    If you have questions, please do not hesitate to call me. I look forward to following Mj Summers along with you.    Sincerely,    Carlos Mccall PA-C    Enclosure  CC:  No Recipients    If you would like to receive this communication electronically, please contact [email or web address] or [phone] to request more information on [facility] access.    For providers and/or their staff who would like to refer a patient to [facility], please contact us through our one-stop-shop provider referral line, [facility] [place], at [phone].    If you feel you have received this communication in error or would no longer like to receive these types of communications, please e-mail [email or web address]

## 2018-08-02 ENCOUNTER — TELEPHONE (OUTPATIENT)
Dept: SPEECH THERAPY | Facility: HOSPITAL | Age: 83
End: 2018-08-02

## 2018-08-10 DIAGNOSIS — I10 ESSENTIAL HYPERTENSION: Chronic | ICD-10-CM

## 2018-08-10 RX ORDER — VALSARTAN 160 MG/1
160 TABLET ORAL 2 TIMES DAILY
Qty: 60 TABLET | Refills: 0 | Status: SHIPPED | OUTPATIENT
Start: 2018-08-10 | End: 2018-09-11 | Stop reason: SDUPTHER

## 2018-08-10 NOTE — TELEPHONE ENCOUNTER
"----- Message from Syed Donnelly sent at 8/10/2018 10:44 AM CDT -----  Contact: Patient [phone]  RX request - refill or new RX.  Is this a refill or new RX:  Refill  RX name and strength: valsartan (DIOVAN) 160 MG tablet  Directions:   Is this a 30 day or 90 day RX:    Pharmacy name and phone # (DON'T enter "on file" or "in chart"): [facility] #[id number] - [place], [place] - [address]      Comment: patient stating pharmacy informed Rx is not included in the recall    Please call an advise  Thank you    "

## 2018-09-07 DIAGNOSIS — E78.5 DYSLIPIDEMIA: Chronic | ICD-10-CM

## 2018-09-07 DIAGNOSIS — I25.10 CORONARY ARTERY DISEASE INVOLVING NATIVE CORONARY ARTERY OF NATIVE HEART WITHOUT ANGINA PECTORIS: Chronic | ICD-10-CM

## 2018-09-07 DIAGNOSIS — I77.9 BILATERAL CAROTID ARTERY DISEASE: Chronic | ICD-10-CM

## 2018-09-07 RX ORDER — LOVASTATIN 40 MG/1
TABLET ORAL
Qty: 90 TABLET | Refills: 0 | Status: SHIPPED | OUTPATIENT
Start: 2018-09-07 | End: 2018-10-24 | Stop reason: ALTCHOICE

## 2018-09-11 DIAGNOSIS — I10 ESSENTIAL HYPERTENSION: Chronic | ICD-10-CM

## 2018-09-11 RX ORDER — VALSARTAN 160 MG/1
160 TABLET ORAL 2 TIMES DAILY
Qty: 60 TABLET | Refills: 0 | Status: SHIPPED | OUTPATIENT
Start: 2018-09-11 | End: 2018-09-18 | Stop reason: ALTCHOICE

## 2018-09-18 ENCOUNTER — OFFICE VISIT (OUTPATIENT)
Dept: INTERNAL MEDICINE | Facility: CLINIC | Age: 83
End: 2018-09-18
Payer: MEDICARE

## 2018-09-18 DIAGNOSIS — Z12.31 SCREENING MAMMOGRAM, ENCOUNTER FOR: Primary | ICD-10-CM

## 2018-09-18 DIAGNOSIS — N39.0 URINARY TRACT INFECTION WITHOUT HEMATURIA, SITE UNSPECIFIED: ICD-10-CM

## 2018-09-18 DIAGNOSIS — Z13.89 SCREENING FOR MULTIPLE CONDITIONS: ICD-10-CM

## 2018-09-18 DIAGNOSIS — R45.84 ANHEDONIA: ICD-10-CM

## 2018-09-18 PROCEDURE — 99215 OFFICE O/P EST HI 40 MIN: CPT | Mod: S$PBB,,, | Performed by: INTERNAL MEDICINE

## 2018-09-18 PROCEDURE — 1101F PT FALLS ASSESS-DOCD LE1/YR: CPT | Mod: CPTII,,, | Performed by: INTERNAL MEDICINE

## 2018-09-18 PROCEDURE — 3078F DIAST BP <80 MM HG: CPT | Mod: CPTII,,, | Performed by: INTERNAL MEDICINE

## 2018-09-18 PROCEDURE — 99999 PR PBB SHADOW E&M-EST. PATIENT-LVL IV: CPT | Mod: PBBFAC,,, | Performed by: INTERNAL MEDICINE

## 2018-09-18 PROCEDURE — 99214 OFFICE O/P EST MOD 30 MIN: CPT | Mod: PBBFAC | Performed by: INTERNAL MEDICINE

## 2018-09-18 PROCEDURE — 3075F SYST BP GE 130 - 139MM HG: CPT | Mod: CPTII,,, | Performed by: INTERNAL MEDICINE

## 2018-09-18 RX ORDER — LOSARTAN POTASSIUM 100 MG/1
100 TABLET ORAL DAILY
Qty: 30 TABLET | Refills: 3 | Status: SHIPPED | OUTPATIENT
Start: 2018-09-18 | End: 2019-01-23 | Stop reason: ALTCHOICE

## 2018-09-19 ENCOUNTER — LAB VISIT (OUTPATIENT)
Dept: LAB | Facility: HOSPITAL | Age: 83
End: 2018-09-19
Attending: INTERNAL MEDICINE
Payer: MEDICARE

## 2018-09-19 DIAGNOSIS — Z13.89 SCREENING FOR MULTIPLE CONDITIONS: ICD-10-CM

## 2018-09-19 DIAGNOSIS — R45.84 ANHEDONIA: ICD-10-CM

## 2018-09-19 LAB
PTH-INTACT SERPL-MCNC: 136 PG/ML
TSH SERPL DL<=0.005 MIU/L-ACNC: 0.82 UIU/ML

## 2018-09-19 PROCEDURE — 83970 ASSAY OF PARATHORMONE: CPT

## 2018-09-19 PROCEDURE — 84443 ASSAY THYROID STIM HORMONE: CPT

## 2018-09-19 PROCEDURE — 36415 COLL VENOUS BLD VENIPUNCTURE: CPT

## 2018-09-23 VITALS
HEIGHT: 63 IN | BODY MASS INDEX: 14.8 KG/M2 | TEMPERATURE: 99 F | WEIGHT: 83.56 LBS | HEART RATE: 74 BPM | DIASTOLIC BLOOD PRESSURE: 62 MMHG | SYSTOLIC BLOOD PRESSURE: 136 MMHG

## 2018-09-23 NOTE — PROGRESS NOTES
Subjective:       Patient ID: Mj Summers is a 83 y.o. female.    Chief Complaint: Follow-up    HPI  She complains of dysuria and urinary frequency.  No fever, chills, night sweats    PAST MEDICAL HISTORY: Hypertension, hyperlipidemia, osteoporosis, anemia, coronary artery disease, peripheral vascular disease,  maxillary fracture, leukopenia, glaucoma, positive PPD, GERD, ORIF left hip,   hyperparathyroidism, tricuspid regurgitation. She had a   colonoscopy February 2018    PAST SURGICAL HISTORY: Breast cyst removal, benign.     MEDICATIONS: Nexium when necessary, one aspirin daily,   timolol drops, prolia, Cozaar 100 mg daily,  chlorthalidone , lovastatin 40 mg daily     ALLERGIES: Penicillin and sulfa. .       Review of Systems   Constitutional: Negative for chills, fatigue, fever and unexpected weight change.   Respiratory: Negative for chest tightness and shortness of breath.    Cardiovascular: Negative for chest pain and palpitations.   Gastrointestinal: Negative for abdominal pain and blood in stool.   Neurological: Negative for dizziness, syncope, numbness and headaches.       Objective:      Physical Exam   HENT:   Right Ear: External ear normal.   Left Ear: External ear normal.   Nose: Nose normal.   Mouth/Throat: Oropharynx is clear and moist.   Eyes: Pupils are equal, round, and reactive to light.   Neck: Normal range of motion.   Cardiovascular: Normal rate and regular rhythm.   Murmur heard.  Pulmonary/Chest: Breath sounds normal.   Abdominal: She exhibits no distension. There is no hepatosplenomegaly. There is no tenderness.   Lymphadenopathy:     She has no cervical adenopathy.     She has no axillary adenopathy.   Neurological: She has normal strength and normal reflexes. No cranial nerve deficit or sensory deficit.     breast exam:  No masses palpated, no nipple discharge expressed  Assessment/Plan     assessment and plan:  Urinary tract infection:  Urine sent for urinalysis and culture.  Check  PTH and TSH.  Schedule mammogram.  Discussed Pap smear, pelvic exam.  She declined all

## 2018-09-24 ENCOUNTER — HOSPITAL ENCOUNTER (OUTPATIENT)
Dept: RADIOLOGY | Facility: HOSPITAL | Age: 83
Discharge: HOME | End: 2018-09-24
Attending: INTERNAL MEDICINE
Payer: MEDICARE

## 2018-09-24 ENCOUNTER — TELEPHONE (OUTPATIENT)
Dept: INTERNAL MEDICINE | Facility: CLINIC | Age: 83
End: 2018-09-24

## 2018-09-24 DIAGNOSIS — Z12.31 SCREENING MAMMOGRAM, ENCOUNTER FOR: ICD-10-CM

## 2018-09-24 PROCEDURE — 77067 SCR MAMMO BI INCL CAD: CPT | Mod: 26,,, | Performed by: RADIOLOGY

## 2018-09-24 PROCEDURE — 77067 SCR MAMMO BI INCL CAD: CPT | Mod: TC

## 2018-09-24 RX ORDER — NITROFURANTOIN 25; 75 MG/1; MG/1
100 CAPSULE ORAL 2 TIMES DAILY
Qty: 14 CAPSULE | Refills: 0 | Status: SHIPPED | OUTPATIENT
Start: 2018-09-24 | End: 2018-10-01

## 2018-10-08 ENCOUNTER — OFFICE VISIT (OUTPATIENT)
Dept: ENDOCRINOLOGY | Facility: CLINIC | Age: 83
End: 2018-10-08
Payer: MEDICARE

## 2018-10-08 VITALS
HEART RATE: 56 BPM | HEIGHT: 63 IN | SYSTOLIC BLOOD PRESSURE: 168 MMHG | BODY MASS INDEX: 14.84 KG/M2 | WEIGHT: 83.75 LBS | DIASTOLIC BLOOD PRESSURE: 67 MMHG

## 2018-10-08 DIAGNOSIS — M81.0 OSTEOPOROSIS, UNSPECIFIED OSTEOPOROSIS TYPE, UNSPECIFIED PATHOLOGICAL FRACTURE PRESENCE: Primary | ICD-10-CM

## 2018-10-08 DIAGNOSIS — M81.0 OSTEOPOROSIS, POSTMENOPAUSAL: ICD-10-CM

## 2018-10-08 PROCEDURE — 3077F SYST BP >= 140 MM HG: CPT | Mod: CPTII,S$GLB,, | Performed by: INTERNAL MEDICINE

## 2018-10-08 PROCEDURE — 1100F PTFALLS ASSESS-DOCD GE2>/YR: CPT | Mod: CPTII,S$GLB,, | Performed by: INTERNAL MEDICINE

## 2018-10-08 PROCEDURE — 3288F FALL RISK ASSESSMENT DOCD: CPT | Mod: CPTII,S$GLB,, | Performed by: INTERNAL MEDICINE

## 2018-10-08 PROCEDURE — 3078F DIAST BP <80 MM HG: CPT | Mod: CPTII,S$GLB,, | Performed by: INTERNAL MEDICINE

## 2018-10-08 PROCEDURE — 99214 OFFICE O/P EST MOD 30 MIN: CPT | Mod: S$GLB,,, | Performed by: INTERNAL MEDICINE

## 2018-10-08 NOTE — ASSESSMENT & PLAN NOTE
-Continue Prolia 60 mg every 6 months. Next Prolia dose in Nov, 2018.  -Continue Vitamin D 50,000 monthly and calcium carbonate 1200 mg daily  -DEXA scan in 2 months  -Will check serum calcium and vitamin D level  -Follow up in 1 year    -Pt's elevated PTH level (136) could be secondary to chlorthalidone or Prolia. Would not pursue further work-up at this time.

## 2018-10-08 NOTE — PROGRESS NOTES
I have reviewed and concur with the fellow's history, physical, assessment, and plan.  I have personally interviewed the patient and all questions were answered.     Continue Prolia. Repeat bone density in 12/2018.

## 2018-10-08 NOTE — PROGRESS NOTES
Subjective:      Patient ID: Mj Summers is a 83 y.o. female.    Chief Complaint:  Osteoporosis, postmenopausal      History of Present Illness    With regards to osteoporosis:   Initially dx in 2001. Was started on IV Reclast- last dose was on 12/21/11. Was started on Prolia 60 mg in Oct of 2013. She missed a dose of Prolia in January 2016.  She had a fall, and subsequently had left hip fracture surgery in May/2016.    Current medication: Prolia. Next dose due in Nov, 2018.  Start date: Oct, 2013    Calcium intake?  1200 mg elemental Ca2+ per day   Vit D intake? 50,000 IU monthly   Recent falls? No  Fall Precautions? Yes    Recent fractures? No  Tobacco use ? No  EtOH use? No     Current diarrhea orNo  Anticoagulants? No  Proton Pump Inhibitors? Yes- on Nexium  Antiseizure medications? No  Thyroid disease? No  Anemia? Yes  Kidney Stones? Yes  Hyperparathyroidism? Yes    Age of onset of menses?: 14  Post-menopausal age?: 53     Malignancy involving bone (active or history)? No  Prior radiation treatment? No  Unexplained elevation of alkaline phosphatase? No  Dental work planned? No    Last DEXA: 12/2016:  Osteoporosis severe on basis of hip fracture and BMD at the spine and hip. Lumbar spine BMD stable since 2012.      Review of Systems   Constitutional: Negative for unexpected weight change.   Eyes: Negative for visual disturbance.   Respiratory: Negative for shortness of breath.    Cardiovascular: Negative for chest pain.   Gastrointestinal: Negative for abdominal pain.   Genitourinary: Negative for urgency.   Musculoskeletal: Negative for arthralgias.   Skin: Negative for wound.   Neurological: Negative for headaches.   Hematological: Does not bruise/bleed easily.   Psychiatric/Behavioral: Negative for sleep disturbance.       Objective:   Physical Exam   Constitutional: She is oriented to person, place, and time. She appears well-developed and well-nourished.   HENT:   Head: Normocephalic and atraumatic.    Neck: Neck supple. No thyromegaly present.   Cardiovascular: Normal rate, regular rhythm and normal heart sounds.   Pulmonary/Chest: Effort normal. No respiratory distress.   Abdominal: Soft. There is no tenderness.   Neurological: She is alert and oriented to person, place, and time.   Skin: Skin is warm. No rash noted.   Psychiatric: She has a normal mood and affect. Her behavior is normal.       Assessment & Plan:   Osteoporosis, postmenopausal  -Continue Prolia 60 mg every 6 months. Next Prolia dose in Nov, 2018.  -Continue Vitamin D 50,000 monthly and calcium carbonate 1200 mg daily  -DEXA scan in 2 months  -Will check serum calcium and vitamin D level  -Follow up in 1 year    -Pt's elevated PTH level (136) could be secondary to chlorthalidone or Prolia. Would not pursue further work-up at this time.        Discussed with Dr. Mandel

## 2018-10-10 DIAGNOSIS — I10 ESSENTIAL HYPERTENSION: Chronic | ICD-10-CM

## 2018-10-10 RX ORDER — VALSARTAN 160 MG/1
160 TABLET ORAL 2 TIMES DAILY
Qty: 60 TABLET | Refills: 0 | OUTPATIENT
Start: 2018-10-10 | End: 2019-10-10

## 2018-10-15 DIAGNOSIS — E55.9 VITAMIN D DEFICIENCY DISEASE: ICD-10-CM

## 2018-10-15 DIAGNOSIS — M81.0 OSTEOPOROSIS, POSTMENOPAUSAL: ICD-10-CM

## 2018-10-15 RX ORDER — ERGOCALCIFEROL 1.25 MG/1
CAPSULE ORAL
Qty: 3 CAPSULE | Refills: 3 | Status: SHIPPED | OUTPATIENT
Start: 2018-10-15 | End: 2019-10-13 | Stop reason: SDUPTHER

## 2018-10-24 ENCOUNTER — OFFICE VISIT (OUTPATIENT)
Dept: CARDIOLOGY | Facility: CLINIC | Age: 83
End: 2018-10-24
Payer: MEDICARE

## 2018-10-24 VITALS
OXYGEN SATURATION: 100 % | WEIGHT: 84 LBS | HEIGHT: 63 IN | SYSTOLIC BLOOD PRESSURE: 145 MMHG | DIASTOLIC BLOOD PRESSURE: 63 MMHG | BODY MASS INDEX: 14.88 KG/M2 | HEART RATE: 65 BPM

## 2018-10-24 DIAGNOSIS — I10 ESSENTIAL HYPERTENSION: Chronic | ICD-10-CM

## 2018-10-24 DIAGNOSIS — I65.23 BILATERAL CAROTID ARTERY STENOSIS: ICD-10-CM

## 2018-10-24 DIAGNOSIS — I73.9 PAD (PERIPHERAL ARTERY DISEASE): ICD-10-CM

## 2018-10-24 DIAGNOSIS — I25.10 CORONARY ARTERY DISEASE INVOLVING NATIVE CORONARY ARTERY OF NATIVE HEART WITHOUT ANGINA PECTORIS: Primary | ICD-10-CM

## 2018-10-24 DIAGNOSIS — E78.5 DYSLIPIDEMIA: Chronic | ICD-10-CM

## 2018-10-24 DIAGNOSIS — I45.10 RIGHT BUNDLE BRANCH BLOCK: ICD-10-CM

## 2018-10-24 DIAGNOSIS — E44.1 MILD PROTEIN-CALORIE MALNUTRITION: ICD-10-CM

## 2018-10-24 PROCEDURE — 1100F PTFALLS ASSESS-DOCD GE2>/YR: CPT | Mod: CPTII,,, | Performed by: NURSE PRACTITIONER

## 2018-10-24 PROCEDURE — 3077F SYST BP >= 140 MM HG: CPT | Mod: CPTII,,, | Performed by: NURSE PRACTITIONER

## 2018-10-24 PROCEDURE — 3078F DIAST BP <80 MM HG: CPT | Mod: CPTII,,, | Performed by: NURSE PRACTITIONER

## 2018-10-24 PROCEDURE — 99499 UNLISTED E&M SERVICE: CPT | Mod: HCNC,S$GLB,, | Performed by: NURSE PRACTITIONER

## 2018-10-24 PROCEDURE — 3288F FALL RISK ASSESSMENT DOCD: CPT | Mod: CPTII,,, | Performed by: NURSE PRACTITIONER

## 2018-10-24 PROCEDURE — 99214 OFFICE O/P EST MOD 30 MIN: CPT | Mod: S$PBB,,, | Performed by: NURSE PRACTITIONER

## 2018-10-24 PROCEDURE — 99999 PR PBB SHADOW E&M-EST. PATIENT-LVL III: CPT | Mod: PBBFAC,,, | Performed by: NURSE PRACTITIONER

## 2018-10-24 PROCEDURE — 99213 OFFICE O/P EST LOW 20 MIN: CPT | Mod: PBBFAC | Performed by: NURSE PRACTITIONER

## 2018-10-24 RX ORDER — ROSUVASTATIN CALCIUM 10 MG/1
10 TABLET, COATED ORAL DAILY
Qty: 90 TABLET | Refills: 3 | Status: SHIPPED | OUTPATIENT
Start: 2018-10-24 | End: 2019-10-27 | Stop reason: SDUPTHER

## 2018-10-24 NOTE — PROGRESS NOTES
Ms. Summers is a patient of Dr. Morrow and was last seen in [facility] 5/15/2018.    Subjective:   Patient ID:  Mj Summers is a 83 y.o. female who presents for follow-up of Essential hypertension (3 months fu)    Problems:  Coronary artery disease ([facility] 2010 mid LAD 40%)   mild bilateral carotid disease (20-39% bilateral in 2015 )   hyperlipidemia   hypertension   pulmonary emphysema.    HPI  Ms. Summers is in clinic today for routine follow up.  Hypertension is treated with chlorthalidone 25mg and losartan 100mg.  Reports following a low salt diet. Home blood pressure readings are 130-140s/50s. Patient denies chest pain with exertion or at rest, palpitations, SOB, YOUNGBLOOD, dizziness, syncope, edema, orthopnea, PND, or claudication.  Reports routine exercise.      Review of Systems   Constitution: Negative for decreased appetite, diaphoresis, weakness, malaise/fatigue, weight gain and weight loss.   Eyes: Negative for visual disturbance.   Cardiovascular: Negative for chest pain, claudication, dyspnea on exertion, irregular heartbeat, leg swelling, near-syncope, orthopnea, palpitations, paroxysmal nocturnal dyspnea and syncope.        Denies chest pressure   Respiratory: Negative for cough, hemoptysis, shortness of breath, sleep disturbances due to breathing and snoring.    Endocrine: Negative for cold intolerance and heat intolerance.   Hematologic/Lymphatic: Negative for bleeding problem. Does not bruise/bleed easily.   Musculoskeletal: Negative for myalgias.   Gastrointestinal: Negative for bloating, abdominal pain, anorexia, change in bowel habit, constipation, diarrhea, nausea and vomiting.   Neurological: Negative for difficulty with concentration, disturbances in coordination, excessive daytime sleepiness, dizziness, headaches, light-headedness, loss of balance and numbness.   Psychiatric/Behavioral: The patient does not have insomnia.      Allergies and current medications updated and  reviewed:  Review of patient's allergies indicates:   Allergen Reactions    Strawberries [strawberry]      B/p elevation    Chocolate flavor     Atorvastatin      Myalgias    Pravastatin      Myalgias    Pcn [penicillins] Rash    Sulfa (sulfonamide antibiotics) Itching     Current Outpatient Medications   Medication Sig    aspirin 81 mg Tab Take 81 mg by mouth every evening. 1 Tablet Oral Every day    bimatoprost (LUMIGAN) 0.01 % Drop Place 1 drop into the right eye every evening.    brimonidine 0.15 % OPTH DROP (ALPHAGAN) 0.15 % ophthalmic solution Place 1 drop into the right eye 3 (three) times daily.    calcium carbonate (OS-KACI) 600 mg (1,500 mg) Tab Take 600 mg by mouth 2 (two) times daily with meals.    chlorthalidone (HYGROTEN) 25 MG Tab Take one tablet daily    esomeprazole (NEXIUM) 20 MG capsule Take 1 capsule (20 mg total) by mouth before breakfast.    ezetimibe (ZETIA) 10 mg tablet Take 0.5 tablets (5 mg total) by mouth once daily.    ferrous sulfate 325 (65 FE) MG EC tablet Take 1 tablet (325 mg total) by mouth once daily.    losartan (COZAAR) 100 MG tablet Take 1 tablet (100 mg total) by mouth once daily.    lovastatin (MEVACOR) 40 MG tablet TAKE 1 TABLET BY MOUTH EVERY DAY IN THE EVENING    multivitamin (ONE DAILY MULTIVITAMIN) per tablet Take 1 tablet by mouth once daily.    pilocarpine HCL 4% (PILOCAR) 4 % ophthalmic solution Place 1 drop into the right eye 3 (three) times daily.    senna-docusate 8.6-50 mg (PERICOLACE) 8.6-50 mg per tablet Take 1 tablet by mouth 2 (two) times daily. (Patient taking differently: Take 1 tablet by mouth 2 (two) times daily as needed. )    timolol maleate 0.5% (TIMOPTIC-XE) 0.5 % SolG Place 1 drop into the right eye every morning.    VITAMIN D2 50,000 unit capsule TAKE ONE CAPSULE BY MOUTH EVERY MONTH     No current facility-administered medications for this visit.        Objective:     Right Arm BP - Sittin/69 (10/24/18 1438)  Left Arm BP -  "Sittin/63 (10/24/18 1438)    BP (!) 145/63 (BP Location: Left arm, Patient Position: Sitting, BP Method: Pediatric (Automatic))   Pulse 65   Ht 5' 3" (1.6 m)   Wt 38.1 kg (83 lb 15.9 oz)   LMP 11/10/1987 (Approximate)   SpO2 100%   BMI 14.88 kg/m²       Physical Exam   Constitutional: She is oriented to person, place, and time. Vital signs are normal. She appears well-developed and well-nourished. She is active. No distress.   HENT:   Head: Normocephalic and atraumatic.   Eyes: Conjunctivae and lids are normal. No scleral icterus.   Neck: Neck supple. Normal carotid pulses, no hepatojugular reflux and no JVD present. Carotid bruit is not present.   Cardiovascular: Normal rate, regular rhythm, S1 normal, S2 normal and intact distal pulses. PMI is not displaced. Exam reveals no gallop and no friction rub.   No murmur heard.  Pulses:       Carotid pulses are 2+ on the right side, and 2+ on the left side.       Radial pulses are 2+ on the right side, and 2+ on the left side.        Dorsalis pedis pulses are 2+ on the right side, and 2+ on the left side.        Posterior tibial pulses are 1+ on the right side, and 1+ on the left side.   Pulmonary/Chest: Effort normal and breath sounds normal. No respiratory distress. She has no decreased breath sounds. She has no wheezes. She has no rhonchi. She has no rales. She exhibits no tenderness.   Abdominal: Soft. Normal appearance and bowel sounds are normal. She exhibits no distension, no fluid wave, no abdominal bruit, no ascites and no pulsatile midline mass. There is no hepatosplenomegaly. There is no tenderness.   Musculoskeletal: She exhibits no edema.   Neurological: She is alert and oriented to person, place, and time. Gait normal.   Skin: Skin is warm, dry and intact. No rash noted. She is not diaphoretic. Nails show no clubbing.   Psychiatric: She has a normal mood and affect. Her speech is normal and behavior is normal. Judgment and thought content normal. " Cognition and memory are normal.   Nursing note and vitals reviewed.      Chemistry        Component Value Date/Time     07/09/2018 0948    K 3.8 07/09/2018 0948     07/09/2018 0948    CO2 27 07/09/2018 0948    BUN 20 07/09/2018 0948    CREATININE 0.8 07/09/2018 0948     07/09/2018 0948        Component Value Date/Time    CALCIUM 9.8 07/09/2018 0948    ALKPHOS 77 07/09/2018 0948    AST 25 07/09/2018 0948    ALT 23 07/09/2018 0948    BILITOT 0.5 07/09/2018 0948    ESTGFRAFRICA >60 07/09/2018 0948    EGFRNONAA >60 07/09/2018 0948        No results found for: LABA1C, HGBA1C    Recent Labs   Lab 07/09/18 0948 09/19/18  1429   WHITE BLOOD CELL COUNT 5.50  --    HEMOGLOBIN 11.6 L  --    HEMATOCRIT 35.7 L  --    MCV 92  --    PLATELETS 266  --    TSH  --  0.818   CHOLESTEROL 266 H  --    HDL 70  --    LDL CHOLESTEROL 173.8 H  --    TRIGLYCERIDES 111  --    HDL/CHOLESTEROL RATIO 26.3  --        Recent Labs   Lab 05/23/16  1558   INR 1.0        Test(s) Reviewed  I have reviewed the following in detail:  [] Stress test   [] Angiography   [x] Echocardiogram   [x] Labs   [] Other:         Assessment/Plan:   1. Coronary artery disease involving native coronary artery of native heart without angina pectoris  Asymptomatic. Taking low intensity statin. Has been intolerant to lipitor and pravastatin in the past. Will attempt switching to rosuvastatin 10mg for better lipid control. Repeat lipids ordered.    - rosuvastatin (CRESTOR) 10 MG tablet; Take 1 tablet (10 mg total) by mouth once daily.  Dispense: 90 tablet; Refill: 3    2. Dyslipidemia  LDL not at goal <70. Switch lovastatin to rosuvastatin 10mg. Encouraged mediterranean diet. Repeat lipids and cmp in 6 weeks.    - rosuvastatin (CRESTOR) 10 MG tablet; Take 1 tablet (10 mg total) by mouth once daily.  Dispense: 90 tablet; Refill: 3    3. PAD (peripheral artery disease)  Asymptomatic. Continue ASA and statin therapy.     4. Right bundle branch block      5.  Essential hypertension  BP fairly well controlled, 130-140s. No changes.    6. Bilateral carotid artery stenosis  No bruit. Continue statin and ASA.     7. Mild protein-calorie malnutrition       The patient was discussed but not examined by Dr. Morrow    Follow-up in about 6 months (around 4/24/2019).

## 2018-11-05 ENCOUNTER — IMMUNIZATION (OUTPATIENT)
Dept: PHARMACY | Facility: CLINIC | Age: 83
End: 2018-11-05
Payer: MEDICARE

## 2018-11-05 NOTE — PROGRESS NOTES
"        Assessment /Plan     For exam results, see Encounter Report.    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   Angle open after PI's ou   -Followed at [facility] since at least '01   First HVF 2001   First photos 2001   S/p PI OU   s/p Gonioplasty OU     Family history none   Glaucoma meds Lumigan, timolol gfs  qAM, Agan tid, Camacho tid  - all OD ((off all gtts os s/p combined)   H/O adverse rxn to glaucoma drops Azopt "felt weird"   LASERS S/p PI OU, s/p Gonioplasty OU   GLAUCOMA SURGERIES    combined phaco/trab with mini shunt OS 5/22/2013   OTHER EYE SURGERIES    S/P DCR sx OD x 2 or 3 with [facility]    Combined phaco/IOL/ trab with mini shunt OS 5/22/2013   CDR 0.99 /0.9   Tbase 42/23   Tmax 42/23 (when stopped gtts)   Ttarget pain control od // 18 os   HVF 18 VF '01 -'18 - Ext to stim V od  // SALT / IAD (gen dep) os   Gonio +3/+3   /539   OCT 9 test 2006 to 2017 - RNFL - OD:dec. S/N/I  // OS:dec N/I, lora S/T   HRT 14 test 2004 to 2018 - MR -  Dec. S/I od // dec. S/N/I os /// CDR 0.66 od // 0.83 os  Disc photos - 2001, 2003, 2006 - slides // 2008, 2010, 2016, 2018   - OIS     Ta today 32/15 - on mult gtts od - pain free // off all gtts os post phaco/trab - 2013   Test today - IOP, HVF ps / DFE / photos     2.  Blind hypertensive Right Eye   Pain free - eye comfortable   Very limited vision CF at 3'   End stage glaucoma and S/P CRVO   No rubeosis   IOP is high but eye is pain free    3. Cataract OD -However, OD VA limited 2/2 CRVO and end stge glaucoma   S/P phaco/IOL/trab OS " 5/22/2013    4. FABY OU -cont ATs     5. APD OD -   Old / stable 2/2 CRVO and glaucoma    6. Band Keratopathy OD -stable. Cont ATs- being followed by Dr. Saul;    had EDTA chelation on 2/13/14 OS - now with recurrent band     7. Asteroid Hyalosis OS -stable     8. Hx of Acute Dacryocystitis OS and recent NLD OD s/p surgery 11/12 and repeat for exposure w/ Dr. Ocampo 12/5/12 -stable. Cont f/u with Dam[facility]     9. Hx of CRVO OD -limiting visual potential OD   -No NV on todays exam     10. Ant capsule phimosis os    S/P yag laser     11. Asteroid hyalosis OS     12. PC IOL os    Combined  W/ mini shunt 5/22/2013   doing well VA is 20/25 and the IOP is 13    Plan:   Off all gtts for now OS - s/p combined (5/22/2013)  -- IOP stable - at 16 and VA is 20/30-20/40 - can add gtts back if IOP goes any higher     Cont glaucoma gtts OD -- IOP very high, but comfortable/no pain // Blind hypertensive but pain free eye   Old  crvo     Ok to use OTC readers for now a +3.00 to +3.50 - not really able to improve distance vision with glasses at this time  Much improved from before the combined  Procedure    Cont all glaucoma gtts od   Blind hypertensive right eye - pain free   Cont off glaucoma gtts os     Glasses - Matthieu - 11/7/2017 (( pt may want a stronger bifocal next time - C/O trouble reading ) )- refer to [facility] - to see if hand held magnifier might help     F/U 4 months with IOP check // HRT     I have seen and personally examined the patient.  I agree with the findings, assessment and plan of the resident and/or fellow.     Aysha Triplett MD

## 2018-11-08 ENCOUNTER — OFFICE VISIT (OUTPATIENT)
Dept: OPHTHALMOLOGY | Facility: CLINIC | Age: 83
End: 2018-11-08
Attending: OPHTHALMOLOGY
Payer: MEDICARE

## 2018-11-08 DIAGNOSIS — H26.40 CAPSULAR OPACIFICATION: ICD-10-CM

## 2018-11-08 DIAGNOSIS — Z96.1 PSEUDOPHAKIA OF LEFT EYE: ICD-10-CM

## 2018-11-08 DIAGNOSIS — H21.561 AFFERENT PUPILLARY DEFECT, RIGHT: ICD-10-CM

## 2018-11-08 DIAGNOSIS — H21.541 POSTERIOR SYNECHIAE, RIGHT: ICD-10-CM

## 2018-11-08 DIAGNOSIS — H35.031 BLIND HYPERTENSIVE RIGHT EYE: ICD-10-CM

## 2018-11-08 DIAGNOSIS — H40.89 GLAUCOMA DUE TO COMBINATION OF MECHANISMS: ICD-10-CM

## 2018-11-08 DIAGNOSIS — H18.421 BAND KERATOPATHY, RIGHT: ICD-10-CM

## 2018-11-08 DIAGNOSIS — H34.8112 CENTRAL RETINAL VEIN OCCLUSION OF RIGHT EYE, UNSPECIFIED COMPLICATION STATUS: ICD-10-CM

## 2018-11-08 DIAGNOSIS — Z98.83 GLAUCOMA FILTERING BLEB OF LEFT EYE: ICD-10-CM

## 2018-11-08 DIAGNOSIS — H25.11 SENILE NUCLEAR SCLEROSIS, RIGHT: ICD-10-CM

## 2018-11-08 DIAGNOSIS — H40.89 GLAUCOMA DUE TO COMBINATION OF MECHANISMS: Primary | ICD-10-CM

## 2018-11-08 PROCEDURE — 99999 PR PBB SHADOW E&M-EST. PATIENT-LVL II: CPT | Mod: PBBFAC,HCNC,, | Performed by: OPHTHALMOLOGY

## 2018-11-08 PROCEDURE — 92083 EXTENDED VISUAL FIELD XM: CPT | Mod: HCNC,S$GLB,, | Performed by: OPHTHALMOLOGY

## 2018-11-08 PROCEDURE — 92014 COMPRE OPH EXAM EST PT 1/>: CPT | Mod: HCNC,S$GLB,, | Performed by: OPHTHALMOLOGY

## 2018-11-08 PROCEDURE — 92250 FUNDUS PHOTOGRAPHY W/I&R: CPT | Mod: HCNC,S$GLB,, | Performed by: OPHTHALMOLOGY

## 2018-11-19 ENCOUNTER — INFUSION (OUTPATIENT)
Dept: INFECTIOUS DISEASES | Facility: HOSPITAL | Age: 83
End: 2018-11-19
Attending: INTERNAL MEDICINE
Payer: MEDICARE

## 2018-11-19 VITALS — BODY MASS INDEX: 14.88 KG/M2 | HEIGHT: 63 IN | WEIGHT: 84 LBS

## 2018-11-19 DIAGNOSIS — M81.0 OSTEOPOROSIS, POSTMENOPAUSAL: Primary | ICD-10-CM

## 2018-11-19 PROCEDURE — 96372 THER/PROPH/DIAG INJ SC/IM: CPT | Mod: HCNC

## 2018-11-19 PROCEDURE — 63600175 PHARM REV CODE 636 W HCPCS: Mod: JG,HCNC | Performed by: INTERNAL MEDICINE

## 2018-11-19 RX ADMIN — DENOSUMAB 60 MG: 60 INJECTION SUBCUTANEOUS at 02:11

## 2018-11-19 NOTE — PLAN OF CARE
Problem: Patient Care Overview (Adult)  Goal: Plan of Care Review  Outcome: Ongoing (interventions implemented as appropriate)  PATIENT EDUCATED ON HAND WASHING AND INFECTION CONTROL. PATIENT VERBALIZED UNDERSTANDING

## 2018-12-09 DIAGNOSIS — I77.9 BILATERAL CAROTID ARTERY DISEASE: Chronic | ICD-10-CM

## 2018-12-09 DIAGNOSIS — I25.10 CORONARY ARTERY DISEASE INVOLVING NATIVE CORONARY ARTERY OF NATIVE HEART WITHOUT ANGINA PECTORIS: Chronic | ICD-10-CM

## 2018-12-09 DIAGNOSIS — E78.5 DYSLIPIDEMIA: Chronic | ICD-10-CM

## 2018-12-10 ENCOUNTER — HOSPITAL ENCOUNTER (OUTPATIENT)
Dept: RADIOLOGY | Facility: OTHER | Age: 83
Discharge: HOME | End: 2018-12-10
Attending: STUDENT IN AN ORGANIZED HEALTH CARE EDUCATION/TRAINING PROGRAM
Payer: MEDICARE

## 2018-12-10 DIAGNOSIS — M81.0 OSTEOPOROSIS, UNSPECIFIED OSTEOPOROSIS TYPE, UNSPECIFIED PATHOLOGICAL FRACTURE PRESENCE: ICD-10-CM

## 2018-12-10 PROCEDURE — 77080 DXA BONE DENSITY AXIAL: CPT | Mod: TC,HCNC

## 2018-12-10 PROCEDURE — 77080 DXA BONE DENSITY AXIAL: CPT | Mod: 26,HCNC,, | Performed by: RADIOLOGY

## 2018-12-10 RX ORDER — LOVASTATIN 40 MG/1
TABLET ORAL
Qty: 90 TABLET | Refills: 0 | OUTPATIENT
Start: 2018-12-10

## 2018-12-11 ENCOUNTER — TELEPHONE (OUTPATIENT)
Dept: ENDOCRINOLOGY | Facility: HOSPITAL | Age: 83
End: 2018-12-11

## 2018-12-11 DIAGNOSIS — M81.0 OSTEOPOROSIS WITHOUT PATHOLOGICAL FRACTURE: Primary | ICD-10-CM

## 2018-12-31 ENCOUNTER — HOSPITAL ENCOUNTER (OUTPATIENT)
Dept: RADIOLOGY | Facility: HOSPITAL | Age: 83
Discharge: HOME | End: 2018-12-31
Attending: STUDENT IN AN ORGANIZED HEALTH CARE EDUCATION/TRAINING PROGRAM
Payer: MEDICARE

## 2018-12-31 DIAGNOSIS — M81.0 OSTEOPOROSIS WITHOUT PATHOLOGICAL FRACTURE: ICD-10-CM

## 2018-12-31 PROCEDURE — 72100 X-RAY EXAM L-S SPINE 2/3 VWS: CPT | Mod: 26,HCNC,, | Performed by: RADIOLOGY

## 2018-12-31 PROCEDURE — 72100 XR LUMBAR SPINE AP AND LATERAL: ICD-10-PCS | Mod: 26,HCNC,, | Performed by: RADIOLOGY

## 2018-12-31 PROCEDURE — 72100 X-RAY EXAM L-S SPINE 2/3 VWS: CPT | Mod: TC,HCNC

## 2019-01-03 ENCOUNTER — TELEPHONE (OUTPATIENT)
Dept: ENDOCRINOLOGY | Facility: CLINIC | Age: 84
End: 2019-01-03

## 2019-01-03 NOTE — TELEPHONE ENCOUNTER
Please advise patient that I reviewed the xray of her spine. It shows some degenerative changes (wear and tear) that is chronic, but no fractures are new changes.

## 2019-01-08 ENCOUNTER — LAB VISIT (OUTPATIENT)
Dept: LAB | Facility: OTHER | Age: 84
End: 2019-01-08
Attending: INTERNAL MEDICINE
Payer: MEDICARE

## 2019-01-08 DIAGNOSIS — D53.9 NUTRITIONAL ANEMIA: ICD-10-CM

## 2019-01-08 DIAGNOSIS — D64.9 ANEMIA, UNSPECIFIED TYPE: ICD-10-CM

## 2019-01-08 LAB
ALBUMIN SERPL BCP-MCNC: 3.7 G/DL
ALP SERPL-CCNC: 72 U/L
ALT SERPL W/O P-5'-P-CCNC: 18 U/L
ANION GAP SERPL CALC-SCNC: 7 MMOL/L
AST SERPL-CCNC: 21 U/L
BASOPHILS # BLD AUTO: 0.03 K/UL
BASOPHILS NFR BLD: 0.7 %
BILIRUB SERPL-MCNC: 0.3 MG/DL
BUN SERPL-MCNC: 23 MG/DL
CALCIUM SERPL-MCNC: 10.2 MG/DL
CHLORIDE SERPL-SCNC: 100 MMOL/L
CO2 SERPL-SCNC: 31 MMOL/L
CREAT SERPL-MCNC: 0.9 MG/DL
DIFFERENTIAL METHOD: ABNORMAL
EOSINOPHIL # BLD AUTO: 0.1 K/UL
EOSINOPHIL NFR BLD: 1.1 %
ERYTHROCYTE [DISTWIDTH] IN BLOOD BY AUTOMATED COUNT: 12.7 %
EST. GFR  (AFRICAN AMERICAN): >60 ML/MIN/1.73 M^2
EST. GFR  (NON AFRICAN AMERICAN): 59 ML/MIN/1.73 M^2
FERRITIN SERPL-MCNC: 217 NG/ML
FOLATE SERPL-MCNC: 16.8 NG/ML
GLUCOSE SERPL-MCNC: 91 MG/DL
HCT VFR BLD AUTO: 30.8 %
HGB BLD-MCNC: 9.9 G/DL
IRON SERPL-MCNC: 69 UG/DL
LDH SERPL L TO P-CCNC: 203 U/L
LYMPHOCYTES # BLD AUTO: 1.1 K/UL
LYMPHOCYTES NFR BLD: 25.3 %
MCH RBC QN AUTO: 29.9 PG
MCHC RBC AUTO-ENTMCNC: 32.1 G/DL
MCV RBC AUTO: 93 FL
MONOCYTES # BLD AUTO: 0.4 K/UL
MONOCYTES NFR BLD: 9.7 %
NEUTROPHILS # BLD AUTO: 2.7 K/UL
NEUTROPHILS NFR BLD: 63 %
PLATELET # BLD AUTO: 277 K/UL
PMV BLD AUTO: 11.2 FL
POTASSIUM SERPL-SCNC: 3.6 MMOL/L
PROT SERPL-MCNC: 7.7 G/DL
RBC # BLD AUTO: 3.31 M/UL
RETICS/RBC NFR AUTO: 1.2 %
SATURATED IRON: 20 %
SODIUM SERPL-SCNC: 138 MMOL/L
TOTAL IRON BINDING CAPACITY: 349 UG/DL
TRANSFERRIN SERPL-MCNC: 236 MG/DL
VIT B12 SERPL-MCNC: 455 PG/ML
WBC # BLD AUTO: 4.35 K/UL

## 2019-01-08 PROCEDURE — 82746 ASSAY OF FOLIC ACID SERUM: CPT | Mod: HCNC

## 2019-01-08 PROCEDURE — 83615 LACTATE (LD) (LDH) ENZYME: CPT | Mod: HCNC

## 2019-01-08 PROCEDURE — 36415 COLL VENOUS BLD VENIPUNCTURE: CPT | Mod: HCNC

## 2019-01-08 PROCEDURE — 85025 COMPLETE CBC W/AUTO DIFF WBC: CPT | Mod: HCNC

## 2019-01-08 PROCEDURE — 82728 ASSAY OF FERRITIN: CPT | Mod: HCNC

## 2019-01-08 PROCEDURE — 80053 COMPREHEN METABOLIC PANEL: CPT | Mod: HCNC

## 2019-01-08 PROCEDURE — 83540 ASSAY OF IRON: CPT | Mod: HCNC

## 2019-01-08 PROCEDURE — 85045 AUTOMATED RETICULOCYTE COUNT: CPT | Mod: HCNC

## 2019-01-08 PROCEDURE — 82607 VITAMIN B-12: CPT | Mod: HCNC

## 2019-01-10 ENCOUNTER — OFFICE VISIT (OUTPATIENT)
Dept: HEMATOLOGY/ONCOLOGY | Facility: CLINIC | Age: 84
End: 2019-01-10
Payer: MEDICARE

## 2019-01-10 VITALS
HEIGHT: 63 IN | RESPIRATION RATE: 16 BRPM | SYSTOLIC BLOOD PRESSURE: 180 MMHG | WEIGHT: 84 LBS | BODY MASS INDEX: 14.88 KG/M2 | HEART RATE: 68 BPM | OXYGEN SATURATION: 100 % | TEMPERATURE: 98 F | DIASTOLIC BLOOD PRESSURE: 73 MMHG

## 2019-01-10 DIAGNOSIS — D64.9 ANEMIA, UNSPECIFIED TYPE: Primary | ICD-10-CM

## 2019-01-10 DIAGNOSIS — I10 ESSENTIAL HYPERTENSION: ICD-10-CM

## 2019-01-10 DIAGNOSIS — K21.9 GASTROESOPHAGEAL REFLUX DISEASE WITHOUT ESOPHAGITIS: ICD-10-CM

## 2019-01-10 PROCEDURE — 1101F PR PT FALLS ASSESS DOC 0-1 FALLS W/OUT INJ PAST YR: ICD-10-PCS | Mod: CPTII,HCNC,S$GLB, | Performed by: INTERNAL MEDICINE

## 2019-01-10 PROCEDURE — 3077F PR MOST RECENT SYSTOLIC BLOOD PRESSURE >= 140 MM HG: ICD-10-PCS | Mod: CPTII,HCNC,S$GLB, | Performed by: INTERNAL MEDICINE

## 2019-01-10 PROCEDURE — 3077F SYST BP >= 140 MM HG: CPT | Mod: CPTII,HCNC,S$GLB, | Performed by: INTERNAL MEDICINE

## 2019-01-10 PROCEDURE — 3078F DIAST BP <80 MM HG: CPT | Mod: CPTII,HCNC,S$GLB, | Performed by: INTERNAL MEDICINE

## 2019-01-10 PROCEDURE — 99214 PR OFFICE/OUTPT VISIT, EST, LEVL IV, 30-39 MIN: ICD-10-PCS | Mod: HCNC,S$GLB,, | Performed by: INTERNAL MEDICINE

## 2019-01-10 PROCEDURE — 99999 PR PBB SHADOW E&M-EST. PATIENT-LVL III: CPT | Mod: PBBFAC,HCNC,, | Performed by: INTERNAL MEDICINE

## 2019-01-10 PROCEDURE — 99214 OFFICE O/P EST MOD 30 MIN: CPT | Mod: HCNC,S$GLB,, | Performed by: INTERNAL MEDICINE

## 2019-01-10 PROCEDURE — 3078F PR MOST RECENT DIASTOLIC BLOOD PRESSURE < 80 MM HG: ICD-10-PCS | Mod: CPTII,HCNC,S$GLB, | Performed by: INTERNAL MEDICINE

## 2019-01-10 PROCEDURE — 1101F PT FALLS ASSESS-DOCD LE1/YR: CPT | Mod: CPTII,HCNC,S$GLB, | Performed by: INTERNAL MEDICINE

## 2019-01-10 PROCEDURE — 99999 PR PBB SHADOW E&M-EST. PATIENT-LVL III: ICD-10-PCS | Mod: PBBFAC,HCNC,, | Performed by: INTERNAL MEDICINE

## 2019-01-10 NOTE — PROGRESS NOTES
"Subjective:       Patient ID: Mj Summers is a 82 y.o. female.     Chief Complaint: follow up for anemia     Hematologic History:  1. Ms. Summers is a very pleasant 83 yo woman with HTN, CAD, b/l carotid disease, GERD, malnutrition, who initially saw me on 1/3/18 for further evaluation of anemia. On review of her records, her Hb has been ranging between 10 and 12 since 2010. In May 2016 it dropped to 5-7 after she underwent a surgery on 5/10/16 for left intertrochanteric fracture from a fall. Her H/H on 7/12/16 was 11.4/35.1. On 11/6/17, WBC 3.86, H/H 10.7/33.2, plt count 213. On 11/3/17, WBC 4.26, H/H 10.2/31.7, MCV 92, plt count 158. Vit B12 338, folate 17.2, ferritin 179, iron 68, TIBC 333, iron saturation 20%, retic 1.1%. CMP on 11/6/17 showed Cr 0.7, total protein 7.3, albumin 3.4, corrected calcium 9.8. Calculated CrCl 37.46 mL/min (cockroft). She has been having dysphagia over the past 2 months, which she attributed to GERD. Has been taking iron pills for many years and has been having dark stool from the iron pills. Denies any signs of bleeding.   2. Workup on 1/3/18 showed normal LDH, haptoglobin, copper levels. SPEP negative for M protein. Seen by GI. Guaiac stool negative.   3. EGD on 2/6/18 showed Small hiatal hernia. Mild Schatzki ring. Dilated. Gastric mucosal atrophy. Biopsied. Normal examined duodenum. Pathology of the gastric antrum showed "Severe chronic inactive gastritis. No evidence of active acute inflammation. Negative for Helicobacter like organisms on H&E. Intestinal metaplasia, complete type. Reactive atypia. No evidence of malignancy."  4. Colonoscopy on 2/6/18 showed "The colon (entire examined portion) was significantly tortuous.The exam was otherwise without abnormality. The cecum was partially obscured by solid material, but by washing and repositioning patient, I feel that adequate visualization was obtained. I cannot rule out an AVM in the cecum, but I don't think any mass, " "lesion, or significant polyp was missed"     INTERVAL HISTORY:   Ms. Summers returns today for follow up of anemia. She has been feeling well. No signs of bleeding. still has bad acid reflux and need to be very careful regarding what she eats.      ROS:   A ten-point system review is obtained and negative except for what was stated in the interval history.      Physical Examination:   Vital signs reviewed.   Gen: well hydrated, well developed, in no acute distress.  HEENT: normocephalic, anicteric sclerae, PERRLA, EOMI, oropharynx clear  Neck: supple, no JVD, thyromegaly, cervical or supraclavicular LAD  Lungs: CTAB, no wheezes or rales  Heart: RRR, no M/R/G  Abdomen: soft, no tenderness, non-distended, no hepatosplenomegaly, mass, or hernia. BS present  Ext: no clubbing, cyanosis, or edema  Neuro: alert and oriented x 4, no focal neuro deficit  Skin: no rash, erythema, open wound or ulcers  Psych: pleasant and appropriate mood and affect        Objective:      Diagnostic Tests:  EGD on 2/6/18 showed Small hiatal hernia. Mild Schatzki ring. Dilated. Gastric mucosal atrophy. Biopsied. Normal examined duodenum. Pathology of the gastric antrum showed "Severe chronic inactive gastritis. No evidence of active acute inflammation. Negative for Helicobacter like organisms on H&E. Intestinal metaplasia, complete type. Reactive atypia. No evidence of malignancy."     Colonoscopy on 2/6/18 showed "The colon (entire examined portion) was significantly tortuous.The exam was otherwise without abnormality. The cecum was partially obscured by solid material, but by washing and repositioning patient, I feel that adequate visualization was obtained. I cannot rule out an AVM in the cecum, but I don't think any mass, lesion, or significant polyp was missed"     Laboratory Data:  Labs reviewed. Hb 9.9     Assessment/Plan:      1. Anemia, unspecified type    2. Gastroesophageal reflux disease without esophagitis    3. Essential " hypertension        1.   - Ms. Summers is an 81 yo woman with normocytic anemia. Extensive workup was negative. EGD showed severe chronic inactive gastritis but no s/o malignancy. Colonoscopy did not show bleeding sites. Guaiac stool was negative.   - Her CrCl is 28.49 min/mL. She could have anemia of chronic kidney disease. Low risk MDS cannot be ruled out, but given her mild anemia, normal WBC and plt counts, the stability of her Hb over the past 7 years, and her age, I would defer a BMBx at this point as it would not change the management.   - RTC in 3 months with repeat labs to monitor    2.  - f/u with GI     3.  - Her BP is elevated today in the office. She monitor BP at home daily. SBP usually in the 140s  - c/w current meds      RTC in 3 months with repeat labs  Knows to call should issues arise.

## 2019-01-20 DIAGNOSIS — I10 ESSENTIAL HYPERTENSION: Chronic | ICD-10-CM

## 2019-01-20 RX ORDER — CHLORTHALIDONE 25 MG/1
TABLET ORAL
Qty: 30 TABLET | Refills: 6 | Status: SHIPPED | OUTPATIENT
Start: 2019-01-20 | End: 2019-07-15 | Stop reason: SDUPTHER

## 2019-01-23 ENCOUNTER — TELEPHONE (OUTPATIENT)
Dept: INTERNAL MEDICINE | Facility: CLINIC | Age: 84
End: 2019-01-23

## 2019-01-23 RX ORDER — OLMESARTAN MEDOXOMIL 20 MG/1
20 TABLET ORAL DAILY
Qty: 30 TABLET | Refills: 3 | Status: SHIPPED | OUTPATIENT
Start: 2019-01-23 | End: 2019-02-26 | Stop reason: SDUPTHER

## 2019-01-23 NOTE — TELEPHONE ENCOUNTER
Please see previous message.    Please advise her to discontinue the losartan. I sent a replacement to her pharmacy. Please schedule appt with me in 1 month

## 2019-01-23 NOTE — TELEPHONE ENCOUNTER
I spoke to the patient she will ask her family to see who can bring her as she cannot drive.  She is aware the Losartan has been changed.

## 2019-01-23 NOTE — TELEPHONE ENCOUNTER
----- Message from Gwen Gray sent at 1/23/2019  4:41 PM CST -----  Contact: Mj Summers [phone]  The patient is requesting a call back in regards to the recall on the losartan medication. The patient did get in contact with her pharmacy due to her not feeling well after taking the medication & they made her aware of the recall on the medication she had.

## 2019-01-24 NOTE — TELEPHONE ENCOUNTER
Spoke with pt and pt spoke with Ms. Guzman yesterday and would like to know can she come in for 1:40 pm, because of her ride situation on the same date 2/26/19.     Please advise!

## 2019-01-24 NOTE — TELEPHONE ENCOUNTER
----- Message from Zamzam Roth sent at 1/24/2019  9:37 AM CST -----  Contact: Patient [phone]  Pt states that she was advised to give the office a call to schedule her an appt on 02/26/19. Please call back and advise.      Thanks

## 2019-02-26 ENCOUNTER — LAB VISIT (OUTPATIENT)
Dept: LAB | Facility: HOSPITAL | Age: 84
End: 2019-02-26
Attending: INTERNAL MEDICINE
Payer: MEDICARE

## 2019-02-26 ENCOUNTER — OFFICE VISIT (OUTPATIENT)
Dept: INTERNAL MEDICINE | Facility: CLINIC | Age: 84
End: 2019-02-26
Payer: MEDICARE

## 2019-02-26 DIAGNOSIS — I10 HYPERTENSION, UNSPECIFIED TYPE: ICD-10-CM

## 2019-02-26 DIAGNOSIS — I10 HYPERTENSION, UNSPECIFIED TYPE: Primary | ICD-10-CM

## 2019-02-26 LAB
ALBUMIN SERPL BCP-MCNC: 3.9 G/DL
ALP SERPL-CCNC: 77 U/L
ALT SERPL W/O P-5'-P-CCNC: 21 U/L
ANION GAP SERPL CALC-SCNC: 7 MMOL/L
AST SERPL-CCNC: 25 U/L
BILIRUB SERPL-MCNC: 0.4 MG/DL
BUN SERPL-MCNC: 16 MG/DL
CALCIUM SERPL-MCNC: 10.1 MG/DL
CHLORIDE SERPL-SCNC: 101 MMOL/L
CO2 SERPL-SCNC: 31 MMOL/L
CREAT SERPL-MCNC: 0.8 MG/DL
EST. GFR  (AFRICAN AMERICAN): >60 ML/MIN/1.73 M^2
EST. GFR  (NON AFRICAN AMERICAN): >60 ML/MIN/1.73 M^2
GLUCOSE SERPL-MCNC: 95 MG/DL
POTASSIUM SERPL-SCNC: 3.8 MMOL/L
PROT SERPL-MCNC: 7.8 G/DL
SODIUM SERPL-SCNC: 139 MMOL/L

## 2019-02-26 PROCEDURE — 3075F SYST BP GE 130 - 139MM HG: CPT | Mod: HCNC,CPTII,S$GLB, | Performed by: INTERNAL MEDICINE

## 2019-02-26 PROCEDURE — 80053 COMPREHEN METABOLIC PANEL: CPT | Mod: HCNC

## 2019-02-26 PROCEDURE — 99215 PR OFFICE/OUTPT VISIT, EST, LEVL V, 40-54 MIN: ICD-10-PCS | Mod: HCNC,S$GLB,, | Performed by: INTERNAL MEDICINE

## 2019-02-26 PROCEDURE — 1101F PT FALLS ASSESS-DOCD LE1/YR: CPT | Mod: HCNC,CPTII,S$GLB, | Performed by: INTERNAL MEDICINE

## 2019-02-26 PROCEDURE — 3078F PR MOST RECENT DIASTOLIC BLOOD PRESSURE < 80 MM HG: ICD-10-PCS | Mod: HCNC,CPTII,S$GLB, | Performed by: INTERNAL MEDICINE

## 2019-02-26 PROCEDURE — 36415 COLL VENOUS BLD VENIPUNCTURE: CPT | Mod: HCNC

## 2019-02-26 PROCEDURE — 99999 PR PBB SHADOW E&M-EST. PATIENT-LVL IV: CPT | Mod: PBBFAC,HCNC,, | Performed by: INTERNAL MEDICINE

## 2019-02-26 PROCEDURE — 99999 PR PBB SHADOW E&M-EST. PATIENT-LVL IV: ICD-10-PCS | Mod: PBBFAC,HCNC,, | Performed by: INTERNAL MEDICINE

## 2019-02-26 PROCEDURE — 3078F DIAST BP <80 MM HG: CPT | Mod: HCNC,CPTII,S$GLB, | Performed by: INTERNAL MEDICINE

## 2019-02-26 PROCEDURE — 3075F PR MOST RECENT SYSTOLIC BLOOD PRESS GE 130-139MM HG: ICD-10-PCS | Mod: HCNC,CPTII,S$GLB, | Performed by: INTERNAL MEDICINE

## 2019-02-26 PROCEDURE — 1101F PR PT FALLS ASSESS DOC 0-1 FALLS W/OUT INJ PAST YR: ICD-10-PCS | Mod: HCNC,CPTII,S$GLB, | Performed by: INTERNAL MEDICINE

## 2019-02-26 PROCEDURE — 99215 OFFICE O/P EST HI 40 MIN: CPT | Mod: HCNC,S$GLB,, | Performed by: INTERNAL MEDICINE

## 2019-02-26 RX ORDER — OLMESARTAN MEDOXOMIL 20 MG/1
20 TABLET ORAL DAILY
Qty: 30 TABLET | Refills: 3 | Status: SHIPPED | OUTPATIENT
Start: 2019-02-26 | End: 2019-09-04 | Stop reason: SDUPTHER

## 2019-03-03 VITALS
SYSTOLIC BLOOD PRESSURE: 138 MMHG | WEIGHT: 84 LBS | OXYGEN SATURATION: 99 % | DIASTOLIC BLOOD PRESSURE: 70 MMHG | BODY MASS INDEX: 14.88 KG/M2 | HEART RATE: 63 BPM | HEIGHT: 63 IN

## 2019-03-03 NOTE — PROGRESS NOTES
Subjective:       Patient ID: Mj Summers is a 83 y.o. female.    Chief Complaint: Hypertension    HPI  She returns for management of hypertension.  She has had hypertension for over a year.  Current treatment has included medications outlined in medication list.  She denies chest pain or shortness of breath.  No palpitations.  Denies left arm or neck pain.    PAST MEDICAL HISTORY: Hypertension, hyperlipidemia, osteoporosis, anemia, coronary artery disease, peripheral vascular disease,  maxillary fracture, leukopenia, glaucoma, positive PPD, GERD, ORIF left hip,  tricuspid regurgitation. She had a   colonoscopy February 2018    PAST SURGICAL HISTORY: Breast cyst removal, benign.     MEDICATIONS:  one aspirin daily, Benicar 20 mg daily, Crestor 10 mg daily,  timolol drops, prolia, chlorthalidone     ALLERGIES: Penicillin and sulfa. .    Review of Systems   Constitutional: Negative for chills, fatigue, fever and unexpected weight change.   Respiratory: Negative for chest tightness and shortness of breath.    Cardiovascular: Negative for chest pain and palpitations.   Gastrointestinal: Negative for abdominal pain and blood in stool.   Neurological: Negative for dizziness, syncope, numbness and headaches.       Objective:      Physical Exam   HENT:   Right Ear: External ear normal.   Left Ear: External ear normal.   Nose: Nose normal.   Mouth/Throat: Oropharynx is clear and moist.   Eyes: Pupils are equal, round, and reactive to light.   Neck: Normal range of motion.   Cardiovascular: Normal rate and regular rhythm.   Murmur heard.  Pulmonary/Chest: Breath sounds normal.   Abdominal: She exhibits no distension. There is no hepatosplenomegaly. There is no tenderness.   Lymphadenopathy:     She has no cervical adenopathy.     She has no axillary adenopathy.   Neurological: She has normal strength and normal reflexes. No cranial nerve deficit or sensory deficit.       Assessment/Plan       Assessment and plan:   Hypertension:  Check CMP

## 2019-03-12 ENCOUNTER — OFFICE VISIT (OUTPATIENT)
Dept: INTERNAL MEDICINE | Facility: CLINIC | Age: 84
End: 2019-03-12
Payer: MEDICARE

## 2019-03-12 VITALS
DIASTOLIC BLOOD PRESSURE: 70 MMHG | HEIGHT: 63 IN | SYSTOLIC BLOOD PRESSURE: 108 MMHG | WEIGHT: 84.19 LBS | HEART RATE: 69 BPM | BODY MASS INDEX: 14.92 KG/M2

## 2019-03-12 DIAGNOSIS — H34.8110 CENTRAL RETINAL VEIN OCCLUSION WITH MACULAR EDEMA OF RIGHT EYE: ICD-10-CM

## 2019-03-12 DIAGNOSIS — D64.9 NORMOCYTIC ANEMIA: ICD-10-CM

## 2019-03-12 DIAGNOSIS — R13.12 OROPHARYNGEAL DYSPHAGIA: ICD-10-CM

## 2019-03-12 DIAGNOSIS — I77.9 BILATERAL CAROTID ARTERY DISEASE, UNSPECIFIED TYPE: ICD-10-CM

## 2019-03-12 DIAGNOSIS — E78.5 DYSLIPIDEMIA: Chronic | ICD-10-CM

## 2019-03-12 DIAGNOSIS — Z87.81 HISTORY OF FRACTURE OF LEFT HIP: ICD-10-CM

## 2019-03-12 DIAGNOSIS — R00.1 BRADYCARDIA: ICD-10-CM

## 2019-03-12 DIAGNOSIS — K21.9 GASTROESOPHAGEAL REFLUX DISEASE, ESOPHAGITIS PRESENCE NOT SPECIFIED: ICD-10-CM

## 2019-03-12 DIAGNOSIS — I25.10 CORONARY ARTERY DISEASE INVOLVING NATIVE CORONARY ARTERY OF NATIVE HEART WITHOUT ANGINA PECTORIS: ICD-10-CM

## 2019-03-12 DIAGNOSIS — I70.0 AORTIC ATHEROSCLEROSIS: ICD-10-CM

## 2019-03-12 DIAGNOSIS — I73.9 PAD (PERIPHERAL ARTERY DISEASE): ICD-10-CM

## 2019-03-12 DIAGNOSIS — M81.0 OSTEOPOROSIS, POSTMENOPAUSAL: ICD-10-CM

## 2019-03-12 DIAGNOSIS — Z00.00 ENCOUNTER FOR PREVENTIVE HEALTH EXAMINATION: Primary | ICD-10-CM

## 2019-03-12 DIAGNOSIS — H40.89 GLAUCOMA DUE TO COMBINATION OF MECHANISMS: ICD-10-CM

## 2019-03-12 DIAGNOSIS — I10 ESSENTIAL HYPERTENSION: Chronic | ICD-10-CM

## 2019-03-12 DIAGNOSIS — I45.10 RIGHT BUNDLE BRANCH BLOCK: ICD-10-CM

## 2019-03-12 PROBLEM — R13.10 DYSPHAGIA: Status: RESOLVED | Noted: 2017-12-21 | Resolved: 2019-03-12

## 2019-03-12 PROBLEM — E44.1 MILD PROTEIN-CALORIE MALNUTRITION: Status: RESOLVED | Noted: 2018-03-14 | Resolved: 2019-03-12

## 2019-03-12 PROCEDURE — 3074F SYST BP LT 130 MM HG: CPT | Mod: HCNC,CPTII,S$GLB, | Performed by: NURSE PRACTITIONER

## 2019-03-12 PROCEDURE — 99499 UNLISTED E&M SERVICE: CPT | Mod: HCNC,S$GLB,, | Performed by: NURSE PRACTITIONER

## 2019-03-12 PROCEDURE — 3074F PR MOST RECENT SYSTOLIC BLOOD PRESSURE < 130 MM HG: ICD-10-PCS | Mod: HCNC,CPTII,S$GLB, | Performed by: NURSE PRACTITIONER

## 2019-03-12 PROCEDURE — 3078F DIAST BP <80 MM HG: CPT | Mod: HCNC,CPTII,S$GLB, | Performed by: NURSE PRACTITIONER

## 2019-03-12 PROCEDURE — 99999 PR PBB SHADOW E&M-EST. PATIENT-LVL IV: ICD-10-PCS | Mod: PBBFAC,HCNC,, | Performed by: NURSE PRACTITIONER

## 2019-03-12 PROCEDURE — 99999 PR PBB SHADOW E&M-EST. PATIENT-LVL IV: CPT | Mod: PBBFAC,HCNC,, | Performed by: NURSE PRACTITIONER

## 2019-03-12 PROCEDURE — 3078F PR MOST RECENT DIASTOLIC BLOOD PRESSURE < 80 MM HG: ICD-10-PCS | Mod: HCNC,CPTII,S$GLB, | Performed by: NURSE PRACTITIONER

## 2019-03-12 PROCEDURE — G0439 PR MEDICARE ANNUAL WELLNESS SUBSEQUENT VISIT: ICD-10-PCS | Mod: HCNC,S$GLB,, | Performed by: NURSE PRACTITIONER

## 2019-03-12 PROCEDURE — 99499 RISK ADDL DX/OHS AUDIT: ICD-10-PCS | Mod: HCNC,S$GLB,, | Performed by: NURSE PRACTITIONER

## 2019-03-12 PROCEDURE — G0439 PPPS, SUBSEQ VISIT: HCPCS | Mod: HCNC,S$GLB,, | Performed by: NURSE PRACTITIONER

## 2019-03-12 NOTE — PATIENT INSTRUCTIONS
Counseling and Referral of Other Preventative  (Italic type indicates deductible and co-insurance are waived)    Patient Name: Mj Summers  Today's Date: 3/12/2019    Health Maintenance       Date Due Completion Date    Zoster Vaccine 09/01/2019 (Originally 7/17/1995) Consider obtaining Shingrix when available    TETANUS VACCINE 03/13/2020 (Originally 7/17/1953) Consider obtaining    Lipid Panel 07/09/2019 7/9/2018    Mammogram 09/24/2019 9/24/2018    DEXA SCAN 12/10/2020 12/10/2018        No orders of the defined types were placed in this encounter.    The following information is provided to all patients.  This information is to help you find resources for any of the problems found today that may be affecting your health:                Living healthy guide: www.[facility].[place].gov      Understanding Diabetes: www.diabetes.org      Eating healthy: www.cdc.gov/healthyweight      [facility] home safety checklist: www.cdc.gov/steadi/patient.html      Agency on Aging: www.goea.[place].gov      Alcoholics anonymous (AA): www.aa.org      Physical Activity: www.cameron.nih.gov/ib4woau      Tobacco use: www.quitwithusla.org
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
## 2019-03-17 DIAGNOSIS — H40.89 GLAUCOMA DUE TO COMBINATION OF MECHANISMS: ICD-10-CM

## 2019-03-17 RX ORDER — BIMATOPROST 0.1 MG/ML
SOLUTION/ DROPS OPHTHALMIC
Qty: 2.5 ML | Refills: 4 | Status: SHIPPED | OUTPATIENT
Start: 2019-03-17 | End: 2019-03-19 | Stop reason: SDUPTHER

## 2019-03-17 RX ORDER — BRIMONIDINE TARTRATE 1.5 MG/ML
SOLUTION/ DROPS OPHTHALMIC
Qty: 10 ML | Refills: 4 | Status: SHIPPED | OUTPATIENT
Start: 2019-03-17 | End: 2019-03-19 | Stop reason: SDUPTHER

## 2019-03-18 NOTE — PROGRESS NOTES
"        Assessment /Plan     For exam results, see Encounter Report.    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   Angle open after PI's ou   -Followed at [facility] since at least '01   First HVF 2001   First photos 2001   S/p PI OU   s/p Gonioplasty OU     Family history none   Glaucoma meds Lumigan, timolol gfs  qAM, Agan tid, Camacho tid  - all OD ((off all gtts os s/p combined)   H/O adverse rxn to glaucoma drops Azopt "felt weird"   LASERS S/p PI OU, s/p Gonioplasty OU   GLAUCOMA SURGERIES    combined phaco/trab with mini shunt OS 5/22/2013   OTHER EYE SURGERIES    S/P DCR sx OD x 2 or 3 with Damaris    Combined phaco/IOL/ trab with mini shunt OS 5/22/2013   CDR 0.99 /0.9   Tbase 42/23   Tmax 42/23 (when stopped gtts)   Ttarget pain control od // 18 os   HVF 18 VF '01 -'18 - Ext to stim V od  // SALT / IAD (gen dep) os   Gonio +3/+3   /539   OCT 9 test 2006 to 2017 - RNFL - OD:dec. S/N/I  // OS:dec N/I, lora S/T   HRT 14 test 2004 to 2018 - MR -  Dec. S/I od // dec. S/N/I os /// CDR 0.66 od // 0.83 os   HRT 2019 - high std dev. Ou   Disc photos - 2001, 2003, 2006 - slides // 2008, 2010, 2016, 2018   - OIS     Ta today 30/14  - on mult gtts od - pain free // off all gtts os post phaco/trab - 2013   Test today - IOP, /HRT     2.  Blind hypertensive Right Eye   Pain free - eye comfortable   Very limited vision CF at 3'   End stage glaucoma and S/P CRVO   No rubeosis   IOP is high but eye is pain free    3. Cataract OD -However, OD VA limited 2/2 CRVO and end stge glaucoma   S/P phaco/IOL/trab OS 5/22/2013    4. " FABY OU -cont ATs     5. APD OD -   Old / stable 2/2 CRVO and glaucoma    6. Band Keratopathy OD -stable. Cont ATs- being followed by Dr. Saul;    had EDTA chelation on 2/13/14 OS - now with recurrent band     7. Asteroid Hyalosis OS -stable     8. Hx of Acute Dacryocystitis OS and recent NLD OD s/p surgery 11/12 and repeat for exposure w/ Dr. Ocampo 12/5/12 -stable. Cont f/u with Damaris     9. Hx of CRVO OD -limiting visual potential OD   -No NV on todays exam     10. Ant capsule phimosis os    S/P yag laser     11. Asteroid hyalosis OS     12. PC IOL os    Combined  W/ mini shunt 5/22/2013   doing well VA is 20/25 and the IOP is 13    Plan:   Off all gtts for now OS - s/p combined (5/22/2013)  -- IOP stable - at 16 and VA is 20/30-20/40 - can add gtts back if IOP goes any higher     Cont glaucoma gtts OD -- IOP very high, but comfortable/no pain // Blind hypertensive but pain free eye   Old  crvo     Ok to use OTC readers for now a +3.00 to +3.50 - not really able to improve distance vision with glasses at this time  Much improved from before the combined  Procedure    Cont all glaucoma gtts od   Blind hypertensive right eye - pain free   Cont off glaucoma gtts os     Glasses - Matthieu - 11/7/2017 (( pt may want a stronger bifocal next time - C/O trouble reading ) )- refer to [facility] - to see if hand held magnifier might help     F/U 4 months with IOP check // gonio     I have seen and personally examined the patient.  I agree with the findings, assessment and plan of the resident and/or fellow.     Aysha Triplett MD

## 2019-03-19 ENCOUNTER — CLINICAL SUPPORT (OUTPATIENT)
Dept: OPHTHALMOLOGY | Facility: CLINIC | Age: 84
End: 2019-03-19
Payer: MEDICARE

## 2019-03-19 ENCOUNTER — OFFICE VISIT (OUTPATIENT)
Dept: OPHTHALMOLOGY | Facility: CLINIC | Age: 84
End: 2019-03-19
Payer: MEDICARE

## 2019-03-19 DIAGNOSIS — H40.89 GLAUCOMA DUE TO COMBINATION OF MECHANISMS: ICD-10-CM

## 2019-03-19 DIAGNOSIS — Z96.1 PSEUDOPHAKIA OF LEFT EYE: ICD-10-CM

## 2019-03-19 DIAGNOSIS — H18.421 BAND KERATOPATHY, RIGHT: ICD-10-CM

## 2019-03-19 DIAGNOSIS — H35.031 BLIND HYPERTENSIVE RIGHT EYE: ICD-10-CM

## 2019-03-19 DIAGNOSIS — H21.541 POSTERIOR SYNECHIAE, RIGHT: ICD-10-CM

## 2019-03-19 DIAGNOSIS — Z98.83 GLAUCOMA FILTERING BLEB OF LEFT EYE: ICD-10-CM

## 2019-03-19 DIAGNOSIS — H34.8112 CENTRAL RETINAL VEIN OCCLUSION OF RIGHT EYE, UNSPECIFIED COMPLICATION STATUS: ICD-10-CM

## 2019-03-19 DIAGNOSIS — H21.561 AFFERENT PUPILLARY DEFECT, RIGHT: ICD-10-CM

## 2019-03-19 DIAGNOSIS — H40.89 GLAUCOMA DUE TO COMBINATION OF MECHANISMS: Primary | ICD-10-CM

## 2019-03-19 DIAGNOSIS — H25.11 SENILE NUCLEAR SCLEROSIS, RIGHT: ICD-10-CM

## 2019-03-19 PROCEDURE — 92012 INTRM OPH EXAM EST PATIENT: CPT | Mod: HCNC,S$GLB,, | Performed by: OPHTHALMOLOGY

## 2019-03-19 PROCEDURE — 92133 HEIDELBERG RETINA TOMOGRAPHY (HRT) - OU - BOTH EYES: ICD-10-PCS | Mod: HCNC,S$GLB,, | Performed by: OPHTHALMOLOGY

## 2019-03-19 PROCEDURE — 99999 PR PBB SHADOW E&M-EST. PATIENT-LVL II: CPT | Mod: PBBFAC,HCNC,, | Performed by: OPHTHALMOLOGY

## 2019-03-19 PROCEDURE — 92012 PR EYE EXAM, EST PATIENT,INTERMED: ICD-10-PCS | Mod: HCNC,S$GLB,, | Performed by: OPHTHALMOLOGY

## 2019-03-19 PROCEDURE — 92133 CPTRZD OPH DX IMG PST SGM ON: CPT | Mod: HCNC,S$GLB,, | Performed by: OPHTHALMOLOGY

## 2019-03-19 PROCEDURE — 99999 PR PBB SHADOW E&M-EST. PATIENT-LVL II: ICD-10-PCS | Mod: PBBFAC,HCNC,, | Performed by: OPHTHALMOLOGY

## 2019-03-19 RX ORDER — PILOCARPINE HYDROCHLORIDE 40 MG/ML
1 SOLUTION/ DROPS OPHTHALMIC 3 TIMES DAILY
Qty: 15 ML | Refills: 6 | Status: SHIPPED | OUTPATIENT
Start: 2019-03-19 | End: 2020-09-03 | Stop reason: SDUPTHER

## 2019-03-19 RX ORDER — TIMOLOL MALEATE 5 MG/ML
1 SOLUTION OPHTHALMIC EVERY MORNING
Qty: 5 ML | Refills: 12 | Status: CANCELLED | OUTPATIENT
Start: 2019-03-19

## 2019-03-19 RX ORDER — BRIMONIDINE TARTRATE 1.5 MG/ML
1 SOLUTION/ DROPS OPHTHALMIC 3 TIMES DAILY
Qty: 1 BOTTLE | Refills: 6 | Status: SHIPPED | OUTPATIENT
Start: 2019-03-19 | End: 2019-03-19 | Stop reason: SDUPTHER

## 2019-03-19 RX ORDER — BRIMONIDINE TARTRATE 1.5 MG/ML
1 SOLUTION/ DROPS OPHTHALMIC 3 TIMES DAILY
Qty: 1 BOTTLE | Refills: 6 | Status: SHIPPED | OUTPATIENT
Start: 2019-03-19 | End: 2020-09-03 | Stop reason: SDUPTHER

## 2019-03-19 RX ORDER — PILOCARPINE HYDROCHLORIDE 40 MG/ML
1 SOLUTION/ DROPS OPHTHALMIC 3 TIMES DAILY
Qty: 15 ML | Refills: 12 | Status: CANCELLED | OUTPATIENT
Start: 2019-03-19

## 2019-03-19 RX ORDER — TIMOLOL MALEATE 5 MG/ML
1 SOLUTION OPHTHALMIC EVERY MORNING
Qty: 5 ML | Refills: 6 | Status: SHIPPED | OUTPATIENT
Start: 2019-03-19 | End: 2019-09-06 | Stop reason: SDUPTHER

## 2019-03-25 ENCOUNTER — OFFICE VISIT (OUTPATIENT)
Dept: CARDIOLOGY | Facility: CLINIC | Age: 84
End: 2019-03-25
Payer: MEDICARE

## 2019-03-25 VITALS
SYSTOLIC BLOOD PRESSURE: 154 MMHG | HEART RATE: 56 BPM | DIASTOLIC BLOOD PRESSURE: 63 MMHG | WEIGHT: 82.44 LBS | HEIGHT: 63 IN | BODY MASS INDEX: 14.61 KG/M2

## 2019-03-25 DIAGNOSIS — I10 ESSENTIAL HYPERTENSION: Primary | Chronic | ICD-10-CM

## 2019-03-25 DIAGNOSIS — I25.10 CORONARY ARTERY DISEASE INVOLVING NATIVE CORONARY ARTERY OF NATIVE HEART WITHOUT ANGINA PECTORIS: ICD-10-CM

## 2019-03-25 DIAGNOSIS — I77.9 BILATERAL CAROTID ARTERY DISEASE, UNSPECIFIED TYPE: ICD-10-CM

## 2019-03-25 DIAGNOSIS — E78.5 DYSLIPIDEMIA: Chronic | ICD-10-CM

## 2019-03-25 PROCEDURE — 3077F PR MOST RECENT SYSTOLIC BLOOD PRESSURE >= 140 MM HG: ICD-10-PCS | Mod: HCNC,CPTII,S$GLB, | Performed by: INTERNAL MEDICINE

## 2019-03-25 PROCEDURE — 99999 PR PBB SHADOW E&M-EST. PATIENT-LVL IV: CPT | Mod: PBBFAC,HCNC,, | Performed by: INTERNAL MEDICINE

## 2019-03-25 PROCEDURE — 3078F PR MOST RECENT DIASTOLIC BLOOD PRESSURE < 80 MM HG: ICD-10-PCS | Mod: HCNC,CPTII,S$GLB, | Performed by: INTERNAL MEDICINE

## 2019-03-25 PROCEDURE — 99999 PR PBB SHADOW E&M-EST. PATIENT-LVL IV: ICD-10-PCS | Mod: PBBFAC,HCNC,, | Performed by: INTERNAL MEDICINE

## 2019-03-25 PROCEDURE — 1101F PT FALLS ASSESS-DOCD LE1/YR: CPT | Mod: HCNC,CPTII,S$GLB, | Performed by: INTERNAL MEDICINE

## 2019-03-25 PROCEDURE — 3078F DIAST BP <80 MM HG: CPT | Mod: HCNC,CPTII,S$GLB, | Performed by: INTERNAL MEDICINE

## 2019-03-25 PROCEDURE — 99214 PR OFFICE/OUTPT VISIT, EST, LEVL IV, 30-39 MIN: ICD-10-PCS | Mod: HCNC,S$GLB,, | Performed by: INTERNAL MEDICINE

## 2019-03-25 PROCEDURE — 1101F PR PT FALLS ASSESS DOC 0-1 FALLS W/OUT INJ PAST YR: ICD-10-PCS | Mod: HCNC,CPTII,S$GLB, | Performed by: INTERNAL MEDICINE

## 2019-03-25 PROCEDURE — 3077F SYST BP >= 140 MM HG: CPT | Mod: HCNC,CPTII,S$GLB, | Performed by: INTERNAL MEDICINE

## 2019-03-25 PROCEDURE — 99214 OFFICE O/P EST MOD 30 MIN: CPT | Mod: HCNC,S$GLB,, | Performed by: INTERNAL MEDICINE

## 2019-03-25 NOTE — PROGRESS NOTES
"Subjective:   Patient ID:  Mj Summers is a 83 y.o. female who presents for follow-up of Coronary Artery Disease (4 month f/u )      HPI:   Mj Summers presents for follow up of Hypertension and non-obstructive CAD. She ' does her chores " but is otherwise limited. BP at home this A:M 114/ and has been ain that range to 130s/ on prior Clinic visits. Mj Summers denies chest pain, shortness of breath, palpitations, presyncope , or syncope. Mj Summers has dyslipidemia tolerating moderate intensity statin but has not had a recheck...     Review of Systems   Constitution: Negative for malaise/fatigue, weight gain and weight loss.   Eyes: Negative for blurred vision.   Cardiovascular: Negative for chest pain, claudication, cyanosis, dyspnea on exertion, irregular heartbeat, leg swelling, near-syncope, orthopnea, palpitations, paroxysmal nocturnal dyspnea and syncope.   Respiratory: Negative for cough, shortness of breath and wheezing.    Musculoskeletal: Positive for joint pain. Negative for falls and myalgias.        Legs weak    Gastrointestinal: Negative for abdominal pain, heartburn, nausea and vomiting.        GERD   Genitourinary: Negative for nocturia.   Neurological: Positive for loss of balance. Negative for brief paralysis, dizziness, focal weakness, headaches, numbness, paresthesias and weakness.   Psychiatric/Behavioral: Negative for altered mental status.       Current Outpatient Medications   Medication Sig    aspirin 81 mg Tab Take 81 mg by mouth every evening. 1 Tablet Oral Every day    bimatoprost (LUMIGAN) 0.01 % Drop Place 1 drop into the right eye every evening.    brimonidine 0.15 % OPTH DROP (ALPHAGAN) 0.15 % ophthalmic solution Place 1 drop into the right eye 3 (three) times daily.    calcium carbonate (OS-KACI) 600 mg (1,500 mg) Tab Take 600 mg by mouth 2 (two) times daily with meals.    chlorthalidone (HYGROTEN) 25 MG Tab TAKE 1 TABLET BY MOUTH EVERY DAY    " "esomeprazole (NEXIUM) 20 MG capsule Take 1 capsule (20 mg total) by mouth before breakfast.    ferrous sulfate 325 (65 FE) MG EC tablet Take 1 tablet (325 mg total) by mouth once daily.    multivitamin (ONE DAILY MULTIVITAMIN) per tablet Take 1 tablet by mouth once daily.    olmesartan (BENICAR) 20 MG tablet Take 1 tablet (20 mg total) by mouth once daily.    pilocarpine HCL 4% (PILOCAR) 4 % ophthalmic solution Place 1 drop into the right eye 3 (three) times daily.    rosuvastatin (CRESTOR) 10 MG tablet Take 1 tablet (10 mg total) by mouth once daily.    senna-docusate 8.6-50 mg (PERICOLACE) 8.6-50 mg per tablet Take 1 tablet by mouth 2 (two) times daily. (Patient taking differently: Take 1 tablet by mouth 2 (two) times daily as needed. )    timolol maleate 0.5% (TIMOPTIC-XE) 0.5 % SolG Place 1 drop into the right eye every morning.    VITAMIN D2 50,000 unit capsule TAKE ONE CAPSULE BY MOUTH EVERY MONTH     No current facility-administered medications for this visit.      Objective:   Physical Exam   Constitutional: She is oriented to person, place, and time. She appears well-developed. No distress.   BP (!) 154/63 (BP Location: Left arm, Patient Position: Sitting, BP Method: Small (Automatic))   Pulse (!) 56   Ht 5' 3" (1.6 m)   Wt 37.4 kg (82 lb 7.2 oz)   LMP 11/10/1987 (Approximate)   BMI 14.61 kg/m²    HENT:   Head: Normocephalic.   Eyes: Pupils are equal, round, and reactive to light. Conjunctivae are normal. No scleral icterus.   Neck: Neck supple. No JVD present. No thyromegaly present.   Cardiovascular: Normal rate, regular rhythm and intact distal pulses. PMI is not displaced. Exam reveals no gallop and no friction rub.   Murmur heard.   Medium-pitched midsystolic murmur is present with a grade of 2/6 at the lower left sternal border.  Pulmonary/Chest: Effort normal and breath sounds normal. No respiratory distress. She has no wheezes. She has no rales.   Abdominal: Soft. She exhibits no " distension. There is no splenomegaly or hepatomegaly. There is no tenderness.   Musculoskeletal: She exhibits no edema or tenderness.   In a wheelchair    Neurological: She is alert and oriented to person, place, and time.   Skin: Skin is warm and dry. She is not diaphoretic.   Psychiatric: She has a normal mood and affect. Her behavior is normal.       Lab Results   Component Value Date     02/26/2019    K 3.8 02/26/2019     02/26/2019    CO2 31 (H) 02/26/2019    BUN 16 02/26/2019    CREATININE 0.8 02/26/2019    GLU 95 02/26/2019    MG 2.3 12/11/2015    AST 25 02/26/2019    ALT 21 02/26/2019    ALBUMIN 3.9 02/26/2019    PROT 7.8 02/26/2019    BILITOT 0.4 02/26/2019    WBC 4.35 01/08/2019    HGB 9.9 (L) 01/08/2019    HCT 30.8 (L) 01/08/2019    HCT 38 07/27/2016    MCV 93 01/08/2019     01/08/2019    TSH 0.818 09/19/2018    CHOL 266 (H) 07/09/2018    HDL 70 07/09/2018    LDLCALC 173.8 (H) 07/09/2018    TRIG 111 07/09/2018       Assessment:     1. Essential hypertension : Adequate control per home and other Clinic detwerminations   2. Dyslipidemia :  on moderate intensity statin therapy.   3. Coronary artery disease involving native coronary artery of native heart without angina pectoris : Stable -asx   4. Bilateral carotid artery disease, unspecified type :stable -asx       Plan:     Mj was seen today for coronary artery disease.    Diagnoses and all orders for this visit:    Essential hypertension  Continue current regimen    Dyslipidemia  -     Lipid panel; Future; Expected date: 04/04/2019    Coronary artery disease involving native coronary artery of native heart without angina pectoris    Bilateral carotid artery disease, unspecified type  Have encouraged more activity to build leg strength and stamina.

## 2019-04-04 ENCOUNTER — LAB VISIT (OUTPATIENT)
Dept: LAB | Facility: OTHER | Age: 84
End: 2019-04-04
Attending: INTERNAL MEDICINE
Payer: MEDICARE

## 2019-04-04 DIAGNOSIS — E78.5 DYSLIPIDEMIA: Chronic | ICD-10-CM

## 2019-04-04 DIAGNOSIS — D64.9 ANEMIA, UNSPECIFIED TYPE: ICD-10-CM

## 2019-04-04 LAB
BASOPHILS # BLD AUTO: 0.03 K/UL (ref 0–0.2)
BASOPHILS NFR BLD: 1 % (ref 0–1.9)
CHOLEST SERPL-MCNC: 253 MG/DL (ref 120–199)
CHOLEST/HDLC SERPL: 3.6 {RATIO} (ref 2–5)
DIFFERENTIAL METHOD: ABNORMAL
EOSINOPHIL # BLD AUTO: 0.1 K/UL (ref 0–0.5)
EOSINOPHIL NFR BLD: 3.9 % (ref 0–8)
ERYTHROCYTE [DISTWIDTH] IN BLOOD BY AUTOMATED COUNT: 12.4 % (ref 11.5–14.5)
HCT VFR BLD AUTO: 33.7 % (ref 37–48.5)
HDLC SERPL-MCNC: 70 MG/DL (ref 40–75)
HDLC SERPL: 27.7 % (ref 20–50)
HGB BLD-MCNC: 11.3 G/DL (ref 12–16)
LDH SERPL L TO P-CCNC: 231 U/L (ref 110–260)
LDLC SERPL CALC-MCNC: 166.2 MG/DL (ref 63–159)
LYMPHOCYTES # BLD AUTO: 0.8 K/UL (ref 1–4.8)
LYMPHOCYTES NFR BLD: 26.3 % (ref 18–48)
MCH RBC QN AUTO: 30.9 PG (ref 27–31)
MCHC RBC AUTO-ENTMCNC: 33.5 G/DL (ref 32–36)
MCV RBC AUTO: 92 FL (ref 82–98)
MONOCYTES # BLD AUTO: 0.3 K/UL (ref 0.3–1)
MONOCYTES NFR BLD: 10.5 % (ref 4–15)
NEUTROPHILS # BLD AUTO: 1.8 K/UL (ref 1.8–7.7)
NEUTROPHILS NFR BLD: 57.6 % (ref 38–73)
NONHDLC SERPL-MCNC: 183 MG/DL
PLATELET # BLD AUTO: 272 K/UL (ref 150–350)
PMV BLD AUTO: 10.3 FL (ref 9.2–12.9)
RBC # BLD AUTO: 3.66 M/UL (ref 4–5.4)
RETICS/RBC NFR AUTO: 0.7 % (ref 0.5–2.5)
TRIGL SERPL-MCNC: 84 MG/DL (ref 30–150)
WBC # BLD AUTO: 3.04 K/UL (ref 3.9–12.7)

## 2019-04-04 PROCEDURE — 36415 COLL VENOUS BLD VENIPUNCTURE: CPT | Mod: HCNC

## 2019-04-04 PROCEDURE — 80061 LIPID PANEL: CPT | Mod: HCNC

## 2019-04-04 PROCEDURE — 85045 AUTOMATED RETICULOCYTE COUNT: CPT | Mod: HCNC

## 2019-04-04 PROCEDURE — 83615 LACTATE (LD) (LDH) ENZYME: CPT | Mod: HCNC

## 2019-04-04 PROCEDURE — 85025 COMPLETE CBC W/AUTO DIFF WBC: CPT | Mod: HCNC

## 2019-04-04 PROCEDURE — 82525 ASSAY OF COPPER: CPT | Mod: HCNC

## 2019-04-05 ENCOUNTER — TELEPHONE (OUTPATIENT)
Dept: CARDIOLOGY | Facility: CLINIC | Age: 84
End: 2019-04-05

## 2019-04-05 NOTE — TELEPHONE ENCOUNTER
----- Message from Man Morrow MD sent at 4/4/2019  5:51 PM CDT -----  Please inform the patient her cholesterol readings are little changed. Inquire as to whether she is taking the Rosuvastatin.

## 2019-04-07 LAB — COPPER SERPL-MCNC: 1686 UG/L (ref 810–1990)

## 2019-04-11 ENCOUNTER — OFFICE VISIT (OUTPATIENT)
Dept: HEMATOLOGY/ONCOLOGY | Facility: CLINIC | Age: 84
End: 2019-04-11
Payer: MEDICARE

## 2019-04-11 VITALS
HEIGHT: 63 IN | TEMPERATURE: 98 F | BODY MASS INDEX: 14.8 KG/M2 | HEART RATE: 54 BPM | OXYGEN SATURATION: 100 % | DIASTOLIC BLOOD PRESSURE: 70 MMHG | SYSTOLIC BLOOD PRESSURE: 184 MMHG | WEIGHT: 83.56 LBS | RESPIRATION RATE: 17 BRPM

## 2019-04-11 DIAGNOSIS — I10 ESSENTIAL HYPERTENSION: ICD-10-CM

## 2019-04-11 DIAGNOSIS — D53.9 NUTRITIONAL ANEMIA: ICD-10-CM

## 2019-04-11 DIAGNOSIS — D64.9 ANEMIA, UNSPECIFIED TYPE: Primary | ICD-10-CM

## 2019-04-11 PROCEDURE — 1101F PR PT FALLS ASSESS DOC 0-1 FALLS W/OUT INJ PAST YR: ICD-10-PCS | Mod: HCNC,CPTII,S$GLB, | Performed by: INTERNAL MEDICINE

## 2019-04-11 PROCEDURE — 99214 OFFICE O/P EST MOD 30 MIN: CPT | Mod: HCNC,S$GLB,, | Performed by: INTERNAL MEDICINE

## 2019-04-11 PROCEDURE — 99214 PR OFFICE/OUTPT VISIT, EST, LEVL IV, 30-39 MIN: ICD-10-PCS | Mod: HCNC,S$GLB,, | Performed by: INTERNAL MEDICINE

## 2019-04-11 PROCEDURE — 99999 PR PBB SHADOW E&M-EST. PATIENT-LVL III: CPT | Mod: PBBFAC,HCNC,, | Performed by: INTERNAL MEDICINE

## 2019-04-11 PROCEDURE — 3078F DIAST BP <80 MM HG: CPT | Mod: HCNC,CPTII,S$GLB, | Performed by: INTERNAL MEDICINE

## 2019-04-11 PROCEDURE — 3077F PR MOST RECENT SYSTOLIC BLOOD PRESSURE >= 140 MM HG: ICD-10-PCS | Mod: HCNC,CPTII,S$GLB, | Performed by: INTERNAL MEDICINE

## 2019-04-11 PROCEDURE — 1101F PT FALLS ASSESS-DOCD LE1/YR: CPT | Mod: HCNC,CPTII,S$GLB, | Performed by: INTERNAL MEDICINE

## 2019-04-11 PROCEDURE — 3078F PR MOST RECENT DIASTOLIC BLOOD PRESSURE < 80 MM HG: ICD-10-PCS | Mod: HCNC,CPTII,S$GLB, | Performed by: INTERNAL MEDICINE

## 2019-04-11 PROCEDURE — 99999 PR PBB SHADOW E&M-EST. PATIENT-LVL III: ICD-10-PCS | Mod: PBBFAC,HCNC,, | Performed by: INTERNAL MEDICINE

## 2019-04-11 PROCEDURE — 3077F SYST BP >= 140 MM HG: CPT | Mod: HCNC,CPTII,S$GLB, | Performed by: INTERNAL MEDICINE

## 2019-04-11 NOTE — PROGRESS NOTES
"Subjective:       Patient ID: Mj Summers is a 82 y.o. female.     Chief Complaint: follow up for anemia     Hematologic History:  1. Ms. Summers is a very pleasant 81 yo woman with HTN, CAD, b/l carotid disease, GERD, malnutrition, who initially saw me on 1/3/18 for further evaluation of anemia. On review of her records, her Hb has been ranging between 10 and 12 since 2010. In May 2016 it dropped to 5-7 after she underwent a surgery on 5/10/16 for left intertrochanteric fracture from a fall. Her H/H on 7/12/16 was 11.4/35.1. On 11/6/17, WBC 3.86, H/H 10.7/33.2, plt count 213. On 11/3/17, WBC 4.26, H/H 10.2/31.7, MCV 92, plt count 158. Vit B12 338, folate 17.2, ferritin 179, iron 68, TIBC 333, iron saturation 20%, retic 1.1%. CMP on 11/6/17 showed Cr 0.7, total protein 7.3, albumin 3.4, corrected calcium 9.8. Calculated CrCl 37.46 mL/min (cockroft). She has been having dysphagia over the past 2 months, which she attributed to GERD. Has been taking iron pills for many years and has been having dark stool from the iron pills. Denies any signs of bleeding.   2. Workup on 1/3/18 showed normal LDH, haptoglobin, copper levels. SPEP negative for M protein. Seen by GI. Guaiac stool negative.   3. EGD on 2/6/18 showed Small hiatal hernia. Mild Schatzki ring. Dilated. Gastric mucosal atrophy. Biopsied. Normal examined duodenum. Pathology of the gastric antrum showed "Severe chronic inactive gastritis. No evidence of active acute inflammation. Negative for Helicobacter like organisms on H&E. Intestinal metaplasia, complete type. Reactive atypia. No evidence of malignancy."  4. Colonoscopy on 2/6/18 showed "The colon (entire examined portion) was significantly tortuous.The exam was otherwise without abnormality. The cecum was partially obscured by solid material, but by washing and repositioning patient, I feel that adequate visualization was obtained. I cannot rule out an AVM in the cecum, but I don't think any mass, " "lesion, or significant polyp was missed"     INTERVAL HISTORY:   Ms. Summers returns today for follow up of anemia. She has been feeling well. Monitors BP daily. BP usually good but is elevated today. Denies other complaints.      ROS:   A ten-point system review is obtained and negative except for what was stated in the interval history.      Physical Examination:   Vital signs reviewed.   Gen: well hydrated, well developed, in no acute distress.  HEENT: normocephalic, anicteric sclerae, PERRLA, EOMI, oropharynx clear  Neck: supple, no JVD, thyromegaly, cervical or supraclavicular LAD  Lungs: CTAB, no wheezes or rales  Heart: RRR, no M/R/G  Abdomen: soft, no tenderness, non-distended, no hepatosplenomegaly, mass, or hernia. BS present  Ext: no clubbing, cyanosis, or edema  Neuro: alert and oriented x 4, no focal neuro deficit  Skin: no rash, erythema, open wound or ulcers  Psych: pleasant and appropriate mood and affect        Objective:      Diagnostic Tests:  EGD on 2/6/18 showed Small hiatal hernia. Mild Schatzki ring. Dilated. Gastric mucosal atrophy. Biopsied. Normal examined duodenum. Pathology of the gastric antrum showed "Severe chronic inactive gastritis. No evidence of active acute inflammation. Negative for Helicobacter like organisms on H&E. Intestinal metaplasia, complete type. Reactive atypia. No evidence of malignancy."     Colonoscopy on 2/6/18 showed "The colon (entire examined portion) was significantly tortuous.The exam was otherwise without abnormality. The cecum was partially obscured by solid material, but by washing and repositioning patient, I feel that adequate visualization was obtained. I cannot rule out an AVM in the cecum, but I don't think any mass, lesion, or significant polyp was missed"     Laboratory Data:  Labs reviewed. WBC 3.04, ANC 1.8, Hb 11.3, MCV 92, plt count 272, retic, LDH, copper level normal     Assessment/Plan:      1. Anemia, unspecified type    2. Nutritional anemia "     3. Essential hypertension           1.2   - Ms. Summers is an 81 yo woman with normocytic anemia. Extensive workup was negative. EGD showed severe chronic inactive gastritis but no s/o malignancy. Colonoscopy did not show bleeding sites. Guaiac stool was negative.   - Hb stable. Anemia mild. No need for intervention for now  - RTC in 6 months to monitor       3.  - Her BP is elevated today in the office. She monitor BP at home daily. SBP usually in the 140s  - f/u with PCP

## 2019-05-03 ENCOUNTER — TELEPHONE (OUTPATIENT)
Dept: OPHTHALMOLOGY | Facility: CLINIC | Age: 84
End: 2019-05-03

## 2019-05-21 ENCOUNTER — INFUSION (OUTPATIENT)
Dept: INFECTIOUS DISEASES | Facility: HOSPITAL | Age: 84
End: 2019-05-21
Attending: INTERNAL MEDICINE
Payer: MEDICARE

## 2019-05-21 VITALS — HEIGHT: 63 IN | BODY MASS INDEX: 14.8 KG/M2 | WEIGHT: 83.56 LBS

## 2019-05-21 DIAGNOSIS — M81.0 OSTEOPOROSIS, POSTMENOPAUSAL: Primary | ICD-10-CM

## 2019-05-21 PROCEDURE — 96372 THER/PROPH/DIAG INJ SC/IM: CPT | Mod: HCNC

## 2019-05-21 PROCEDURE — 63600175 PHARM REV CODE 636 W HCPCS: Mod: HCNC,JG | Performed by: INTERNAL MEDICINE

## 2019-05-21 RX ADMIN — DENOSUMAB 60 MG: 60 INJECTION SUBCUTANEOUS at 02:05

## 2019-07-12 NOTE — PROGRESS NOTES
"HPI     Glaucoma      Additional comments: 4 month ck today              Comments     DLS: 3/19/19    1) Residula OAG / MMG OU  2) Blind Hypertensive OD  3) NS OD  4) FABY  5) APD OD  6) Band Keratopathy OD  7) Asteroid Hyalosis OS  8) Hx Acute Dacryocystitis OS  9) CRVO OD  10) Ant. Capsule Phimosis OS  11) PCIOL OS    MEDS:  T1/2 GFS QAM OD   Alphagan 0.15% TID OD   Camacho 4% TID OD   Lumugan QHS OD  AT's PRN OU          Last edited by Yuridia Talamantes MA on 7/16/2019  1:27 PM. (History)              Assessment /Plan     For exam results, see Encounter Report.    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   Angle open after PI's ou   -Followed at [facility] since at least '01   First HVF 2001   First photos 2001   S/p PI OU   s/p Gonioplasty OU     Family history none   Glaucoma meds Lumigan, timolol gfs  qAM, Agan tid, Camacho tid  - all OD ((off all gtts os s/p combined)   H/O adverse rxn to glaucoma drops Azopt "felt weird"   LASERS S/p PI OU, s/p Gonioplasty OU   GLAUCOMA SURGERIES    combined phaco/trab with mini shunt OS 5/22/2013   OTHER EYE SURGERIES    S/P DCR sx OD x 2 or 3 with Damaris    Combined phaco/IOL/ trab with mini shunt OS 5/22/2013   CDR 0.99 /0.9   Tbase 42/23   Tmax 42/23 (when stopped gtts)   Ttarget pain control od // 18 os   HVF 18 VF '01 -'18 - Ext to stim V od  // SALT / IAD (gen dep) os   Gonio +3/+3   /539   OCT 9 test 2006 to 2017 - RNFL - OD:dec. S/N/I  // OS:dec N/I, lora S/T   HRT 14 test 2004 to 2018 - MR -  Dec. S/I od // dec. S/N/I os /// CDR 0.66 od // 0.83 os   HRT 2019 - high std " dev. Ou   Disc photos - 2001, 2003, 2006 - slides // 2008, 2010, 2016, 2018   - OIS     Ta today 36/17 - on mult gtts od - pain free // off all gtts os post phaco/trab - 2013   Test today - IOP, gonio    2.  Blind hypertensive Right Eye   Pain free - eye comfortable   Very limited vision CF at 3'   End stage glaucoma and S/P CRVO    No rubeosis   IOP is high but eye is pain free    3. Cataract OD -However, OD VA limited 2/2 CRVO and end stge glaucoma   S/P phaco/IOL/trab OS 5/22/2013    4. FABY OU -cont ATs     5. APD OD -   Old / stable 2/2 CRVO and glaucoma    6. Band Keratopathy OD -stable. Cont ATs- being followed by Dr. Saul;    had EDTA chelation on 2/13/14 OS - now with recurrent band     7. Asteroid Hyalosis OS -stable     8. Hx of Acute Dacryocystitis OS and recent NLD OD s/p surgery 11/12 and repeat for exposure w/ Dr. Ocampo 12/5/12 -stable. Cont f/u with Damaris     9. Hx of CRVO OD -limiting visual potential OD   -No NV on todays exam     10. Ant capsule phimosis os    S/P yag laser     11. Asteroid hyalosis OS     12. PC IOL os    Combined  W/ mini shunt 5/22/2013   doing well VA is 20/25 and the IOP is 13    Plan:   Off all gtts for now OS - s/p combined (5/22/2013)  -- IOP stable - at 16 and VA is 20/30-20/40 - can add gtts back if IOP goes any higher     Cont glaucoma gtts OD -- IOP very high, but comfortable/no pain // Blind hypertensive but pain free eye   Old  crvo     Ok to use OTC readers for now a +3.00 to +3.50 - not really able to improve distance vision with glasses at this time  Much improved from before the combined  Procedure    Cont all glaucoma gtts od   Blind hypertensive right eye - pain free   Cont off glaucoma gtts os     Glasses - Matthieu - 11/7/2017 (( pt may want a stronger bifocal next time - C/O trouble reading ) )- refer to [facility] - to see if hand held magnifier might help     F/U 4 months with IOP and DFE/OCT RNFL    I have seen and personally examined the patient.  I agree  with the findings, assessment and plan of the resident and/or fellow.     Aysha Triplett MD

## 2019-07-15 DIAGNOSIS — I10 ESSENTIAL HYPERTENSION: Chronic | ICD-10-CM

## 2019-07-15 RX ORDER — CHLORTHALIDONE 25 MG/1
TABLET ORAL
Qty: 90 TABLET | Refills: 3 | Status: SHIPPED | OUTPATIENT
Start: 2019-07-15 | End: 2020-07-14

## 2019-07-16 ENCOUNTER — OFFICE VISIT (OUTPATIENT)
Dept: OPHTHALMOLOGY | Facility: CLINIC | Age: 84
End: 2019-07-16
Payer: MEDICARE

## 2019-07-16 DIAGNOSIS — Z96.1 PSEUDOPHAKIA OF LEFT EYE: ICD-10-CM

## 2019-07-16 DIAGNOSIS — H40.89 GLAUCOMA DUE TO COMBINATION OF MECHANISMS: Primary | ICD-10-CM

## 2019-07-16 DIAGNOSIS — H34.8112 CENTRAL RETINAL VEIN OCCLUSION OF RIGHT EYE, UNSPECIFIED COMPLICATION STATUS: ICD-10-CM

## 2019-07-16 DIAGNOSIS — H35.031 BLIND HYPERTENSIVE RIGHT EYE: ICD-10-CM

## 2019-07-16 DIAGNOSIS — H25.11 SENILE NUCLEAR SCLEROSIS, RIGHT: ICD-10-CM

## 2019-07-16 DIAGNOSIS — H21.561 AFFERENT PUPILLARY DEFECT, RIGHT: ICD-10-CM

## 2019-07-16 DIAGNOSIS — H21.541 POSTERIOR SYNECHIAE, RIGHT: ICD-10-CM

## 2019-07-16 DIAGNOSIS — H18.421 BAND KERATOPATHY, RIGHT: ICD-10-CM

## 2019-07-16 DIAGNOSIS — Z98.83 GLAUCOMA FILTERING BLEB OF LEFT EYE: ICD-10-CM

## 2019-07-16 PROCEDURE — 92012 INTRM OPH EXAM EST PATIENT: CPT | Mod: HCNC,S$GLB,, | Performed by: OPHTHALMOLOGY

## 2019-07-16 PROCEDURE — 92020 GONIOSCOPY: CPT | Mod: HCNC,S$GLB,, | Performed by: OPHTHALMOLOGY

## 2019-07-16 PROCEDURE — 92020 PR SPECIAL EYE EVAL,GONIOSCOPY: ICD-10-PCS | Mod: HCNC,S$GLB,, | Performed by: OPHTHALMOLOGY

## 2019-07-16 PROCEDURE — 99999 PR PBB SHADOW E&M-EST. PATIENT-LVL II: CPT | Mod: PBBFAC,HCNC,, | Performed by: OPHTHALMOLOGY

## 2019-07-16 PROCEDURE — 92012 PR EYE EXAM, EST PATIENT,INTERMED: ICD-10-PCS | Mod: HCNC,S$GLB,, | Performed by: OPHTHALMOLOGY

## 2019-07-16 PROCEDURE — 99999 PR PBB SHADOW E&M-EST. PATIENT-LVL II: ICD-10-PCS | Mod: PBBFAC,HCNC,, | Performed by: OPHTHALMOLOGY

## 2019-07-23 ENCOUNTER — HOSPITAL ENCOUNTER (OUTPATIENT)
Dept: RADIOLOGY | Facility: OTHER | Age: 84
Discharge: HOME | End: 2019-07-23
Attending: PHYSICIAN ASSISTANT
Payer: MEDICARE

## 2019-07-23 DIAGNOSIS — R13.12 OROPHARYNGEAL DYSPHAGIA: ICD-10-CM

## 2019-07-23 PROCEDURE — 74230 X-RAY XM SWLNG FUNCJ C+: CPT | Mod: 26,HCNC,, | Performed by: RADIOLOGY

## 2019-07-23 PROCEDURE — 74230 FL MODIFIED BARIUM SWALLOW SPEECH STUDY: ICD-10-PCS | Mod: 26,HCNC,, | Performed by: RADIOLOGY

## 2019-07-23 PROCEDURE — 74230 X-RAY XM SWLNG FUNCJ C+: CPT | Mod: TC,HCNC

## 2019-07-23 PROCEDURE — 92611 MOTION FLUOROSCOPY/SWALLOW: CPT | Mod: HCNC

## 2019-07-23 NOTE — PROCEDURES
Modified Barium Swallow    Patient Name:  Mj Summers   MRN:  [id number]      Recommendations:     Recommendations:                General Recommendations:     1. Follow up with GI due to presence of cricopharyngeal impression during the swallow.   2. Outpatient speech pathology for remediation of pharyngo esophageal dysphagia, initiation of Shaker exercise program to dcr effects of the CP bar/impression    Diet recommendations:   ,     1. Regular consistency diet with thin liquids  2. Recommend a cricopharyngeal diet:  FOR EXAMPLE  ·  Toasted bread vs fresh bread  ·  Soft pasta with sauces or butte  ·      Soft cooked meats, chopped or shredded  ·     Increase sauces or gravies with meals  ·     Avoid excessively viscous or chewy items such as large amounts of peanut butter, caramels  ·    Small pills  ·  crush larger pills    Aspiration Precautions:   1. Small bites and sips  2.  Small bites of foods  3. Two swallows per bite of food  4. Alternate liquids and solids    General Precautions: Standard,      Referral     Reason for Referral  Patient was referred for a Modified Barium Swallow Study to assess the efficiency of his/her swallow function, rule out aspiration and make recommendations regarding safe dietary consistencies, effective compensatory strategies, and safe eating environment.     Diagnosis: <principal problem not specified>       History:     Pt is an 84 year old female referred for MBS due to complaints of difficulty swallowing, instances of foods and thick liquids getting stuck in her throat, need to crush medications.      Past Medical History:   Diagnosis Date    Anemia     Arthritis 2015    back    Cataract     s/p surgery    Coronary artery disease     [facility] 1/2010 - mid LAD 40%    GERD (gastroesophageal reflux disease)     Glaucoma (increased eye pressure)     Hyperlipidemia     Hypertension     Lactose intolerance     Legally blind in right eye, as defined in [place]      Osteoporosis, post-menopausal     PAD (peripheral artery disease) 7/11/2018    Pneumonia 12/13/2015    Proximal muscle weakness     Renal cyst     left kidney    Vitamin D deficiency disease        Objective:     Consistencies Assessed  · Thin 3mls, 5 mls, 10 mls, sips via straw  · Nectar thick trialed due to patient report of difficulty with thick liquids  · Puree 1/2 and 1 tsp amounts  · Solids dry solids    Oral Preparation/Oral Phase  · Lip seal, ability to form a cohesive bolus and A-p transport was WFL  · Mildly prolonged mastication of solids    Pharyngeal Phase   THIN LIQUIDS: delayed trigger of the swallow reflex, 1 sec longer than normal, (yet consistent with age) with premature spillage to the valleculae.  PENETRATION of the airway during the swallow 1/5 swallows, to the level of the laryngeal vestibule.No  aspiration during this study. Minimal residue after the swallow on the tongue base, in the valleculae. THERE IS PRESENCE OF A CRICOPHARYNGEAL BAR/IMPRESSION DURING THE SWALLOW ON ALL SWALLOWS. DURING THIS SESSION, IT DID NOT OBSTRUCT BOLUS FLOW FROM THE PHARYNGEAL INTO THE ESOPHAGUS OR THROUGH THE UPPER ESOPHAGUS.     NECTAR THICK LIQUIDS: delayed trigger of the swallow reflex, 1 sec longer than normal, (yet consistent with age) with premature spillage to the valleculae. No penetration or aspiration during this study. Minimal residue after the swallow on the tongue base, in the valleculae. THERE IS PRESENCE OF A CRICOPHARYNGEAL BAR/IMPRESSION DURING THE SWALLOW ON ALL SWALLOWS. DURING THIS SESSION, IT DID NOT OBSTRUCT BOLUS FLOW FROM THE PHARYNGEAL INTO THE ESOPHAGUS OR THROUGH THE UPPER ESOPHAGUS.     PUREE: delayed trigger of the swallow reflex, 1 sec longer than normal, (yet consistent with age) with premature spillage to the valleculae. No penetration or aspiration during this study on 3 swallows. Minimal residue after the swallow on the tongue base, in the valleculae and pyriform sinuses.  THERE IS PRESENCE OF A CRICOPHARYNGEAL BAR/IMPRESSION DURING THE SWALLOW ON ALL SWALLOWS. DURING THIS SESSION, IT DID NOT OBSTRUCT BOLUS FLOW FROM THE PHARYNGEAL INTO THE ESOPHAGUS OR THROUGH THE UPPER ESOPHAGUS.     SOLIDS: delayed trigger of the swallow reflex, 1 sec longer than normal, (yet consistent with age) with premature spillage to the valleculae while chewing. No penetration or aspiration during this study on 3 swallows. Minimal residue after the swallow on the tongue base, in the valleculae and pyriform sinuses. THERE IS PRESENCE OF A CRICOPHARYNGEAL BAR/IMPRESSION DURING THE SWALLOW ON ALL SWALLOWS. DURING THIS SESSION, IT DID NOT OBSTRUCT BOLUS FLOW FROM THE PHARYNGEAL INTO THE ESOPHAGUS OR THROUGH THE UPPER ESOPHAGUS.     Cervical Esophageal Phase   · THERE IS PRESENCE OF A CRICOPHARYNGEAL BAR/IMPRESSION DURING THE SWALLOW ON ALL SWALLOWS. DURING THIS SESSION, IT DID NOT OBSTRUCT BOLUS FLOW FROM THE PHARYNGEAL INTO THE ESOPHAGUS OR THROUGH THE UPPER ESOPHAGUS.   · However, pt is complaining of foods getting stuck in the throat. Pt at risk for instances of foods and pills getting stuck in the UES due to dcr cricopharyngeal relaxation and opening during the swallow.    Assessment:     Impressions  · Oral phase of swallow is judged to be WFL for lip seal, ability to form a cohesive bolus and a-p transport  · Mild pharyngo esophageal dysphagia noted  · Delayed in trigger of the swallow reflex consistent with age  · There was presence of a large cricopharyngeal bar/impression during the swallow.    Prognosis: Good    Education: Recorded results and recs discussed at length with patient. Discussed follow up with GI and addition of a short course of outpatient speech pathology      Plan:   · Patient to be seen:      · Plan of Care expires:     · Plan of Care reviewed with:      patient     Discharge recommendations:    outpatient speech pathology recommended      Time Tracking:   SLP Treatment Date:    07/23/19  Speech Start Time:  1400  Speech Stop Time:  1512     Speech Total Time (min):  72 min    Renu Figueroa CCC-SLP  07/23/2019

## 2019-08-01 ENCOUNTER — OFFICE VISIT (OUTPATIENT)
Dept: GASTROENTEROLOGY | Facility: CLINIC | Age: 84
End: 2019-08-01
Payer: MEDICARE

## 2019-08-01 ENCOUNTER — TELEPHONE (OUTPATIENT)
Dept: GASTROENTEROLOGY | Facility: CLINIC | Age: 84
End: 2019-08-01

## 2019-08-01 VITALS
HEIGHT: 63 IN | DIASTOLIC BLOOD PRESSURE: 55 MMHG | SYSTOLIC BLOOD PRESSURE: 146 MMHG | WEIGHT: 80.69 LBS | BODY MASS INDEX: 14.3 KG/M2 | HEART RATE: 65 BPM

## 2019-08-01 DIAGNOSIS — K21.9 GASTROESOPHAGEAL REFLUX DISEASE WITHOUT ESOPHAGITIS: Primary | ICD-10-CM

## 2019-08-01 DIAGNOSIS — R13.19 ESOPHAGEAL DYSPHAGIA: ICD-10-CM

## 2019-08-01 PROCEDURE — 3077F SYST BP >= 140 MM HG: CPT | Mod: HCNC,CPTII,S$GLB, | Performed by: PHYSICIAN ASSISTANT

## 2019-08-01 PROCEDURE — 99213 OFFICE O/P EST LOW 20 MIN: CPT | Mod: HCNC,S$GLB,, | Performed by: PHYSICIAN ASSISTANT

## 2019-08-01 PROCEDURE — 1101F PT FALLS ASSESS-DOCD LE1/YR: CPT | Mod: HCNC,CPTII,S$GLB, | Performed by: PHYSICIAN ASSISTANT

## 2019-08-01 PROCEDURE — 1101F PR PT FALLS ASSESS DOC 0-1 FALLS W/OUT INJ PAST YR: ICD-10-PCS | Mod: HCNC,CPTII,S$GLB, | Performed by: PHYSICIAN ASSISTANT

## 2019-08-01 PROCEDURE — 99999 PR PBB SHADOW E&M-EST. PATIENT-LVL IV: ICD-10-PCS | Mod: PBBFAC,HCNC,, | Performed by: PHYSICIAN ASSISTANT

## 2019-08-01 PROCEDURE — 3078F PR MOST RECENT DIASTOLIC BLOOD PRESSURE < 80 MM HG: ICD-10-PCS | Mod: HCNC,CPTII,S$GLB, | Performed by: PHYSICIAN ASSISTANT

## 2019-08-01 PROCEDURE — 99213 PR OFFICE/OUTPT VISIT, EST, LEVL III, 20-29 MIN: ICD-10-PCS | Mod: HCNC,S$GLB,, | Performed by: PHYSICIAN ASSISTANT

## 2019-08-01 PROCEDURE — 3078F DIAST BP <80 MM HG: CPT | Mod: HCNC,CPTII,S$GLB, | Performed by: PHYSICIAN ASSISTANT

## 2019-08-01 PROCEDURE — 3077F PR MOST RECENT SYSTOLIC BLOOD PRESSURE >= 140 MM HG: ICD-10-PCS | Mod: HCNC,CPTII,S$GLB, | Performed by: PHYSICIAN ASSISTANT

## 2019-08-01 PROCEDURE — 99999 PR PBB SHADOW E&M-EST. PATIENT-LVL IV: CPT | Mod: PBBFAC,HCNC,, | Performed by: PHYSICIAN ASSISTANT

## 2019-08-01 NOTE — PATIENT INSTRUCTIONS
If no relief with the zantac, let me know and I will prescribe another medicine    GERD  Worst Foods for Acid Reflux  Chocolate (milk chocolate worse than dark chocolate)  Soda (all carbonated beverages)  Alcohol (beer, liquor, wine)  Fried foods  [place], sausage, ribs  Cream sauce  Fatty meats (beef)  Butter, margarine, lard, shortening  Coffee, tea  Mint   High fat nuts  Hot sauces and pepper  Citrus fruit/juices      Acidic foods (pH - 1 is MORE acidic, 5 is LESS acidic)     Do not eat or drink these (lower numbers are worse)    Induction diet - For 2 weeks eat nothing below pH 5     Lemon juice 2.3  Grape cranberry juice 2.5  Stomach Acid 2.5  Gelatin Dessert 2.6  Lemon/lime 2.9/2.7  Vinegar 2.9  Gatorade 3.0  Fruits - plums, apricots, strawberries, cherries 3.0  Vitamin C (ascorbic acid) 3.0  Iced tea, Snapple 3.1  Mustard 3.2  Soft drinks 3.3  Nectarines 3.3  Pomegranate 3.3  Applesauce 3.4  Grapefruit 3.4  Kiwi 3.4  Barbecue sauce 3.4  Caesar dressing 3.5  [place]ousand island dressing 3.6  Strawberries 3.5  Pineapple juice 3.5  Beer 3.5  Wine 3.5  Grape 3.6  Apples 3.6  Pineapple 3.7  Pickle 3.7  Blackberries, blueberries 3.7  Truchas 3.7  Orange 3.8  Cherries 3.9  Red Bull 3.9  Tomatoes 4.2  Coffee 5.1      These are Safe foods:  Agave  Aloe Vera  Apple (only red)  Bagels  Banana (worsens reflux in 1%)  Beans - black, red, lima, lentils  Bread - whole grain, rye  Caramel  Celery  Chamomile tea  Chicken - skinless, never fried  Chicken stock or bouillon  Coffee - one cup/day with milk  Fennel  Fish  Eva  Green vegetables (no green peppers)  Herbs  Honey  Melon  Milk - skin, soy, or Lactaid skim milk  Mushrooms  Oatmeal  Olive oil  Parsley  Pasta  Pears  Popcorn  Potatoes  Red bell peppers  Rice  Soups  Tofu  Turkey Breast  Turnip  Vegetables - no onion, tomatoes, peppers  Vinaigrette  Water - non carbonated  Whole grain breads, crackers, breakfast cereals      Best Foods for Acid Reflux  Whole grain  breads  Oatmeal  Aloe Vera  Salad (no tomatoes, onions, cheese, or high fat dressing)  Banana  Melon  Fennel  Chicken and turkey (skinless, never fried)  Fish/seafood (never fried)  Celery  Parsley  Couscous and Rice    Maybe bad foods (Everyone is unique)  Tomatoes  Garlic  Onion  Nuts (macadamia nuts)  Apples (especially green)  Cucumber  Green peppers  Spicy food  Some herbal teas    GERD tips  Change what you eat:  Eat smaller meals  Eat slowly and chew thoroughly until food is almost liquid  Cut down on junk carbohydrates such as sugar and white flour  Use herbs in your cooking  Eat more raw foods (more than 10 ingredients is not a raw food)  Avoid trans fats and partially hydrogenated oils  Eat more fish and switch to grass fed beef  Switch your cooking oil to macadamia nut or olive oil  Watch extremes of salt intake (too high or too low is bad)    Change these habits:  Stop smoking  Eat dinner earlier (3-4 hours before lying down to sleep)  Elevate the head of your bed 6 inches (blocks under the head of the bed are better than pillows)  Exercise (but wait 2 hours after eating)  Drink more water (between meals)    Take these supplements:  Multi vitamin  Probiotic  Fish oil    Most common food allergens: milk, eggs, peanuts, tree nuts, fish, shellfish, wheat, and soy    All natural immediate relief:  Chew 2-3 soft probiotic capsules - Dr. Alvarado's Probiotics 12 Plus  Chew chewable DGL licorice tablet  Chew papaya tablet with high protein meal - [employer]  Drink 2 ounces of aloe vera juice  Swedish bitters  Prelief- reduce the acid in food to keep it form burning sensitive tissue  Iberogast  S[place]ppery Elm  Drink Chamomile Tea  Teaspoon of baking soda in water  Spoonful of vinegar in water      All natural ulcer healers:  Zinc carnosine - 75.5 mg with food twice a day x 8 weeks   Da Lezama - $8 for 60 pills  DGL (deglycyrrhizinated licorice) - 2 tablets before meals. Heals stomach lining   Natural  Factors brand, Enzymatic therapy brand.  Aloe Vera juice  - 2 to 8 ounces a day   Jesus or [place] of [place]

## 2019-08-01 NOTE — PROGRESS NOTES
[facility] Note    Reason for Visit:  The primary encounter diagnosis was Gastroesophageal reflux disease without esophagitis. A diagnosis of Esophageal dysphagia was also pertinent to this visit.    PCP:   Shasta Urbina       Referring MD:  No referring provider defined for this encounter.    HPI:  This is a 84 y.o. female here for f/u evaluation of dysphagia and GERD  Interval history  Currently doing well on Nexium 20 mg 3 times a week   Occasional breakthrough reflux  She says that she feels bad after taking it though  Tries to avoid acidic fooods  Intermittent dysphagia of solids  Recent episode was after eating Triskets and peanut butter  Recently had modified barium swallow study, results are pending    She reports good relief of her dysphagia after her February 2018 esophageal dilation    Still Spitting up mucus, possibly with more dairy intake    Her recent bone density scan revealed osteoporosis     08/01/2018 visit notes  Last seen by MARCELINO Hernandez 6 month ago for anemia and GERD. VCE was discussed but pt deferred.     Interval Hx  Still having some dysphagia   Spitting up mucus  Denies melena or rectal bleeding  Taking iron daily. Iron levels are now normal  Hgb stable at 11.6  Taking nexium a couple times a month only, instead of daily as advised.     2/2018 visit notes  Hx of dysphagia. Was feeling solids not go down. Occurring a couple times per month. No difficulty swallowing liquids, painful swallowing, inducing vomiting. NSAID usage- none. Hx of Gerd not taking Nexium daily, taking every couple days. EGD done 2/6/18 Small hiatal hernia. Mild Schatzki ring. Dilated. Gastric mucosal atrophy. Currently, pt reports improvement in dysphagia and taking Nexium daily.     Hx of Anemia, recently followed up with Hematology recommending EGD and Colonoscopy to rule out GI bleed. Hgb ranging 10-12 since 2010. Taking iron daily for years and will see darker stool. Denies blood in  stool and melena. Having normal formed stool daily. FIT test 1/23/18 negative. Iron studies 1/8/18 normal. Colonoscopy done 2/6/18 Tortuous colon, otherwise normal. Currently, pt denies SOB at rest, CP, fatigue, lightheadedness, syncope, blood in stool.     ROS:  Constitutional: No fevers, no chills, No unintentional weight loss, no fatigue   ENT: No allergies  CV: No chest pain, no palpitations, no perif. edema  Pulm: No cough, No shortness of breath, no wheezes, no sputum  Ophtho: No vision changes  GI: see HPI  Derm: No rash  Heme: No lymphadenopathy, No bruising  MSK: No arthritis, no muscle pain, no muscle weakness  : No dysuria, No hematuria  Endo: No hot or cold intolerance  Neuro: No syncope, No seizure     Medical History:  has a past medical history of Anemia, Arthritis (2015), Cataract, Coronary artery disease, GERD (gastroesophageal reflux disease), Glaucoma (increased eye pressure), Hyperlipidemia, Hypertension, Lactose intolerance, Legally blind in right eye, as defined in [place], Osteoporosis, post-menopausal, PAD (peripheral artery disease) (7/11/2018), Pneumonia (12/13/2015), Proximal muscle weakness, Renal cyst, and Vitamin D deficiency disease.    Surgical History:  has a past surgical history that includes Tonsillectomy; Tear duct surgery; Breast biopsy; Cardiac catheterization (01/14/2010); EDTA CHELATION (Left, 2/14); Adenoidectomy; Femur fracture surgery (Left, 05/09/2016); YAG CAPSULOTOMY (Left, 06/22/2017); Upper gastrointestinal endoscopy; Colonoscopy (N/A, 2/6/2018); Eye surgery; Fracture surgery (2016); Trabeculectomy (Left, 05/22/2013); and Cataract extraction w/  intraocular lens implant (Left, 05/22/2013).    Family History: family history includes Alzheimer's disease in her father; Arthritis in her sister; Asthma in her sister; Colon cancer (age of onset: 44) in her other; Diabetes in her brother and daughter; Glaucoma in her mother; Heart disease in her brother and mother;  Hyperlipidemia in her brother; Hypertension in her brother, daughter, father, and son; Osteoporosis in her mother, sister, and sister; Peripheral vascular disease in her brother and father; Rheum arthritis in her brother and sister; Stroke in her brother..     Social History:  reports that she has never smoked. She has never used smokeless tobacco. She reports that she does not drink alcohol or use drugs.    Review of patient's allergies indicates:   Allergen Reactions    Pcn [penicillins] Rash    Sulfa (sulfonamide antibiotics) Itching     Current Outpatient Medications   Medication Sig    aspirin 81 mg Tab Take 81 mg by mouth every evening. 1 Tablet Oral Every day    bimatoprost (LUMIGAN) 0.01 % Drop Place 1 drop into the right eye every evening.    brimonidine 0.15 % OPTH DROP (ALPHAGAN) 0.15 % ophthalmic solution Place 1 drop into the right eye 3 (three) times daily.    calcium carbonate (OS-KACI) 600 mg (1,500 mg) Tab Take 600 mg by mouth 2 (two) times daily with meals.    chlorthalidone (HYGROTEN) 25 MG Tab TAKE 1 TABLET BY MOUTH EVERY DAY    ferrous sulfate 325 (65 FE) MG EC tablet Take 1 tablet (325 mg total) by mouth once daily.    multivitamin (ONE DAILY MULTIVITAMIN) per tablet Take 1 tablet by mouth once daily.    olmesartan (BENICAR) 20 MG tablet Take 1 tablet (20 mg total) by mouth once daily.    pilocarpine HCL 4% (PILOCAR) 4 % ophthalmic solution Place 1 drop into the right eye 3 (three) times daily.    rosuvastatin (CRESTOR) 10 MG tablet Take 1 tablet (10 mg total) by mouth once daily.    senna-docusate 8.6-50 mg (PERICOLACE) 8.6-50 mg per tablet Take 1 tablet by mouth 2 (two) times daily.    timolol maleate 0.5% (TIMOPTIC-XE) 0.5 % SolG Place 1 drop into the right eye every morning.    VITAMIN D2 50,000 unit capsule TAKE ONE CAPSULE BY MOUTH EVERY MONTH    ranitidine (ZANTAC) 150 MG tablet Take 2 tablets (300 mg total) by mouth 2 (two) times daily.     No current  "facility-administered medications for this visit.      Objective Findings:    Vital Signs:  BP (!) 146/55 Comment: pt took bp medication today  Pulse 65   Ht 5' 3" (1.6 m)   Wt 36.6 kg (80 lb 11 oz)   LMP 11/10/1987 (Approximate)   BMI 14.29 kg/m²   Body mass index is 14.29 kg/m².    Physical Exam:  General Appearance: Well appearing in no acute distress  Head: Normocephalic, without obvious abnormality  Eyes: No scleral icterus  ENT: Neck supple, Lips, mucosa, and tongue normal  Abdomen: soft, NT to palpation, BS x 4  Extremities: No clubbing, cyanosis or edema  Skin: No rash to exposed areas  Neurologic: AAOx4    Labs:  Lab Results   Component Value Date    WBC 3.04 (L) 04/04/2019    HGB 11.3 (L) 04/04/2019    HCT 33.7 (L) 04/04/2019     04/04/2019    CHOL 253 (H) 04/04/2019    TRIG 84 04/04/2019    HDL 70 04/04/2019    ALT 21 02/26/2019    AST 25 02/26/2019     02/26/2019    K 3.8 02/26/2019     02/26/2019    CREATININE 0.8 02/26/2019    BUN 16 02/26/2019    CO2 31 (H) 02/26/2019    TSH 0.818 09/19/2018    INR 1.0 05/23/2016     Esophagram normal    Endoscopy:    EGD- 6/2014 normal.     EGD- 2/6/18- Small hiatal hernia. Mild Schatzki ring. Dilated. Gastric mucosal atrophy    Colonoscopy- 2007- Tortuous colon, otherwise normal. Repeat in 5-10 years.     Assessment:  1. Gastroesophageal reflux disease without esophagitis    2. Esophageal dysphagia     Osteoporosis    82yo F with Hx of dysphagia, GERD and Schatzki's ring requiring dilation.  Currently doing well taking Nexium 20 mg 3 times a week.  She says that she does not feel well after taking the Nexium    Recommendations:  1.  Trial of discontinuing Nexium.  Will switch to Zantac 300 mg twice daily  If no relief will try Prilosec 20 mg  Will need to contact speech pathologist for the details of her modified barium swallow study  Order summary:  Orders Placed This Encounter    ranitidine (ZANTAC) 150 MG tablet     Thank you so much for " allowing me to participate in the care of Mj Mccall PA-C

## 2019-08-04 ENCOUNTER — TELEPHONE (OUTPATIENT)
Dept: GASTROENTEROLOGY | Facility: CLINIC | Age: 84
End: 2019-08-04

## 2019-08-04 DIAGNOSIS — R13.13 CRICOPHARYNGEAL DYSPHAGIA: Primary | ICD-10-CM

## 2019-08-09 ENCOUNTER — TELEPHONE (OUTPATIENT)
Dept: GASTROENTEROLOGY | Facility: CLINIC | Age: 84
End: 2019-08-09

## 2019-08-09 NOTE — TELEPHONE ENCOUNTER
----- Message from Wilberto Balderas MA sent at 8/9/2019 10:22 AM CDT -----  Contact: self [phone]      ----- Message -----  From: Allyson Frost  Sent: 8/9/2019  10:03 AM  To: Cal PARMAR Staff    Patient Returning Call from [facility]    Who Left Message for Patient: Lor from 08/04.  Communication Preference: self [phone]  Additional Information: She states she received a letter in the mail from Carlos stating she need to see a cancer MD and she would like to know why.

## 2019-08-09 NOTE — TELEPHONE ENCOUNTER
Spoke with pt and informed her that she's not going to see a cancer doctor, Cal referred her to ENT and to call the number on her appt letter to get directions to their clinic.

## 2019-08-20 ENCOUNTER — PATIENT OUTREACH (OUTPATIENT)
Dept: ADMINISTRATIVE | Facility: HOSPITAL | Age: 84
End: 2019-08-20

## 2019-08-20 DIAGNOSIS — Z12.31 ENCOUNTER FOR SCREENING MAMMOGRAM FOR MALIGNANT NEOPLASM OF BREAST: Primary | ICD-10-CM

## 2019-08-20 NOTE — PROGRESS NOTES
Spoke with pt, made aware of need to schedule her MMG, she will consult with her ride and then schedule at the end of September.

## 2019-08-22 ENCOUNTER — OFFICE VISIT (OUTPATIENT)
Dept: OTOLARYNGOLOGY | Facility: CLINIC | Age: 84
End: 2019-08-22
Payer: MEDICARE

## 2019-08-22 VITALS
SYSTOLIC BLOOD PRESSURE: 158 MMHG | HEART RATE: 55 BPM | DIASTOLIC BLOOD PRESSURE: 61 MMHG | WEIGHT: 79 LBS | BODY MASS INDEX: 13.99 KG/M2

## 2019-08-22 DIAGNOSIS — R13.14 DYSPHAGIA, PHARYNGOESOPHAGEAL: Primary | ICD-10-CM

## 2019-08-22 DIAGNOSIS — J38.3 VOCAL FOLD ATROPHY: ICD-10-CM

## 2019-08-22 PROCEDURE — 3077F SYST BP >= 140 MM HG: CPT | Mod: HCNC,CPTII,S$GLB, | Performed by: OTOLARYNGOLOGY

## 2019-08-22 PROCEDURE — 31575 PR LARYNGOSCOPY, FLEXIBLE; DIAGNOSTIC: ICD-10-PCS | Mod: HCNC,S$GLB,, | Performed by: OTOLARYNGOLOGY

## 2019-08-22 PROCEDURE — 3078F DIAST BP <80 MM HG: CPT | Mod: HCNC,CPTII,S$GLB, | Performed by: OTOLARYNGOLOGY

## 2019-08-22 PROCEDURE — 99999 PR PBB SHADOW E&M-EST. PATIENT-LVL III: CPT | Mod: PBBFAC,HCNC,, | Performed by: OTOLARYNGOLOGY

## 2019-08-22 PROCEDURE — 31575 DIAGNOSTIC LARYNGOSCOPY: CPT | Mod: HCNC,S$GLB,, | Performed by: OTOLARYNGOLOGY

## 2019-08-22 PROCEDURE — 99203 PR OFFICE/OUTPT VISIT, NEW, LEVL III, 30-44 MIN: ICD-10-PCS | Mod: 25,HCNC,S$GLB, | Performed by: OTOLARYNGOLOGY

## 2019-08-22 PROCEDURE — 3078F PR MOST RECENT DIASTOLIC BLOOD PRESSURE < 80 MM HG: ICD-10-PCS | Mod: HCNC,CPTII,S$GLB, | Performed by: OTOLARYNGOLOGY

## 2019-08-22 PROCEDURE — 3077F PR MOST RECENT SYSTOLIC BLOOD PRESSURE >= 140 MM HG: ICD-10-PCS | Mod: HCNC,CPTII,S$GLB, | Performed by: OTOLARYNGOLOGY

## 2019-08-22 PROCEDURE — 99999 PR PBB SHADOW E&M-EST. PATIENT-LVL III: ICD-10-PCS | Mod: PBBFAC,HCNC,, | Performed by: OTOLARYNGOLOGY

## 2019-08-22 PROCEDURE — 1101F PT FALLS ASSESS-DOCD LE1/YR: CPT | Mod: HCNC,CPTII,S$GLB, | Performed by: OTOLARYNGOLOGY

## 2019-08-22 PROCEDURE — 99499 RISK ADDL DX/OHS AUDIT: ICD-10-PCS | Mod: HCNC,S$GLB,, | Performed by: OTOLARYNGOLOGY

## 2019-08-22 PROCEDURE — 99203 OFFICE O/P NEW LOW 30 MIN: CPT | Mod: 25,HCNC,S$GLB, | Performed by: OTOLARYNGOLOGY

## 2019-08-22 PROCEDURE — 99499 UNLISTED E&M SERVICE: CPT | Mod: HCNC,S$GLB,, | Performed by: OTOLARYNGOLOGY

## 2019-08-22 PROCEDURE — 1101F PR PT FALLS ASSESS DOC 0-1 FALLS W/OUT INJ PAST YR: ICD-10-PCS | Mod: HCNC,CPTII,S$GLB, | Performed by: OTOLARYNGOLOGY

## 2019-08-22 NOTE — LETTER
August 23, 2019      Carlos Mccall PA-C  [address]  [facility] [address]           [facility]  [address] [address]  [facility] [address]  Phone: [phone]  Fax: [phone]          Patient: Mj Summers   MR Number: [id number]   YOB: 1935   Date of Visit: 8/22/2019       Dear Carlos Mccall:    Thank you for referring Mj Summers to me for evaluation. Attached you will find relevant portions of my assessment and plan of care.    If you have questions, please do not hesitate to call me. I look forward to following Mj Summers along with you.    Sincerely,    Kenneth Meza MD    Enclosure  CC:  No Recipients    If you would like to receive this communication electronically, please contact [email or web address] or [phone] to request more information on [employer] Link access.    For providers and/or their staff who would like to refer a patient to [facility], please contact us through our one-stop-shop provider referral line, [facility], at [phone].    If you feel you have received this communication in error or would no longer like to receive these types of communications, please e-mail [email or web address]

## 2019-08-22 NOTE — PROGRESS NOTES
[facility]  Department of Otorhinolaryngology and Communication Sciences    Mj Summers is a 84 y.o. female who presents to the [facility] for consultation at the kind request of Carlos Mccall for further evaluation of dysphagia.     She complains of inconsistent difficulty swallowing. Onset was gradual. Duration is a few years. Time course is intermittent. Symptoms are stable. Exacerbating factors include solid foods, larger bolus size. Alleviating factors include liquid wash, smaller bolus size. Associated symptoms include postnasal drip. Has complete upper/lower dentures. No Heimlich maneuvers necessary. No aspiration-related illnesses.     She is being treated for reflux. Recently changed to Zantac.    Had EGD/dilation in 2/2018. A small hiatal hernia was present.       A mild Schatzki ring (acquired) was found at the gastroesophageal        junction. A TTS dilator was passed through the scope. Dilation with        a 12-13.5-15 mm x 5.5 cm CRE balloon dilator was performed to 15 mm.   Dilation of the Schatzki ring was helpful for about a year.    MBSS 8/2/2019: mild pharyngoesophageal dysphagia; nonobstructive CP bar; rec'd reg diet, thin liquids and SLP treatment for swallowing    Has not followed up for SLP treatment yet.    Dr. Morrow is her cardiologist.  Hypertension and non-obstructive CAD. Bilateral carotid artery disease. Dyslipidemia.      Past Medical History  She has a past medical history of Anemia, Arthritis, Cataract, Coronary artery disease, GERD (gastroesophageal reflux disease), Glaucoma (increased eye pressure), Hyperlipidemia, Hypertension, Lactose intolerance, Legally blind in right eye, as defined in [place], Osteoporosis, post-menopausal, PAD (peripheral artery disease), Pneumonia, Proximal muscle weakness, Renal cyst, and Vitamin D deficiency disease.    Past Surgical History  She has a past surgical history that includes Tonsillectomy; Tear duct surgery; Breast biopsy;  Cardiac catheterization (01/14/2010); EDTA CHELATION (Left, 2/14); Adenoidectomy; Femur fracture surgery (Left, 05/09/2016); YAG CAPSULOTOMY (Left, 06/22/2017); Upper gastrointestinal endoscopy; Colonoscopy (N/A, 2/6/2018); Eye surgery; Fracture surgery (2016); Trabeculectomy (Left, 05/22/2013); and Cataract extraction w/  intraocular lens implant (Left, 05/22/2013).    Family History  Her family history includes Alzheimer's disease in her father; Arthritis in her sister; Asthma in her sister; Colon cancer (age of onset: 44) in her other; Diabetes in her brother and daughter; Glaucoma in her mother; Heart disease in her brother and mother; Hyperlipidemia in her brother; Hypertension in her brother, daughter, father, and son; Osteoporosis in her mother, sister, and sister; Peripheral vascular disease in her brother and father; Rheum arthritis in her brother and sister; Stroke in her brother.    Social History  She reports that she has never smoked. She has never used smokeless tobacco. She reports that she does not drink alcohol or use drugs.    Allergies  She is allergic to strawberries [strawberry]; chocolate flavor; atorvastatin; pcn [penicillins]; pravastatin; and sulfa (sulfonamide antibiotics).    Medications  She has a current medication list which includes the following prescription(s): aspirin, bimatoprost, brimonidine 0.15 % opth drop, calcium carbonate, chlorthalidone, ferrous sulfate, multivitamin, olmesartan, pilocarpine hcl 4%, ranitidine, rosuvastatin, senna-docusate 8.6-50 mg, timolol maleate 0.5%, and vitamin d2.    Review of Systems   Constitutional: Negative for fever.   HENT: Negative for sore throat.    Eyes: Negative for visual disturbance.   Respiratory: Negative for wheezing.    Cardiovascular: Negative for chest pain.   Gastrointestinal: Negative for nausea.   Musculoskeletal: Negative for arthralgias.   Skin: Negative for rash.   Neurological: Negative for tremors.   Hematological: Does not  bruise/bleed easily.   Psychiatric/Behavioral: The patient is not nervous/anxious.           Objective:     BP (!) 158/61   Pulse (!) 55   Wt 35.8 kg (79 lb)   LMP 11/10/1987 (Approximate)   BMI 13.99 kg/m²      Physical Exam    Constitutional: comfortable, well dressed, thin  Psychiatric: appropriate affect  Respiratory: comfortably breathing, symmetric chest rise, no stridor  Voice: appropriate for age/gender  Cardiovascular: upper extremities non-edematous  Lymphatic: no cervical lymphadenopathy  Neurologic: alert and oriented to time, place, person, and situation; cranial nerves 3-12 grossly intact  Head: normocephalic  Eyes: conjunctivae and sclerae clear  Ears: normal pinnae, normal external auditory canals, tympanic membranes intact  Nose: mucosa pink and noncongested, no masses, no mucopurulence, no polyps  Oral cavity / oropharynx: no mucosal lesions  Neck: soft, full range of motion, laryngotracheal complex palpable with appropriate landmarks, larynx elevates on swallowing  Indirect laryngoscopy: limited due to gag    Procedure  Flexible Laryngoscopy (77375): Laryngoscopy is indicated for assessment of upper aerodigestive structure and function. This was carried out transnasally with a [employer] chip videoendoscope. After verbal consent was obtained, the patient was positioned and the nose was topically decongested with 1% phenylephrine and topically anesthetized with 4% lidocaine. The endoscope was passed through the most patent nasal cavity and positioned to image the nasopharynx, larynx, and hypopharynx in detail. The following features were examined: nasopharyngeal, laryngeal, hypopharyngeal masses; velopharyngeal strength, closure, and symmetry of motion; vocal fold range and symmetry of motion; laryngeal mucosal edema, erythema, inflammation, and hydration; salivary pooling; and gross laryngeal sensation. The equipment was removed. The patient tolerated the procedure well without complication. All  findings were normal except:  - attenuated pharyngeal squeeze  - no pooling  - no masses, no mucosal abnormalities, no paralysis/paresis    Data Reviewed  see HPI      Assessment:     Mj Summers is a 84 y.o. female with mild, inconsistent dysphagia symptoms. She has GE reflux, a Schatzki ring, attenuated pharyngeal strength, and a nonobstructive CP bar.       Plan:        I had a discussion with the patient regarding her condition and the further workup and management options.      I recommended adherence to the SLP strategies recommended at the time of her MBSS. She may benefit from SLP swallowing therapy, but she defers at present. I recommended continued gastroenterology follow up.    She will follow up with me in the future on an as-needed basis.    All questions were answered, and the patient is in agreement with the above.     Kenneth Meza M.D.  [facility]  Department of Otorhinolaryngology and Communication Sciences

## 2019-08-27 ENCOUNTER — TELEPHONE (OUTPATIENT)
Dept: CARDIOLOGY | Facility: CLINIC | Age: 84
End: 2019-08-27

## 2019-08-27 NOTE — TELEPHONE ENCOUNTER
Returned patient's call but no answer. Left message on her recorder asking her to call back and let nus know what questions she has about the 9/24 lab appt.     Dylan Weiss Staff   Caller: PT (Today,  2:11 PM)             Pt would like to be called back asap regarding questions concerning a lab scheduled for 9/24/19.     Pt can be reached at [phone].     Thanks

## 2019-09-04 ENCOUNTER — LAB VISIT (OUTPATIENT)
Dept: LAB | Facility: HOSPITAL | Age: 84
End: 2019-09-04
Attending: INTERNAL MEDICINE
Payer: MEDICARE

## 2019-09-04 ENCOUNTER — OFFICE VISIT (OUTPATIENT)
Dept: INTERNAL MEDICINE | Facility: CLINIC | Age: 84
End: 2019-09-04
Payer: MEDICARE

## 2019-09-04 VITALS
WEIGHT: 84 LBS | HEIGHT: 63 IN | SYSTOLIC BLOOD PRESSURE: 158 MMHG | BODY MASS INDEX: 14.88 KG/M2 | DIASTOLIC BLOOD PRESSURE: 59 MMHG | HEART RATE: 61 BPM | OXYGEN SATURATION: 98 %

## 2019-09-04 DIAGNOSIS — I10 HYPERTENSION, UNSPECIFIED TYPE: ICD-10-CM

## 2019-09-04 DIAGNOSIS — I10 HYPERTENSION, UNSPECIFIED TYPE: Primary | ICD-10-CM

## 2019-09-04 LAB
ALBUMIN SERPL BCP-MCNC: 4.1 G/DL (ref 3.5–5.2)
ALP SERPL-CCNC: 70 U/L (ref 55–135)
ALT SERPL W/O P-5'-P-CCNC: 13 U/L (ref 10–44)
ANION GAP SERPL CALC-SCNC: 6 MMOL/L (ref 8–16)
AST SERPL-CCNC: 23 U/L (ref 10–40)
BILIRUB SERPL-MCNC: 0.4 MG/DL (ref 0.1–1)
BUN SERPL-MCNC: 16 MG/DL (ref 8–23)
CALCIUM SERPL-MCNC: 9.9 MG/DL (ref 8.7–10.5)
CHLORIDE SERPL-SCNC: 104 MMOL/L (ref 95–110)
CO2 SERPL-SCNC: 29 MMOL/L (ref 23–29)
CREAT SERPL-MCNC: 0.8 MG/DL (ref 0.5–1.4)
EST. GFR  (AFRICAN AMERICAN): >60 ML/MIN/1.73 M^2
EST. GFR  (NON AFRICAN AMERICAN): >60 ML/MIN/1.73 M^2
GLUCOSE SERPL-MCNC: 87 MG/DL (ref 70–110)
POTASSIUM SERPL-SCNC: 3.9 MMOL/L (ref 3.5–5.1)
PROT SERPL-MCNC: 8.1 G/DL (ref 6–8.4)
SODIUM SERPL-SCNC: 139 MMOL/L (ref 136–145)

## 2019-09-04 PROCEDURE — 3078F DIAST BP <80 MM HG: CPT | Mod: HCNC,CPTII,S$GLB, | Performed by: INTERNAL MEDICINE

## 2019-09-04 PROCEDURE — 84443 ASSAY THYROID STIM HORMONE: CPT | Mod: HCNC

## 2019-09-04 PROCEDURE — 99397 PER PM REEVAL EST PAT 65+ YR: CPT | Mod: HCNC,S$GLB,, | Performed by: INTERNAL MEDICINE

## 2019-09-04 PROCEDURE — 3078F PR MOST RECENT DIASTOLIC BLOOD PRESSURE < 80 MM HG: ICD-10-PCS | Mod: HCNC,CPTII,S$GLB, | Performed by: INTERNAL MEDICINE

## 2019-09-04 PROCEDURE — 36415 COLL VENOUS BLD VENIPUNCTURE: CPT | Mod: HCNC

## 2019-09-04 PROCEDURE — 3077F PR MOST RECENT SYSTOLIC BLOOD PRESSURE >= 140 MM HG: ICD-10-PCS | Mod: HCNC,CPTII,S$GLB, | Performed by: INTERNAL MEDICINE

## 2019-09-04 PROCEDURE — 99999 PR PBB SHADOW E&M-EST. PATIENT-LVL IV: ICD-10-PCS | Mod: PBBFAC,HCNC,, | Performed by: INTERNAL MEDICINE

## 2019-09-04 PROCEDURE — 99397 PR PREVENTIVE VISIT,EST,65 & OVER: ICD-10-PCS | Mod: HCNC,S$GLB,, | Performed by: INTERNAL MEDICINE

## 2019-09-04 PROCEDURE — 80053 COMPREHEN METABOLIC PANEL: CPT | Mod: HCNC

## 2019-09-04 PROCEDURE — 3077F SYST BP >= 140 MM HG: CPT | Mod: HCNC,CPTII,S$GLB, | Performed by: INTERNAL MEDICINE

## 2019-09-04 PROCEDURE — 99999 PR PBB SHADOW E&M-EST. PATIENT-LVL IV: CPT | Mod: PBBFAC,HCNC,, | Performed by: INTERNAL MEDICINE

## 2019-09-04 RX ORDER — OLMESARTAN MEDOXOMIL 20 MG/1
20 TABLET ORAL DAILY
Qty: 90 TABLET | Refills: 1 | Status: SHIPPED | OUTPATIENT
Start: 2019-09-04 | End: 2019-10-31

## 2019-09-05 LAB — TSH SERPL DL<=0.005 MIU/L-ACNC: 1.14 UIU/ML (ref 0.4–4)

## 2019-09-06 ENCOUNTER — TELEPHONE (OUTPATIENT)
Dept: GASTROENTEROLOGY | Facility: CLINIC | Age: 84
End: 2019-09-06

## 2019-09-06 RX ORDER — OMEPRAZOLE 20 MG/1
20 CAPSULE, DELAYED RELEASE ORAL DAILY
Qty: 30 CAPSULE | Refills: 5 | Status: SHIPPED | OUTPATIENT
Start: 2019-09-06 | End: 2021-09-15 | Stop reason: ALTCHOICE

## 2019-09-06 RX ORDER — TIMOLOL MALEATE 5 MG/ML
SOLUTION/ DROPS OPHTHALMIC
Qty: 5 ML | Refills: 12 | Status: SHIPPED | OUTPATIENT
Start: 2019-09-06 | End: 2020-09-03 | Stop reason: SDUPTHER

## 2019-09-06 NOTE — TELEPHONE ENCOUNTER
----- Message from Wilberto Balderas MA sent at 9/5/2019  4:48 PM CDT -----  Contact: pt: [phone]      ----- Message -----  From: Carlito Hartley  Sent: 9/5/2019   4:31 PM  To: Cal PARMAR Staff    Pt called to notify Carlos that ranitidine (ZANTAC) does not work for her and would like to try the other medication she suggested     Please contact pt: [phone]

## 2019-09-09 NOTE — PROGRESS NOTES
Subjective:       Patient ID: Mj Summers is a 84 y.o. female.    Chief Complaint: Annual Exam    HPI  She is here for annual exam.  Currently without complaint    PAST MEDICAL HISTORY: Hypertension, hyperlipidemia, osteoporosis, anemia, coronary artery disease, peripheral vascular disease,  maxillary fracture, leukopenia, glaucoma, positive PPD, GERD, ORIF left hip,  tricuspid regurgitation. She had a   colonoscopy February 2018    PAST SURGICAL HISTORY: Breast cyst removal, benign.     MEDICATIONS:  one aspirin daily, Benicar 20 mg daily, Crestor 10 mg daily,  timolol drops, prolia, chlorthalidone     ALLERGIES: Penicillin and sulfa. .       Review of Systems   Constitutional: Negative for chills, fatigue, fever and unexpected weight change.   Respiratory: Negative for chest tightness and shortness of breath.    Cardiovascular: Negative for chest pain and palpitations.   Gastrointestinal: Negative for abdominal pain and blood in stool.   Neurological: Negative for dizziness, syncope, numbness and headaches.       Objective:      Physical Exam   HENT:   Right Ear: External ear normal.   Left Ear: External ear normal.   Nose: Nose normal.   Mouth/Throat: Oropharynx is clear and moist.   Eyes: Pupils are equal, round, and reactive to light.   Neck: Normal range of motion.   Cardiovascular: Normal rate and regular rhythm.   Murmur heard.  Pulmonary/Chest: Breath sounds normal.   Abdominal: She exhibits no distension. There is no hepatosplenomegaly. There is no tenderness.   Lymphadenopathy:     She has no cervical adenopathy.     She has no axillary adenopathy.   Neurological: She has normal strength and normal reflexes. No cranial nerve deficit or sensory deficit.       Assessment/Plan       Assessment and plan:  Annual exam.  She will continue to monitor her blood pressure at home and follow up with Cardiology.  Check CMP and TSH

## 2019-09-26 ENCOUNTER — HOSPITAL ENCOUNTER (OUTPATIENT)
Dept: RADIOLOGY | Facility: HOSPITAL | Age: 84
Discharge: HOME | End: 2019-09-26
Attending: INTERNAL MEDICINE
Payer: MEDICARE

## 2019-09-26 DIAGNOSIS — Z12.31 ENCOUNTER FOR SCREENING MAMMOGRAM FOR MALIGNANT NEOPLASM OF BREAST: ICD-10-CM

## 2019-09-26 PROCEDURE — 77063 BREAST TOMOSYNTHESIS BI: CPT | Mod: 26,HCNC,, | Performed by: RADIOLOGY

## 2019-09-26 PROCEDURE — 77063 MAMMO DIGITAL SCREENING BILAT WITH TOMOSYNTHESIS_CAD: ICD-10-PCS | Mod: 26,HCNC,, | Performed by: RADIOLOGY

## 2019-09-26 PROCEDURE — 77067 MAMMO DIGITAL SCREENING BILAT WITH TOMOSYNTHESIS_CAD: ICD-10-PCS | Mod: 26,HCNC,, | Performed by: RADIOLOGY

## 2019-09-26 PROCEDURE — 77067 SCR MAMMO BI INCL CAD: CPT | Mod: TC,HCNC

## 2019-09-26 PROCEDURE — 77067 SCR MAMMO BI INCL CAD: CPT | Mod: 26,HCNC,, | Performed by: RADIOLOGY

## 2019-10-07 ENCOUNTER — LAB VISIT (OUTPATIENT)
Dept: LAB | Facility: OTHER | Age: 84
End: 2019-10-07
Attending: INTERNAL MEDICINE
Payer: MEDICARE

## 2019-10-07 DIAGNOSIS — D64.9 ANEMIA, UNSPECIFIED TYPE: ICD-10-CM

## 2019-10-07 DIAGNOSIS — M81.0 OSTEOPOROSIS, UNSPECIFIED OSTEOPOROSIS TYPE, UNSPECIFIED PATHOLOGICAL FRACTURE PRESENCE: ICD-10-CM

## 2019-10-07 DIAGNOSIS — D53.9 NUTRITIONAL ANEMIA: ICD-10-CM

## 2019-10-07 LAB
25(OH)D3+25(OH)D2 SERPL-MCNC: 28 NG/ML (ref 30–96)
ALBUMIN SERPL BCP-MCNC: 3.7 G/DL (ref 3.5–5.2)
ANION GAP SERPL CALC-SCNC: 7 MMOL/L (ref 8–16)
BASOPHILS # BLD AUTO: 0.03 K/UL (ref 0–0.2)
BASOPHILS NFR BLD: 0.7 % (ref 0–1.9)
BUN SERPL-MCNC: 21 MG/DL (ref 8–23)
CALCIUM SERPL-MCNC: 9.7 MG/DL (ref 8.7–10.5)
CHLORIDE SERPL-SCNC: 103 MMOL/L (ref 95–110)
CO2 SERPL-SCNC: 29 MMOL/L (ref 23–29)
CREAT SERPL-MCNC: 0.8 MG/DL (ref 0.5–1.4)
DIFFERENTIAL METHOD: ABNORMAL
EOSINOPHIL # BLD AUTO: 0.1 K/UL (ref 0–0.5)
EOSINOPHIL NFR BLD: 1.2 % (ref 0–8)
ERYTHROCYTE [DISTWIDTH] IN BLOOD BY AUTOMATED COUNT: 12.5 % (ref 11.5–14.5)
EST. GFR  (AFRICAN AMERICAN): >60 ML/MIN/1.73 M^2
EST. GFR  (NON AFRICAN AMERICAN): >60 ML/MIN/1.73 M^2
FERRITIN SERPL-MCNC: 178 NG/ML (ref 20–300)
FOLATE SERPL-MCNC: 19 NG/ML (ref 4–24)
GLUCOSE SERPL-MCNC: 76 MG/DL (ref 70–110)
HCT VFR BLD AUTO: 32.6 % (ref 37–48.5)
HGB BLD-MCNC: 10.6 G/DL (ref 12–16)
IMM GRANULOCYTES # BLD AUTO: 0 K/UL (ref 0–0.04)
IMM GRANULOCYTES NFR BLD AUTO: 0 % (ref 0–0.5)
IRON SERPL-MCNC: 66 UG/DL (ref 30–160)
LYMPHOCYTES # BLD AUTO: 1.3 K/UL (ref 1–4.8)
LYMPHOCYTES NFR BLD: 31.9 % (ref 18–48)
MCH RBC QN AUTO: 29.9 PG (ref 27–31)
MCHC RBC AUTO-ENTMCNC: 32.5 G/DL (ref 32–36)
MCV RBC AUTO: 92 FL (ref 82–98)
MONOCYTES # BLD AUTO: 0.5 K/UL (ref 0.3–1)
MONOCYTES NFR BLD: 12.5 % (ref 4–15)
NEUTROPHILS # BLD AUTO: 2.2 K/UL (ref 1.8–7.7)
NEUTROPHILS NFR BLD: 53.7 % (ref 38–73)
NRBC BLD-RTO: 0 /100 WBC
PHOSPHATE SERPL-MCNC: 3.3 MG/DL (ref 2.7–4.5)
PLATELET # BLD AUTO: 217 K/UL (ref 150–350)
PMV BLD AUTO: 9.9 FL (ref 9.2–12.9)
POTASSIUM SERPL-SCNC: 4 MMOL/L (ref 3.5–5.1)
RBC # BLD AUTO: 3.54 M/UL (ref 4–5.4)
SATURATED IRON: 18 % (ref 20–50)
SODIUM SERPL-SCNC: 139 MMOL/L (ref 136–145)
TOTAL IRON BINDING CAPACITY: 363 UG/DL (ref 250–450)
TRANSFERRIN SERPL-MCNC: 245 MG/DL (ref 200–375)
VIT B12 SERPL-MCNC: 399 PG/ML (ref 210–950)
WBC # BLD AUTO: 4.07 K/UL (ref 3.9–12.7)

## 2019-10-07 PROCEDURE — 80069 RENAL FUNCTION PANEL: CPT | Mod: HCNC

## 2019-10-07 PROCEDURE — 82306 VITAMIN D 25 HYDROXY: CPT | Mod: HCNC

## 2019-10-07 PROCEDURE — 82607 VITAMIN B-12: CPT | Mod: HCNC

## 2019-10-07 PROCEDURE — 82746 ASSAY OF FOLIC ACID SERUM: CPT | Mod: HCNC

## 2019-10-07 PROCEDURE — 85025 COMPLETE CBC W/AUTO DIFF WBC: CPT | Mod: HCNC

## 2019-10-07 PROCEDURE — 36415 COLL VENOUS BLD VENIPUNCTURE: CPT | Mod: HCNC

## 2019-10-07 PROCEDURE — 82728 ASSAY OF FERRITIN: CPT | Mod: HCNC

## 2019-10-07 PROCEDURE — 83540 ASSAY OF IRON: CPT | Mod: HCNC

## 2019-10-08 ENCOUNTER — TELEPHONE (OUTPATIENT)
Dept: ENDOCRINOLOGY | Facility: CLINIC | Age: 84
End: 2019-10-08

## 2019-10-13 DIAGNOSIS — M81.0 OSTEOPOROSIS, POSTMENOPAUSAL: ICD-10-CM

## 2019-10-13 DIAGNOSIS — E55.9 VITAMIN D DEFICIENCY DISEASE: ICD-10-CM

## 2019-10-13 RX ORDER — ERGOCALCIFEROL 1.25 MG/1
CAPSULE ORAL
Qty: 3 CAPSULE | Refills: 3 | Status: SHIPPED | OUTPATIENT
Start: 2019-10-13 | End: 2020-10-27 | Stop reason: SDUPTHER

## 2019-10-15 ENCOUNTER — OFFICE VISIT (OUTPATIENT)
Dept: HEMATOLOGY/ONCOLOGY | Facility: CLINIC | Age: 84
End: 2019-10-15
Payer: MEDICARE

## 2019-10-15 VITALS
SYSTOLIC BLOOD PRESSURE: 208 MMHG | DIASTOLIC BLOOD PRESSURE: 79 MMHG | RESPIRATION RATE: 16 BRPM | WEIGHT: 81.56 LBS | HEART RATE: 72 BPM | OXYGEN SATURATION: 100 % | BODY MASS INDEX: 14.45 KG/M2 | HEIGHT: 63 IN | TEMPERATURE: 96 F

## 2019-10-15 DIAGNOSIS — I10 ESSENTIAL HYPERTENSION: ICD-10-CM

## 2019-10-15 DIAGNOSIS — D64.9 ANEMIA, UNSPECIFIED TYPE: Primary | ICD-10-CM

## 2019-10-15 DIAGNOSIS — D53.9 NUTRITIONAL ANEMIA: ICD-10-CM

## 2019-10-15 PROCEDURE — 99499 RISK ADDL DX/OHS AUDIT: ICD-10-PCS | Mod: HCNC,S$GLB,, | Performed by: INTERNAL MEDICINE

## 2019-10-15 PROCEDURE — 1101F PT FALLS ASSESS-DOCD LE1/YR: CPT | Mod: HCNC,CPTII,S$GLB, | Performed by: INTERNAL MEDICINE

## 2019-10-15 PROCEDURE — 3078F PR MOST RECENT DIASTOLIC BLOOD PRESSURE < 80 MM HG: ICD-10-PCS | Mod: HCNC,CPTII,S$GLB, | Performed by: INTERNAL MEDICINE

## 2019-10-15 PROCEDURE — 99999 PR PBB SHADOW E&M-EST. PATIENT-LVL III: CPT | Mod: PBBFAC,HCNC,, | Performed by: INTERNAL MEDICINE

## 2019-10-15 PROCEDURE — 99214 OFFICE O/P EST MOD 30 MIN: CPT | Mod: HCNC,S$GLB,, | Performed by: INTERNAL MEDICINE

## 2019-10-15 PROCEDURE — 99499 UNLISTED E&M SERVICE: CPT | Mod: HCNC,S$GLB,, | Performed by: INTERNAL MEDICINE

## 2019-10-15 PROCEDURE — 99214 PR OFFICE/OUTPT VISIT, EST, LEVL IV, 30-39 MIN: ICD-10-PCS | Mod: HCNC,S$GLB,, | Performed by: INTERNAL MEDICINE

## 2019-10-15 PROCEDURE — 1101F PR PT FALLS ASSESS DOC 0-1 FALLS W/OUT INJ PAST YR: ICD-10-PCS | Mod: HCNC,CPTII,S$GLB, | Performed by: INTERNAL MEDICINE

## 2019-10-15 PROCEDURE — 3078F DIAST BP <80 MM HG: CPT | Mod: HCNC,CPTII,S$GLB, | Performed by: INTERNAL MEDICINE

## 2019-10-15 PROCEDURE — 3077F PR MOST RECENT SYSTOLIC BLOOD PRESSURE >= 140 MM HG: ICD-10-PCS | Mod: HCNC,CPTII,S$GLB, | Performed by: INTERNAL MEDICINE

## 2019-10-15 PROCEDURE — 3077F SYST BP >= 140 MM HG: CPT | Mod: HCNC,CPTII,S$GLB, | Performed by: INTERNAL MEDICINE

## 2019-10-15 PROCEDURE — 99999 PR PBB SHADOW E&M-EST. PATIENT-LVL III: ICD-10-PCS | Mod: PBBFAC,HCNC,, | Performed by: INTERNAL MEDICINE

## 2019-10-15 NOTE — PROGRESS NOTES
"Subjective:       Patient ID: Mj Summers is a 84 y.o. female.     Chief Complaint: follow up for anemia     Hematologic History:  1. Ms. Summers is a very pleasant 81 yo woman with HTN, CAD, b/l carotid disease, GERD, malnutrition, who initially saw me on 1/3/18 for further evaluation of anemia. On review of her records, her Hb has been ranging between 10 and 12 since 2010. In May 2016 it dropped to 5-7 after she underwent a surgery on 5/10/16 for left intertrochanteric fracture from a fall. Her H/H on 7/12/16 was 11.4/35.1. On 11/6/17, WBC 3.86, H/H 10.7/33.2, plt count 213. On 11/3/17, WBC 4.26, H/H 10.2/31.7, MCV 92, plt count 158. Vit B12 338, folate 17.2, ferritin 179, iron 68, TIBC 333, iron saturation 20%, retic 1.1%. CMP on 11/6/17 showed Cr 0.7, total protein 7.3, albumin 3.4, corrected calcium 9.8. Calculated CrCl 37.46 mL/min (cockroft). She has been having dysphagia over the past 2 months, which she attributed to GERD. Has been taking iron pills for many years and has been having dark stool from the iron pills. Denies any signs of bleeding.   2. Workup on 1/3/18 showed normal LDH, haptoglobin, copper levels. SPEP negative for M protein. Seen by GI. Guaiac stool negative.   3. EGD on 2/6/18 showed Small hiatal hernia. Mild Schatzki ring. Dilated. Gastric mucosal atrophy. Biopsied. Normal examined duodenum. Pathology of the gastric antrum showed "Severe chronic inactive gastritis. No evidence of active acute inflammation. Negative for Helicobacter like organisms on H&E. Intestinal metaplasia, complete type. Reactive atypia. No evidence of malignancy."  4. Colonoscopy on 2/6/18 showed "The colon (entire examined portion) was significantly tortuous.The exam was otherwise without abnormality. The cecum was partially obscured by solid material, but by washing and repositioning patient, I feel that adequate visualization was obtained. I cannot rule out an AVM in the cecum, but I don't think any mass, " "lesion, or significant polyp was missed"     INTERVAL HISTORY:   Ms. Summers returns today for follow up of anemia. She has been feeling well. has left shoulder pain from prior rotator cuff injury. Also has chronic acid reflux.      ROS:   A ten-point system review is obtained and negative except for what was stated in the interval history.      Physical Examination:   Vital signs reviewed.   Gen: well hydrated, well developed, in no acute distress. In a wheelchair.   HEENT: normocephalic, anicteric sclerae, PERRLA, EOMI, oropharynx clear  Neck: supple, no JVD, thyromegaly, cervical or supraclavicular LAD  Lungs: CTAB, no wheezes or rales  Heart: RRR, no M/R/G  Abdomen: soft, no tenderness, non-distended, no hepatosplenomegaly, mass, or hernia. BS present  Ext: no clubbing, cyanosis, or edema  Neuro: alert and oriented x 4, no focal neuro deficit  Skin: no rash, erythema, open wound or ulcers  Psych: pleasant and appropriate mood and affect        Objective:      Diagnostic Tests:  EGD on 2/6/18 showed Small hiatal hernia. Mild Schatzki ring. Dilated. Gastric mucosal atrophy. Biopsied. Normal examined duodenum. Pathology of the gastric antrum showed "Severe chronic inactive gastritis. No evidence of active acute inflammation. Negative for Helicobacter like organisms on H&E. Intestinal metaplasia, complete type. Reactive atypia. No evidence of malignancy."     Colonoscopy on 2/6/18 showed "The colon (entire examined portion) was significantly tortuous.The exam was otherwise without abnormality. The cecum was partially obscured by solid material, but by washing and repositioning patient, I feel that adequate visualization was obtained. I cannot rule out an AVM in the cecum, but I don't think any mass, lesion, or significant polyp was missed"     Laboratory Data:  Labs reviewed. WBC 4.07, Hb 10.6, plt count 217, folate, ferritin, B12 levels are good.      Assessment/Plan:      1. Anemia, unspecified type    2. " Nutritional anemia    3. Essential hypertension        1.2   - Ms. Summers is an 83 yo woman with chronic normocytic anemia. Extensive workup was negative. EGD showed severe chronic inactive gastritis but no s/o malignancy. Colonoscopy did not show bleeding sites. Guaiac stool was negative.   - Hb stable. Anemia mild. No need for intervention for now  - RTC in 6 months to monitor     3.  - Her BP is elevated today in the office. She took one BP med this morning but did not take the other one. Asymptomatic. Asked patient to take the other BP medication.   - f/u with PCP

## 2019-10-24 DIAGNOSIS — H40.89 GLAUCOMA DUE TO COMBINATION OF MECHANISMS: ICD-10-CM

## 2019-10-24 RX ORDER — BRIMONIDINE TARTRATE 1.5 MG/ML
1 SOLUTION/ DROPS OPHTHALMIC 3 TIMES DAILY
Qty: 15 ML | Refills: 3 | Status: SHIPPED | OUTPATIENT
Start: 2019-10-24 | End: 2020-02-17 | Stop reason: SDUPTHER

## 2019-10-27 DIAGNOSIS — I25.10 CORONARY ARTERY DISEASE INVOLVING NATIVE CORONARY ARTERY OF NATIVE HEART WITHOUT ANGINA PECTORIS: ICD-10-CM

## 2019-10-27 DIAGNOSIS — E78.5 DYSLIPIDEMIA: Chronic | ICD-10-CM

## 2019-10-28 ENCOUNTER — LAB VISIT (OUTPATIENT)
Dept: LAB | Facility: HOSPITAL | Age: 84
End: 2019-10-28
Attending: NURSE PRACTITIONER
Payer: MEDICARE

## 2019-10-28 DIAGNOSIS — E78.5 DYSLIPIDEMIA: Chronic | ICD-10-CM

## 2019-10-28 DIAGNOSIS — I25.10 CORONARY ARTERY DISEASE INVOLVING NATIVE CORONARY ARTERY OF NATIVE HEART WITHOUT ANGINA PECTORIS: ICD-10-CM

## 2019-10-28 LAB
ALBUMIN SERPL BCP-MCNC: 3.7 G/DL (ref 3.5–5.2)
ALP SERPL-CCNC: 62 U/L (ref 55–135)
ALT SERPL W/O P-5'-P-CCNC: 18 U/L (ref 10–44)
ANION GAP SERPL CALC-SCNC: 9 MMOL/L (ref 8–16)
AST SERPL-CCNC: 24 U/L (ref 10–40)
BILIRUB SERPL-MCNC: 0.3 MG/DL (ref 0.1–1)
BUN SERPL-MCNC: 28 MG/DL (ref 8–23)
CALCIUM SERPL-MCNC: 9.6 MG/DL (ref 8.7–10.5)
CHLORIDE SERPL-SCNC: 102 MMOL/L (ref 95–110)
CO2 SERPL-SCNC: 29 MMOL/L (ref 23–29)
CREAT SERPL-MCNC: 0.8 MG/DL (ref 0.5–1.4)
EST. GFR  (AFRICAN AMERICAN): >60 ML/MIN/1.73 M^2
EST. GFR  (NON AFRICAN AMERICAN): >60 ML/MIN/1.73 M^2
GLUCOSE SERPL-MCNC: 93 MG/DL (ref 70–110)
POTASSIUM SERPL-SCNC: 4.2 MMOL/L (ref 3.5–5.1)
PROT SERPL-MCNC: 7.7 G/DL (ref 6–8.4)
SODIUM SERPL-SCNC: 140 MMOL/L (ref 136–145)

## 2019-10-28 PROCEDURE — 36415 COLL VENOUS BLD VENIPUNCTURE: CPT | Mod: HCNC

## 2019-10-28 PROCEDURE — 80053 COMPREHEN METABOLIC PANEL: CPT | Mod: HCNC

## 2019-10-29 ENCOUNTER — PATIENT OUTREACH (OUTPATIENT)
Dept: ADMINISTRATIVE | Facility: OTHER | Age: 84
End: 2019-10-29

## 2019-10-29 RX ORDER — ROSUVASTATIN CALCIUM 10 MG/1
TABLET, COATED ORAL
Qty: 90 TABLET | Refills: 3 | Status: SHIPPED | OUTPATIENT
Start: 2019-10-29 | End: 2020-11-06 | Stop reason: SDUPTHER

## 2019-10-31 ENCOUNTER — OFFICE VISIT (OUTPATIENT)
Dept: CARDIOLOGY | Facility: CLINIC | Age: 84
End: 2019-10-31
Payer: MEDICARE

## 2019-10-31 VITALS
BODY MASS INDEX: 12.8 KG/M2 | HEART RATE: 64 BPM | HEIGHT: 67 IN | SYSTOLIC BLOOD PRESSURE: 192 MMHG | DIASTOLIC BLOOD PRESSURE: 77 MMHG | WEIGHT: 81.56 LBS

## 2019-10-31 DIAGNOSIS — I10 ESSENTIAL HYPERTENSION: Primary | Chronic | ICD-10-CM

## 2019-10-31 DIAGNOSIS — I77.9 BILATERAL CAROTID ARTERY DISEASE, UNSPECIFIED TYPE: ICD-10-CM

## 2019-10-31 DIAGNOSIS — I25.10 CORONARY ARTERY DISEASE INVOLVING NATIVE CORONARY ARTERY OF NATIVE HEART WITHOUT ANGINA PECTORIS: ICD-10-CM

## 2019-10-31 DIAGNOSIS — E78.5 DYSLIPIDEMIA: Chronic | ICD-10-CM

## 2019-10-31 PROCEDURE — 3078F DIAST BP <80 MM HG: CPT | Mod: HCNC,CPTII,S$GLB, | Performed by: INTERNAL MEDICINE

## 2019-10-31 PROCEDURE — 99214 OFFICE O/P EST MOD 30 MIN: CPT | Mod: HCNC,S$GLB,, | Performed by: INTERNAL MEDICINE

## 2019-10-31 PROCEDURE — 3078F PR MOST RECENT DIASTOLIC BLOOD PRESSURE < 80 MM HG: ICD-10-PCS | Mod: HCNC,CPTII,S$GLB, | Performed by: INTERNAL MEDICINE

## 2019-10-31 PROCEDURE — 3077F PR MOST RECENT SYSTOLIC BLOOD PRESSURE >= 140 MM HG: ICD-10-PCS | Mod: HCNC,CPTII,S$GLB, | Performed by: INTERNAL MEDICINE

## 2019-10-31 PROCEDURE — 99214 PR OFFICE/OUTPT VISIT, EST, LEVL IV, 30-39 MIN: ICD-10-PCS | Mod: HCNC,S$GLB,, | Performed by: INTERNAL MEDICINE

## 2019-10-31 PROCEDURE — 99999 PR PBB SHADOW E&M-EST. PATIENT-LVL III: ICD-10-PCS | Mod: PBBFAC,HCNC,, | Performed by: INTERNAL MEDICINE

## 2019-10-31 PROCEDURE — 3077F SYST BP >= 140 MM HG: CPT | Mod: HCNC,CPTII,S$GLB, | Performed by: INTERNAL MEDICINE

## 2019-10-31 PROCEDURE — 99999 PR PBB SHADOW E&M-EST. PATIENT-LVL III: CPT | Mod: PBBFAC,HCNC,, | Performed by: INTERNAL MEDICINE

## 2019-10-31 PROCEDURE — 1101F PT FALLS ASSESS-DOCD LE1/YR: CPT | Mod: HCNC,CPTII,S$GLB, | Performed by: INTERNAL MEDICINE

## 2019-10-31 PROCEDURE — 1101F PR PT FALLS ASSESS DOC 0-1 FALLS W/OUT INJ PAST YR: ICD-10-PCS | Mod: HCNC,CPTII,S$GLB, | Performed by: INTERNAL MEDICINE

## 2019-10-31 RX ORDER — OLMESARTAN MEDOXOMIL 20 MG/1
20 TABLET ORAL 2 TIMES DAILY
Qty: 180 TABLET | Refills: 3 | Status: SHIPPED | OUTPATIENT
Start: 2019-10-31 | End: 2021-03-12 | Stop reason: SDUPTHER

## 2019-10-31 NOTE — PROGRESS NOTES
Subjective:   Patient ID:  Mj Summers is a 84 y.o. female who presents for follow-up of Essential hypertension (6 months fu)      HPI:   Mj Summers presents for follow up of hypertension. BP elevated on all Clinic visits and has ranged 130-150/ at home. She is limited due to leg weakness. Mj Summers has known non-obstructive coronary artery disease . Mj Summers denies chest pain, shortness of breath, palpitations, presyncope , or syncope. Mj Summers has dyslipidemia uncontrolled continuing to sporadically take her statin   ROS  Review of Systems   Constitutional: Negative for chills and fever.   Eyes: Negative for blurred vision, double vision and pain.   Respiratory: Negative for shortness of breath.    Cardiovascular: Negative for chest pain, palpitations, orthopnea, claudication, leg swelling and PND.   Gastrointestinal: Positive for nausea. Negative for abdominal pain, constipation, diarrhea and vomiting.        GERD   Genitourinary: Negative for dysuria.   Musculoskeletal: Positive for joint pain.   Neurological: Positive for weakness.        Unsteady with lack of balance   Psychiatric/Behavioral: Negative for depression and suicidal ideas. The patient is not nervous/anxious.        Current Outpatient Medications   Medication Sig    aspirin 81 mg Tab Take 81 mg by mouth every evening. 1 Tablet Oral Every day    bimatoprost (LUMIGAN) 0.01 % Drop Place 1 drop into the right eye every evening.    brimonidine 0.15 % OPTH DROP (ALPHAGAN) 0.15 % ophthalmic solution Place 1 drop into the right eye 3 (three) times daily.    brimonidine 0.15 % OPTH DROP (ALPHAGAN) 0.15 % ophthalmic solution Place 1 drop into the right eye 3 (three) times daily.    calcium carbonate (OS-KACI) 600 mg (1,500 mg) Tab Take 600 mg by mouth 2 (two) times daily with meals.    chlorthalidone (HYGROTEN) 25 MG Tab TAKE 1 TABLET BY MOUTH EVERY DAY    ergocalciferol (ERGOCALCIFEROL) 50,000 unit Cap TAKE  "ONE CAPSULE BY MOUTH EVERY MONTH    ferrous sulfate 325 (65 FE) MG EC tablet Take 1 tablet (325 mg total) by mouth once daily.    multivitamin (ONE DAILY MULTIVITAMIN) per tablet Take 1 tablet by mouth once daily.    olmesartan (BENICAR) 20 MG tablet Take 1 tablet (20 mg total) by mouth 2 (two) times daily.    omeprazole (PRILOSEC) 20 MG capsule Take 1 capsule (20 mg total) by mouth once daily.    pilocarpine HCL 4% (PILOCAR) 4 % ophthalmic solution Place 1 drop into the right eye 3 (three) times daily.    rosuvastatin (CRESTOR) 10 MG tablet TAKE 1 TABLET BY MOUTH EVERY DAY    senna-docusate 8.6-50 mg (PERICOLACE) 8.6-50 mg per tablet Take 1 tablet by mouth 2 (two) times daily.    timolol maleate 0.5% (TIMOPTIC) 0.5 % Drop INSTILL 1 DROP INTO RIGHT EYE EVERY MORNING     No current facility-administered medications for this visit.      Objective:   Physical Exam   Constitutional: She is oriented to person, place, and time. She appears well-developed. No distress.   BP (!) 192/77 (BP Location: Left arm, Patient Position: Sitting, BP Method: Pediatric (Automatic))   Pulse 64   Ht 5' 7" (1.702 m)   Wt 37 kg (81 lb 9.1 oz)   LMP 11/10/1987 (Approximate)   BMI 12.78 kg/m²    HENT:   Head: Normocephalic.   Eyes: Pupils are equal, round, and reactive to light. Conjunctivae are normal. No scleral icterus.   Neck: Neck supple. No JVD present. No thyromegaly present.   Cardiovascular: Normal rate, regular rhythm, normal heart sounds and intact distal pulses. PMI is not displaced. Exam reveals no gallop and no friction rub.   No murmur heard.  Pulmonary/Chest: Effort normal and breath sounds normal. No respiratory distress. She has no wheezes. She has no rales.   Abdominal: Soft. She exhibits no distension. There is no splenomegaly or hepatomegaly. There is no tenderness.   Musculoskeletal: She exhibits no edema or tenderness.   In a wheelchair   Neurological: She is alert and oriented to person, place, and time. "   Skin: Skin is warm and dry. She is not diaphoretic.   Psychiatric: She has a normal mood and affect. Her behavior is normal.       Lab Results   Component Value Date     10/28/2019    K 4.2 10/28/2019     10/28/2019    CO2 29 10/28/2019    BUN 28 (H) 10/28/2019    CREATININE 0.8 10/28/2019    GLU 93 10/28/2019    MG 2.3 12/11/2015    AST 24 10/28/2019    ALT 18 10/28/2019    ALBUMIN 3.7 10/28/2019    PROT 7.7 10/28/2019    BILITOT 0.3 10/28/2019    WBC 4.07 10/07/2019    HGB 10.6 (L) 10/07/2019    HCT 32.6 (L) 10/07/2019    HCT 38 07/27/2016    MCV 92 10/07/2019     10/07/2019    TSH 1.142 09/04/2019    CHOL 253 (H) 04/04/2019    HDL 70 04/04/2019    LDLCALC 166.2 (H) 04/04/2019    TRIG 84 04/04/2019       Assessment:     1. Essential hypertension : not well controlled   2. Dyslipidemia : Medication non-compliance   3. Coronary artery disease involving native coronary artery of native heart without angina pectoris : Mild in the past   4. Bilateral carotid artery disease, unspecified type : Mild       Plan:     Mj was seen today for essential hypertension.    Diagnoses and all orders for this visit:    Essential hypertension  -     olmesartan (BENICAR) 20 MG tablet; Take 1 tablet (20 mg total) by mouth 2 (two) times daily ( an increase ).    Dyslipidemia  -     Lipid panel; Future; Expected date: 01/29/2020  Encouraged to take her statin regularly  Coronary artery disease involving native coronary artery of native heart without angina pectoris    Bilateral carotid artery disease, unspecified type

## 2019-10-31 NOTE — PATIENT INSTRUCTIONS
Increase Olmesartan to twice daily  Bring your blood pressure log next visit.  Suggest taking your cholesterol medication daily ( Rosuvastatin)

## 2019-11-07 ENCOUNTER — IMMUNIZATION (OUTPATIENT)
Dept: INTERNAL MEDICINE | Facility: CLINIC | Age: 84
End: 2019-11-07
Payer: MEDICARE

## 2019-11-22 ENCOUNTER — INFUSION (OUTPATIENT)
Dept: INFECTIOUS DISEASES | Facility: HOSPITAL | Age: 84
End: 2019-11-22
Attending: INTERNAL MEDICINE
Payer: MEDICARE

## 2019-11-22 VITALS — WEIGHT: 81.56 LBS | BODY MASS INDEX: 12.8 KG/M2 | HEIGHT: 67 IN

## 2019-11-22 DIAGNOSIS — M81.0 OSTEOPOROSIS, POSTMENOPAUSAL: Primary | ICD-10-CM

## 2019-11-22 PROCEDURE — 63600175 PHARM REV CODE 636 W HCPCS: Mod: JG,HCNC | Performed by: INTERNAL MEDICINE

## 2019-11-22 PROCEDURE — 96372 THER/PROPH/DIAG INJ SC/IM: CPT | Mod: HCNC

## 2019-11-22 RX ADMIN — DENOSUMAB 60 MG: 60 INJECTION SUBCUTANEOUS at 02:11

## 2019-11-22 NOTE — PROGRESS NOTES
Subjective:      Patient ID: Mj Summers is a 84 y.o. female.    Chief Complaint:  Osteoporosis    History of Present Illness  Mj Summers is here for follow up of osteoporosis.  Previously seen by Dr. Mandel.  Last seen 10/8/18.  This is their first visit with me.      With regards to osteoporosis:   Diagnosed in 2001.    BMD: 12/10/18  Osteoporosis.    Medications:   Reclast (Last dose 12/2011)  Prolia (started 8/2012) last dose 11/22/19  Calcium intake: 1200mg daily  Vit D intake: ergo 50,000 monthly    Weight bearing exercise: None  Falls:Denies  Fractures: Denies any over the last 12 months.   L hip FX 5/2016.  Significant height loss (>2 inches): +    Family history: Unsure    Menopause: 53y.o.    Tobacco Use: None  Alcohol Use: None  Glucocorticoid History: None  Anticoagulant Use: None  GERD/PPI Use: +  History of Malabsorption: None  Antiseizure Medications: None  History of Thyroid Disease: None  Kidney Disease: +  Hyperpara: +  Personal history of kidney stones: +  Family history of kidney stones: None  Family history of bone disease or fracture: None    With regards to Vitamin D Deficiency:  Vit D, 25-Hydroxy   Date Value Ref Range Status   10/07/2019 28 (L) 30 - 96 ng/mL Final     Comment:     Vitamin D deficiency.........<10 ng/mL                              Vitamin D insufficiency......10-29 ng/mL       Vitamin D sufficiency........> or equal to 30 ng/mL  Vitamin D toxicity............>100 ng/mL       Current Meds: Ergo 50,000 monthly    Review of Systems   Constitutional: Negative for fatigue.   Eyes: Negative for visual disturbance.   Respiratory: Negative for shortness of breath.    Cardiovascular: Negative for chest pain.   Gastrointestinal: Negative for abdominal pain.   Musculoskeletal: Positive for gait problem (wheelchair). Negative for arthralgias.   Skin: Negative for wound.   Neurological: Negative for headaches.   Hematological: Does not bruise/bleed easily.  "  Psychiatric/Behavioral: Negative for sleep disturbance.     Objective:   Physical Exam   Neck: No thyromegaly present.   Cardiovascular: Normal rate.   No edema present   Pulmonary/Chest: Effort normal.   Abdominal: Soft.   Vitals reviewed.    Visit Vitals  /68 (BP Location: Right arm, Patient Position: Sitting, BP Method: Medium (Automatic))   Resp 16   Ht 5' 7" (1.702 m)   Wt 37.1 kg (81 lb 12.7 oz)   LMP 11/10/1987 (Approximate)   BMI 12.81 kg/m²     Body mass index is 12.81 kg/m².    Lab Review:   No results found for: HGBA1C    Lab Results   Component Value Date    CHOL 253 (H) 04/04/2019    HDL 70 04/04/2019    LDLCALC 166.2 (H) 04/04/2019    TRIG 84 04/04/2019    CHOLHDL 27.7 04/04/2019     Lab Results   Component Value Date     10/28/2019    K 4.2 10/28/2019     10/28/2019    CO2 29 10/28/2019    GLU 93 10/28/2019    BUN 28 (H) 10/28/2019    CREATININE 0.8 10/28/2019    CALCIUM 9.6 10/28/2019    PROT 7.7 10/28/2019    ALBUMIN 3.7 10/28/2019    BILITOT 0.3 10/28/2019    ALKPHOS 62 10/28/2019    AST 24 10/28/2019    ALT 18 10/28/2019    ANIONGAP 9 10/28/2019    ESTGFRAFRICA >60 10/28/2019    EGFRNONAA >60 10/28/2019    TSH 1.142 09/04/2019     Vit D, 25-Hydroxy   Date Value Ref Range Status   10/07/2019 28 (L) 30 - 96 ng/mL Final     Comment:     Vitamin D deficiency.........<10 ng/mL                              Vitamin D insufficiency......10-29 ng/mL       Vitamin D sufficiency........> or equal to 30 ng/mL  Vitamin D toxicity............>100 ng/mL       Assessment and Plan     1. Osteoporosis, postmenopausal  Comprehensive metabolic panel    Vitamin D   2. History of fracture of left hip     3. Essential hypertension     4. Dyslipidemia     5. Vitamin D deficiency  Vitamin D     Osteoporosis, postmenopausal  -- Risks include low weight, menopause, hyperparathyroidism, PPI therapy.  -- Reviewed basics of quantity versus quality  -- Reassuring they are not fracturing.  -- " Recommend:  -Pharmacological therapy is recommended for patients with osteopenia if the 10 year probability of a hip fracture is >3% and 10 year probability of other major osteoporotic fractures is >20%.  Treatment options and potential side effects discussed for PO bisphosphonates, reclast, Denosumab, and Teriparatide.   -Treatment: Prolia (started 8/2012) last dose 11/22/19. Will check CMP Vit D prior to next injection.  -Calcium and Vitamin D RDD provided.  -Fall precautions made in the home.  -Alerted that if dental work needs to be done it should be done prior to initiating therapy. Dental health: UTD  -- Repeat DEXA scan 12/10/2020    History of fracture of left hip  -- Patient on Prolia.   -- Fall precautions encouraged.     Essential hypertension  -- Controlled on current medications.     Dyslipidemia  -- Stable on statin.    Vitamin D deficiency  -- CONTINUE: Ergo monthly.    Follow up in about 1 year (around 11/27/2020).

## 2019-11-22 NOTE — PROGRESS NOTES
Patient received Prolia 60 mg injection sub Q.  Tolerated well and left in NAD.  Administered to the lower ab

## 2019-11-25 ENCOUNTER — OFFICE VISIT (OUTPATIENT)
Dept: OPHTHALMOLOGY | Facility: CLINIC | Age: 84
End: 2019-11-25
Payer: MEDICARE

## 2019-11-25 ENCOUNTER — CLINICAL SUPPORT (OUTPATIENT)
Dept: OPHTHALMOLOGY | Facility: CLINIC | Age: 84
End: 2019-11-25
Payer: MEDICARE

## 2019-11-25 DIAGNOSIS — H40.89 GLAUCOMA DUE TO COMBINATION OF MECHANISMS: ICD-10-CM

## 2019-11-25 PROCEDURE — 92133 CPTRZD OPH DX IMG PST SGM ON: CPT | Mod: HCNC,S$GLB,, | Performed by: OPHTHALMOLOGY

## 2019-11-25 PROCEDURE — 99999 PR PBB SHADOW E&M-EST. PATIENT-LVL II: CPT | Mod: PBBFAC,HCNC,, | Performed by: OPHTHALMOLOGY

## 2019-11-25 PROCEDURE — 92083 HUMPHREY VISUAL FIELD - OU - BOTH EYES: ICD-10-PCS | Mod: HCNC,S$GLB,, | Performed by: OPHTHALMOLOGY

## 2019-11-25 PROCEDURE — 99999 PR PBB SHADOW E&M-EST. PATIENT-LVL II: ICD-10-PCS | Mod: PBBFAC,HCNC,, | Performed by: OPHTHALMOLOGY

## 2019-11-25 PROCEDURE — 92133 POSTERIOR SEGMENT OCT OPTIC NERVE(OCULAR COHERENCE TOMOGRAPHY) - OU - BOTH EYES: ICD-10-PCS | Mod: HCNC,S$GLB,, | Performed by: OPHTHALMOLOGY

## 2019-11-25 PROCEDURE — 92014 COMPRE OPH EXAM EST PT 1/>: CPT | Mod: HCNC,S$GLB,, | Performed by: OPHTHALMOLOGY

## 2019-11-25 PROCEDURE — 92014 PR EYE EXAM, EST PATIENT,COMPREHESV: ICD-10-PCS | Mod: HCNC,S$GLB,, | Performed by: OPHTHALMOLOGY

## 2019-11-25 PROCEDURE — 92083 EXTENDED VISUAL FIELD XM: CPT | Mod: HCNC,S$GLB,, | Performed by: OPHTHALMOLOGY

## 2019-11-25 NOTE — PROGRESS NOTES
"HPI     DLS: 7/16/19    Pt here for HVF review;    Meds; T1/2 GFS QAM OD   Alphagan 0.15% TID OD   Camacho 4% TID OD   Lumugan QHS OD   AT's PRN OU    1) Residula OAG / MMG OU   2) Blind Hypertensive OD   3) NS OD   4) FABY   5) APD OD   6) Band Keratopathy OD   7) Asteroid Hyalosis OS   8) Hx Acute Dacryocystitis OS   9) CRVO OD   10) Ant. Capsule Phimosis OS   11) PCIOL OS     Last edited by Kristi Ray on 11/25/2019  3:10 PM. (History)            Assessment /Plan     For exam results, see Encounter Report.    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   Angle open after PI's ou   -Followed at [facility] since at least '01   First HVF 2001   First photos 2001   S/p PI OU   s/p Gonioplasty OU     Family history none   Glaucoma meds Lumigan, timolol gfs  qAM, Agan tid, Camacho tid  - all OD ((off all gtts os s/p combined)   H/O adverse rxn to glaucoma drops Azopt "felt weird"   LASERS S/p PI OU, s/p Gonioplasty OU   GLAUCOMA SURGERIES    combined phaco/trab with mini shunt OS 5/22/2013   OTHER EYE SURGERIES    S/P DCR sx OD x 2 or 3 with Damaris    Combined phaco/IOL/ trab with mini shunt OS 5/22/2013   CDR 0.99 /0.9   Tbase 42/23   Tmax 42/23 (when stopped gtts)   Ttarget pain control od // 18 os   HVF 18 VF '01 -'19 - SAD/SNS  od  // SALT / IAD (gen dep) os   Gonio +3/+3   /539   OCT 10 test 2006 to 2019 - RNFL - OD:poor test  // OS:dec Dec thru out expect NS   HRT 14 test 2004 to 2018 - MR -  Dec. S/I od // dec. S/N/I os /// CDR 0.66 od // 0.83 os   HRT 2019 - high std dev. Ou (( NO MORE HRT'S - unreliable))   Disc photos - 2001, 2003, 2006 - slides // 2008, 2010, 2016, 2018   - OIS     Ta today 42/10 - on mult gtts od - pain free // off all gtts os post phaco/trab - 2013   Test today - IOP, gonio    2.  Blind hypertensive Right Eye   Pain free - eye " comfortable   Very limited vision CF at 3'   End stage glaucoma and S/P CRVO    No rubeosis   IOP is high but eye is pain free    3. Cataract OD -However, OD VA limited 2/2 CRVO and end stge glaucoma   S/P phaco/IOL/trab OS 5/22/2013    4. FABY OU -cont ATs     5. APD OD -   Old / stable 2/2 CRVO and glaucoma    6. Band Keratopathy OD -stable. Cont ATs- being followed by Dr. Saul;    had EDTA chelation on 2/13/14 OS - now with recurrent band     7. Asteroid Hyalosis OS -stable     8. Hx of Acute Dacryocystitis OS and recent NLD OD s/p surgery 11/12 and repeat for exposure w/ Dr. Ocampo 12/5/12 -stable. Cont f/u with Damaris     9. Hx of CRVO OD -limiting visual potential OD   -No NV on todays exam     10. Ant capsule phimosis os    S/P yag laser     11. Asteroid hyalosis OS     12. PC IOL os    Combined  W/ mini shunt 5/22/2013   doing well VA is 20/25 and the IOP is 13    Plan:   Off all gtts for now OS - s/p combined (5/22/2013)  -- IOP stable - at 16 and VA is 20/30-20/40 - can add gtts back if IOP goes any higher     Cont glaucoma gtts OD -- IOP very high, but comfortable/no pain // Blind hypertensive but pain free eye   Old  crvo     Ok to use OTC readers for now a +3.00 to +3.50 - not really able to improve distance vision with glasses at this time  Much improved from before the combined  Procedure    Cont all glaucoma gtts od   Blind hypertensive right eye - pain free   Cont off glaucoma gtts os     Glasses - Matthieu - 11/7/2017 (( pt may want a stronger bifocal next time - C/O trouble reading ) )- refer to [facility] - to see if hand held magnifier might help     F/U 4 months with IOP and  Gonio     I have seen and personally examined the patient.  I agree with the findings, assessment and plan of the resident and/or fellow.     Aysha Triplett MD

## 2019-11-27 ENCOUNTER — OFFICE VISIT (OUTPATIENT)
Dept: ENDOCRINOLOGY | Facility: CLINIC | Age: 84
End: 2019-11-27
Payer: MEDICARE

## 2019-11-27 VITALS
BODY MASS INDEX: 12.84 KG/M2 | HEIGHT: 67 IN | RESPIRATION RATE: 16 BRPM | SYSTOLIC BLOOD PRESSURE: 124 MMHG | DIASTOLIC BLOOD PRESSURE: 68 MMHG | WEIGHT: 81.81 LBS

## 2019-11-27 DIAGNOSIS — Z87.81 HISTORY OF FRACTURE OF LEFT HIP: ICD-10-CM

## 2019-11-27 DIAGNOSIS — M81.0 OSTEOPOROSIS, POSTMENOPAUSAL: Primary | ICD-10-CM

## 2019-11-27 DIAGNOSIS — I10 ESSENTIAL HYPERTENSION: Chronic | ICD-10-CM

## 2019-11-27 DIAGNOSIS — E55.9 VITAMIN D DEFICIENCY: ICD-10-CM

## 2019-11-27 DIAGNOSIS — E78.5 DYSLIPIDEMIA: Chronic | ICD-10-CM

## 2019-11-27 PROCEDURE — 3078F DIAST BP <80 MM HG: CPT | Mod: HCNC,CPTII,S$GLB, | Performed by: NURSE PRACTITIONER

## 2019-11-27 PROCEDURE — 1126F PR PAIN SEVERITY QUANTIFIED, NO PAIN PRESENT: ICD-10-PCS | Mod: HCNC,S$GLB,, | Performed by: NURSE PRACTITIONER

## 2019-11-27 PROCEDURE — 3074F SYST BP LT 130 MM HG: CPT | Mod: HCNC,CPTII,S$GLB, | Performed by: NURSE PRACTITIONER

## 2019-11-27 PROCEDURE — 1159F MED LIST DOCD IN RCRD: CPT | Mod: HCNC,S$GLB,, | Performed by: NURSE PRACTITIONER

## 2019-11-27 PROCEDURE — 99999 PR PBB SHADOW E&M-EST. PATIENT-LVL III: CPT | Mod: PBBFAC,HCNC,, | Performed by: NURSE PRACTITIONER

## 2019-11-27 PROCEDURE — 1101F PT FALLS ASSESS-DOCD LE1/YR: CPT | Mod: HCNC,CPTII,S$GLB, | Performed by: NURSE PRACTITIONER

## 2019-11-27 PROCEDURE — 1126F AMNT PAIN NOTED NONE PRSNT: CPT | Mod: HCNC,S$GLB,, | Performed by: NURSE PRACTITIONER

## 2019-11-27 PROCEDURE — 3074F PR MOST RECENT SYSTOLIC BLOOD PRESSURE < 130 MM HG: ICD-10-PCS | Mod: HCNC,CPTII,S$GLB, | Performed by: NURSE PRACTITIONER

## 2019-11-27 PROCEDURE — 1101F PR PT FALLS ASSESS DOC 0-1 FALLS W/OUT INJ PAST YR: ICD-10-PCS | Mod: HCNC,CPTII,S$GLB, | Performed by: NURSE PRACTITIONER

## 2019-11-27 PROCEDURE — 99214 PR OFFICE/OUTPT VISIT, EST, LEVL IV, 30-39 MIN: ICD-10-PCS | Mod: HCNC,S$GLB,, | Performed by: NURSE PRACTITIONER

## 2019-11-27 PROCEDURE — 99499 UNLISTED E&M SERVICE: CPT | Mod: HCNC,S$GLB,, | Performed by: NURSE PRACTITIONER

## 2019-11-27 PROCEDURE — 99999 PR PBB SHADOW E&M-EST. PATIENT-LVL III: ICD-10-PCS | Mod: PBBFAC,HCNC,, | Performed by: NURSE PRACTITIONER

## 2019-11-27 PROCEDURE — 99499 RISK ADDL DX/OHS AUDIT: ICD-10-PCS | Mod: HCNC,S$GLB,, | Performed by: NURSE PRACTITIONER

## 2019-11-27 PROCEDURE — 3078F PR MOST RECENT DIASTOLIC BLOOD PRESSURE < 80 MM HG: ICD-10-PCS | Mod: HCNC,CPTII,S$GLB, | Performed by: NURSE PRACTITIONER

## 2019-11-27 PROCEDURE — 99214 OFFICE O/P EST MOD 30 MIN: CPT | Mod: HCNC,S$GLB,, | Performed by: NURSE PRACTITIONER

## 2019-11-27 PROCEDURE — 1159F PR MEDICATION LIST DOCUMENTED IN MEDICAL RECORD: ICD-10-PCS | Mod: HCNC,S$GLB,, | Performed by: NURSE PRACTITIONER

## 2019-11-27 NOTE — ASSESSMENT & PLAN NOTE
-- Risks include low weight, menopause, hyperparathyroidism, PPI therapy.  -- Reviewed basics of quantity versus quality  -- Reassuring they are not fracturing.  -- Recommend:  -Pharmacological therapy is recommended for patients with osteopenia if the 10 year probability of a hip fracture is >3% and 10 year probability of other major osteoporotic fractures is >20%.  Treatment options and potential side effects discussed for PO bisphosphonates, reclast, Denosumab, and Teriparatide.   -Treatment: Prolia (started 8/2012) last dose 11/22/19. Will check CMP Vit D prior to next injection.  -Calcium and Vitamin D RDD provided.  -Fall precautions made in the home.  -Alerted that if dental work needs to be done it should be done prior to initiating therapy. Dental health: UTD  -- Repeat DEXA scan 12/10/2020

## 2020-01-20 ENCOUNTER — PES CALL (OUTPATIENT)
Dept: ADMINISTRATIVE | Facility: CLINIC | Age: 85
End: 2020-01-20

## 2020-02-17 ENCOUNTER — OFFICE VISIT (OUTPATIENT)
Dept: INTERNAL MEDICINE | Facility: CLINIC | Age: 85
End: 2020-02-17
Payer: MEDICARE

## 2020-02-17 VITALS
HEIGHT: 66 IN | BODY MASS INDEX: 13.46 KG/M2 | HEART RATE: 58 BPM | WEIGHT: 83.75 LBS | DIASTOLIC BLOOD PRESSURE: 60 MMHG | SYSTOLIC BLOOD PRESSURE: 128 MMHG

## 2020-02-17 DIAGNOSIS — I73.9 PAD (PERIPHERAL ARTERY DISEASE): ICD-10-CM

## 2020-02-17 DIAGNOSIS — Z99.89 DEPENDENCE ON OTHER ENABLING MACHINES AND DEVICES: ICD-10-CM

## 2020-02-17 DIAGNOSIS — I70.0 AORTIC ATHEROSCLEROSIS: ICD-10-CM

## 2020-02-17 DIAGNOSIS — R63.6 UNDERWEIGHT: ICD-10-CM

## 2020-02-17 DIAGNOSIS — I77.9 BILATERAL CAROTID ARTERY DISEASE, UNSPECIFIED TYPE: ICD-10-CM

## 2020-02-17 DIAGNOSIS — M81.0 OSTEOPOROSIS, POSTMENOPAUSAL: ICD-10-CM

## 2020-02-17 DIAGNOSIS — I25.10 CORONARY ARTERY DISEASE INVOLVING NATIVE CORONARY ARTERY OF NATIVE HEART WITHOUT ANGINA PECTORIS: ICD-10-CM

## 2020-02-17 DIAGNOSIS — Z00.00 ENCOUNTER FOR PREVENTIVE HEALTH EXAMINATION: Primary | ICD-10-CM

## 2020-02-17 DIAGNOSIS — D64.9 NORMOCYTIC ANEMIA: ICD-10-CM

## 2020-02-17 DIAGNOSIS — R13.12 OROPHARYNGEAL DYSPHAGIA: ICD-10-CM

## 2020-02-17 DIAGNOSIS — Z87.81 HISTORY OF FRACTURE OF LEFT HIP: ICD-10-CM

## 2020-02-17 DIAGNOSIS — E55.9 VITAMIN D DEFICIENCY: ICD-10-CM

## 2020-02-17 DIAGNOSIS — K21.9 GASTROESOPHAGEAL REFLUX DISEASE, ESOPHAGITIS PRESENCE NOT SPECIFIED: ICD-10-CM

## 2020-02-17 DIAGNOSIS — I45.10 RIGHT BUNDLE BRANCH BLOCK: ICD-10-CM

## 2020-02-17 DIAGNOSIS — R26.9 ABNORMALITY OF GAIT AND MOBILITY: ICD-10-CM

## 2020-02-17 DIAGNOSIS — E78.5 DYSLIPIDEMIA: Chronic | ICD-10-CM

## 2020-02-17 DIAGNOSIS — I10 ESSENTIAL HYPERTENSION: Chronic | ICD-10-CM

## 2020-02-17 DIAGNOSIS — H40.89 GLAUCOMA DUE TO COMBINATION OF MECHANISMS: ICD-10-CM

## 2020-02-17 DIAGNOSIS — R00.1 BRADYCARDIA: ICD-10-CM

## 2020-02-17 PROCEDURE — 3078F DIAST BP <80 MM HG: CPT | Mod: HCNC,CPTII,S$GLB, | Performed by: NURSE PRACTITIONER

## 2020-02-17 PROCEDURE — 99999 PR PBB SHADOW E&M-EST. PATIENT-LVL IV: CPT | Mod: PBBFAC,HCNC,, | Performed by: NURSE PRACTITIONER

## 2020-02-17 PROCEDURE — G0439 PPPS, SUBSEQ VISIT: HCPCS | Mod: HCNC,S$GLB,, | Performed by: NURSE PRACTITIONER

## 2020-02-17 PROCEDURE — 3078F PR MOST RECENT DIASTOLIC BLOOD PRESSURE < 80 MM HG: ICD-10-PCS | Mod: HCNC,CPTII,S$GLB, | Performed by: NURSE PRACTITIONER

## 2020-02-17 PROCEDURE — G0439 PR MEDICARE ANNUAL WELLNESS SUBSEQUENT VISIT: ICD-10-PCS | Mod: HCNC,S$GLB,, | Performed by: NURSE PRACTITIONER

## 2020-02-17 PROCEDURE — 3074F SYST BP LT 130 MM HG: CPT | Mod: HCNC,CPTII,S$GLB, | Performed by: NURSE PRACTITIONER

## 2020-02-17 PROCEDURE — 99999 PR PBB SHADOW E&M-EST. PATIENT-LVL IV: ICD-10-PCS | Mod: PBBFAC,HCNC,, | Performed by: NURSE PRACTITIONER

## 2020-02-17 PROCEDURE — 3074F PR MOST RECENT SYSTOLIC BLOOD PRESSURE < 130 MM HG: ICD-10-PCS | Mod: HCNC,CPTII,S$GLB, | Performed by: NURSE PRACTITIONER

## 2020-02-17 NOTE — PROGRESS NOTES
I offered to discuss end of life issues, including information on how to make advance directives that the patient could use to name someone who would make medical decisions on their behalf if they became too ill to make themselves.    _X_Patient declined. Reports that daughter will make decisions and she is aware of patient preferences  ___Patient is interested, I provided paper work and offered to discuss.

## 2020-02-17 NOTE — PROGRESS NOTES
"Mj Summers presented for a  Medicare AWV and comprehensive Health Risk Assessment today. The following components were reviewed and updated:    · Medical history  · Family History  · Social history  · Allergies and Current Medications  · Health Risk Assessment  · Health Maintenance  · Care Team     ** See Completed Assessments for Annual Wellness Visit within the encounter summary.**       The following assessments were completed:  · Living Situation  · CAGE  · Depression Screening  · Timed Get Up and Go  · Whisper Test  · Cognitive Function Screening      ·   · Nutrition Screening  · ADL Screening  · PAQ Screening    Vitals:    02/17/20 1412   BP: 128/60   BP Location: Left arm   Pulse: (!) 58   Weight: 38 kg (83 lb 12.4 oz)   Height: 5' 6" (1.676 m)     Body mass index is 13.52 kg/m².  Physical Exam   Constitutional: She is oriented to person, place, and time.   Frail   HENT:   Head: Normocephalic.   Cardiovascular: Normal rate and regular rhythm.   Pulmonary/Chest: Effort normal and breath sounds normal.   Abdominal: Soft. Bowel sounds are normal.   Musculoskeletal: She exhibits no edema.   In wheelchair today   Neurological: She is alert and oriented to person, place, and time.   Skin: Skin is warm and dry.   Psychiatric: She has a normal mood and affect.   Nursing note and vitals reviewed.        Diagnoses and health risks identified today and associated recommendations/orders:    1. Encounter for preventive health examination  Here for Health Risk Assessment/Annual Wellness Visit.  Health maintenance reviewed and updated. Follow up in one year.  Discussed Shingrix.    2. Essential hypertension  Chronic, stable on current medications. Followed by PCP.    3. Aortic atherosclerosis  Chronic, stable on current medications. Noted CXR 10/21/12. Followed by PCP, Cardiology.    4. Coronary artery disease involving native coronary artery of native heart without angina pectoris  Chronic, stable on current " medications. Followed by PCP, Cardiology.    5. Bradycardia  Chronic, stable. Followed by PCP, Cardiology.    6. Right bundle branch block  Chronic, stable. Followed by PCP,Cardiology.    7. PAD (peripheral artery disease)  Chronic, stable on current medications. Noted ABIs 7/06/18. Followed by PCP, Cardiology.    8. Bilateral carotid artery disease, unspecified type  Chronic, stable on current medications. 20-39% bilaterally noted on U/S 10/14/15. Followed by PCP, Cardiology.    9. Dyslipidemia  Chronic, stable on current medication. Followed by PCP, Cardiology.    10. Underweight  Chronic, decreased one pounds from AWV/HRA 3/2019. Last albumin 3.7. Followed by PCP.    11. Normocytic anemia  Chronic, stable. Followed by PCP, Hematology    12. Osteoporosis, postmenopausal  Chronic, stable on current medications/Prolia. Followed by PCP, Endocrinology.    13. History of fracture of left hip  Stable. Followed by PCP.    14. Vitamin D deficiency  Chronic, stable on current medication. Followed by PCP, Endocirnology.    15. Gastroesophageal reflux disease, esophagitis presence not specified  Chronic, stable on current medication. Followed by PCP,Gastroenterology.    16. Oropharyngeal dysphagia  Chronic, stable. Followed by PCP, Gastroenterology.    17. 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 Followed by Ophthalmology    18. Dependence on other enabling machines and devices  Chronic, ambulates with cane at home, uses walker for distances. No reported falls last year. Followed by PCP.    19. Abnormality of gait and mobility  Chronic, ambulates with cane at home, uses walker for distances. No reported falls last year. Followed by PCP.      Provided Mj with a 5-10 year written screening schedule and personal prevention plan. Recommendations were developed using the USPSTF age appropriate recommendations. Education, counseling, and referrals were provided as needed. After Visit  Summary printed and given to patient which includes a list of additional screenings\tests needed.    Follow up in 18 days (on 3/6/2020).with PCP    Isela Chaudhary NP

## 2020-02-17 NOTE — PATIENT INSTRUCTIONS
Counseling and Referral of Other Preventative  (Italic type indicates deductible and co-insurance are waived)    Patient Name: Mj Summers  Today's Date: 2/17/2020    Health Maintenance       Date Due Completion Date    Shingles Vaccine (1 of 2) 07/17/1985 Obtain new shingles vaccine - SHINGRIX - when available    TETANUS VACCINE 03/13/2020 (Originally 7/17/1953) Consider obtaining    Lipid Panel 04/04/2020 4/4/2019    Mammogram 09/26/2020 9/26/2019    Aspirin/Antiplatelet Therapy 11/27/2020 11/27/2019    DEXA SCAN 12/10/2020 12/10/2018        No orders of the defined types were placed in this encounter.    The following information is provided to all patients.  This information is to help you find resources for any of the problems found today that may be affecting your health:                Living healthy guide: www.[facility].[place].gov      Understanding Diabetes: www.diabetes.org      Eating healthy: www.cdc.gov/healthyweight      CDC home safety checklist: www.cdc.gov/steadi/patient.html      Agency on Aging: www.goea.[place].gov      Alcoholics anonymous (AA): www.aa.org      Physical Activity: www.cameron.nih.gov/uq0zgkr      Tobacco use: www.quitwithusla.org

## 2020-03-02 ENCOUNTER — LAB VISIT (OUTPATIENT)
Dept: LAB | Facility: HOSPITAL | Age: 85
End: 2020-03-02
Attending: INTERNAL MEDICINE
Payer: MEDICARE

## 2020-03-02 DIAGNOSIS — E78.5 DYSLIPIDEMIA: Chronic | ICD-10-CM

## 2020-03-02 LAB
CHOLEST SERPL-MCNC: 234 MG/DL (ref 120–199)
CHOLEST/HDLC SERPL: 3.6 {RATIO} (ref 2–5)
HDLC SERPL-MCNC: 65 MG/DL (ref 40–75)
HDLC SERPL: 27.8 % (ref 20–50)
LDLC SERPL CALC-MCNC: 150 MG/DL (ref 63–159)
NONHDLC SERPL-MCNC: 169 MG/DL
TRIGL SERPL-MCNC: 95 MG/DL (ref 30–150)

## 2020-03-02 PROCEDURE — 36415 COLL VENOUS BLD VENIPUNCTURE: CPT | Mod: HCNC

## 2020-03-02 PROCEDURE — 80061 LIPID PANEL: CPT | Mod: HCNC

## 2020-03-03 ENCOUNTER — OFFICE VISIT (OUTPATIENT)
Dept: CARDIOLOGY | Facility: CLINIC | Age: 85
End: 2020-03-03
Payer: MEDICARE

## 2020-03-03 VITALS
DIASTOLIC BLOOD PRESSURE: 77 MMHG | BODY MASS INDEX: 14.73 KG/M2 | HEART RATE: 79 BPM | HEIGHT: 63 IN | SYSTOLIC BLOOD PRESSURE: 188 MMHG | WEIGHT: 83.13 LBS

## 2020-03-03 DIAGNOSIS — I10 ESSENTIAL HYPERTENSION: Primary | Chronic | ICD-10-CM

## 2020-03-03 DIAGNOSIS — I10 HYPERTENSION, UNSPECIFIED TYPE: ICD-10-CM

## 2020-03-03 DIAGNOSIS — E78.5 DYSLIPIDEMIA: Chronic | ICD-10-CM

## 2020-03-03 DIAGNOSIS — I25.10 CORONARY ARTERY DISEASE INVOLVING NATIVE CORONARY ARTERY OF NATIVE HEART WITHOUT ANGINA PECTORIS: ICD-10-CM

## 2020-03-03 DIAGNOSIS — I77.9 BILATERAL CAROTID ARTERY DISEASE, UNSPECIFIED TYPE: ICD-10-CM

## 2020-03-03 PROCEDURE — 3078F DIAST BP <80 MM HG: CPT | Mod: HCNC,CPTII,S$GLB, | Performed by: INTERNAL MEDICINE

## 2020-03-03 PROCEDURE — 3077F SYST BP >= 140 MM HG: CPT | Mod: HCNC,CPTII,S$GLB, | Performed by: INTERNAL MEDICINE

## 2020-03-03 PROCEDURE — 1101F PR PT FALLS ASSESS DOC 0-1 FALLS W/OUT INJ PAST YR: ICD-10-PCS | Mod: HCNC,CPTII,S$GLB, | Performed by: INTERNAL MEDICINE

## 2020-03-03 PROCEDURE — 99214 PR OFFICE/OUTPT VISIT, EST, LEVL IV, 30-39 MIN: ICD-10-PCS | Mod: HCNC,S$GLB,, | Performed by: INTERNAL MEDICINE

## 2020-03-03 PROCEDURE — 1159F MED LIST DOCD IN RCRD: CPT | Mod: HCNC,S$GLB,, | Performed by: INTERNAL MEDICINE

## 2020-03-03 PROCEDURE — 1159F PR MEDICATION LIST DOCUMENTED IN MEDICAL RECORD: ICD-10-PCS | Mod: HCNC,S$GLB,, | Performed by: INTERNAL MEDICINE

## 2020-03-03 PROCEDURE — 1126F AMNT PAIN NOTED NONE PRSNT: CPT | Mod: HCNC,S$GLB,, | Performed by: INTERNAL MEDICINE

## 2020-03-03 PROCEDURE — 99214 OFFICE O/P EST MOD 30 MIN: CPT | Mod: HCNC,S$GLB,, | Performed by: INTERNAL MEDICINE

## 2020-03-03 PROCEDURE — 3078F PR MOST RECENT DIASTOLIC BLOOD PRESSURE < 80 MM HG: ICD-10-PCS | Mod: HCNC,CPTII,S$GLB, | Performed by: INTERNAL MEDICINE

## 2020-03-03 PROCEDURE — 99999 PR PBB SHADOW E&M-EST. PATIENT-LVL IV: ICD-10-PCS | Mod: PBBFAC,HCNC,, | Performed by: INTERNAL MEDICINE

## 2020-03-03 PROCEDURE — 1101F PT FALLS ASSESS-DOCD LE1/YR: CPT | Mod: HCNC,CPTII,S$GLB, | Performed by: INTERNAL MEDICINE

## 2020-03-03 PROCEDURE — 99999 PR PBB SHADOW E&M-EST. PATIENT-LVL IV: CPT | Mod: PBBFAC,HCNC,, | Performed by: INTERNAL MEDICINE

## 2020-03-03 PROCEDURE — 3077F PR MOST RECENT SYSTOLIC BLOOD PRESSURE >= 140 MM HG: ICD-10-PCS | Mod: HCNC,CPTII,S$GLB, | Performed by: INTERNAL MEDICINE

## 2020-03-03 PROCEDURE — 1126F PR PAIN SEVERITY QUANTIFIED, NO PAIN PRESENT: ICD-10-PCS | Mod: HCNC,S$GLB,, | Performed by: INTERNAL MEDICINE

## 2020-03-03 NOTE — PATIENT INSTRUCTIONS
Take the Olmesartan at night  Let me know if you have recurrent dizziness or blood pressure at home is consistently less than 120/

## 2020-03-03 NOTE — PROGRESS NOTES
Subjective:   Patient ID:  Mj Summers is a 84 y.o. female who presents for follow-up of Essential hypertension (4 month f/u )      HPI:   Mj Summers presents for follow up of hypertension. Her BP is elevated with the patient only taking 12.5 mg of Chlorthalidone today and no Olmesartan because BP this A:M 108/. BP last Clinic visit a couple of weeks ago 120s/ A few days ago had an episode of dizziness , she thinks after standing quickly,  that resulted in a fall. Has not recurred. No LOC. She is at limited activity. Mj Summers denies chest pain, shortness of breath, palpitations,  or syncope. Mj Summers has known non-obstructive coronary artery disease and mild carotid stenosis. Mj Summers has dyslipidemia.  on moderate intensity statin therapy.that she irratically takes because she feels it causes low back pain. Not At LDL goal.  Review of Systems   Constitution: Negative for malaise/fatigue, weight gain and weight loss.   Eyes: Negative for blurred vision.   Cardiovascular: Negative for chest pain, claudication, cyanosis, dyspnea on exertion, irregular heartbeat, leg swelling, near-syncope, orthopnea, palpitations, paroxysmal nocturnal dyspnea and syncope.   Respiratory: Negative for cough, shortness of breath and wheezing.    Musculoskeletal: Positive for back pain. Negative for falls and myalgias.        Leg weakness but doing exercises   Gastrointestinal: Negative for abdominal pain, heartburn, nausea and vomiting.          GERD    Genitourinary: Negative for nocturia.   Neurological: Positive for loss of balance. Negative for brief paralysis, dizziness, focal weakness, headaches, numbness, paresthesias and weakness.   Psychiatric/Behavioral: Negative for altered mental status.       Current Outpatient Medications   Medication Sig    aspirin 81 mg Tab Take 81 mg by mouth every evening. 1 Tablet Oral Every day    bimatoprost (LUMIGAN) 0.01 % Drop Place 1 drop into the  "right eye every evening.    brimonidine 0.15 % OPTH DROP (ALPHAGAN) 0.15 % ophthalmic solution Place 1 drop into the right eye 3 (three) times daily.    calcium carbonate (OS-KACI) 600 mg (1,500 mg) Tab Take 600 mg by mouth 2 (two) times daily with meals.    chlorthalidone (HYGROTEN) 25 MG Tab TAKE 1 TABLET BY MOUTH EVERY DAY    ergocalciferol (ERGOCALCIFEROL) 50,000 unit Cap TAKE ONE CAPSULE BY MOUTH EVERY MONTH    ferrous sulfate 325 (65 FE) MG EC tablet Take 1 tablet (325 mg total) by mouth once daily.    multivitamin (ONE DAILY MULTIVITAMIN) per tablet Take 1 tablet by mouth once daily.    olmesartan (BENICAR) 20 MG tablet Take 1 tablet (20 mg total) by mouth 2 (two) times daily.    omeprazole (PRILOSEC) 20 MG capsule Take 1 capsule (20 mg total) by mouth once daily.    pilocarpine HCL 4% (PILOCAR) 4 % ophthalmic solution Place 1 drop into the right eye 3 (three) times daily.    rosuvastatin (CRESTOR) 10 MG tablet TAKE 1 TABLET BY MOUTH EVERY DAY    senna-docusate 8.6-50 mg (PERICOLACE) 8.6-50 mg per tablet Take 1 tablet by mouth 2 (two) times daily.    timolol maleate 0.5% (TIMOPTIC) 0.5 % Drop INSTILL 1 DROP INTO RIGHT EYE EVERY MORNING     No current facility-administered medications for this visit.      Objective:   Physical Exam   Constitutional: She is oriented to person, place, and time. She appears well-developed. No distress.   BP (!) 188/77 (BP Location: Left arm, Patient Position: Sitting, BP Method: Pediatric (Automatic))   Pulse 79   Ht 5' 3" (1.6 m)   Wt 37.7 kg (83 lb 1.8 oz)   LMP 11/10/1987 (Approximate)   BMI 14.72 kg/m²    HENT:   Head: Normocephalic.   Eyes: Pupils are equal, round, and reactive to light. Conjunctivae are normal. No scleral icterus.   Neck: Neck supple. No JVD present. No thyromegaly present.   Cardiovascular: Normal rate, regular rhythm and intact distal pulses. PMI is not displaced. Exam reveals no gallop and no friction rub.   Murmur heard.   Medium-pitched " mid to late systolic murmur is present with a grade of 2/6 at the upper left sternal border and lower left sternal border.  Pulmonary/Chest: Effort normal and breath sounds normal. No respiratory distress. She has no wheezes. She has no rales.   Abdominal: Soft. She exhibits no distension. There is no splenomegaly or hepatomegaly. There is no tenderness.   Musculoskeletal: She exhibits no edema or tenderness.   In a wheelchair    Neurological: She is alert and oriented to person, place, and time.   Skin: Skin is warm and dry. She is not diaphoretic.   Psychiatric: She has a normal mood and affect. Her behavior is normal.       Lab Results   Component Value Date     10/28/2019    K 4.2 10/28/2019     10/28/2019    CO2 29 10/28/2019    BUN 28 (H) 10/28/2019    CREATININE 0.8 10/28/2019    GLU 93 10/28/2019    MG 2.3 12/11/2015    AST 24 10/28/2019    ALT 18 10/28/2019    ALBUMIN 3.7 10/28/2019    PROT 7.7 10/28/2019    BILITOT 0.3 10/28/2019    WBC 4.07 10/07/2019    HGB 10.6 (L) 10/07/2019    HCT 32.6 (L) 10/07/2019    HCT 38 07/27/2016    MCV 92 10/07/2019     10/07/2019    TSH 1.142 09/04/2019    CHOL 234 (H) 03/02/2020    HDL 65 03/02/2020    LDLCALC 150.0 03/02/2020    TRIG 95 03/02/2020       Assessment:     1. Essential hypertension : Elevated but minimal medications   2. Dyslipidemia : Not At LDL goal due to Medication non-compliance   3. Coronary artery disease involving native coronary artery of native heart without angina pectoris : Stable asx   4. Bilateral carotid artery disease, unspecified type        Plan:     Mj was seen today for essential hypertension.    Diagnoses and all orders for this visit:    Essential hypertension  To take Olmesartan in the evening when she goes home . If recurrent orthostatic dizziness, will reduce chlorthalidone  Dyslipidemia  Discussed rationale for taking the medication and explained it probably was not causing her back pain  Coronary artery  disease involving native coronary artery of native heart without angina pectoris    Bilateral carotid artery disease, unspecified type

## 2020-03-06 ENCOUNTER — LAB VISIT (OUTPATIENT)
Dept: LAB | Facility: HOSPITAL | Age: 85
End: 2020-03-06
Attending: INTERNAL MEDICINE
Payer: MEDICARE

## 2020-03-06 ENCOUNTER — OFFICE VISIT (OUTPATIENT)
Dept: INTERNAL MEDICINE | Facility: CLINIC | Age: 85
End: 2020-03-06
Payer: MEDICARE

## 2020-03-06 DIAGNOSIS — I10 HYPERTENSION, UNSPECIFIED TYPE: Primary | ICD-10-CM

## 2020-03-06 DIAGNOSIS — I10 HYPERTENSION, UNSPECIFIED TYPE: ICD-10-CM

## 2020-03-06 DIAGNOSIS — K21.9 GASTROESOPHAGEAL REFLUX DISEASE, ESOPHAGITIS PRESENCE NOT SPECIFIED: ICD-10-CM

## 2020-03-06 DIAGNOSIS — Z01.419 WELL WOMAN EXAM: ICD-10-CM

## 2020-03-06 LAB
ALBUMIN SERPL BCP-MCNC: 3.8 G/DL (ref 3.5–5.2)
ALP SERPL-CCNC: 84 U/L (ref 55–135)
ALT SERPL W/O P-5'-P-CCNC: 19 U/L (ref 10–44)
ANION GAP SERPL CALC-SCNC: 9 MMOL/L (ref 8–16)
AST SERPL-CCNC: 26 U/L (ref 10–40)
BILIRUB SERPL-MCNC: 0.4 MG/DL (ref 0.1–1)
BUN SERPL-MCNC: 18 MG/DL (ref 8–23)
CALCIUM SERPL-MCNC: 9.8 MG/DL (ref 8.7–10.5)
CHLORIDE SERPL-SCNC: 99 MMOL/L (ref 95–110)
CO2 SERPL-SCNC: 30 MMOL/L (ref 23–29)
CREAT SERPL-MCNC: 0.9 MG/DL (ref 0.5–1.4)
EST. GFR  (AFRICAN AMERICAN): >60 ML/MIN/1.73 M^2
EST. GFR  (NON AFRICAN AMERICAN): 58.9 ML/MIN/1.73 M^2
GLUCOSE SERPL-MCNC: 94 MG/DL (ref 70–110)
POTASSIUM SERPL-SCNC: 3.6 MMOL/L (ref 3.5–5.1)
PROT SERPL-MCNC: 8 G/DL (ref 6–8.4)
SODIUM SERPL-SCNC: 138 MMOL/L (ref 136–145)

## 2020-03-06 PROCEDURE — 1100F PR PT FALLS ASSESS DOC 2+ FALLS/FALL W/INJURY/YR: ICD-10-PCS | Mod: HCNC,CPTII,S$GLB, | Performed by: INTERNAL MEDICINE

## 2020-03-06 PROCEDURE — 3074F PR MOST RECENT SYSTOLIC BLOOD PRESSURE < 130 MM HG: ICD-10-PCS | Mod: HCNC,CPTII,S$GLB, | Performed by: INTERNAL MEDICINE

## 2020-03-06 PROCEDURE — 1126F AMNT PAIN NOTED NONE PRSNT: CPT | Mod: HCNC,S$GLB,, | Performed by: INTERNAL MEDICINE

## 2020-03-06 PROCEDURE — 1126F PR PAIN SEVERITY QUANTIFIED, NO PAIN PRESENT: ICD-10-PCS | Mod: HCNC,S$GLB,, | Performed by: INTERNAL MEDICINE

## 2020-03-06 PROCEDURE — 1159F MED LIST DOCD IN RCRD: CPT | Mod: HCNC,S$GLB,, | Performed by: INTERNAL MEDICINE

## 2020-03-06 PROCEDURE — 3288F PR FALLS RISK ASSESSMENT DOCUMENTED: ICD-10-PCS | Mod: HCNC,CPTII,S$GLB, | Performed by: INTERNAL MEDICINE

## 2020-03-06 PROCEDURE — 36415 COLL VENOUS BLD VENIPUNCTURE: CPT | Mod: HCNC

## 2020-03-06 PROCEDURE — 3288F FALL RISK ASSESSMENT DOCD: CPT | Mod: HCNC,CPTII,S$GLB, | Performed by: INTERNAL MEDICINE

## 2020-03-06 PROCEDURE — 1159F PR MEDICATION LIST DOCUMENTED IN MEDICAL RECORD: ICD-10-PCS | Mod: HCNC,S$GLB,, | Performed by: INTERNAL MEDICINE

## 2020-03-06 PROCEDURE — 99215 PR OFFICE/OUTPT VISIT, EST, LEVL V, 40-54 MIN: ICD-10-PCS | Mod: HCNC,S$GLB,, | Performed by: INTERNAL MEDICINE

## 2020-03-06 PROCEDURE — 99999 PR PBB SHADOW E&M-EST. PATIENT-LVL V: ICD-10-PCS | Mod: PBBFAC,HCNC,, | Performed by: INTERNAL MEDICINE

## 2020-03-06 PROCEDURE — 3078F DIAST BP <80 MM HG: CPT | Mod: HCNC,CPTII,S$GLB, | Performed by: INTERNAL MEDICINE

## 2020-03-06 PROCEDURE — 3074F SYST BP LT 130 MM HG: CPT | Mod: HCNC,CPTII,S$GLB, | Performed by: INTERNAL MEDICINE

## 2020-03-06 PROCEDURE — 3078F PR MOST RECENT DIASTOLIC BLOOD PRESSURE < 80 MM HG: ICD-10-PCS | Mod: HCNC,CPTII,S$GLB, | Performed by: INTERNAL MEDICINE

## 2020-03-06 PROCEDURE — 80053 COMPREHEN METABOLIC PANEL: CPT | Mod: HCNC

## 2020-03-06 PROCEDURE — 1100F PTFALLS ASSESS-DOCD GE2>/YR: CPT | Mod: HCNC,CPTII,S$GLB, | Performed by: INTERNAL MEDICINE

## 2020-03-06 PROCEDURE — 99999 PR PBB SHADOW E&M-EST. PATIENT-LVL V: CPT | Mod: PBBFAC,HCNC,, | Performed by: INTERNAL MEDICINE

## 2020-03-06 PROCEDURE — 99215 OFFICE O/P EST HI 40 MIN: CPT | Mod: HCNC,S$GLB,, | Performed by: INTERNAL MEDICINE

## 2020-03-08 VITALS
WEIGHT: 81.38 LBS | SYSTOLIC BLOOD PRESSURE: 102 MMHG | OXYGEN SATURATION: 99 % | DIASTOLIC BLOOD PRESSURE: 60 MMHG | BODY MASS INDEX: 14.42 KG/M2 | HEART RATE: 60 BPM | HEIGHT: 63 IN

## 2020-03-09 NOTE — PROGRESS NOTES
Subjective:       Patient ID: Mj Summers is a 84 y.o. female.    Chief Complaint: Hypertension    HPI  She returns for management of hypertension.  She has had hypertension for over a year.  Current treatment has included medications outlined in medication list.  She denies chest pain or shortness of breath.  No palpitations.  Denies left arm or neck pain.  Review of Systems   Constitutional: Negative for chills, fatigue, fever and unexpected weight change.   Respiratory: Negative for chest tightness and shortness of breath.    Cardiovascular: Negative for chest pain and palpitations.   Gastrointestinal: Negative for abdominal pain and blood in stool.   Neurological: Negative for dizziness, syncope, numbness and headaches.       Objective:      Physical Exam   HENT:   Right Ear: External ear normal.   Left Ear: External ear normal.   Nose: Nose normal.   Mouth/Throat: Oropharynx is clear and moist.   Eyes: Pupils are equal, round, and reactive to light.   Neck: Normal range of motion.   Cardiovascular: Normal rate and regular rhythm.   Murmur heard.  Pulmonary/Chest: Breath sounds normal.   Abdominal: She exhibits no distension. There is no hepatosplenomegaly. There is no tenderness.   Lymphadenopathy:     She has no cervical adenopathy.     She has no axillary adenopathy.   Neurological: She has normal strength and normal reflexes. No cranial nerve deficit or sensory deficit.       Assessment/Plan       Assessment and plan:  Hypertension:  Possibly overmedicated.  Decrease Benicar to once a day.  Follow-up with cardiology.  Check CMP

## 2020-04-09 ENCOUNTER — TELEPHONE (OUTPATIENT)
Dept: GASTROENTEROLOGY | Facility: CLINIC | Age: 85
End: 2020-04-09

## 2020-04-14 ENCOUNTER — TELEPHONE (OUTPATIENT)
Dept: GASTROENTEROLOGY | Facility: CLINIC | Age: 85
End: 2020-04-14

## 2020-04-14 NOTE — TELEPHONE ENCOUNTER
MA contacted pt in regards to her appt. In GI on 4/15 . This is GI second attempt to contact pt. Pt has no pt portal setup . MA left detailed VM for pt , stating that her appt would be canceled due to COVID-19 for the safety of the pt and also the staff and that if she had any question she could give the clinic a call . Clinic number was left on VM as well.

## 2020-04-22 ENCOUNTER — TELEPHONE (OUTPATIENT)
Dept: OBSTETRICS AND GYNECOLOGY | Facility: CLINIC | Age: 85
End: 2020-04-22

## 2020-04-22 NOTE — TELEPHONE ENCOUNTER
Called pt to let her know about [facility] canceling appointments due to COVID-19. Pt verbalized understanding.

## 2020-06-02 ENCOUNTER — INFUSION (OUTPATIENT)
Dept: INFECTIOUS DISEASES | Facility: HOSPITAL | Age: 85
End: 2020-06-02
Attending: INTERNAL MEDICINE
Payer: MEDICARE

## 2020-06-02 VITALS
HEART RATE: 65 BPM | BODY MASS INDEX: 14.42 KG/M2 | TEMPERATURE: 97 F | SYSTOLIC BLOOD PRESSURE: 144 MMHG | DIASTOLIC BLOOD PRESSURE: 61 MMHG | HEIGHT: 63 IN | WEIGHT: 81.38 LBS

## 2020-06-02 DIAGNOSIS — M81.0 OSTEOPOROSIS, POSTMENOPAUSAL: Primary | ICD-10-CM

## 2020-06-02 PROCEDURE — 96372 THER/PROPH/DIAG INJ SC/IM: CPT | Mod: HCNC

## 2020-06-02 PROCEDURE — 63600175 PHARM REV CODE 636 W HCPCS: Mod: JG,HCNC | Performed by: NURSE PRACTITIONER

## 2020-06-02 RX ADMIN — DENOSUMAB 60 MG: 60 INJECTION SUBCUTANEOUS at 03:06

## 2020-06-02 NOTE — PROGRESS NOTES
Patient received Prolia 60 mg injection sub Q.  Tolerated well and left in NAD.    Patient is taking Calcium and Vit D

## 2020-06-23 ENCOUNTER — LAB VISIT (OUTPATIENT)
Dept: LAB | Facility: OTHER | Age: 85
End: 2020-06-23
Attending: INTERNAL MEDICINE
Payer: MEDICARE

## 2020-06-23 DIAGNOSIS — D53.9 NUTRITIONAL ANEMIA: ICD-10-CM

## 2020-06-23 LAB
BASOPHILS # BLD AUTO: 0.04 K/UL (ref 0–0.2)
BASOPHILS NFR BLD: 1 % (ref 0–1.9)
DIFFERENTIAL METHOD: ABNORMAL
EOSINOPHIL # BLD AUTO: 0.1 K/UL (ref 0–0.5)
EOSINOPHIL NFR BLD: 1.7 % (ref 0–8)
ERYTHROCYTE [DISTWIDTH] IN BLOOD BY AUTOMATED COUNT: 12.5 % (ref 11.5–14.5)
HCT VFR BLD AUTO: 31.4 % (ref 37–48.5)
HGB BLD-MCNC: 10.2 G/DL (ref 12–16)
IMM GRANULOCYTES # BLD AUTO: 0.01 K/UL (ref 0–0.04)
IMM GRANULOCYTES NFR BLD AUTO: 0.2 % (ref 0–0.5)
LYMPHOCYTES # BLD AUTO: 1.1 K/UL (ref 1–4.8)
LYMPHOCYTES NFR BLD: 27.2 % (ref 18–48)
MCH RBC QN AUTO: 30.1 PG (ref 27–31)
MCHC RBC AUTO-ENTMCNC: 32.5 G/DL (ref 32–36)
MCV RBC AUTO: 93 FL (ref 82–98)
MONOCYTES # BLD AUTO: 0.5 K/UL (ref 0.3–1)
MONOCYTES NFR BLD: 11.9 % (ref 4–15)
NEUTROPHILS # BLD AUTO: 2.3 K/UL (ref 1.8–7.7)
NEUTROPHILS NFR BLD: 58 % (ref 38–73)
NRBC BLD-RTO: 0 /100 WBC
PLATELET # BLD AUTO: 190 K/UL (ref 150–350)
PMV BLD AUTO: 11 FL (ref 9.2–12.9)
RBC # BLD AUTO: 3.39 M/UL (ref 4–5.4)
WBC # BLD AUTO: 4.04 K/UL (ref 3.9–12.7)

## 2020-06-23 PROCEDURE — 36415 COLL VENOUS BLD VENIPUNCTURE: CPT | Mod: HCNC

## 2020-06-23 PROCEDURE — 83540 ASSAY OF IRON: CPT | Mod: HCNC

## 2020-06-23 PROCEDURE — 85025 COMPLETE CBC W/AUTO DIFF WBC: CPT | Mod: HCNC

## 2020-06-23 PROCEDURE — 82746 ASSAY OF FOLIC ACID SERUM: CPT | Mod: HCNC

## 2020-06-23 PROCEDURE — 82607 VITAMIN B-12: CPT | Mod: HCNC

## 2020-06-23 PROCEDURE — 82728 ASSAY OF FERRITIN: CPT | Mod: HCNC

## 2020-06-24 LAB
FERRITIN SERPL-MCNC: 153 NG/ML (ref 20–300)
FOLATE SERPL-MCNC: 18.7 NG/ML (ref 4–24)
IRON SERPL-MCNC: 69 UG/DL (ref 30–160)
SATURATED IRON: 20 % (ref 20–50)
TOTAL IRON BINDING CAPACITY: 351 UG/DL (ref 250–450)
TRANSFERRIN SERPL-MCNC: 237 MG/DL (ref 200–375)
VIT B12 SERPL-MCNC: 430 PG/ML (ref 210–950)

## 2020-06-25 ENCOUNTER — OFFICE VISIT (OUTPATIENT)
Dept: HEMATOLOGY/ONCOLOGY | Facility: CLINIC | Age: 85
End: 2020-06-25
Payer: MEDICARE

## 2020-06-25 VITALS
WEIGHT: 83.31 LBS | DIASTOLIC BLOOD PRESSURE: 76 MMHG | HEART RATE: 70 BPM | OXYGEN SATURATION: 98 % | TEMPERATURE: 98 F | RESPIRATION RATE: 16 BRPM | SYSTOLIC BLOOD PRESSURE: 177 MMHG | BODY MASS INDEX: 14.76 KG/M2 | HEIGHT: 63 IN

## 2020-06-25 DIAGNOSIS — D64.9 ANEMIA, UNSPECIFIED TYPE: Primary | ICD-10-CM

## 2020-06-25 DIAGNOSIS — I10 ESSENTIAL HYPERTENSION: ICD-10-CM

## 2020-06-25 DIAGNOSIS — D53.9 NUTRITIONAL ANEMIA: ICD-10-CM

## 2020-06-25 PROCEDURE — 1159F PR MEDICATION LIST DOCUMENTED IN MEDICAL RECORD: ICD-10-PCS | Mod: HCNC,S$GLB,, | Performed by: INTERNAL MEDICINE

## 2020-06-25 PROCEDURE — 99999 PR PBB SHADOW E&M-EST. PATIENT-LVL IV: ICD-10-PCS | Mod: PBBFAC,HCNC,, | Performed by: INTERNAL MEDICINE

## 2020-06-25 PROCEDURE — 99999 PR PBB SHADOW E&M-EST. PATIENT-LVL IV: CPT | Mod: PBBFAC,HCNC,, | Performed by: INTERNAL MEDICINE

## 2020-06-25 PROCEDURE — 1101F PR PT FALLS ASSESS DOC 0-1 FALLS W/OUT INJ PAST YR: ICD-10-PCS | Mod: HCNC,CPTII,S$GLB, | Performed by: INTERNAL MEDICINE

## 2020-06-25 PROCEDURE — 1126F PR PAIN SEVERITY QUANTIFIED, NO PAIN PRESENT: ICD-10-PCS | Mod: HCNC,S$GLB,, | Performed by: INTERNAL MEDICINE

## 2020-06-25 PROCEDURE — 3077F PR MOST RECENT SYSTOLIC BLOOD PRESSURE >= 140 MM HG: ICD-10-PCS | Mod: HCNC,CPTII,S$GLB, | Performed by: INTERNAL MEDICINE

## 2020-06-25 PROCEDURE — 3078F DIAST BP <80 MM HG: CPT | Mod: HCNC,CPTII,S$GLB, | Performed by: INTERNAL MEDICINE

## 2020-06-25 PROCEDURE — 3077F SYST BP >= 140 MM HG: CPT | Mod: HCNC,CPTII,S$GLB, | Performed by: INTERNAL MEDICINE

## 2020-06-25 PROCEDURE — 99214 OFFICE O/P EST MOD 30 MIN: CPT | Mod: HCNC,S$GLB,, | Performed by: INTERNAL MEDICINE

## 2020-06-25 PROCEDURE — 99499 RISK ADDL DX/OHS AUDIT: ICD-10-PCS | Mod: HCNC,S$GLB,, | Performed by: INTERNAL MEDICINE

## 2020-06-25 PROCEDURE — 1126F AMNT PAIN NOTED NONE PRSNT: CPT | Mod: HCNC,S$GLB,, | Performed by: INTERNAL MEDICINE

## 2020-06-25 PROCEDURE — 99214 PR OFFICE/OUTPT VISIT, EST, LEVL IV, 30-39 MIN: ICD-10-PCS | Mod: HCNC,S$GLB,, | Performed by: INTERNAL MEDICINE

## 2020-06-25 PROCEDURE — 1101F PT FALLS ASSESS-DOCD LE1/YR: CPT | Mod: HCNC,CPTII,S$GLB, | Performed by: INTERNAL MEDICINE

## 2020-06-25 PROCEDURE — 99499 UNLISTED E&M SERVICE: CPT | Mod: HCNC,S$GLB,, | Performed by: INTERNAL MEDICINE

## 2020-06-25 PROCEDURE — 3078F PR MOST RECENT DIASTOLIC BLOOD PRESSURE < 80 MM HG: ICD-10-PCS | Mod: HCNC,CPTII,S$GLB, | Performed by: INTERNAL MEDICINE

## 2020-06-25 PROCEDURE — 1159F MED LIST DOCD IN RCRD: CPT | Mod: HCNC,S$GLB,, | Performed by: INTERNAL MEDICINE

## 2020-06-25 NOTE — PROGRESS NOTES
"Subjective:       Patient ID: Mj Summers is a 84 y.o. female.     Chief Complaint: follow up for anemia     Hematologic History:  1. Ms. Summers is a very pleasant 83 yo woman with HTN, CAD, b/l carotid disease, GERD, malnutrition, who initially saw me on 1/3/18 for further evaluation of anemia. On review of her records, her Hb has been ranging between 10 and 12 since 2010. In May 2016 it dropped to 5-7 after she underwent a surgery on 5/10/16 for left intertrochanteric fracture from a fall. Her H/H on 7/12/16 was 11.4/35.1. On 11/6/17, WBC 3.86, H/H 10.7/33.2, plt count 213. On 11/3/17, WBC 4.26, H/H 10.2/31.7, MCV 92, plt count 158. Vit B12 338, folate 17.2, ferritin 179, iron 68, TIBC 333, iron saturation 20%, retic 1.1%. CMP on 11/6/17 showed Cr 0.7, total protein 7.3, albumin 3.4, corrected calcium 9.8. Calculated CrCl 37.46 mL/min (cockroft). She has been having dysphagia over the past 2 months, which she attributed to GERD. Has been taking iron pills for many years and has been having dark stool from the iron pills. Denies any signs of bleeding.   2. Workup on 1/3/18 showed normal LDH, haptoglobin, copper levels. SPEP negative for M protein. Seen by GI. Guaiac stool negative.   3. EGD on 2/6/18 showed Small hiatal hernia. Mild Schatzki ring. Dilated. Gastric mucosal atrophy. Biopsied. Normal examined duodenum. Pathology of the gastric antrum showed "Severe chronic inactive gastritis. No evidence of active acute inflammation. Negative for Helicobacter like organisms on H&E. Intestinal metaplasia, complete type. Reactive atypia. No evidence of malignancy."  4. Colonoscopy on 2/6/18 showed "The colon (entire examined portion) was significantly tortuous.The exam was otherwise without abnormality. The cecum was partially obscured by solid material, but by washing and repositioning patient, I feel that adequate visualization was obtained. I cannot rule out an AVM in the cecum, but I don't think any mass, " "lesion, or significant polyp was missed"     INTERVAL HISTORY:   Ms. Summers returns today for follow up of anemia. She has been feeling well. Denies any complaints.      ROS:   A ten-point system review is obtained and negative except for what was stated in the interval history.      Physical Examination:   Vital signs reviewed.   Gen: well hydrated, well developed, in no acute distress. In a wheelchair.   HEENT: normocephalic, anicteric sclerae, PERRLA, EOMI, oropharynx clear  Neck: supple, no JVD, thyromegaly, cervical or supraclavicular LAD  Lungs: CTAB, no wheezes or rales  Heart: RRR, no M/R/G  Abdomen: soft, no tenderness, non-distended, no hepatosplenomegaly, mass, or hernia. BS present  Ext: no clubbing, cyanosis, or edema  Neuro: alert and oriented x 4, no focal neuro deficit  Skin: no rash, erythema, open wound or ulcers  Psych: pleasant and appropriate mood and affect        Objective:      Diagnostic Tests:  EGD on 2/6/18 showed Small hiatal hernia. Mild Schatzki ring. Dilated. Gastric mucosal atrophy. Biopsied. Normal examined duodenum. Pathology of the gastric antrum showed "Severe chronic inactive gastritis. No evidence of active acute inflammation. Negative for Helicobacter like organisms on H&E. Intestinal metaplasia, complete type. Reactive atypia. No evidence of malignancy."     Colonoscopy on 2/6/18 showed "The colon (entire examined portion) was significantly tortuous.The exam was otherwise without abnormality. The cecum was partially obscured by solid material, but by washing and repositioning patient, I feel that adequate visualization was obtained. I cannot rule out an AVM in the cecum, but I don't think any mass, lesion, or significant polyp was missed"     Laboratory Data:  Labs reviewed. Hb 10.2 stable, MCV 93, WBC and plt count normal. B12, folate, ferritin, iron levels are normal.      Assessment/Plan:      1. Anemia, unspecified type    2. Nutritional anemia    3. Essential " hypertension        1.2   - Ms. Summers is an 83 yo woman with chronic normocytic anemia. Extensive workup was negative. EGD showed severe chronic inactive gastritis but no s/o malignancy. Colonoscopy did not show bleeding sites. Guaiac stool was negative.   - Hb stable. Anemia mild. No need for intervention for now  - RTC in 6 months to monitor     3.  - elevated  - f/u with PCP

## 2020-08-25 ENCOUNTER — PES CALL (OUTPATIENT)
Dept: ADMINISTRATIVE | Facility: CLINIC | Age: 85
End: 2020-08-25

## 2020-08-28 NOTE — PROGRESS NOTES
"HPI     DLS: 11/25/19    Pt here for 4 month check;    Meds;    OFF all gtts post filtering bleb os     T1/2 GFS QAM OD   Alphagan 0.15% TID OD   Camacho 4% TID OD   Lumigan QHS OD   AT's PRN OU     1) Residula OAG / MMG OU   2) Blind Hypertensive OD   3) NS OD   4) FABY   5) APD OD   6) Band Keratopathy OD   7) Asteroid Hyalosis OS   8) Hx Acute Dacryocystitis OS   9) CRVO OD   10) Ant. Capsule Phimosis OS   11) PCIOL OS     Last edited by Aysha Triplett MD on 9/3/2020  2:30 PM. (History)              Assessment /Plan     For exam results, see Encounter Report.    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   Angle open after PI's ou   -Followed at [facility] since at least '01   First HVF 2001   First photos 2001   S/p PI OU   s/p Gonioplasty OU     Family history none   Glaucoma meds Lumigan, timolol gfs  qAM, Agan tid, Camacho tid  - all OD ((off all gtts os s/p combined)   H/O adverse rxn to glaucoma drops Azopt "felt weird"   LASERS S/p PI OU, s/p Gonioplasty OU   GLAUCOMA SURGERIES    combined phaco/trab with mini shunt OS 5/22/2013   OTHER EYE SURGERIES    S/P DCR sx OD x 2 or 3 with [facility]    Combined phaco/IOL/ trab with mini shunt OS 5/22/2013   CDR 0.99 /0.9   Tbase 42/23   Tmax 42/23 (when stopped gtts)   Ttarget pain control od // 18 os   HVF 18 VF '01 -'19 - SAD/SNS  od  // SALT / IAD (gen dep) os   Gonio +3/+3   /539   OCT 10 test 2006 to 2019 - RNFL - OD:poor test  // OS:dec Dec thru out expect NS   HRT 14 test 2004 to 2018 - MR -  Dec. S/I od // dec. S/N/I os /// CDR 0.66 od // 0.83 os   HRT 2019 " - high std dev. Ou (( NO MORE HRT'S - unreliable))   Disc photos - 2001, 2003, 2006 - slides // 2008, 2010, 2016, 2018   - OIS     Ta today 41/15 - on mult gtts od - pain free // off all gtts os post phaco/trab - 2013   Test today - IOP, gonio     2.  Blind hypertensive Right Eye   Pain free - eye comfortable   Very limited vision CF at 3'   End stage glaucoma and S/P CRVO    No rubeosis   IOP is high but eye is pain free    3. Cataract OD -However, OD VA limited 2/2 CRVO and end stge glaucoma   S/P phaco/IOL/trab OS 5/22/2013    4. FABY OU -cont ATs     5. APD OD -   Old / stable 2/2 CRVO and glaucoma    6. Band Keratopathy OD -stable. Cont ATs- being followed by Dr. Saul;    had EDTA chelation on 2/13/14 OS - now with recurrent band     7. Asteroid Hyalosis OS -stable     8. Hx of Acute Dacryocystitis OS and recent NLD OD s/p surgery 11/12 and repeat for exposure w/ Dr. Ocampo 12/5/12 -stable. Cont f/u with Dam[facility]     9. Hx of CRVO OD -limiting visual potential OD   -No NV on todays exam     10. Ant capsule phimosis os    S/P yag laser     11. Asteroid hyalosis OS     12. PC IOL os    Combined  W/ mini shunt 5/22/2013   doing well VA is 20/25 and the IOP is 13    Plan:   Off all gtts for now OS - s/p combined (5/22/2013)  -- IOP stable - at 16 and VA is 20/30-20/40 - can add gtts back if IOP goes any higher     Cont glaucoma gtts OD -- IOP very high, but comfortable/no pain // Blind hypertensive but pain free eye   Old  crvo     Ok to use OTC readers for now a +3.00 to +3.50 - not really able to improve distance vision with glasses at this time  Much improved from before the combined  Procedure    Cont all glaucoma gtts od   Blind hypertensive right eye - pain free   Cont off glaucoma gtts os     Glasses - Matthieu -  - refer to B - to see if hand held magnifier might help     F/U 4 months with IOP and  HVF - os only and DFE / and photos     I have seen and personally examined the patient.  I agree with the  findings, assessment and plan of the resident and/or fellow.     Aysha Triplett MD

## 2020-09-02 ENCOUNTER — PATIENT OUTREACH (OUTPATIENT)
Dept: ADMINISTRATIVE | Facility: OTHER | Age: 85
End: 2020-09-02

## 2020-09-03 ENCOUNTER — OFFICE VISIT (OUTPATIENT)
Dept: OPHTHALMOLOGY | Facility: CLINIC | Age: 85
End: 2020-09-03
Payer: MEDICARE

## 2020-09-03 DIAGNOSIS — H21.541 POSTERIOR SYNECHIAE, RIGHT: ICD-10-CM

## 2020-09-03 DIAGNOSIS — H18.421 BAND KERATOPATHY, RIGHT: ICD-10-CM

## 2020-09-03 DIAGNOSIS — H35.031 BLIND HYPERTENSIVE RIGHT EYE: ICD-10-CM

## 2020-09-03 DIAGNOSIS — H34.8112 CENTRAL RETINAL VEIN OCCLUSION OF RIGHT EYE, UNSPECIFIED COMPLICATION STATUS: ICD-10-CM

## 2020-09-03 DIAGNOSIS — H25.11 SENILE NUCLEAR SCLEROSIS, RIGHT: ICD-10-CM

## 2020-09-03 DIAGNOSIS — H21.561 AFFERENT PUPILLARY DEFECT, RIGHT: ICD-10-CM

## 2020-09-03 DIAGNOSIS — Z98.83 GLAUCOMA FILTERING BLEB OF LEFT EYE: ICD-10-CM

## 2020-09-03 DIAGNOSIS — H40.89 GLAUCOMA DUE TO COMBINATION OF MECHANISMS: Primary | ICD-10-CM

## 2020-09-03 DIAGNOSIS — Z96.1 PSEUDOPHAKIA OF LEFT EYE: ICD-10-CM

## 2020-09-03 PROCEDURE — 99999 PR PBB SHADOW E&M-EST. PATIENT-LVL III: CPT | Mod: PBBFAC,HCNC,, | Performed by: OPHTHALMOLOGY

## 2020-09-03 PROCEDURE — 92020 GONIOSCOPY: CPT | Mod: HCNC,S$GLB,, | Performed by: OPHTHALMOLOGY

## 2020-09-03 PROCEDURE — 92020 PR SPECIAL EYE EVAL,GONIOSCOPY: ICD-10-PCS | Mod: HCNC,S$GLB,, | Performed by: OPHTHALMOLOGY

## 2020-09-03 PROCEDURE — 99999 PR PBB SHADOW E&M-EST. PATIENT-LVL III: ICD-10-PCS | Mod: PBBFAC,HCNC,, | Performed by: OPHTHALMOLOGY

## 2020-09-03 PROCEDURE — 92012 PR EYE EXAM, EST PATIENT,INTERMED: ICD-10-PCS | Mod: HCNC,S$GLB,, | Performed by: OPHTHALMOLOGY

## 2020-09-03 PROCEDURE — 92012 INTRM OPH EXAM EST PATIENT: CPT | Mod: HCNC,S$GLB,, | Performed by: OPHTHALMOLOGY

## 2020-09-03 RX ORDER — TIMOLOL MALEATE 5 MG/ML
1 SOLUTION/ DROPS OPHTHALMIC EVERY MORNING
Qty: 5 ML | Refills: 6 | Status: SHIPPED | OUTPATIENT
Start: 2020-09-03 | End: 2021-09-21 | Stop reason: SDUPTHER

## 2020-09-03 RX ORDER — BRIMONIDINE TARTRATE 1.5 MG/ML
1 SOLUTION/ DROPS OPHTHALMIC 3 TIMES DAILY
Qty: 1 BOTTLE | Refills: 6 | Status: SHIPPED | OUTPATIENT
Start: 2020-09-03 | End: 2022-06-07 | Stop reason: SDUPTHER

## 2020-09-03 RX ORDER — BIMATOPROST 0.1 MG/ML
1 SOLUTION/ DROPS OPHTHALMIC NIGHTLY
Qty: 2.5 ML | Refills: 6 | Status: SHIPPED | OUTPATIENT
Start: 2020-09-03 | End: 2021-09-21 | Stop reason: SDUPTHER

## 2020-09-03 RX ORDER — PILOCARPINE HYDROCHLORIDE 40 MG/ML
1 SOLUTION/ DROPS OPHTHALMIC 3 TIMES DAILY
Qty: 15 ML | Refills: 6 | Status: SHIPPED | OUTPATIENT
Start: 2020-09-03 | End: 2022-06-07 | Stop reason: SDUPTHER

## 2020-09-08 ENCOUNTER — OFFICE VISIT (OUTPATIENT)
Dept: INTERNAL MEDICINE | Facility: CLINIC | Age: 85
End: 2020-09-08
Payer: MEDICARE

## 2020-09-08 ENCOUNTER — LAB VISIT (OUTPATIENT)
Dept: LAB | Facility: HOSPITAL | Age: 85
End: 2020-09-08
Attending: INTERNAL MEDICINE
Payer: MEDICARE

## 2020-09-08 DIAGNOSIS — I10 HYPERTENSION, UNSPECIFIED TYPE: Primary | ICD-10-CM

## 2020-09-08 DIAGNOSIS — Z12.31 SCREENING MAMMOGRAM, ENCOUNTER FOR: ICD-10-CM

## 2020-09-08 DIAGNOSIS — I10 HYPERTENSION, UNSPECIFIED TYPE: ICD-10-CM

## 2020-09-08 LAB
ALBUMIN SERPL BCP-MCNC: 3.8 G/DL (ref 3.5–5.2)
ALP SERPL-CCNC: 59 U/L (ref 55–135)
ALT SERPL W/O P-5'-P-CCNC: 20 U/L (ref 10–44)
ANION GAP SERPL CALC-SCNC: 7 MMOL/L (ref 8–16)
AST SERPL-CCNC: 25 U/L (ref 10–40)
BILIRUB SERPL-MCNC: 0.2 MG/DL (ref 0.1–1)
BUN SERPL-MCNC: 23 MG/DL (ref 8–23)
CALCIUM SERPL-MCNC: 9.2 MG/DL (ref 8.7–10.5)
CHLORIDE SERPL-SCNC: 104 MMOL/L (ref 95–110)
CO2 SERPL-SCNC: 30 MMOL/L (ref 23–29)
CREAT SERPL-MCNC: 0.9 MG/DL (ref 0.5–1.4)
EST. GFR  (AFRICAN AMERICAN): >60 ML/MIN/1.73 M^2
EST. GFR  (NON AFRICAN AMERICAN): 58.5 ML/MIN/1.73 M^2
GLUCOSE SERPL-MCNC: 89 MG/DL (ref 70–110)
POTASSIUM SERPL-SCNC: 4.1 MMOL/L (ref 3.5–5.1)
PROT SERPL-MCNC: 7.6 G/DL (ref 6–8.4)
SODIUM SERPL-SCNC: 141 MMOL/L (ref 136–145)

## 2020-09-08 PROCEDURE — 99999 PR PBB SHADOW E&M-EST. PATIENT-LVL V: CPT | Mod: PBBFAC,HCNC,, | Performed by: INTERNAL MEDICINE

## 2020-09-08 PROCEDURE — 3075F PR MOST RECENT SYSTOLIC BLOOD PRESS GE 130-139MM HG: ICD-10-PCS | Mod: HCNC,CPTII,S$GLB, | Performed by: INTERNAL MEDICINE

## 2020-09-08 PROCEDURE — 1101F PR PT FALLS ASSESS DOC 0-1 FALLS W/OUT INJ PAST YR: ICD-10-PCS | Mod: HCNC,CPTII,S$GLB, | Performed by: INTERNAL MEDICINE

## 2020-09-08 PROCEDURE — 1126F PR PAIN SEVERITY QUANTIFIED, NO PAIN PRESENT: ICD-10-PCS | Mod: HCNC,S$GLB,, | Performed by: INTERNAL MEDICINE

## 2020-09-08 PROCEDURE — 99215 PR OFFICE/OUTPT VISIT, EST, LEVL V, 40-54 MIN: ICD-10-PCS | Mod: HCNC,S$GLB,, | Performed by: INTERNAL MEDICINE

## 2020-09-08 PROCEDURE — 1126F AMNT PAIN NOTED NONE PRSNT: CPT | Mod: HCNC,S$GLB,, | Performed by: INTERNAL MEDICINE

## 2020-09-08 PROCEDURE — 36415 COLL VENOUS BLD VENIPUNCTURE: CPT | Mod: HCNC

## 2020-09-08 PROCEDURE — 99999 PR PBB SHADOW E&M-EST. PATIENT-LVL V: ICD-10-PCS | Mod: PBBFAC,HCNC,, | Performed by: INTERNAL MEDICINE

## 2020-09-08 PROCEDURE — 3078F DIAST BP <80 MM HG: CPT | Mod: HCNC,CPTII,S$GLB, | Performed by: INTERNAL MEDICINE

## 2020-09-08 PROCEDURE — 1159F MED LIST DOCD IN RCRD: CPT | Mod: HCNC,S$GLB,, | Performed by: INTERNAL MEDICINE

## 2020-09-08 PROCEDURE — 99215 OFFICE O/P EST HI 40 MIN: CPT | Mod: HCNC,S$GLB,, | Performed by: INTERNAL MEDICINE

## 2020-09-08 PROCEDURE — 1159F PR MEDICATION LIST DOCUMENTED IN MEDICAL RECORD: ICD-10-PCS | Mod: HCNC,S$GLB,, | Performed by: INTERNAL MEDICINE

## 2020-09-08 PROCEDURE — 80053 COMPREHEN METABOLIC PANEL: CPT | Mod: HCNC

## 2020-09-08 PROCEDURE — 1101F PT FALLS ASSESS-DOCD LE1/YR: CPT | Mod: HCNC,CPTII,S$GLB, | Performed by: INTERNAL MEDICINE

## 2020-09-08 PROCEDURE — 3078F PR MOST RECENT DIASTOLIC BLOOD PRESSURE < 80 MM HG: ICD-10-PCS | Mod: HCNC,CPTII,S$GLB, | Performed by: INTERNAL MEDICINE

## 2020-09-08 PROCEDURE — 3075F SYST BP GE 130 - 139MM HG: CPT | Mod: HCNC,CPTII,S$GLB, | Performed by: INTERNAL MEDICINE

## 2020-09-11 ENCOUNTER — IMMUNIZATION (OUTPATIENT)
Dept: PHARMACY | Facility: CLINIC | Age: 85
End: 2020-09-11
Payer: MEDICARE

## 2020-09-13 VITALS
HEIGHT: 63 IN | BODY MASS INDEX: 14.1 KG/M2 | HEART RATE: 75 BPM | DIASTOLIC BLOOD PRESSURE: 60 MMHG | WEIGHT: 79.56 LBS | SYSTOLIC BLOOD PRESSURE: 138 MMHG | TEMPERATURE: 99 F | OXYGEN SATURATION: 99 %

## 2020-09-13 NOTE — PROGRESS NOTES
Subjective:       Patient ID: Mj Summers is a 85 y.o. female.    Chief Complaint: Hypertension    HPI  She returns for management of hypertension.  She has had hypertension for over a year.  Current treatment has included medications outlined in medication list.  She denies chest pain or shortness of breath.  No palpitations.  Denies left arm or neck pain.       PAST MEDICAL HISTORY: Hypertension, hyperlipidemia, osteoporosis, anemia, coronary artery disease, peripheral vascular disease,  maxillary fracture, leukopenia, glaucoma, positive PPD, GERD, ORIF left hip,  tricuspid regurgitation. She had a   colonoscopy February 2018    PAST SURGICAL HISTORY: Breast cyst removal, benign.     MEDICATIONS:  one aspirin daily, Benicar 20 mg daily, Crestor 10 mg daily,  timolol drops, prolia, chlorthalidone     ALLERGIES: Penicillin and sulfa. .    Review of Systems   Constitutional: Negative for chills, fatigue, fever and unexpected weight change.   Respiratory: Negative for chest tightness and shortness of breath.    Cardiovascular: Negative for chest pain and palpitations.   Gastrointestinal: Negative for abdominal pain and blood in stool.   Neurological: Negative for dizziness, syncope, numbness and headaches.       Objective:      Physical Exam  HENT:      Right Ear: External ear normal.      Left Ear: External ear normal.      Nose: Nose normal.      Mouth/Throat:      Mouth: Mucous membranes are moist.      Pharynx: Oropharynx is clear.   Eyes:      Pupils: Pupils are equal, round, and reactive to light.   Neck:      Musculoskeletal: Normal range of motion.   Cardiovascular:      Rate and Rhythm: Normal rate and regular rhythm.      Heart sounds: Murmur present.   Pulmonary:      Breath sounds: Normal breath sounds.   Abdominal:      General: There is no distension.      Palpations: There is no hepatomegaly or splenomegaly.      Tenderness: There is no abdominal tenderness.   Lymphadenopathy:      Cervical: No  cervical adenopathy.      Upper Body:      Right upper body: No axillary adenopathy.      Left upper body: No axillary adenopathy.   Neurological:      Cranial Nerves: No cranial nerve deficit.      Sensory: No sensory deficit.      Motor: Motor function is intact.      Deep Tendon Reflexes: Reflexes are normal and symmetric.       breast exam:  No masses palpated, no nipple discharge expressed  Assessment/Plan           assessment and plan:  Hypertension:  Check CMP.  Schedule mammogram.  Discussed Pap smear, pelvic exam.  She declined all

## 2020-09-14 ENCOUNTER — OFFICE VISIT (OUTPATIENT)
Dept: CARDIOLOGY | Facility: CLINIC | Age: 85
End: 2020-09-14
Payer: MEDICARE

## 2020-09-14 VITALS
DIASTOLIC BLOOD PRESSURE: 72 MMHG | HEIGHT: 63 IN | SYSTOLIC BLOOD PRESSURE: 179 MMHG | BODY MASS INDEX: 14.22 KG/M2 | WEIGHT: 80.25 LBS | HEART RATE: 66 BPM | OXYGEN SATURATION: 100 %

## 2020-09-14 DIAGNOSIS — E78.5 DYSLIPIDEMIA: Chronic | ICD-10-CM

## 2020-09-14 DIAGNOSIS — R64 CACHECTIC: ICD-10-CM

## 2020-09-14 DIAGNOSIS — I10 ESSENTIAL HYPERTENSION: Chronic | ICD-10-CM

## 2020-09-14 DIAGNOSIS — I65.23 BILATERAL CAROTID ARTERY STENOSIS: ICD-10-CM

## 2020-09-14 DIAGNOSIS — I73.9 PAD (PERIPHERAL ARTERY DISEASE): ICD-10-CM

## 2020-09-14 DIAGNOSIS — I25.10 CORONARY ARTERY DISEASE INVOLVING NATIVE CORONARY ARTERY OF NATIVE HEART WITHOUT ANGINA PECTORIS: Primary | ICD-10-CM

## 2020-09-14 DIAGNOSIS — I70.0 AORTIC ATHEROSCLEROSIS: ICD-10-CM

## 2020-09-14 PROCEDURE — 3077F PR MOST RECENT SYSTOLIC BLOOD PRESSURE >= 140 MM HG: ICD-10-PCS | Mod: HCNC,CPTII,S$GLB, | Performed by: NURSE PRACTITIONER

## 2020-09-14 PROCEDURE — 3077F SYST BP >= 140 MM HG: CPT | Mod: HCNC,CPTII,S$GLB, | Performed by: NURSE PRACTITIONER

## 2020-09-14 PROCEDURE — 1126F AMNT PAIN NOTED NONE PRSNT: CPT | Mod: HCNC,S$GLB,, | Performed by: NURSE PRACTITIONER

## 2020-09-14 PROCEDURE — 99499 RISK ADDL DX/OHS AUDIT: ICD-10-PCS | Mod: HCNC,S$GLB,, | Performed by: NURSE PRACTITIONER

## 2020-09-14 PROCEDURE — 99214 OFFICE O/P EST MOD 30 MIN: CPT | Mod: HCNC,S$GLB,, | Performed by: NURSE PRACTITIONER

## 2020-09-14 PROCEDURE — 93000 ELECTROCARDIOGRAM COMPLETE: CPT | Mod: HCNC,S$GLB,, | Performed by: INTERNAL MEDICINE

## 2020-09-14 PROCEDURE — 1159F MED LIST DOCD IN RCRD: CPT | Mod: HCNC,S$GLB,, | Performed by: NURSE PRACTITIONER

## 2020-09-14 PROCEDURE — 1126F PR PAIN SEVERITY QUANTIFIED, NO PAIN PRESENT: ICD-10-PCS | Mod: HCNC,S$GLB,, | Performed by: NURSE PRACTITIONER

## 2020-09-14 PROCEDURE — 99999 PR PBB SHADOW E&M-EST. PATIENT-LVL IV: CPT | Mod: PBBFAC,HCNC,, | Performed by: NURSE PRACTITIONER

## 2020-09-14 PROCEDURE — 1159F PR MEDICATION LIST DOCUMENTED IN MEDICAL RECORD: ICD-10-PCS | Mod: HCNC,S$GLB,, | Performed by: NURSE PRACTITIONER

## 2020-09-14 PROCEDURE — 1101F PT FALLS ASSESS-DOCD LE1/YR: CPT | Mod: HCNC,CPTII,S$GLB, | Performed by: NURSE PRACTITIONER

## 2020-09-14 PROCEDURE — 99214 PR OFFICE/OUTPT VISIT, EST, LEVL IV, 30-39 MIN: ICD-10-PCS | Mod: HCNC,S$GLB,, | Performed by: NURSE PRACTITIONER

## 2020-09-14 PROCEDURE — 3078F PR MOST RECENT DIASTOLIC BLOOD PRESSURE < 80 MM HG: ICD-10-PCS | Mod: HCNC,CPTII,S$GLB, | Performed by: NURSE PRACTITIONER

## 2020-09-14 PROCEDURE — 99499 UNLISTED E&M SERVICE: CPT | Mod: HCNC,S$GLB,, | Performed by: NURSE PRACTITIONER

## 2020-09-14 PROCEDURE — 99999 PR PBB SHADOW E&M-EST. PATIENT-LVL IV: ICD-10-PCS | Mod: PBBFAC,HCNC,, | Performed by: NURSE PRACTITIONER

## 2020-09-14 PROCEDURE — 3078F DIAST BP <80 MM HG: CPT | Mod: HCNC,CPTII,S$GLB, | Performed by: NURSE PRACTITIONER

## 2020-09-14 PROCEDURE — 1101F PR PT FALLS ASSESS DOC 0-1 FALLS W/OUT INJ PAST YR: ICD-10-PCS | Mod: HCNC,CPTII,S$GLB, | Performed by: NURSE PRACTITIONER

## 2020-09-14 PROCEDURE — 93000 EKG 12-LEAD: ICD-10-PCS | Mod: HCNC,S$GLB,, | Performed by: INTERNAL MEDICINE

## 2020-09-14 NOTE — PATIENT INSTRUCTIONS
Take the chlorthalidone in the morning and the olmesartan 20 mg in the evening.  Please email or call me in a week or 2 to let me know how your blood pressure is running.  If you are still having blood pressures in the 140-150s, then I'd like to increase the olmesartan to 40 mg in the evening.     Magnesium rich foods:    Almonds  Avocado  Black beans  Bran cereal  Brown rice  Cashews  Cereal (shredded wheat)  Edamame  Kidney beans  Oatmeal  Peanut butter  Peanuts  Potato with skin  Pumpkin  Raisins  Soymilk  Spinach  Whole grain bread  Yogurt

## 2020-09-14 NOTE — PROGRESS NOTES
Ms. Summers is a patient of Dr. Morrow and was last seen in [facility] Cardiology Visit 3/3/20      Subjective:   Patient ID:  Mj Summers is a 85 y.o. female who presents for follow-up of Essential hypertension (6 months fu)    Problems:  Coronary artery disease ([facility] 2010 mid LAD 40%)   mild bilateral carotid disease (20-39% bilateral in 2015 )   hyperlipidemia   hypertension   pulmonary emphysema.    HPI  Ms. Summers is in clinic today for routine follow up.  At her last visit, her olmesartan was moved to the evening d/t orthostatic hypotension.  Her BP runs 140-150s at home.  She is taking the chlorthalidone and the olmesartan in the morning around 10 or 11 am.  Patient denies chest pain with exertion or at rest, palpitations, SOB, YOUNGBLOOD, dizziness, syncope, edema, orthopnea, PND, or claudication.  Reports no routine exercise.      Review of Systems   Constitution: Negative for decreased appetite, diaphoresis, malaise/fatigue, weight gain and weight loss.   Eyes: Negative for visual disturbance.   Cardiovascular: Negative for chest pain, claudication, dyspnea on exertion, irregular heartbeat, leg swelling, near-syncope, orthopnea, palpitations, paroxysmal nocturnal dyspnea and syncope.        Denies chest pressure   Respiratory: Negative for cough, hemoptysis, shortness of breath, sleep disturbances due to breathing and snoring.    Endocrine: Negative for cold intolerance and heat intolerance.   Hematologic/Lymphatic: Negative for bleeding problem. Does not bruise/bleed easily.   Musculoskeletal: Negative for myalgias.   Gastrointestinal: Negative for bloating, abdominal pain, anorexia, change in bowel habit, constipation, diarrhea, nausea and vomiting.   Neurological: Negative for difficulty with concentration, disturbances in coordination, excessive daytime sleepiness, dizziness, headaches, light-headedness, loss of balance, numbness and weakness.   Psychiatric/Behavioral: The patient does not have insomnia.         Allergies and current medications updated and reviewed:  Review of patient's allergies indicates:   Allergen Reactions    Strawberries [strawberry]      Blood pressure elevation    Chocolate flavor      Causes acid reflux    Atorvastatin      Myalgias    Pcn [penicillins] Rash    Pravastatin      Myalgias    Sulfa (sulfonamide antibiotics) Itching     Current Outpatient Medications   Medication Sig    aspirin 81 mg Tab Take 81 mg by mouth every evening. 1 Tablet Oral Every day    bimatoprost (LUMIGAN) 0.01 % Drop Place 1 drop into the right eye every evening.    brimonidine 0.15 % OPTH DROP (ALPHAGAN) 0.15 % ophthalmic solution Place 1 drop into the right eye 3 (three) times daily.    calcium carbonate (OS-KACI) 600 mg (1,500 mg) Tab Take 600 mg by mouth 2 (two) times daily with meals.    chlorthalidone (HYGROTEN) 25 MG Tab TAKE 1 TABLET BY MOUTH EVERY DAY    ergocalciferol (ERGOCALCIFEROL) 50,000 unit Cap TAKE ONE CAPSULE BY MOUTH EVERY MONTH    ferrous sulfate 325 (65 FE) MG EC tablet Take 1 tablet (325 mg total) by mouth once daily.    multivitamin (ONE DAILY MULTIVITAMIN) per tablet Take 1 tablet by mouth once daily.    olmesartan (BENICAR) 20 MG tablet Take 1 tablet (20 mg total) by mouth 2 (two) times daily.    omeprazole (PRILOSEC) 20 MG capsule Take 1 capsule (20 mg total) by mouth once daily.    pilocarpine HCL 4% (PILOCAR) 4 % ophthalmic solution Place 1 drop into the right eye 3 (three) times daily.    rosuvastatin (CRESTOR) 10 MG tablet TAKE 1 TABLET BY MOUTH EVERY DAY    senna-docusate 8.6-50 mg (PERICOLACE) 8.6-50 mg per tablet Take 1 tablet by mouth 2 (two) times daily.    timolol maleate 0.5% (TIMOPTIC) 0.5 % Drop Place 1 drop into the right eye every morning.     No current facility-administered medications for this visit.        Objective:     Right Arm BP - Sittin/74 (20 1335)  Left Arm BP - Sittin/72 (20 1335)    BP (!) 179/72 (BP Location: Left  "arm, Patient Position: Sitting, BP Method: Pediatric (Automatic))   Pulse 66   Ht 5' 3" (1.6 m)   Wt 36.4 kg (80 lb 4 oz)   LMP 11/10/1987 (Approximate)   SpO2 100%   BMI 14.22 kg/m²       Physical Exam   Constitutional: She is oriented to person, place, and time. Vital signs are normal. She appears well-developed and well-nourished. She is active. No distress.   HENT:   Head: Normocephalic and atraumatic.   Eyes: Conjunctivae and lids are normal. No scleral icterus.   Neck: Neck supple. Normal carotid pulses, no hepatojugular reflux and no JVD present. Carotid bruit is not present.   Cardiovascular: Normal rate, regular rhythm, S1 normal, S2 normal and intact distal pulses. PMI is not displaced. Exam reveals no gallop and no friction rub.   No murmur heard.  Pulses:       Carotid pulses are 2+ on the right side and 2+ on the left side.       Radial pulses are 2+ on the right side and 2+ on the left side.        Dorsalis pedis pulses are 2+ on the right side and 2+ on the left side.        Posterior tibial pulses are 1+ on the right side and 1+ on the left side.   Pulmonary/Chest: Effort normal and breath sounds normal. No respiratory distress. She has no decreased breath sounds. She has no wheezes. She has no rhonchi. She has no rales. She exhibits no tenderness.   Abdominal: Soft. Normal appearance and bowel sounds are normal. She exhibits no distension, no fluid wave, no abdominal bruit, no ascites and no pulsatile midline mass. There is no hepatosplenomegaly. There is no abdominal tenderness.   Musculoskeletal:         General: No edema.   Neurological: She is alert and oriented to person, place, and time. Gait normal.   Skin: Skin is warm, dry and intact. No rash noted. She is not diaphoretic. Nails show no clubbing.   Psychiatric: She has a normal mood and affect. Her speech is normal and behavior is normal. Judgment and thought content normal. Cognition and memory are normal.   Nursing note and vitals " reviewed.      Chemistry        Component Value Date/Time     09/08/2020 1556    K 4.1 09/08/2020 1556     09/08/2020 1556    CO2 30 (H) 09/08/2020 1556    BUN 23 09/08/2020 1556    CREATININE 0.9 09/08/2020 1556    GLU 89 09/08/2020 1556        Component Value Date/Time    CALCIUM 9.2 09/08/2020 1556    ALKPHOS 59 09/08/2020 1556    AST 25 09/08/2020 1556    ALT 20 09/08/2020 1556    BILITOT 0.2 09/08/2020 1556    ESTGFRAFRICA >60.0 09/08/2020 1556    EGFRNONAA 58.5 (A) 09/08/2020 1556        No results found for: LABA1C, HGBA1C    Recent Labs   Lab 09/04/19  1543  03/02/20  0945 06/23/20  1600   WBC  --    < >  --  4.04   Hemoglobin  --    < >  --  10.2 L   Hematocrit  --    < >  --  31.4 L   Mean Corpuscular Volume  --    < >  --  93   Platelets  --    < >  --  190   TSH 1.142  --   --   --    Cholesterol  --   --  234 H  --    HDL  --   --  65  --    LDL Cholesterol  --   --  150.0  --    Triglycerides  --   --  95  --    Hdl/Cholesterol Ratio  --   --  27.8  --     < > = values in this interval not displayed.              Test(s) Reviewed  I have reviewed the following in detail:  [] Stress test   [] Angiography   [x] Echocardiogram   [x] Labs   [] Other:         Assessment/Plan:   1. Coronary artery disease involving native coronary artery of native heart without angina pectoris  Asymptomatic. Taking moderate intensity statin and ASA.  ECG today shows SB with a RBBB but no significant change.  Discussed role of statin therapy.     - EKG 12-lead    2. Aortic atherosclerosis      3. Bilateral carotid artery stenosis  Asymptomatic.  No Bruit on exam. Continue statin and ASA    4. PAD (peripheral artery disease)  Encouraged walking program    5. Dyslipidemia  LDL not at goal <70.  Had not been taking statin consistently because she thought it might be the cause of her back pain.  Will repeat lipids.  If lipids remain elevated, consider addition of zetia or nexlizet.       6. Essential hypertension  BP  not at goal <140/90.  Dr. Morrow had instructed her to take her chlorthalidone in the morning and her olmesartan in the evening.  She did not make this change.  Again instructed her to change how she is taking her medications and requested she send a log on 2 weeks or call with her readings.  If she remains above goal at home, then would increase olmesartan to 40 mg.       7. Cachectic  Denies food access issues or difficulty eating.  BMI remains very low at 14.  Encouraged use of supplemental nutrition such as boost or ensure.    Patient was discussed with but not examined by Dr. Yan    Follow up in about 3 months (around 12/14/2020).

## 2020-10-13 ENCOUNTER — HOSPITAL ENCOUNTER (OUTPATIENT)
Dept: RADIOLOGY | Facility: HOSPITAL | Age: 85
Discharge: HOME | End: 2020-10-13
Attending: INTERNAL MEDICINE
Payer: MEDICARE

## 2020-10-13 VITALS — WEIGHT: 80 LBS | BODY MASS INDEX: 14.17 KG/M2

## 2020-10-13 DIAGNOSIS — Z12.31 SCREENING MAMMOGRAM, ENCOUNTER FOR: ICD-10-CM

## 2020-10-13 PROCEDURE — 77067 MAMMO DIGITAL SCREENING BILAT WITH TOMO: ICD-10-PCS | Mod: 26,HCNC,, | Performed by: RADIOLOGY

## 2020-10-13 PROCEDURE — 77063 BREAST TOMOSYNTHESIS BI: CPT | Mod: 26,HCNC,, | Performed by: RADIOLOGY

## 2020-10-13 PROCEDURE — 77067 SCR MAMMO BI INCL CAD: CPT | Mod: 26,HCNC,, | Performed by: RADIOLOGY

## 2020-10-13 PROCEDURE — 77067 SCR MAMMO BI INCL CAD: CPT | Mod: TC,HCNC

## 2020-10-13 PROCEDURE — 77063 MAMMO DIGITAL SCREENING BILAT WITH TOMO: ICD-10-PCS | Mod: 26,HCNC,, | Performed by: RADIOLOGY

## 2020-10-26 ENCOUNTER — PATIENT OUTREACH (OUTPATIENT)
Dept: ADMINISTRATIVE | Facility: OTHER | Age: 85
End: 2020-10-26

## 2020-10-27 ENCOUNTER — OFFICE VISIT (OUTPATIENT)
Dept: OPTOMETRY | Facility: CLINIC | Age: 85
End: 2020-10-27
Payer: MEDICARE

## 2020-10-27 DIAGNOSIS — E55.9 VITAMIN D DEFICIENCY DISEASE: ICD-10-CM

## 2020-10-27 DIAGNOSIS — H52.12 MYOPIA WITH ASTIGMATISM AND PRESBYOPIA, LEFT: Primary | ICD-10-CM

## 2020-10-27 DIAGNOSIS — H52.4 MYOPIA WITH ASTIGMATISM AND PRESBYOPIA, LEFT: Primary | ICD-10-CM

## 2020-10-27 DIAGNOSIS — M81.0 OSTEOPOROSIS, POSTMENOPAUSAL: ICD-10-CM

## 2020-10-27 DIAGNOSIS — H52.202 MYOPIA WITH ASTIGMATISM AND PRESBYOPIA, LEFT: Primary | ICD-10-CM

## 2020-10-27 PROCEDURE — 99999 PR PBB SHADOW E&M-EST. PATIENT-LVL III: ICD-10-PCS | Mod: PBBFAC,HCNC,, | Performed by: OPTOMETRIST

## 2020-10-27 PROCEDURE — 92015 PR REFRACTION: ICD-10-PCS | Mod: HCNC,S$GLB,, | Performed by: OPTOMETRIST

## 2020-10-27 PROCEDURE — 92015 DETERMINE REFRACTIVE STATE: CPT | Mod: HCNC,S$GLB,, | Performed by: OPTOMETRIST

## 2020-10-27 PROCEDURE — 99999 PR PBB SHADOW E&M-EST. PATIENT-LVL III: CPT | Mod: PBBFAC,HCNC,, | Performed by: OPTOMETRIST

## 2020-10-27 RX ORDER — ERGOCALCIFEROL 1.25 MG/1
50000 CAPSULE ORAL
Qty: 3 CAPSULE | Refills: 3 | Status: SHIPPED | OUTPATIENT
Start: 2020-10-27 | End: 2021-10-11

## 2020-10-27 NOTE — TELEPHONE ENCOUNTER
----- Message from Savannah Mahmood MA sent at 10/27/2020  8:46 AM CDT -----  Contact: pt    ----- Message -----  From: Raeann Hernandez RN  Sent: 10/26/2020   5:59 PM CDT  To: Savannah Mahmood MA      ----- Message -----  From: Jaylan Villela  Sent: 10/26/2020   4:38 PM CDT  To: [facility] Staff    Calling to speak with nurse in regards to refill on prescription ergocalciferol (ERGOCALCIFEROL) 50,000 unit Cap    Call back: [phone]

## 2020-10-27 NOTE — PROGRESS NOTES
HPI     Patient is 85 y.o. female here for refraction only. Patient referred by   Dr. Triplett.      Last edited by Delaney Dow on 10/27/2020  4:15 PM. (History)            Assessment /Plan     For exam results, see Encounter Report.    Myopia with astigmatism and presbyopia, left      Srx updated. RTC prn    Pt was originally scheduled to see Dr Sommers at 2:30p today but waited since 2:30p in waiting area due to confusion at new check-in.                   DISPLAY PLAN FREE TEXT

## 2020-11-16 ENCOUNTER — TELEPHONE (OUTPATIENT)
Dept: ENDOCRINOLOGY | Facility: CLINIC | Age: 85
End: 2020-11-16

## 2020-11-16 NOTE — TELEPHONE ENCOUNTER
----- Message from Blank Junior sent at 11/16/2020  3:28 PM CST -----  Regarding: f/u appt  Pt received a letter to schedule an appt with Guera Hernandez between 11/26/20 -1/25/2021. Please give pt a call back at [phone] .

## 2020-11-17 ENCOUNTER — TELEPHONE (OUTPATIENT)
Dept: ENDOCRINOLOGY | Facility: CLINIC | Age: 85
End: 2020-11-17

## 2020-11-17 DIAGNOSIS — M81.0 OSTEOPOROSIS, POSTMENOPAUSAL: Primary | ICD-10-CM

## 2020-11-17 NOTE — TELEPHONE ENCOUNTER
----- Message from Lanie Goldberg LPN sent at 11/17/2020  8:50 AM CST -----  She will need an appt with endo and a new signature on her prolia therapy plan prior to her next injection on 12/3/20 Thanks

## 2020-11-20 ENCOUNTER — LAB VISIT (OUTPATIENT)
Dept: LAB | Facility: HOSPITAL | Age: 85
End: 2020-11-20
Attending: NURSE PRACTITIONER
Payer: MEDICARE

## 2020-11-20 DIAGNOSIS — M81.0 OSTEOPOROSIS, POSTMENOPAUSAL: ICD-10-CM

## 2020-11-20 LAB
25(OH)D3+25(OH)D2 SERPL-MCNC: 35 NG/ML (ref 30–96)
ALBUMIN SERPL BCP-MCNC: 3.7 G/DL (ref 3.5–5.2)
ALP SERPL-CCNC: 64 U/L (ref 55–135)
ALT SERPL W/O P-5'-P-CCNC: 20 U/L (ref 10–44)
ANION GAP SERPL CALC-SCNC: 7 MMOL/L (ref 8–16)
AST SERPL-CCNC: 26 U/L (ref 10–40)
BILIRUB SERPL-MCNC: 0.4 MG/DL (ref 0.1–1)
BUN SERPL-MCNC: 28 MG/DL (ref 8–23)
CALCIUM SERPL-MCNC: 10.1 MG/DL (ref 8.7–10.5)
CHLORIDE SERPL-SCNC: 101 MMOL/L (ref 95–110)
CO2 SERPL-SCNC: 33 MMOL/L (ref 23–29)
CREAT SERPL-MCNC: 0.9 MG/DL (ref 0.5–1.4)
EST. GFR  (AFRICAN AMERICAN): >60 ML/MIN/1.73 M^2
EST. GFR  (NON AFRICAN AMERICAN): 58.5 ML/MIN/1.73 M^2
GLUCOSE SERPL-MCNC: 98 MG/DL (ref 70–110)
POTASSIUM SERPL-SCNC: 3.8 MMOL/L (ref 3.5–5.1)
PROT SERPL-MCNC: 7.8 G/DL (ref 6–8.4)
SODIUM SERPL-SCNC: 141 MMOL/L (ref 136–145)

## 2020-11-20 PROCEDURE — 80053 COMPREHEN METABOLIC PANEL: CPT | Mod: HCNC

## 2020-11-20 PROCEDURE — 82306 VITAMIN D 25 HYDROXY: CPT | Mod: HCNC

## 2020-11-20 PROCEDURE — 36415 COLL VENOUS BLD VENIPUNCTURE: CPT | Mod: HCNC

## 2020-12-03 ENCOUNTER — INFUSION (OUTPATIENT)
Dept: INFECTIOUS DISEASES | Facility: HOSPITAL | Age: 85
End: 2020-12-03
Attending: INTERNAL MEDICINE
Payer: MEDICARE

## 2020-12-03 VITALS
HEART RATE: 74 BPM | BODY MASS INDEX: 14.24 KG/M2 | HEIGHT: 63 IN | SYSTOLIC BLOOD PRESSURE: 200 MMHG | DIASTOLIC BLOOD PRESSURE: 81 MMHG | OXYGEN SATURATION: 96 % | RESPIRATION RATE: 18 BRPM | WEIGHT: 80.38 LBS | TEMPERATURE: 98 F

## 2020-12-03 NOTE — PROGRESS NOTES
Patient arrived for Prolia injection. Elevated BP upon arrival 225/91, 200/81.  Patient denies SOB, HA and chest tightness, no facial flushing observed. Patient stated she has not taken her afternoon BP medication prior to arrival.  Instructed patient to go to ED if she experiences any symptoms of HA, SOB, chest tightness, flushing etc.  Patient stated she was going home to take her scheduled BP medication.  Prolia inj rescheduled for 12/14/2020. Patient left via wheelchair in [place] with family.

## 2020-12-10 ENCOUNTER — PATIENT OUTREACH (OUTPATIENT)
Dept: ADMINISTRATIVE | Facility: OTHER | Age: 85
End: 2020-12-10

## 2020-12-10 NOTE — PROGRESS NOTES
"Subjective:      Patient ID: Mj Summers is a 85 y.o. female.    Chief Complaint:  Osteoporosis    History of Present Illness  Mj Summers is here for follow up of osteoporosis.  Previously seen by me on 11/27/2019.      With regards to osteoporosis:   Diagnosed in 2001.    BMD: 12/10/18  Osteoporosis.    Medications:   Reclast (Last dose 12/2011)  Prolia (started 8/2012) last dose 6/2/2020  Calcium intake: MVI  Vit D intake: Ergo 50,000 monthly    Weight bearing exercise: None  Falls: Denies any over the last 12 months.  Fractures: Denies any over the last 12 months.   L hip FX 5/2016.  Significant height loss (>2 inches): Yes.     Family history: Unsure    Menopause: 53y.o.    Tobacco Use: Denies  Alcohol Use: Denies  Glucocorticoid History: Denies  Anticoagulant Use: + Aspirin   GERD/PPI Use: + Omeprazole  History of Malabsorption: Denies  Antiseizure Medications: Denies  History of Thyroid Disease: Denies  Kidney Disease: +  Hyperpara: +  Personal history of kidney stones: + "a long time ago"  Family history of kidney stones: Denies  Family history of bone disease or fracture: Denies    Denies point tenderness along spine  Denies bilateral hip tenderness  Gait -unsteady, uses a cane at home     Lab Results   Component Value Date    CALCIUM 10.1 11/20/2020    PHOS 3.3 10/07/2019     Vit D, 25-Hydroxy   Date Value Ref Range Status   11/20/2020 35 30 - 96 ng/mL Final     Comment:     Vitamin D deficiency.........<10 ng/mL                              Vitamin D insufficiency......10-29 ng/mL       Vitamin D sufficiency........> or equal to 30 ng/mL  Vitamin D toxicity............>100 ng/mL         With regards to Vitamin D Deficiency:  Vit D, 25-Hydroxy   Date Value Ref Range Status   11/20/2020 35 30 - 96 ng/mL Final     Comment:     Vitamin D deficiency.........<10 ng/mL                              Vitamin D insufficiency......10-29 ng/mL       Vitamin D sufficiency........> or equal to 30 " "ng/mL  Vitamin D toxicity............>100 ng/mL       Current Meds: Ergo 50,000 monthly    Review of Systems   Constitutional: Negative for fatigue.   Eyes: Negative for visual disturbance.   Respiratory: Negative for shortness of breath.    Cardiovascular: Negative for chest pain.   Gastrointestinal: Negative for abdominal pain.   Musculoskeletal: Positive for gait problem (wheelchair). Negative for arthralgias.   Skin: Negative for wound.   Neurological: Negative for headaches.   Hematological: Does not bruise/bleed easily.   Psychiatric/Behavioral: Negative for sleep disturbance.     Objective:   Physical Exam  Vitals signs reviewed.   Neck:      Thyroid: No thyromegaly.   Cardiovascular:      Rate and Rhythm: Normal rate.      Comments: No edema present  Pulmonary:      Effort: Pulmonary effort is normal.   Abdominal:      Palpations: Abdomen is soft.       Visit Vitals  BP (!) 178/82   Pulse 63   Ht 5' 3" (1.6 m)   Wt 36 kg (79 lb 5.9 oz)   LMP 11/10/1987 (Approximate)   BMI 14.06 kg/m²     Body mass index is 14.06 kg/m².    Lab Review:   No results found for: HGBA1C    Lab Results   Component Value Date    CHOL 234 (H) 03/02/2020    HDL 65 03/02/2020    LDLCALC 150.0 03/02/2020    TRIG 95 03/02/2020    CHOLHDL 27.8 03/02/2020     Lab Results   Component Value Date     11/20/2020    K 3.8 11/20/2020     11/20/2020    CO2 33 (H) 11/20/2020    GLU 98 11/20/2020    BUN 28 (H) 11/20/2020    CREATININE 0.9 11/20/2020    CALCIUM 10.1 11/20/2020    PROT 7.8 11/20/2020    ALBUMIN 3.7 11/20/2020    BILITOT 0.4 11/20/2020    ALKPHOS 64 11/20/2020    AST 26 11/20/2020    ALT 20 11/20/2020    ANIONGAP 7 (L) 11/20/2020    ESTGFRAFRICA >60.0 11/20/2020    EGFRNONAA 58.5 (A) 11/20/2020    TSH 1.142 09/04/2019     Vit D, 25-Hydroxy   Date Value Ref Range Status   11/20/2020 35 30 - 96 ng/mL Final     Comment:     Vitamin D deficiency.........<10 ng/mL                              Vitamin D insufficiency......10-29 " ng/mL       Vitamin D sufficiency........> or equal to 30 ng/mL  Vitamin D toxicity............>100 ng/mL       Assessment and Plan     1. Osteoporosis, postmenopausal  DXA Bone Density Spine And Hip   2. Vitamin D deficiency     3. Essential hypertension     4. Dyslipidemia       Osteoporosis, postmenopausal  -- Risks include low weight, menopause, hyperparathyroidism, PPI therapy.  -- Reviewed basics of quantity versus quality.  -- Reassuring they are not fracturing.  -- Recommend:  -Pharmacological therapy is recommended for patients with osteopenia if the 10 year probability of a hip fracture is >3% and 10 year probability of other major osteoporotic fractures is >20%.  Treatment options and potential side effects discussed for PO bisphosphonates, reclast, Denosumab, and Teriparatide.   -Treatment:   Reclast (Last dose 12/2011)  Prolia (started 8/2012) last dose 6/2/2020. Therapy plan reordered.  -Calcium and Vitamin D RDD provided.  -Exercise: recommended.  -Fall precautions made in the home.  -Alerted that if dental work needs to be done it should be done prior to initiating therapy. Dental health: UTD.  -- Repeat DEXA scan now. Ordered.     Vitamin D deficiency  -- CONTINUE: Ergo monthly.    Essential hypertension  -- BP elevated today. Asymptomatic.  -- Reports she has not taken her medication. Encouraged to take as prescribed.  -- BP goals discussed.    Dyslipidemia  -- Stable on statin.    Follow up in about 1 year (around 12/11/2021).

## 2020-12-11 ENCOUNTER — OFFICE VISIT (OUTPATIENT)
Dept: ENDOCRINOLOGY | Facility: CLINIC | Age: 85
End: 2020-12-11
Payer: MEDICARE

## 2020-12-11 VITALS
BODY MASS INDEX: 14.07 KG/M2 | DIASTOLIC BLOOD PRESSURE: 82 MMHG | HEIGHT: 63 IN | WEIGHT: 79.38 LBS | HEART RATE: 63 BPM | SYSTOLIC BLOOD PRESSURE: 178 MMHG

## 2020-12-11 DIAGNOSIS — E78.5 DYSLIPIDEMIA: Chronic | ICD-10-CM

## 2020-12-11 DIAGNOSIS — E55.9 VITAMIN D DEFICIENCY: ICD-10-CM

## 2020-12-11 DIAGNOSIS — I10 ESSENTIAL HYPERTENSION: Chronic | ICD-10-CM

## 2020-12-11 DIAGNOSIS — M81.0 OSTEOPOROSIS, POSTMENOPAUSAL: Primary | ICD-10-CM

## 2020-12-11 PROCEDURE — 99999 PR PBB SHADOW E&M-EST. PATIENT-LVL IV: ICD-10-PCS | Mod: PBBFAC,,, | Performed by: NURSE PRACTITIONER

## 2020-12-11 PROCEDURE — 3077F SYST BP >= 140 MM HG: CPT | Mod: CPTII,S$GLB,, | Performed by: NURSE PRACTITIONER

## 2020-12-11 PROCEDURE — 99214 PR OFFICE/OUTPT VISIT, EST, LEVL IV, 30-39 MIN: ICD-10-PCS | Mod: S$GLB,,, | Performed by: NURSE PRACTITIONER

## 2020-12-11 PROCEDURE — 1159F PR MEDICATION LIST DOCUMENTED IN MEDICAL RECORD: ICD-10-PCS | Mod: S$GLB,,, | Performed by: NURSE PRACTITIONER

## 2020-12-11 PROCEDURE — 99499 RISK ADDL DX/OHS AUDIT: ICD-10-PCS | Mod: S$GLB,,, | Performed by: NURSE PRACTITIONER

## 2020-12-11 PROCEDURE — 3079F DIAST BP 80-89 MM HG: CPT | Mod: CPTII,S$GLB,, | Performed by: NURSE PRACTITIONER

## 2020-12-11 PROCEDURE — 99999 PR PBB SHADOW E&M-EST. PATIENT-LVL IV: CPT | Mod: PBBFAC,,, | Performed by: NURSE PRACTITIONER

## 2020-12-11 PROCEDURE — 3079F PR MOST RECENT DIASTOLIC BLOOD PRESSURE 80-89 MM HG: ICD-10-PCS | Mod: CPTII,S$GLB,, | Performed by: NURSE PRACTITIONER

## 2020-12-11 PROCEDURE — 1126F PR PAIN SEVERITY QUANTIFIED, NO PAIN PRESENT: ICD-10-PCS | Mod: S$GLB,,, | Performed by: NURSE PRACTITIONER

## 2020-12-11 PROCEDURE — 1159F MED LIST DOCD IN RCRD: CPT | Mod: S$GLB,,, | Performed by: NURSE PRACTITIONER

## 2020-12-11 PROCEDURE — 99499 UNLISTED E&M SERVICE: CPT | Mod: S$GLB,,, | Performed by: NURSE PRACTITIONER

## 2020-12-11 PROCEDURE — 1126F AMNT PAIN NOTED NONE PRSNT: CPT | Mod: S$GLB,,, | Performed by: NURSE PRACTITIONER

## 2020-12-11 PROCEDURE — 99214 OFFICE O/P EST MOD 30 MIN: CPT | Mod: S$GLB,,, | Performed by: NURSE PRACTITIONER

## 2020-12-11 PROCEDURE — 3077F PR MOST RECENT SYSTOLIC BLOOD PRESSURE >= 140 MM HG: ICD-10-PCS | Mod: CPTII,S$GLB,, | Performed by: NURSE PRACTITIONER

## 2020-12-11 RX ORDER — INFLUENZA A VIRUS A/MICHIGAN/45/2015 X-275 (H1N1) ANTIGEN (FORMALDEHYDE INACTIVATED), INFLUENZA A VIRUS A/SINGAPORE/INFIMH-16-0019/2016 IVR-186 (H3N2) ANTIGEN (FORMALDEHYDE INACTIVATED), INFLUENZA B VIRUS B/PHUKET/3073/2013 ANTIGEN (FORMALDEHYDE INACTIVATED), AND INFLUENZA B VIRUS B/MARYLAND/15/2016 BX-69A ANTIGEN (FORMALDEHYDE INACTIVATED) 60; 60; 60; 60 UG/.7ML; UG/.7ML; UG/.7ML; UG/.7ML
INJECTION, SUSPENSION INTRAMUSCULAR
COMMUNITY
Start: 2020-09-10 | End: 2023-02-28 | Stop reason: SDUPTHER

## 2020-12-11 NOTE — ASSESSMENT & PLAN NOTE
-- Risks include low weight, menopause, hyperparathyroidism, PPI therapy.  -- Reviewed basics of quantity versus quality.  -- Reassuring they are not fracturing.  -- Recommend:  -Pharmacological therapy is recommended for patients with osteopenia if the 10 year probability of a hip fracture is >3% and 10 year probability of other major osteoporotic fractures is >20%.  Treatment options and potential side effects discussed for PO bisphosphonates, reclast, Denosumab, and Teriparatide.   -Treatment:   Reclast (Last dose 12/2011)  Prolia (started 8/2012) last dose 6/2/2020. Therapy plan reordered.  -Calcium and Vitamin D RDD provided.  -Exercise: recommended.  -Fall precautions made in the home.  -Alerted that if dental work needs to be done it should be done prior to initiating therapy. Dental health: UTD.  -- Repeat DEXA scan now. Ordered.

## 2020-12-11 NOTE — ASSESSMENT & PLAN NOTE
-- BP elevated today. Asymptomatic.  -- Reports she has not taken her medication. Encouraged to take as prescribed.  -- BP goals discussed.

## 2020-12-17 ENCOUNTER — PES CALL (OUTPATIENT)
Dept: ADMINISTRATIVE | Facility: CLINIC | Age: 85
End: 2020-12-17

## 2020-12-21 ENCOUNTER — LAB VISIT (OUTPATIENT)
Dept: LAB | Facility: OTHER | Age: 85
End: 2020-12-21
Attending: INTERNAL MEDICINE
Payer: MEDICARE

## 2020-12-21 DIAGNOSIS — D53.9 NUTRITIONAL ANEMIA: ICD-10-CM

## 2020-12-21 DIAGNOSIS — D64.9 ANEMIA, UNSPECIFIED TYPE: ICD-10-CM

## 2020-12-21 LAB
BASOPHILS # BLD AUTO: 0.04 K/UL (ref 0–0.2)
BASOPHILS NFR BLD: 0.9 % (ref 0–1.9)
DIFFERENTIAL METHOD: ABNORMAL
EOSINOPHIL # BLD AUTO: 0 K/UL (ref 0–0.5)
EOSINOPHIL NFR BLD: 0.7 % (ref 0–8)
ERYTHROCYTE [DISTWIDTH] IN BLOOD BY AUTOMATED COUNT: 12 % (ref 11.5–14.5)
HCT VFR BLD AUTO: 32.4 % (ref 37–48.5)
HGB BLD-MCNC: 10.6 G/DL (ref 12–16)
IMM GRANULOCYTES # BLD AUTO: 0.01 K/UL (ref 0–0.04)
IMM GRANULOCYTES NFR BLD AUTO: 0.2 % (ref 0–0.5)
LYMPHOCYTES # BLD AUTO: 1.1 K/UL (ref 1–4.8)
LYMPHOCYTES NFR BLD: 26.4 % (ref 18–48)
MCH RBC QN AUTO: 30 PG (ref 27–31)
MCHC RBC AUTO-ENTMCNC: 32.7 G/DL (ref 32–36)
MCV RBC AUTO: 92 FL (ref 82–98)
MONOCYTES # BLD AUTO: 0.4 K/UL (ref 0.3–1)
MONOCYTES NFR BLD: 9.5 % (ref 4–15)
NEUTROPHILS # BLD AUTO: 2.7 K/UL (ref 1.8–7.7)
NEUTROPHILS NFR BLD: 62.3 % (ref 38–73)
NRBC BLD-RTO: 0 /100 WBC
PLATELET # BLD AUTO: 229 K/UL (ref 150–350)
PMV BLD AUTO: 10.5 FL (ref 9.2–12.9)
RBC # BLD AUTO: 3.53 M/UL (ref 4–5.4)
WBC # BLD AUTO: 4.32 K/UL (ref 3.9–12.7)

## 2020-12-21 PROCEDURE — 36415 COLL VENOUS BLD VENIPUNCTURE: CPT

## 2020-12-21 PROCEDURE — 83540 ASSAY OF IRON: CPT

## 2020-12-21 PROCEDURE — 82728 ASSAY OF FERRITIN: CPT

## 2020-12-21 PROCEDURE — 82607 VITAMIN B-12: CPT

## 2020-12-21 PROCEDURE — 82746 ASSAY OF FOLIC ACID SERUM: CPT

## 2020-12-21 PROCEDURE — 85025 COMPLETE CBC W/AUTO DIFF WBC: CPT

## 2020-12-21 NOTE — PROGRESS NOTES
"Subjective:       Patient ID: Mj Summers is a 85 y.o. female.     Chief Complaint: follow up for anemia     Hematologic History:  1. Ms. Summers is a very pleasant 81 yo woman with HTN, CAD, b/l carotid disease, GERD, malnutrition, who initially saw me on 1/3/18 for further evaluation of anemia. On review of her records, her Hb has been ranging between 10 and 12 since 2010. In May 2016 it dropped to 5-7 after she underwent a surgery on 5/10/16 for left intertrochanteric fracture from a fall. Her H/H on 7/12/16 was 11.4/35.1. On 11/6/17, WBC 3.86, H/H 10.7/33.2, plt count 213. On 11/3/17, WBC 4.26, H/H 10.2/31.7, MCV 92, plt count 158. Vit B12 338, folate 17.2, ferritin 179, iron 68, TIBC 333, iron saturation 20%, retic 1.1%. CMP on 11/6/17 showed Cr 0.7, total protein 7.3, albumin 3.4, corrected calcium 9.8. Calculated CrCl 37.46 mL/min (cockroft). She has been having dysphagia over the past 2 months, which she attributed to GERD. Has been taking iron pills for many years and has been having dark stool from the iron pills. Denies any signs of bleeding.   2. Workup on 1/3/18 showed normal LDH, haptoglobin, copper levels. SPEP negative for M protein. Seen by GI. Guaiac stool negative.   3. EGD on 2/6/18 showed Small hiatal hernia. Mild Schatzki ring. Dilated. Gastric mucosal atrophy. Biopsied. Normal examined duodenum. Pathology of the gastric antrum showed "Severe chronic inactive gastritis. No evidence of active acute inflammation. Negative for Helicobacter like organisms on H&E. Intestinal metaplasia, complete type. Reactive atypia. No evidence of malignancy."  4. Colonoscopy on 2/6/18 showed "The colon (entire examined portion) was significantly tortuous.The exam was otherwise without abnormality. The cecum was partially obscured by solid material, but by washing and repositioning patient, I feel that adequate visualization was obtained. I cannot rule out an AVM in the cecum, but I don't think any mass, " "lesion, or significant polyp was missed"     INTERVAL HISTORY:   Ms. Summers returns today for follow up of anemia. She has been feeling well. Denies any complaints.      ROS:   A ten-point system review is obtained and negative except for what was stated in the interval history.      Physical Examination:   Vital signs reviewed.   Gen: well hydrated, well developed, in no acute distress. In a wheelchair.   HEENT: normocephalic, anicteric sclerae, PERRLA, EOMI, oropharynx clear  Neck: supple, no JVD, thyromegaly, cervical or supraclavicular LAD  Lungs: CTAB, no wheezes or rales  Heart: RRR, no M/R/G  Abdomen: soft, no tenderness, non-distended, no hepatosplenomegaly, mass, or hernia. BS present  Ext: no clubbing, cyanosis, or edema  Neuro: alert and oriented x 4, no focal neuro deficit  Skin: no rash, erythema, open wound or ulcers  Psych: pleasant and appropriate mood and affect        Objective:      Diagnostic Tests:  EGD on 2/6/18 showed Small hiatal hernia. Mild Schatzki ring. Dilated. Gastric mucosal atrophy. Biopsied. Normal examined duodenum. Pathology of the gastric antrum showed "Severe chronic inactive gastritis. No evidence of active acute inflammation. Negative for Helicobacter like organisms on H&E. Intestinal metaplasia, complete type. Reactive atypia. No evidence of malignancy."     Colonoscopy on 2/6/18 showed "The colon (entire examined portion) was significantly tortuous.The exam was otherwise without abnormality. The cecum was partially obscured by solid material, but by washing and repositioning patient, I feel that adequate visualization was obtained. I cannot rule out an AVM in the cecum, but I don't think any mass, lesion, or significant polyp was missed"     Laboratory Data:  Labs reviewed. Hb 10.6 stable. WBC and plt count normal. B12, folate, ferritin, iron levels are normal.      Assessment/Plan:      1. Chronic anemia    2. Nutritional anemia    3. Essential hypertension      1.2   - " Ms. Summers is an 86 yo woman with chronic normocytic anemia. Extensive workup was negative. EGD showed severe chronic inactive gastritis but no s/o malignancy. Colonoscopy did not show bleeding sites. Guaiac stool was negative.   - Reviewed lab results with patient. Hb stable. Anemia mild. Vitamins and ferritin levels are within good range. No need for intervention for now  - RTC in one year to monitor     3.  - elevated  - f/u with PCP

## 2020-12-22 ENCOUNTER — OFFICE VISIT (OUTPATIENT)
Dept: HEMATOLOGY/ONCOLOGY | Facility: CLINIC | Age: 85
End: 2020-12-22
Payer: MEDICARE

## 2020-12-22 VITALS
BODY MASS INDEX: 13.94 KG/M2 | HEIGHT: 63 IN | TEMPERATURE: 98 F | SYSTOLIC BLOOD PRESSURE: 191 MMHG | DIASTOLIC BLOOD PRESSURE: 72 MMHG | OXYGEN SATURATION: 100 % | WEIGHT: 78.69 LBS | RESPIRATION RATE: 14 BRPM | HEART RATE: 58 BPM

## 2020-12-22 DIAGNOSIS — I10 ESSENTIAL HYPERTENSION: ICD-10-CM

## 2020-12-22 DIAGNOSIS — D64.9 CHRONIC ANEMIA: Primary | ICD-10-CM

## 2020-12-22 DIAGNOSIS — D53.9 NUTRITIONAL ANEMIA: ICD-10-CM

## 2020-12-22 LAB
FERRITIN SERPL-MCNC: 171 NG/ML (ref 20–300)
FOLATE SERPL-MCNC: 17.5 NG/ML (ref 4–24)
IRON SERPL-MCNC: 54 UG/DL (ref 30–160)
SATURATED IRON: 15 % (ref 20–50)
TOTAL IRON BINDING CAPACITY: 349 UG/DL (ref 250–450)
TRANSFERRIN SERPL-MCNC: 236 MG/DL (ref 200–375)
VIT B12 SERPL-MCNC: 476 PG/ML (ref 210–950)

## 2020-12-22 PROCEDURE — 1159F MED LIST DOCD IN RCRD: CPT | Mod: S$GLB,,, | Performed by: INTERNAL MEDICINE

## 2020-12-22 PROCEDURE — 99999 PR PBB SHADOW E&M-EST. PATIENT-LVL IV: CPT | Mod: PBBFAC,,, | Performed by: INTERNAL MEDICINE

## 2020-12-22 PROCEDURE — 1101F PR PT FALLS ASSESS DOC 0-1 FALLS W/OUT INJ PAST YR: ICD-10-PCS | Mod: CPTII,S$GLB,, | Performed by: INTERNAL MEDICINE

## 2020-12-22 PROCEDURE — 3077F SYST BP >= 140 MM HG: CPT | Mod: CPTII,S$GLB,, | Performed by: INTERNAL MEDICINE

## 2020-12-22 PROCEDURE — 99214 PR OFFICE/OUTPT VISIT, EST, LEVL IV, 30-39 MIN: ICD-10-PCS | Mod: S$GLB,,, | Performed by: INTERNAL MEDICINE

## 2020-12-22 PROCEDURE — 99499 UNLISTED E&M SERVICE: CPT | Mod: S$GLB,,, | Performed by: INTERNAL MEDICINE

## 2020-12-22 PROCEDURE — 3078F PR MOST RECENT DIASTOLIC BLOOD PRESSURE < 80 MM HG: ICD-10-PCS | Mod: CPTII,S$GLB,, | Performed by: INTERNAL MEDICINE

## 2020-12-22 PROCEDURE — 3078F DIAST BP <80 MM HG: CPT | Mod: CPTII,S$GLB,, | Performed by: INTERNAL MEDICINE

## 2020-12-22 PROCEDURE — 1101F PT FALLS ASSESS-DOCD LE1/YR: CPT | Mod: CPTII,S$GLB,, | Performed by: INTERNAL MEDICINE

## 2020-12-22 PROCEDURE — 99214 OFFICE O/P EST MOD 30 MIN: CPT | Mod: S$GLB,,, | Performed by: INTERNAL MEDICINE

## 2020-12-22 PROCEDURE — 99999 PR PBB SHADOW E&M-EST. PATIENT-LVL IV: ICD-10-PCS | Mod: PBBFAC,,, | Performed by: INTERNAL MEDICINE

## 2020-12-22 PROCEDURE — 3077F PR MOST RECENT SYSTOLIC BLOOD PRESSURE >= 140 MM HG: ICD-10-PCS | Mod: CPTII,S$GLB,, | Performed by: INTERNAL MEDICINE

## 2020-12-22 PROCEDURE — 3288F FALL RISK ASSESSMENT DOCD: CPT | Mod: CPTII,S$GLB,, | Performed by: INTERNAL MEDICINE

## 2020-12-22 PROCEDURE — 99499 RISK ADDL DX/OHS AUDIT: ICD-10-PCS | Mod: S$GLB,,, | Performed by: INTERNAL MEDICINE

## 2020-12-22 PROCEDURE — 1159F PR MEDICATION LIST DOCUMENTED IN MEDICAL RECORD: ICD-10-PCS | Mod: S$GLB,,, | Performed by: INTERNAL MEDICINE

## 2020-12-22 PROCEDURE — 1126F AMNT PAIN NOTED NONE PRSNT: CPT | Mod: S$GLB,,, | Performed by: INTERNAL MEDICINE

## 2020-12-22 PROCEDURE — 3288F PR FALLS RISK ASSESSMENT DOCUMENTED: ICD-10-PCS | Mod: CPTII,S$GLB,, | Performed by: INTERNAL MEDICINE

## 2020-12-22 PROCEDURE — 1126F PR PAIN SEVERITY QUANTIFIED, NO PAIN PRESENT: ICD-10-PCS | Mod: S$GLB,,, | Performed by: INTERNAL MEDICINE

## 2020-12-22 NOTE — Clinical Note
Critical Care Progress Note      11/07/2017    Name: Em Richmond MRN#: [id number]   Age: 75 year old YOB: 1942     Hsptl Day# 1  ICU DAY #1    MV DAY #1             Problem List:   Active Problems:    Acute respiratory failure with hypoxemia (H)  Cardiogenic shock  Acute transaminitis (improving)  Elevated INR (on coumadin)           Summary/Hospital Course:     75F with known systolic heart failure, apparently had episode of abd pain, nausea, vomiting yesterday along with progressively worsened SOB.  Found to be hypoxemic by EMS and intubated in field.  Noted to have very elevated lactate here, transaminitis, extremities cold on arrival.  Initiated on dobutamine for cardiogenic shock.  Abd ultrasound in ED showed equivocally dilated cbd, and edematous gallbladder, but MRCP less concerning for hepatobiliary disease.       Assessment and plan :     Em Richmond IS a 75 year old female admitted on 11/5/2017 for respiratory failure and shock.   I have personally reviewed the daily labs, imaging studies, cultures and discussed the case with referring physician and consulting physicians.     My assessment and plan by system for this patient is as follows:    Neurology/Psychiatry:   1. Sedation:  Precedex and fentanyl this AM.   Weaning down sedation for spont breathing trial today.    Cardiovascular:   1.  Shock:  Suspect cardiogenic given cold extremities on arrival and known heart failure.   Extremities now more warm.  Blood pressure and UOP much improved on dobutamine.  Remains on a small amount of levophed.  Repeat echo still with EF ~30%.  Appreciate cardiology input, continue diuresis per their direction.  Lactate resolved.  Maintaining dobutamine at 2.5 for today.   2.  A fib:  Rate 110s to 120s on my visit.  Continue amiodarone drip (tolerating well despite liver insult).    Pulmonary/Ventilator Management:   1. Vent-dependent acute hypoxemia:  Improved this AM with positive pressure  RTC to main campus in one year with CBC, ferritin, iron/TIBC, B12, folate checked 1-2 days prior ventilation and diuresis.  Suspect pulmonary edema may be culprit.  Attempt weaning to extubate today.    GI and Nutrition :   1. Transaminitis:  Suspect shock liver and congestive hepatopathy.  Transaminitidies and synthetic markers all now improving.  MRCP negative for obstruction.  NAC started by GI--appreciate their input.  2.  GB edema and borderline CBD dilation:  MRCP reassuring.  3. Protein malnutrition:  Serum albumin 1.9. Extubated today.  PO feeds if passes swallow in AM, otherwise will need ngt feeds.    Renal/Fluids/Electrolytes:   1. Lactic acidosis:  Improved.  Most recent abg with normal pH  2.  Creatinine acceptable and UOP excellent with diuresis. Continue.  3.  HypoNa resolved to 137.  4.  HypoK/hypomag:  Repleting by IV.    Infectious Disease:   1.  Possible sepsis:  Cultures negative.  On vanc/zosyn--stopping vanco today.  Continue zosyn one more day. WBC resolved to normal.    Endocrine:   1. Sugars adequately controlled.  CTM.  Insulin vs dextrose prn.    Hematology/Oncology:   1. Elevated INR:  Likely due to coumadin combined with hepatopathy.  Improving with vitamin K. No active bleeding.    IV/Access:   1. Venous access - fem TLC from ED on 11/6/17  2. Arterial access -  A line  Plan  - central access required and necessary      ICU Prophylaxis:   1. DVT: mech/start hep sc today.  2. VAP: HOB 30 degrees, chlorhexidine rinse  3. Stress Ulcer: H2 blocker  4. Restraints: Nonviolent soft two point restraints required and necessary for patient safety and continued cares and good effect as patient continues to pull at necessary lines, tubes despite education and distraction. Will readdress daily.   5. Wound care - per unit routine   6. Feeding -  TF if not extubated today  7. Family Update:  D/w daughter/ at bedside. All questions asked/answered.  8. Disposition - critically ill.         Key goals for next 24 hours:   1. Wean to extubate if able  2.  Continue dobutamine  3.   Diuresis               Interim History:     Overall continues to improve.  Labs all normalizing.  Good UOP with dobutamine and lasix.         Key Medications:       chlorhexidine  15 mL Mouth/Throat Q12H     famotidine  20 mg Intravenous Q12H     piperacillin-tazobactam  4.5 g Intravenous Q6H     furosemide  40 mg Intravenous BID       norepinephrine 0.03 mcg/kg/min (11/07/17 0852)     amiodarone 0.5 mg/min (11/07/17 1019)     acetylcysteine (ACETADOTE) infusion *second dose* Stopped (11/06/17 1646)     acetylcysteine (ACEDOTE) infusion *third dose* 9.08 g (11/06/17 1954)     DOBUTamine 2.5 mcg/kg/min (11/07/17 0820)     dexmedetomidine 0.5 mcg/kg/hr (11/07/17 1019)     fentaNYL 25 mcg/hr (11/07/17 1014)     propofol (DIPRIVAN) infusion Stopped (11/06/17 1904)               Physical Examination:   Temp:  [98.6  F (37  C)-100  F (37.8  C)] 100  F (37.8  C)  Heart Rate:  [] 112  Resp:  [11-32] 32  BP: ()/(20-73) 94/73  MAP:  [53 mmHg-300 mmHg] 82 mmHg  Arterial Line BP: ()/() 106/65  FiO2 (%):  [40 %] 40 %  SpO2:  [94 %-99 %] 99 %      Intake/Output Summary (Last 24 hours) at 11/07/17 1155  Last data filed at 11/07/17 1100   Gross per 24 hour   Intake          3517.84 ml   Output             5735 ml   Net         -2217.16 ml         Wt Readings from Last 4 Encounters:   11/07/17 89.5 kg (197 lb 5 oz)   10/25/17 83.9 kg (184 lb 14.4 oz)   06/20/14 83.9 kg (185 lb)     Arterial Line BP: ()/() 106/65  MAP:  [53 mmHg-300 mmHg] 82 mmHg  BP - Mean:  [35-77] 77  Ventilation Mode: CPAP/PS  FiO2 (%): 40 %  Rate Set (breaths/minute): 18 breaths/min  Tidal Volume Set (mL): 400 mL  PEEP (cm H2O): 5 cmH2O  Pressure Support (cm H2O): 5 cmH2O  Oxygen Concentration (%): 40 %  Peak Inspiratory Pressure (cm H2O) ([employer]): 55  Resp: 32    Recent Labs  Lab 11/07/17  0415 11/06/17  0745 11/06/17  0620 11/06/17  0448 11/06/17  0049   PH 7.40 7.39 Canceled, Test credited 7.19* 7.16*   PCO2 37 28*  Canceled, Test credited 44 44   PO2 114* 105 Canceled, Test credited 115* 213*   HCO3 23 17* Canceled, Test credited 17* 16*   O2PER  --   --   --   --  100       GEN: no acute distress, sedated but awakens/tracks/interacts when sedation weaned.   HEENT: head ncat, sclera anicteric, OP patent, trachea midline   PULM: unlabored synchronous with vent, clear anteriorly    CV/COR: RRR S1S2 no gallop,  No rub, no murmur  ABD: soft mild b/l upper quadrant tenderness, hypoactive bowel sounds, no mass  EXT:  Minimal edema x4,  Now warm x4  NEURO: awakens/alert/interacts.  Moves all 4.  No gross deficit.  SKIN: no obvious rash  LINES: clean, dry intact         Data:   All data and imaging reviewed     ROUTINE ICU LABS (Last four results)  CMP    Recent Labs  Lab 11/07/17 0415 11/06/17  1417 11/06/17  0700 11/06/17  0440 11/05/17  2315     --  131* 131* 132*   POTASSIUM 2.8*  --  4.6 5.1 4.8   CHLORIDE 104  --  101 101 99   CO2 24  --  14* 17* 15*   ANIONGAP 9  --  16* 13 18*   GLC 97  --  138* 114* 122*   BUN 17  --  24 23 23   CR 0.80  --  0.83 0.84 1.20*   GFRESTIMATED 70  --  67 66 44*   GFRESTBLACK 84  --  81 79 53*   ALFIE 7.3*  --  7.8* 7.6* 8.3*   MAG 1.4*  --  1.7 1.7  --    PHOS 2.5  --  4.0 5.0*  --    PROTTOTAL 5.9* 6.0* 6.1* 6.3* 8.2   ALBUMIN 1.9* 2.1* 2.1* 2.2* 2.9*   BILITOTAL 1.4* 1.6* 2.1* 2.3* 1.8*   ALKPHOS 226* 259* 292* 314* 405*   AST 3199* 6542* 6218* 3720* 1480*   ALT 2474* 3231* 2858* 1890* 769*     CBC    Recent Labs  Lab 11/07/17  0415 11/06/17  0700 11/06/17  0440 11/05/17  2315   WBC 9.2 12.4* 15.6* 11.8*   RBC 3.56* 3.61* 3.89 4.18   HGB 11.4* 11.7 12.6 13.5   HCT 33.3* 34.4* 37.8 41.5   MCV 94 95 97 99   MCH 32.0 32.4 32.4 32.3   MCHC 34.2 34.0 33.3 32.5   RDW 13.0 13.0 13.2 13.1    217 230 322     INR    Recent Labs  Lab 11/07/17  0415 11/06/17  1417 11/06/17  0700 11/06/17  0440   INR 1.92* 2.90* 4.83* 5.26*     Arterial Blood Gas    Recent Labs  Lab 11/07/17  0415 11/06/17  0745  11/06/17  0620 11/06/17  0448 11/06/17  0049   PH 7.40 7.39 Canceled, Test credited 7.19* 7.16*   PCO2 37 28* Canceled, Test credited 44 44   PO2 114* 105 Canceled, Test credited 115* 213*   HCO3 23 17* Canceled, Test credited 17* 16*   O2PER  --   --   --   --  100       All cultures:    Recent Labs  Lab 11/06/17  0139 11/05/17  2315   CULT No growth after 22 hours No growth after 1 day     Recent Results (from the past 24 hour(s))   MR Abdomen MRCP w/o Contast & w Recon    Narrative    MR ABDOMEN MRCP WITHOUT CONTRAST WITH RECON 11/6/2017 6:50 PM     HISTORY: Evaluate for choledocholithiasis.     TECHNIQUE:  Multisequence, multiplanar imaging is performed through  the biliary system. Patient was on respirator therefore pre and  postcontrast imaging through the liver is unable to be obtained.    FINDINGS:   Abdomen: Limited evaluation of the biliary system is performed.  Gallbladder is distended with large gallstone near the gallbladder  neck as described on recent ultrasound report from 11/6/2017. Repeated  attempts at obtaining adequate MRCP images were performed with series  2000 being the best data set. This coronal sequence with heavy T2  weighting shows no evidence of intrahepatic or common bile duct  dilatation. No evidence of an intraductal stone. No pancreatic ductal  dilatation is evident. Abnormal T2 signal is noted likely relating to  trace ascites. Free pelvic fluid is noted on series 701, image 21. No  evidence of hydronephrosis. T2 hyperintense liver lesion on series  601, image 15 is most likely the cyst seen on prior ultrasound.      Impression    IMPRESSION: Limited exam due to respiratory motion artifact in this  intubated patient. No evidence of intrahepatic or common bile duct  dilatation. No evidence of an obstructing common bile duct stone.  Cholelithiasis as seen on prior ultrasound. Trace ascites and free  pelvic fluid is noted. No hydronephrosis.      OUSMANE DUNN MD     Labs (personally  reviewed/interpreted):  Mild hypona 137, hypoK 2.8.  Creatinine normal.  Albumin low 1.9.  Transaminitis alt/ast 2474/3199, alk phos high but improved 226, Ical slightly low 4.3.  Bili high but improving 1.4.  Wbc normalized 9.2.     Echo with EF 31%, moderate RV dysfunction as well.     D/w cardiology attdg Dr. Green.     Billing: This patient is critically ill: Yes. Total critical care time today 50 min exclusive of procedures.

## 2021-01-20 ENCOUNTER — PATIENT OUTREACH (OUTPATIENT)
Dept: ADMINISTRATIVE | Facility: OTHER | Age: 86
End: 2021-01-20

## 2021-01-21 ENCOUNTER — CLINICAL SUPPORT (OUTPATIENT)
Dept: OPHTHALMOLOGY | Facility: CLINIC | Age: 86
End: 2021-01-21
Payer: MEDICARE

## 2021-01-21 ENCOUNTER — OFFICE VISIT (OUTPATIENT)
Dept: OPHTHALMOLOGY | Facility: CLINIC | Age: 86
End: 2021-01-21
Payer: MEDICARE

## 2021-01-21 DIAGNOSIS — H21.561 AFFERENT PUPILLARY DEFECT, RIGHT: ICD-10-CM

## 2021-01-21 DIAGNOSIS — Z98.83 GLAUCOMA FILTERING BLEB OF LEFT EYE: ICD-10-CM

## 2021-01-21 DIAGNOSIS — H21.541 POSTERIOR SYNECHIAE, RIGHT: ICD-10-CM

## 2021-01-21 DIAGNOSIS — H34.8112 CENTRAL RETINAL VEIN OCCLUSION OF RIGHT EYE, UNSPECIFIED COMPLICATION STATUS: ICD-10-CM

## 2021-01-21 DIAGNOSIS — H35.031 BLIND HYPERTENSIVE RIGHT EYE: ICD-10-CM

## 2021-01-21 DIAGNOSIS — H40.89 GLAUCOMA DUE TO COMBINATION OF MECHANISMS: ICD-10-CM

## 2021-01-21 DIAGNOSIS — H25.11 SENILE NUCLEAR SCLEROSIS, RIGHT: ICD-10-CM

## 2021-01-21 DIAGNOSIS — H18.421 BAND KERATOPATHY, RIGHT: ICD-10-CM

## 2021-01-21 DIAGNOSIS — Z96.1 PSEUDOPHAKIA OF LEFT EYE: ICD-10-CM

## 2021-01-21 DIAGNOSIS — H40.89 GLAUCOMA DUE TO COMBINATION OF MECHANISMS: Primary | ICD-10-CM

## 2021-01-21 PROCEDURE — 92250 COLOR FUNDUS PHOTOGRAPHY - OU - BOTH EYES: ICD-10-PCS | Mod: HCNC,S$GLB,, | Performed by: OPHTHALMOLOGY

## 2021-01-21 PROCEDURE — 1126F AMNT PAIN NOTED NONE PRSNT: CPT | Mod: HCNC,S$GLB,, | Performed by: OPHTHALMOLOGY

## 2021-01-21 PROCEDURE — 1101F PT FALLS ASSESS-DOCD LE1/YR: CPT | Mod: HCNC,CPTII,S$GLB, | Performed by: OPHTHALMOLOGY

## 2021-01-21 PROCEDURE — 92250 FUNDUS PHOTOGRAPHY W/I&R: CPT | Mod: HCNC,S$GLB,, | Performed by: OPHTHALMOLOGY

## 2021-01-21 PROCEDURE — 92014 COMPRE OPH EXAM EST PT 1/>: CPT | Mod: HCNC,S$GLB,, | Performed by: OPHTHALMOLOGY

## 2021-01-21 PROCEDURE — 99999 PR PBB SHADOW E&M-EST. PATIENT-LVL III: CPT | Mod: PBBFAC,HCNC,, | Performed by: OPHTHALMOLOGY

## 2021-01-21 PROCEDURE — 92014 PR EYE EXAM, EST PATIENT,COMPREHESV: ICD-10-PCS | Mod: HCNC,S$GLB,, | Performed by: OPHTHALMOLOGY

## 2021-01-21 PROCEDURE — 3288F PR FALLS RISK ASSESSMENT DOCUMENTED: ICD-10-PCS | Mod: HCNC,CPTII,S$GLB, | Performed by: OPHTHALMOLOGY

## 2021-01-21 PROCEDURE — 99999 PR PBB SHADOW E&M-EST. PATIENT-LVL III: ICD-10-PCS | Mod: PBBFAC,HCNC,, | Performed by: OPHTHALMOLOGY

## 2021-01-21 PROCEDURE — 1126F PR PAIN SEVERITY QUANTIFIED, NO PAIN PRESENT: ICD-10-PCS | Mod: HCNC,S$GLB,, | Performed by: OPHTHALMOLOGY

## 2021-01-21 PROCEDURE — 1101F PR PT FALLS ASSESS DOC 0-1 FALLS W/OUT INJ PAST YR: ICD-10-PCS | Mod: HCNC,CPTII,S$GLB, | Performed by: OPHTHALMOLOGY

## 2021-01-21 PROCEDURE — 3288F FALL RISK ASSESSMENT DOCD: CPT | Mod: HCNC,CPTII,S$GLB, | Performed by: OPHTHALMOLOGY

## 2021-02-04 ENCOUNTER — INFUSION (OUTPATIENT)
Dept: INFECTIOUS DISEASES | Facility: HOSPITAL | Age: 86
End: 2021-02-04
Attending: INTERNAL MEDICINE
Payer: MEDICARE

## 2021-02-04 VITALS
DIASTOLIC BLOOD PRESSURE: 57 MMHG | HEART RATE: 65 BPM | BODY MASS INDEX: 13.94 KG/M2 | WEIGHT: 78.69 LBS | HEIGHT: 63 IN | SYSTOLIC BLOOD PRESSURE: 152 MMHG

## 2021-02-04 DIAGNOSIS — M81.0 OSTEOPOROSIS, POSTMENOPAUSAL: Primary | ICD-10-CM

## 2021-02-04 PROCEDURE — 63600175 PHARM REV CODE 636 W HCPCS: Mod: JG | Performed by: NURSE PRACTITIONER

## 2021-02-04 PROCEDURE — 96372 THER/PROPH/DIAG INJ SC/IM: CPT

## 2021-02-04 RX ADMIN — DENOSUMAB 60 MG: 60 INJECTION SUBCUTANEOUS at 03:02

## 2021-03-12 ENCOUNTER — OFFICE VISIT (OUTPATIENT)
Dept: INTERNAL MEDICINE | Facility: CLINIC | Age: 86
End: 2021-03-12
Payer: MEDICARE

## 2021-03-12 ENCOUNTER — LAB VISIT (OUTPATIENT)
Dept: LAB | Facility: HOSPITAL | Age: 86
End: 2021-03-12
Attending: INTERNAL MEDICINE
Payer: MEDICARE

## 2021-03-12 DIAGNOSIS — M81.0 OSTEOPOROSIS, UNSPECIFIED OSTEOPOROSIS TYPE, UNSPECIFIED PATHOLOGICAL FRACTURE PRESENCE: Primary | ICD-10-CM

## 2021-03-12 DIAGNOSIS — I10 ESSENTIAL HYPERTENSION: Chronic | ICD-10-CM

## 2021-03-12 LAB
ALBUMIN SERPL BCP-MCNC: 3.7 G/DL (ref 3.5–5.2)
ALP SERPL-CCNC: 77 U/L (ref 55–135)
ALT SERPL W/O P-5'-P-CCNC: 25 U/L (ref 10–44)
ANION GAP SERPL CALC-SCNC: 13 MMOL/L (ref 8–16)
AST SERPL-CCNC: 28 U/L (ref 10–40)
BILIRUB SERPL-MCNC: 0.5 MG/DL (ref 0.1–1)
BUN SERPL-MCNC: 41 MG/DL (ref 8–23)
CALCIUM SERPL-MCNC: 9.2 MG/DL (ref 8.7–10.5)
CHLORIDE SERPL-SCNC: 101 MMOL/L (ref 95–110)
CHOLEST SERPL-MCNC: 198 MG/DL (ref 120–199)
CHOLEST/HDLC SERPL: 2.6 {RATIO} (ref 2–5)
CO2 SERPL-SCNC: 27 MMOL/L (ref 23–29)
CREAT SERPL-MCNC: 1 MG/DL (ref 0.5–1.4)
EST. GFR  (AFRICAN AMERICAN): 59.4 ML/MIN/1.73 M^2
EST. GFR  (NON AFRICAN AMERICAN): 51.5 ML/MIN/1.73 M^2
GLUCOSE SERPL-MCNC: 70 MG/DL (ref 70–110)
HDLC SERPL-MCNC: 77 MG/DL (ref 40–75)
HDLC SERPL: 38.9 % (ref 20–50)
LDLC SERPL CALC-MCNC: 108 MG/DL (ref 63–159)
NONHDLC SERPL-MCNC: 121 MG/DL
POTASSIUM SERPL-SCNC: 3.8 MMOL/L (ref 3.5–5.1)
PROT SERPL-MCNC: 7.7 G/DL (ref 6–8.4)
SODIUM SERPL-SCNC: 141 MMOL/L (ref 136–145)
TRIGL SERPL-MCNC: 65 MG/DL (ref 30–150)

## 2021-03-12 PROCEDURE — 1126F PR PAIN SEVERITY QUANTIFIED, NO PAIN PRESENT: ICD-10-PCS | Mod: S$GLB,,, | Performed by: INTERNAL MEDICINE

## 2021-03-12 PROCEDURE — 1159F MED LIST DOCD IN RCRD: CPT | Mod: S$GLB,,, | Performed by: INTERNAL MEDICINE

## 2021-03-12 PROCEDURE — 80053 COMPREHEN METABOLIC PANEL: CPT | Performed by: INTERNAL MEDICINE

## 2021-03-12 PROCEDURE — 3288F PR FALLS RISK ASSESSMENT DOCUMENTED: ICD-10-PCS | Mod: CPTII,S$GLB,, | Performed by: INTERNAL MEDICINE

## 2021-03-12 PROCEDURE — 1101F PR PT FALLS ASSESS DOC 0-1 FALLS W/OUT INJ PAST YR: ICD-10-PCS | Mod: CPTII,S$GLB,, | Performed by: INTERNAL MEDICINE

## 2021-03-12 PROCEDURE — 99999 PR PBB SHADOW E&M-EST. PATIENT-LVL V: ICD-10-PCS | Mod: PBBFAC,,, | Performed by: INTERNAL MEDICINE

## 2021-03-12 PROCEDURE — 80061 LIPID PANEL: CPT | Performed by: INTERNAL MEDICINE

## 2021-03-12 PROCEDURE — 36415 COLL VENOUS BLD VENIPUNCTURE: CPT | Performed by: INTERNAL MEDICINE

## 2021-03-12 PROCEDURE — 3075F PR MOST RECENT SYSTOLIC BLOOD PRESS GE 130-139MM HG: ICD-10-PCS | Mod: CPTII,S$GLB,, | Performed by: INTERNAL MEDICINE

## 2021-03-12 PROCEDURE — 1126F AMNT PAIN NOTED NONE PRSNT: CPT | Mod: S$GLB,,, | Performed by: INTERNAL MEDICINE

## 2021-03-12 PROCEDURE — 3078F PR MOST RECENT DIASTOLIC BLOOD PRESSURE < 80 MM HG: ICD-10-PCS | Mod: CPTII,S$GLB,, | Performed by: INTERNAL MEDICINE

## 2021-03-12 PROCEDURE — 3078F DIAST BP <80 MM HG: CPT | Mod: CPTII,S$GLB,, | Performed by: INTERNAL MEDICINE

## 2021-03-12 PROCEDURE — 99215 PR OFFICE/OUTPT VISIT, EST, LEVL V, 40-54 MIN: ICD-10-PCS | Mod: S$GLB,,, | Performed by: INTERNAL MEDICINE

## 2021-03-12 PROCEDURE — 1159F PR MEDICATION LIST DOCUMENTED IN MEDICAL RECORD: ICD-10-PCS | Mod: S$GLB,,, | Performed by: INTERNAL MEDICINE

## 2021-03-12 PROCEDURE — 99999 PR PBB SHADOW E&M-EST. PATIENT-LVL V: CPT | Mod: PBBFAC,,, | Performed by: INTERNAL MEDICINE

## 2021-03-12 PROCEDURE — 3075F SYST BP GE 130 - 139MM HG: CPT | Mod: CPTII,S$GLB,, | Performed by: INTERNAL MEDICINE

## 2021-03-12 PROCEDURE — 1101F PT FALLS ASSESS-DOCD LE1/YR: CPT | Mod: CPTII,S$GLB,, | Performed by: INTERNAL MEDICINE

## 2021-03-12 PROCEDURE — 3288F FALL RISK ASSESSMENT DOCD: CPT | Mod: CPTII,S$GLB,, | Performed by: INTERNAL MEDICINE

## 2021-03-12 PROCEDURE — 99215 OFFICE O/P EST HI 40 MIN: CPT | Mod: S$GLB,,, | Performed by: INTERNAL MEDICINE

## 2021-03-12 RX ORDER — OLMESARTAN MEDOXOMIL 20 MG/1
20 TABLET ORAL 2 TIMES DAILY
Qty: 180 TABLET | Refills: 1 | Status: SHIPPED | OUTPATIENT
Start: 2021-03-12 | End: 2021-09-15 | Stop reason: ALTCHOICE

## 2021-03-17 ENCOUNTER — IMMUNIZATION (OUTPATIENT)
Dept: PHARMACY | Facility: CLINIC | Age: 86
End: 2021-03-17
Payer: MEDICARE

## 2021-03-17 DIAGNOSIS — Z23 NEED FOR VACCINATION: Primary | ICD-10-CM

## 2021-03-18 VITALS
OXYGEN SATURATION: 97 % | DIASTOLIC BLOOD PRESSURE: 68 MMHG | WEIGHT: 80.44 LBS | SYSTOLIC BLOOD PRESSURE: 138 MMHG | HEIGHT: 63 IN | BODY MASS INDEX: 14.25 KG/M2 | HEART RATE: 80 BPM | TEMPERATURE: 99 F

## 2021-04-09 ENCOUNTER — PES CALL (OUTPATIENT)
Dept: ADMINISTRATIVE | Facility: CLINIC | Age: 86
End: 2021-04-09

## 2021-04-14 ENCOUNTER — IMMUNIZATION (OUTPATIENT)
Dept: PHARMACY | Facility: CLINIC | Age: 86
End: 2021-04-14
Payer: MEDICARE

## 2021-04-14 DIAGNOSIS — Z23 NEED FOR VACCINATION: Primary | ICD-10-CM

## 2021-05-03 ENCOUNTER — PES CALL (OUTPATIENT)
Dept: ADMINISTRATIVE | Facility: CLINIC | Age: 86
End: 2021-05-03

## 2021-05-14 ENCOUNTER — PATIENT OUTREACH (OUTPATIENT)
Dept: ADMINISTRATIVE | Facility: OTHER | Age: 86
End: 2021-05-14

## 2021-05-18 ENCOUNTER — OFFICE VISIT (OUTPATIENT)
Dept: OPHTHALMOLOGY | Facility: CLINIC | Age: 86
End: 2021-05-18
Payer: MEDICARE

## 2021-05-18 ENCOUNTER — HOSPITAL ENCOUNTER (OUTPATIENT)
Dept: RADIOLOGY | Facility: CLINIC | Age: 86
Discharge: HOME | End: 2021-05-18
Attending: INTERNAL MEDICINE
Payer: MEDICARE

## 2021-05-18 DIAGNOSIS — H21.541 POSTERIOR SYNECHIAE, RIGHT: ICD-10-CM

## 2021-05-18 DIAGNOSIS — M81.0 OSTEOPOROSIS, UNSPECIFIED OSTEOPOROSIS TYPE, UNSPECIFIED PATHOLOGICAL FRACTURE PRESENCE: ICD-10-CM

## 2021-05-18 DIAGNOSIS — H34.8112 CENTRAL RETINAL VEIN OCCLUSION OF RIGHT EYE, UNSPECIFIED COMPLICATION STATUS: ICD-10-CM

## 2021-05-18 DIAGNOSIS — H40.89 GLAUCOMA DUE TO COMBINATION OF MECHANISMS: Primary | ICD-10-CM

## 2021-05-18 DIAGNOSIS — H35.031 BLIND HYPERTENSIVE RIGHT EYE: ICD-10-CM

## 2021-05-18 DIAGNOSIS — Z96.1 PSEUDOPHAKIA OF LEFT EYE: ICD-10-CM

## 2021-05-18 DIAGNOSIS — H25.11 SENILE NUCLEAR SCLEROSIS, RIGHT: ICD-10-CM

## 2021-05-18 DIAGNOSIS — H18.421 BAND KERATOPATHY, RIGHT: ICD-10-CM

## 2021-05-18 DIAGNOSIS — Z98.83 GLAUCOMA FILTERING BLEB OF LEFT EYE: ICD-10-CM

## 2021-05-18 DIAGNOSIS — H21.561 AFFERENT PUPILLARY DEFECT, RIGHT: ICD-10-CM

## 2021-05-18 PROCEDURE — 99499 RISK ADDL DX/OHS AUDIT: ICD-10-PCS | Mod: HCNC,S$GLB,, | Performed by: OPHTHALMOLOGY

## 2021-05-18 PROCEDURE — 1126F PR PAIN SEVERITY QUANTIFIED, NO PAIN PRESENT: ICD-10-PCS | Mod: S$GLB,,, | Performed by: OPHTHALMOLOGY

## 2021-05-18 PROCEDURE — 1101F PR PT FALLS ASSESS DOC 0-1 FALLS W/OUT INJ PAST YR: ICD-10-PCS | Mod: CPTII,S$GLB,, | Performed by: OPHTHALMOLOGY

## 2021-05-18 PROCEDURE — 99999 PR PBB SHADOW E&M-EST. PATIENT-LVL III: ICD-10-PCS | Mod: PBBFAC,,, | Performed by: OPHTHALMOLOGY

## 2021-05-18 PROCEDURE — 92020 GONIOSCOPY: CPT | Mod: S$GLB,,, | Performed by: OPHTHALMOLOGY

## 2021-05-18 PROCEDURE — 3288F FALL RISK ASSESSMENT DOCD: CPT | Mod: CPTII,S$GLB,, | Performed by: OPHTHALMOLOGY

## 2021-05-18 PROCEDURE — 92012 PR EYE EXAM, EST PATIENT,INTERMED: ICD-10-PCS | Mod: S$GLB,,, | Performed by: OPHTHALMOLOGY

## 2021-05-18 PROCEDURE — 92020 PR SPECIAL EYE EVAL,GONIOSCOPY: ICD-10-PCS | Mod: S$GLB,,, | Performed by: OPHTHALMOLOGY

## 2021-05-18 PROCEDURE — 77080 DXA BONE DENSITY AXIAL: CPT | Mod: 26,,, | Performed by: INTERNAL MEDICINE

## 2021-05-18 PROCEDURE — 99999 PR PBB SHADOW E&M-EST. PATIENT-LVL III: CPT | Mod: PBBFAC,,, | Performed by: OPHTHALMOLOGY

## 2021-05-18 PROCEDURE — 1126F AMNT PAIN NOTED NONE PRSNT: CPT | Mod: S$GLB,,, | Performed by: OPHTHALMOLOGY

## 2021-05-18 PROCEDURE — 77080 DXA BONE DENSITY AXIAL: CPT | Mod: TC

## 2021-05-18 PROCEDURE — 3288F PR FALLS RISK ASSESSMENT DOCUMENTED: ICD-10-PCS | Mod: CPTII,S$GLB,, | Performed by: OPHTHALMOLOGY

## 2021-05-18 PROCEDURE — 92012 INTRM OPH EXAM EST PATIENT: CPT | Mod: S$GLB,,, | Performed by: OPHTHALMOLOGY

## 2021-05-18 PROCEDURE — 77080 DEXA BONE DENSITY SPINE HIP: ICD-10-PCS | Mod: 26,,, | Performed by: INTERNAL MEDICINE

## 2021-05-18 PROCEDURE — 99499 UNLISTED E&M SERVICE: CPT | Mod: HCNC,S$GLB,, | Performed by: OPHTHALMOLOGY

## 2021-05-18 PROCEDURE — 1101F PT FALLS ASSESS-DOCD LE1/YR: CPT | Mod: CPTII,S$GLB,, | Performed by: OPHTHALMOLOGY

## 2021-05-31 ENCOUNTER — TELEPHONE (OUTPATIENT)
Dept: INTERNAL MEDICINE | Facility: CLINIC | Age: 86
End: 2021-05-31

## 2021-06-01 ENCOUNTER — HOSPITAL ENCOUNTER (EMERGENCY)
Facility: HOSPITAL | Age: 86
Discharge: HOME | End: 2021-06-01
Attending: EMERGENCY MEDICINE
Payer: MEDICARE

## 2021-06-01 ENCOUNTER — OFFICE VISIT (OUTPATIENT)
Dept: INTERNAL MEDICINE | Facility: CLINIC | Age: 86
End: 2021-06-01
Payer: MEDICARE

## 2021-06-01 ENCOUNTER — TELEPHONE (OUTPATIENT)
Dept: INTERNAL MEDICINE | Facility: CLINIC | Age: 86
End: 2021-06-01

## 2021-06-01 VITALS
BODY MASS INDEX: 14.57 KG/M2 | DIASTOLIC BLOOD PRESSURE: 50 MMHG | HEART RATE: 120 BPM | HEIGHT: 63 IN | WEIGHT: 82.25 LBS | SYSTOLIC BLOOD PRESSURE: 220 MMHG

## 2021-06-01 VITALS
RESPIRATION RATE: 18 BRPM | HEIGHT: 63 IN | SYSTOLIC BLOOD PRESSURE: 182 MMHG | OXYGEN SATURATION: 100 % | TEMPERATURE: 98 F | HEART RATE: 70 BPM | BODY MASS INDEX: 14.53 KG/M2 | DIASTOLIC BLOOD PRESSURE: 74 MMHG | WEIGHT: 82 LBS

## 2021-06-01 DIAGNOSIS — I10 HYPERTENSION: ICD-10-CM

## 2021-06-01 DIAGNOSIS — I16.0 HYPERTENSIVE URGENCY: Primary | ICD-10-CM

## 2021-06-01 LAB
BUN SERPL-MCNC: 22 MG/DL (ref 6–30)
CHLORIDE SERPL-SCNC: 98 MMOL/L (ref 95–110)
CREAT SERPL-MCNC: 0.8 MG/DL (ref 0.5–1.4)
GLUCOSE SERPL-MCNC: 120 MG/DL (ref 70–110)
HCT VFR BLD CALC: 33 %PCV (ref 36–54)
POC IONIZED CALCIUM: 1.16 MMOL/L (ref 1.06–1.42)
POC TCO2 (MEASURED): 26 MMOL/L (ref 23–29)
POTASSIUM BLD-SCNC: 3.8 MMOL/L (ref 3.5–5.1)
SAMPLE: ABNORMAL
SODIUM BLD-SCNC: 136 MMOL/L (ref 136–145)

## 2021-06-01 PROCEDURE — 99999 PR PBB SHADOW E&M-EST. PATIENT-LVL III: ICD-10-PCS | Mod: PBBFAC,,, | Performed by: NURSE PRACTITIONER

## 2021-06-01 PROCEDURE — 99284 EMERGENCY DEPT VISIT MOD MDM: CPT | Mod: ,,, | Performed by: EMERGENCY MEDICINE

## 2021-06-01 PROCEDURE — 3077F SYST BP >= 140 MM HG: CPT | Mod: CPTII,S$GLB,, | Performed by: NURSE PRACTITIONER

## 2021-06-01 PROCEDURE — 93005 ELECTROCARDIOGRAM TRACING: CPT

## 2021-06-01 PROCEDURE — 1126F PR PAIN SEVERITY QUANTIFIED, NO PAIN PRESENT: ICD-10-PCS | Mod: S$GLB,,, | Performed by: NURSE PRACTITIONER

## 2021-06-01 PROCEDURE — 80047 BASIC METABLC PNL IONIZED CA: CPT

## 2021-06-01 PROCEDURE — 3077F PR MOST RECENT SYSTOLIC BLOOD PRESSURE >= 140 MM HG: ICD-10-PCS | Mod: CPTII,S$GLB,, | Performed by: NURSE PRACTITIONER

## 2021-06-01 PROCEDURE — 3078F PR MOST RECENT DIASTOLIC BLOOD PRESSURE < 80 MM HG: ICD-10-PCS | Mod: CPTII,S$GLB,, | Performed by: NURSE PRACTITIONER

## 2021-06-01 PROCEDURE — 99283 EMERGENCY DEPT VISIT LOW MDM: CPT | Mod: 25

## 2021-06-01 PROCEDURE — 99999 PR PBB SHADOW E&M-EST. PATIENT-LVL III: CPT | Mod: PBBFAC,,, | Performed by: NURSE PRACTITIONER

## 2021-06-01 PROCEDURE — 93010 ELECTROCARDIOGRAM REPORT: CPT | Mod: ,,, | Performed by: INTERNAL MEDICINE

## 2021-06-01 PROCEDURE — 1101F PT FALLS ASSESS-DOCD LE1/YR: CPT | Mod: CPTII,S$GLB,, | Performed by: NURSE PRACTITIONER

## 2021-06-01 PROCEDURE — 1159F PR MEDICATION LIST DOCUMENTED IN MEDICAL RECORD: ICD-10-PCS | Mod: S$GLB,,, | Performed by: NURSE PRACTITIONER

## 2021-06-01 PROCEDURE — 1101F PR PT FALLS ASSESS DOC 0-1 FALLS W/OUT INJ PAST YR: ICD-10-PCS | Mod: CPTII,S$GLB,, | Performed by: NURSE PRACTITIONER

## 2021-06-01 PROCEDURE — 3078F DIAST BP <80 MM HG: CPT | Mod: CPTII,S$GLB,, | Performed by: NURSE PRACTITIONER

## 2021-06-01 PROCEDURE — 93010 EKG 12-LEAD: ICD-10-PCS | Mod: ,,, | Performed by: INTERNAL MEDICINE

## 2021-06-01 PROCEDURE — 1126F AMNT PAIN NOTED NONE PRSNT: CPT | Mod: S$GLB,,, | Performed by: NURSE PRACTITIONER

## 2021-06-01 PROCEDURE — 3288F PR FALLS RISK ASSESSMENT DOCUMENTED: ICD-10-PCS | Mod: CPTII,S$GLB,, | Performed by: NURSE PRACTITIONER

## 2021-06-01 PROCEDURE — 3288F FALL RISK ASSESSMENT DOCD: CPT | Mod: CPTII,S$GLB,, | Performed by: NURSE PRACTITIONER

## 2021-06-01 PROCEDURE — 99284 PR EMERGENCY DEPT VISIT,LEVEL IV: ICD-10-PCS | Mod: ,,, | Performed by: EMERGENCY MEDICINE

## 2021-06-01 PROCEDURE — 1159F MED LIST DOCD IN RCRD: CPT | Mod: S$GLB,,, | Performed by: NURSE PRACTITIONER

## 2021-06-01 PROCEDURE — 25000003 PHARM REV CODE 250: Performed by: EMERGENCY MEDICINE

## 2021-06-01 RX ORDER — CLONIDINE HYDROCHLORIDE 0.1 MG/1
0.1 TABLET ORAL
Status: COMPLETED | OUTPATIENT
Start: 2021-06-01 | End: 2021-06-01

## 2021-06-01 RX ADMIN — CLONIDINE HYDROCHLORIDE 0.1 MG: 0.1 TABLET ORAL at 05:06

## 2021-08-01 DIAGNOSIS — I10 ESSENTIAL HYPERTENSION: Chronic | ICD-10-CM

## 2021-08-02 RX ORDER — CHLORTHALIDONE 25 MG/1
TABLET ORAL
Qty: 90 TABLET | Refills: 0 | Status: SHIPPED | OUTPATIENT
Start: 2021-08-02 | End: 2021-10-26

## 2021-08-05 ENCOUNTER — INFUSION (OUTPATIENT)
Dept: INFECTIOUS DISEASES | Facility: HOSPITAL | Age: 86
End: 2021-08-05
Attending: INTERNAL MEDICINE
Payer: MEDICARE

## 2021-08-05 VITALS
HEART RATE: 84 BPM | DIASTOLIC BLOOD PRESSURE: 77 MMHG | TEMPERATURE: 99 F | RESPIRATION RATE: 17 BRPM | BODY MASS INDEX: 14.53 KG/M2 | HEIGHT: 63 IN | WEIGHT: 82 LBS | SYSTOLIC BLOOD PRESSURE: 196 MMHG

## 2021-09-15 ENCOUNTER — LAB VISIT (OUTPATIENT)
Dept: LAB | Facility: HOSPITAL | Age: 86
End: 2021-09-15
Attending: INTERNAL MEDICINE
Payer: MEDICARE

## 2021-09-15 ENCOUNTER — OFFICE VISIT (OUTPATIENT)
Dept: INTERNAL MEDICINE | Facility: CLINIC | Age: 86
End: 2021-09-15
Payer: MEDICARE

## 2021-09-15 DIAGNOSIS — I25.10 CORONARY ARTERY DISEASE, ANGINA PRESENCE UNSPECIFIED, UNSPECIFIED VESSEL OR LESION TYPE, UNSPECIFIED WHETHER NATIVE OR TRANSPLANTED HEART: Primary | ICD-10-CM

## 2021-09-15 DIAGNOSIS — I10 HYPERTENSION, UNSPECIFIED TYPE: ICD-10-CM

## 2021-09-15 DIAGNOSIS — I73.9 PVD (PERIPHERAL VASCULAR DISEASE): ICD-10-CM

## 2021-09-15 LAB
ALBUMIN SERPL BCP-MCNC: 3.6 G/DL (ref 3.5–5.2)
ALP SERPL-CCNC: 70 U/L (ref 55–135)
ALT SERPL W/O P-5'-P-CCNC: 27 U/L (ref 10–44)
ANION GAP SERPL CALC-SCNC: 8 MMOL/L (ref 8–16)
AST SERPL-CCNC: 28 U/L (ref 10–40)
BILIRUB SERPL-MCNC: 0.4 MG/DL (ref 0.1–1)
BUN SERPL-MCNC: 21 MG/DL (ref 8–23)
CALCIUM SERPL-MCNC: 9.9 MG/DL (ref 8.7–10.5)
CHLORIDE SERPL-SCNC: 102 MMOL/L (ref 95–110)
CO2 SERPL-SCNC: 27 MMOL/L (ref 23–29)
CREAT SERPL-MCNC: 0.7 MG/DL (ref 0.5–1.4)
EST. GFR  (AFRICAN AMERICAN): >60 ML/MIN/1.73 M^2
EST. GFR  (NON AFRICAN AMERICAN): >60 ML/MIN/1.73 M^2
GLUCOSE SERPL-MCNC: 89 MG/DL (ref 70–110)
POTASSIUM SERPL-SCNC: 4 MMOL/L (ref 3.5–5.1)
PROT SERPL-MCNC: 7.4 G/DL (ref 6–8.4)
SODIUM SERPL-SCNC: 137 MMOL/L (ref 136–145)

## 2021-09-15 PROCEDURE — 99499 RISK ADDL DX/OHS AUDIT: ICD-10-PCS | Mod: HCNC,S$GLB,, | Performed by: INTERNAL MEDICINE

## 2021-09-15 PROCEDURE — 36415 COLL VENOUS BLD VENIPUNCTURE: CPT | Performed by: INTERNAL MEDICINE

## 2021-09-15 PROCEDURE — 99215 OFFICE O/P EST HI 40 MIN: CPT | Mod: S$GLB,,, | Performed by: INTERNAL MEDICINE

## 2021-09-15 PROCEDURE — 3288F PR FALLS RISK ASSESSMENT DOCUMENTED: ICD-10-PCS | Mod: CPTII,S$GLB,, | Performed by: INTERNAL MEDICINE

## 2021-09-15 PROCEDURE — 1159F MED LIST DOCD IN RCRD: CPT | Mod: CPTII,S$GLB,, | Performed by: INTERNAL MEDICINE

## 2021-09-15 PROCEDURE — 3288F FALL RISK ASSESSMENT DOCD: CPT | Mod: CPTII,S$GLB,, | Performed by: INTERNAL MEDICINE

## 2021-09-15 PROCEDURE — 1126F AMNT PAIN NOTED NONE PRSNT: CPT | Mod: CPTII,S$GLB,, | Performed by: INTERNAL MEDICINE

## 2021-09-15 PROCEDURE — 1101F PT FALLS ASSESS-DOCD LE1/YR: CPT | Mod: CPTII,S$GLB,, | Performed by: INTERNAL MEDICINE

## 2021-09-15 PROCEDURE — 99999 PR PBB SHADOW E&M-EST. PATIENT-LVL IV: CPT | Mod: PBBFAC,,, | Performed by: INTERNAL MEDICINE

## 2021-09-15 PROCEDURE — 1126F PR PAIN SEVERITY QUANTIFIED, NO PAIN PRESENT: ICD-10-PCS | Mod: CPTII,S$GLB,, | Performed by: INTERNAL MEDICINE

## 2021-09-15 PROCEDURE — 80053 COMPREHEN METABOLIC PANEL: CPT | Performed by: INTERNAL MEDICINE

## 2021-09-15 PROCEDURE — 99999 PR PBB SHADOW E&M-EST. PATIENT-LVL IV: ICD-10-PCS | Mod: PBBFAC,,, | Performed by: INTERNAL MEDICINE

## 2021-09-15 PROCEDURE — 1159F PR MEDICATION LIST DOCUMENTED IN MEDICAL RECORD: ICD-10-PCS | Mod: CPTII,S$GLB,, | Performed by: INTERNAL MEDICINE

## 2021-09-15 PROCEDURE — 99499 UNLISTED E&M SERVICE: CPT | Mod: HCNC,S$GLB,, | Performed by: INTERNAL MEDICINE

## 2021-09-15 PROCEDURE — 1101F PR PT FALLS ASSESS DOC 0-1 FALLS W/OUT INJ PAST YR: ICD-10-PCS | Mod: CPTII,S$GLB,, | Performed by: INTERNAL MEDICINE

## 2021-09-15 PROCEDURE — 99215 PR OFFICE/OUTPT VISIT, EST, LEVL V, 40-54 MIN: ICD-10-PCS | Mod: S$GLB,,, | Performed by: INTERNAL MEDICINE

## 2021-09-15 RX ORDER — ESOMEPRAZOLE MAGNESIUM 40 MG/1
40 CAPSULE, DELAYED RELEASE ORAL DAILY PRN
Qty: 30 CAPSULE | Refills: 6 | Status: SHIPPED | OUTPATIENT
Start: 2021-09-15 | End: 2022-03-15

## 2021-09-15 RX ORDER — OLMESARTAN MEDOXOMIL 40 MG/1
40 TABLET ORAL DAILY
Qty: 90 TABLET | Refills: 1 | Status: SHIPPED | OUTPATIENT
Start: 2021-09-15 | End: 2022-03-15

## 2021-09-18 VITALS
TEMPERATURE: 99 F | HEIGHT: 63 IN | BODY MASS INDEX: 14.42 KG/M2 | WEIGHT: 81.38 LBS | SYSTOLIC BLOOD PRESSURE: 150 MMHG | HEART RATE: 62 BPM | DIASTOLIC BLOOD PRESSURE: 70 MMHG | OXYGEN SATURATION: 99 %

## 2021-09-21 ENCOUNTER — OFFICE VISIT (OUTPATIENT)
Dept: OPHTHALMOLOGY | Facility: CLINIC | Age: 86
End: 2021-09-21
Payer: MEDICARE

## 2021-09-21 DIAGNOSIS — H18.421 BAND KERATOPATHY, RIGHT: ICD-10-CM

## 2021-09-21 DIAGNOSIS — H21.561 AFFERENT PUPILLARY DEFECT, RIGHT: ICD-10-CM

## 2021-09-21 DIAGNOSIS — H21.541 POSTERIOR SYNECHIAE, RIGHT: ICD-10-CM

## 2021-09-21 DIAGNOSIS — Z96.1 PSEUDOPHAKIA OF LEFT EYE: ICD-10-CM

## 2021-09-21 DIAGNOSIS — H35.031 BLIND HYPERTENSIVE RIGHT EYE: ICD-10-CM

## 2021-09-21 DIAGNOSIS — H34.8112 CENTRAL RETINAL VEIN OCCLUSION OF RIGHT EYE, UNSPECIFIED COMPLICATION STATUS: ICD-10-CM

## 2021-09-21 DIAGNOSIS — H25.11 SENILE NUCLEAR SCLEROSIS, RIGHT: ICD-10-CM

## 2021-09-21 DIAGNOSIS — H40.89 GLAUCOMA DUE TO COMBINATION OF MECHANISMS: Primary | ICD-10-CM

## 2021-09-21 DIAGNOSIS — Z98.83 GLAUCOMA FILTERING BLEB OF LEFT EYE: ICD-10-CM

## 2021-09-21 PROCEDURE — 1101F PT FALLS ASSESS-DOCD LE1/YR: CPT | Mod: CPTII,S$GLB,, | Performed by: OPHTHALMOLOGY

## 2021-09-21 PROCEDURE — 1159F MED LIST DOCD IN RCRD: CPT | Mod: CPTII,S$GLB,, | Performed by: OPHTHALMOLOGY

## 2021-09-21 PROCEDURE — 1126F AMNT PAIN NOTED NONE PRSNT: CPT | Mod: CPTII,S$GLB,, | Performed by: OPHTHALMOLOGY

## 2021-09-21 PROCEDURE — 92012 INTRM OPH EXAM EST PATIENT: CPT | Mod: S$GLB,,, | Performed by: OPHTHALMOLOGY

## 2021-09-21 PROCEDURE — 1101F PR PT FALLS ASSESS DOC 0-1 FALLS W/OUT INJ PAST YR: ICD-10-PCS | Mod: CPTII,S$GLB,, | Performed by: OPHTHALMOLOGY

## 2021-09-21 PROCEDURE — 99999 PR PBB SHADOW E&M-EST. PATIENT-LVL III: CPT | Mod: PBBFAC,,, | Performed by: OPHTHALMOLOGY

## 2021-09-21 PROCEDURE — 1159F PR MEDICATION LIST DOCUMENTED IN MEDICAL RECORD: ICD-10-PCS | Mod: CPTII,S$GLB,, | Performed by: OPHTHALMOLOGY

## 2021-09-21 PROCEDURE — 1160F RVW MEDS BY RX/DR IN RCRD: CPT | Mod: CPTII,S$GLB,, | Performed by: OPHTHALMOLOGY

## 2021-09-21 PROCEDURE — 1126F PR PAIN SEVERITY QUANTIFIED, NO PAIN PRESENT: ICD-10-PCS | Mod: CPTII,S$GLB,, | Performed by: OPHTHALMOLOGY

## 2021-09-21 PROCEDURE — 99999 PR PBB SHADOW E&M-EST. PATIENT-LVL III: ICD-10-PCS | Mod: PBBFAC,,, | Performed by: OPHTHALMOLOGY

## 2021-09-21 PROCEDURE — 3288F FALL RISK ASSESSMENT DOCD: CPT | Mod: CPTII,S$GLB,, | Performed by: OPHTHALMOLOGY

## 2021-09-21 PROCEDURE — 3288F PR FALLS RISK ASSESSMENT DOCUMENTED: ICD-10-PCS | Mod: CPTII,S$GLB,, | Performed by: OPHTHALMOLOGY

## 2021-09-21 PROCEDURE — 1160F PR REVIEW ALL MEDS BY PRESCRIBER/CLIN PHARMACIST DOCUMENTED: ICD-10-PCS | Mod: CPTII,S$GLB,, | Performed by: OPHTHALMOLOGY

## 2021-09-21 PROCEDURE — 92012 PR EYE EXAM, EST PATIENT,INTERMED: ICD-10-PCS | Mod: S$GLB,,, | Performed by: OPHTHALMOLOGY

## 2021-09-21 RX ORDER — TIMOLOL MALEATE 5 MG/ML
1 SOLUTION/ DROPS OPHTHALMIC EVERY MORNING
Qty: 15 ML | Refills: 3 | Status: SHIPPED | OUTPATIENT
Start: 2021-09-21 | End: 2022-09-13

## 2021-09-21 RX ORDER — BIMATOPROST 0.1 MG/ML
1 SOLUTION/ DROPS OPHTHALMIC NIGHTLY
Qty: 7.5 ML | Refills: 3 | Status: SHIPPED | OUTPATIENT
Start: 2021-09-21 | End: 2022-10-10

## 2021-10-15 ENCOUNTER — OFFICE VISIT (OUTPATIENT)
Dept: INTERNAL MEDICINE | Facility: CLINIC | Age: 86
End: 2021-10-15
Payer: MEDICARE

## 2021-10-15 DIAGNOSIS — K21.9 GASTROESOPHAGEAL REFLUX DISEASE, UNSPECIFIED WHETHER ESOPHAGITIS PRESENT: ICD-10-CM

## 2021-10-15 DIAGNOSIS — E21.3 HYPERPARATHYROIDISM, UNSPECIFIED: ICD-10-CM

## 2021-10-15 DIAGNOSIS — Z12.31 SCREENING MAMMOGRAM, ENCOUNTER FOR: ICD-10-CM

## 2021-10-15 DIAGNOSIS — I10 HYPERTENSION, UNSPECIFIED TYPE: ICD-10-CM

## 2021-10-15 DIAGNOSIS — I25.10 CORONARY ARTERY DISEASE, UNSPECIFIED VESSEL OR LESION TYPE, UNSPECIFIED WHETHER ANGINA PRESENT, UNSPECIFIED WHETHER NATIVE OR TRANSPLANTED HEART: Primary | ICD-10-CM

## 2021-10-15 DIAGNOSIS — I73.9 PVD (PERIPHERAL VASCULAR DISEASE): ICD-10-CM

## 2021-10-15 PROCEDURE — 3288F FALL RISK ASSESSMENT DOCD: CPT | Mod: HCNC,CPTII,S$GLB, | Performed by: INTERNAL MEDICINE

## 2021-10-15 PROCEDURE — 1101F PR PT FALLS ASSESS DOC 0-1 FALLS W/OUT INJ PAST YR: ICD-10-PCS | Mod: HCNC,CPTII,S$GLB, | Performed by: INTERNAL MEDICINE

## 2021-10-15 PROCEDURE — 1126F AMNT PAIN NOTED NONE PRSNT: CPT | Mod: HCNC,CPTII,S$GLB, | Performed by: INTERNAL MEDICINE

## 2021-10-15 PROCEDURE — 99215 OFFICE O/P EST HI 40 MIN: CPT | Mod: HCNC,S$GLB,, | Performed by: INTERNAL MEDICINE

## 2021-10-15 PROCEDURE — 3288F PR FALLS RISK ASSESSMENT DOCUMENTED: ICD-10-PCS | Mod: HCNC,CPTII,S$GLB, | Performed by: INTERNAL MEDICINE

## 2021-10-15 PROCEDURE — 99999 PR PBB SHADOW E&M-EST. PATIENT-LVL V: ICD-10-PCS | Mod: PBBFAC,HCNC,, | Performed by: INTERNAL MEDICINE

## 2021-10-15 PROCEDURE — 99215 PR OFFICE/OUTPT VISIT, EST, LEVL V, 40-54 MIN: ICD-10-PCS | Mod: HCNC,S$GLB,, | Performed by: INTERNAL MEDICINE

## 2021-10-15 PROCEDURE — 99999 PR PBB SHADOW E&M-EST. PATIENT-LVL V: CPT | Mod: PBBFAC,HCNC,, | Performed by: INTERNAL MEDICINE

## 2021-10-15 PROCEDURE — 1101F PT FALLS ASSESS-DOCD LE1/YR: CPT | Mod: HCNC,CPTII,S$GLB, | Performed by: INTERNAL MEDICINE

## 2021-10-15 PROCEDURE — 1126F PR PAIN SEVERITY QUANTIFIED, NO PAIN PRESENT: ICD-10-PCS | Mod: HCNC,CPTII,S$GLB, | Performed by: INTERNAL MEDICINE

## 2021-10-19 ENCOUNTER — INFUSION (OUTPATIENT)
Dept: INFECTIOUS DISEASES | Facility: HOSPITAL | Age: 86
End: 2021-10-19
Attending: INTERNAL MEDICINE
Payer: MEDICARE

## 2021-10-19 VITALS
OXYGEN SATURATION: 98 % | RESPIRATION RATE: 18 BRPM | HEART RATE: 81 BPM | BODY MASS INDEX: 14.86 KG/M2 | SYSTOLIC BLOOD PRESSURE: 179 MMHG | DIASTOLIC BLOOD PRESSURE: 77 MMHG | WEIGHT: 83.88 LBS | TEMPERATURE: 98 F

## 2021-10-19 DIAGNOSIS — M81.0 OSTEOPOROSIS, POSTMENOPAUSAL: Primary | ICD-10-CM

## 2021-10-19 PROCEDURE — 96372 THER/PROPH/DIAG INJ SC/IM: CPT | Mod: HCNC

## 2021-10-19 PROCEDURE — 63600175 PHARM REV CODE 636 W HCPCS: Mod: JG,HCNC | Performed by: NURSE PRACTITIONER

## 2021-10-19 RX ORDER — HYDROCHLOROTHIAZIDE 25 MG/1
25 TABLET ORAL DAILY
COMMUNITY
End: 2021-10-29

## 2021-10-19 RX ADMIN — DENOSUMAB 60 MG: 60 INJECTION SUBCUTANEOUS at 02:10

## 2021-10-21 VITALS
OXYGEN SATURATION: 99 % | BODY MASS INDEX: 15 KG/M2 | HEIGHT: 63 IN | SYSTOLIC BLOOD PRESSURE: 138 MMHG | HEART RATE: 66 BPM | DIASTOLIC BLOOD PRESSURE: 88 MMHG | WEIGHT: 84.69 LBS | TEMPERATURE: 99 F

## 2021-10-29 ENCOUNTER — OFFICE VISIT (OUTPATIENT)
Dept: CARDIOLOGY | Facility: CLINIC | Age: 86
End: 2021-10-29
Payer: MEDICARE

## 2021-10-29 ENCOUNTER — PATIENT OUTREACH (OUTPATIENT)
Dept: ADMINISTRATIVE | Facility: OTHER | Age: 86
End: 2021-10-29
Payer: MEDICARE

## 2021-10-29 VITALS
WEIGHT: 83.13 LBS | OXYGEN SATURATION: 99 % | HEIGHT: 63 IN | BODY MASS INDEX: 14.73 KG/M2 | DIASTOLIC BLOOD PRESSURE: 86 MMHG | SYSTOLIC BLOOD PRESSURE: 222 MMHG | HEART RATE: 77 BPM

## 2021-10-29 DIAGNOSIS — I25.10 CORONARY ARTERY DISEASE INVOLVING NATIVE CORONARY ARTERY OF NATIVE HEART WITHOUT ANGINA PECTORIS: ICD-10-CM

## 2021-10-29 DIAGNOSIS — I70.0 AORTIC ATHEROSCLEROSIS: ICD-10-CM

## 2021-10-29 DIAGNOSIS — I73.9 PAD (PERIPHERAL ARTERY DISEASE): ICD-10-CM

## 2021-10-29 DIAGNOSIS — I10 ESSENTIAL HYPERTENSION: Primary | Chronic | ICD-10-CM

## 2021-10-29 DIAGNOSIS — R64 CACHECTIC: ICD-10-CM

## 2021-10-29 DIAGNOSIS — I65.23 BILATERAL CAROTID ARTERY STENOSIS: ICD-10-CM

## 2021-10-29 DIAGNOSIS — E78.5 DYSLIPIDEMIA: Chronic | ICD-10-CM

## 2021-10-29 PROCEDURE — 1126F PR PAIN SEVERITY QUANTIFIED, NO PAIN PRESENT: ICD-10-PCS | Mod: HCNC,CPTII,S$GLB, | Performed by: NURSE PRACTITIONER

## 2021-10-29 PROCEDURE — 1126F AMNT PAIN NOTED NONE PRSNT: CPT | Mod: HCNC,CPTII,S$GLB, | Performed by: NURSE PRACTITIONER

## 2021-10-29 PROCEDURE — 1159F PR MEDICATION LIST DOCUMENTED IN MEDICAL RECORD: ICD-10-PCS | Mod: HCNC,CPTII,S$GLB, | Performed by: NURSE PRACTITIONER

## 2021-10-29 PROCEDURE — 99999 PR PBB SHADOW E&M-EST. PATIENT-LVL IV: CPT | Mod: PBBFAC,HCNC,, | Performed by: NURSE PRACTITIONER

## 2021-10-29 PROCEDURE — 3288F PR FALLS RISK ASSESSMENT DOCUMENTED: ICD-10-PCS | Mod: HCNC,CPTII,S$GLB, | Performed by: NURSE PRACTITIONER

## 2021-10-29 PROCEDURE — 1101F PR PT FALLS ASSESS DOC 0-1 FALLS W/OUT INJ PAST YR: ICD-10-PCS | Mod: HCNC,CPTII,S$GLB, | Performed by: NURSE PRACTITIONER

## 2021-10-29 PROCEDURE — 3288F FALL RISK ASSESSMENT DOCD: CPT | Mod: HCNC,CPTII,S$GLB, | Performed by: NURSE PRACTITIONER

## 2021-10-29 PROCEDURE — 1101F PT FALLS ASSESS-DOCD LE1/YR: CPT | Mod: HCNC,CPTII,S$GLB, | Performed by: NURSE PRACTITIONER

## 2021-10-29 PROCEDURE — 99999 PR PBB SHADOW E&M-EST. PATIENT-LVL IV: ICD-10-PCS | Mod: PBBFAC,HCNC,, | Performed by: NURSE PRACTITIONER

## 2021-10-29 PROCEDURE — 99499 RISK ADDL DX/OHS AUDIT: ICD-10-PCS | Mod: HCNC,S$GLB,, | Performed by: NURSE PRACTITIONER

## 2021-10-29 PROCEDURE — 1159F MED LIST DOCD IN RCRD: CPT | Mod: HCNC,CPTII,S$GLB, | Performed by: NURSE PRACTITIONER

## 2021-10-29 PROCEDURE — 99214 OFFICE O/P EST MOD 30 MIN: CPT | Mod: HCNC,S$GLB,, | Performed by: NURSE PRACTITIONER

## 2021-10-29 PROCEDURE — 99499 UNLISTED E&M SERVICE: CPT | Mod: HCNC,S$GLB,, | Performed by: NURSE PRACTITIONER

## 2021-10-29 PROCEDURE — 99214 PR OFFICE/OUTPT VISIT, EST, LEVL IV, 30-39 MIN: ICD-10-PCS | Mod: HCNC,S$GLB,, | Performed by: NURSE PRACTITIONER

## 2021-10-29 RX ORDER — AMLODIPINE BESYLATE 5 MG/1
5 TABLET ORAL DAILY
Qty: 30 TABLET | Refills: 11 | Status: SHIPPED | OUTPATIENT
Start: 2021-10-29 | End: 2023-02-28 | Stop reason: SDUPTHER

## 2021-11-01 ENCOUNTER — HOSPITAL ENCOUNTER (OUTPATIENT)
Dept: RADIOLOGY | Facility: HOSPITAL | Age: 86
Discharge: HOME | End: 2021-11-01
Attending: INTERNAL MEDICINE
Payer: MEDICARE

## 2021-11-01 DIAGNOSIS — Z12.31 SCREENING MAMMOGRAM, ENCOUNTER FOR: ICD-10-CM

## 2021-11-01 PROCEDURE — 77067 MAMMO DIGITAL SCREENING BILAT WITH TOMO: ICD-10-PCS | Mod: 26,HCNC,, | Performed by: RADIOLOGY

## 2021-11-01 PROCEDURE — 77067 SCR MAMMO BI INCL CAD: CPT | Mod: TC,HCNC

## 2021-11-01 PROCEDURE — 77067 SCR MAMMO BI INCL CAD: CPT | Mod: 26,HCNC,, | Performed by: RADIOLOGY

## 2021-11-01 PROCEDURE — 77063 BREAST TOMOSYNTHESIS BI: CPT | Mod: 26,HCNC,, | Performed by: RADIOLOGY

## 2021-11-01 PROCEDURE — 77063 MAMMO DIGITAL SCREENING BILAT WITH TOMO: ICD-10-PCS | Mod: 26,HCNC,, | Performed by: RADIOLOGY

## 2021-11-17 ENCOUNTER — PES CALL (OUTPATIENT)
Dept: ADMINISTRATIVE | Facility: CLINIC | Age: 86
End: 2021-11-17
Payer: MEDICARE

## 2021-11-22 ENCOUNTER — OFFICE VISIT (OUTPATIENT)
Dept: CARDIOLOGY | Facility: CLINIC | Age: 86
End: 2021-11-22
Payer: MEDICARE

## 2021-11-22 VITALS
HEART RATE: 74 BPM | SYSTOLIC BLOOD PRESSURE: 207 MMHG | WEIGHT: 82.88 LBS | OXYGEN SATURATION: 98 % | HEIGHT: 63 IN | BODY MASS INDEX: 14.68 KG/M2 | DIASTOLIC BLOOD PRESSURE: 84 MMHG

## 2021-11-22 DIAGNOSIS — I70.0 AORTIC ATHEROSCLEROSIS: ICD-10-CM

## 2021-11-22 DIAGNOSIS — I73.9 PAD (PERIPHERAL ARTERY DISEASE): ICD-10-CM

## 2021-11-22 DIAGNOSIS — R64 CACHECTIC: ICD-10-CM

## 2021-11-22 DIAGNOSIS — I25.10 CORONARY ARTERY DISEASE INVOLVING NATIVE CORONARY ARTERY OF NATIVE HEART WITHOUT ANGINA PECTORIS: ICD-10-CM

## 2021-11-22 DIAGNOSIS — I65.23 BILATERAL CAROTID ARTERY STENOSIS: ICD-10-CM

## 2021-11-22 DIAGNOSIS — I10 ESSENTIAL HYPERTENSION: Primary | Chronic | ICD-10-CM

## 2021-11-22 DIAGNOSIS — E78.5 DYSLIPIDEMIA: Chronic | ICD-10-CM

## 2021-11-22 PROCEDURE — 99999 PR PBB SHADOW E&M-EST. PATIENT-LVL V: CPT | Mod: PBBFAC,HCNC,, | Performed by: NURSE PRACTITIONER

## 2021-11-22 PROCEDURE — 99214 OFFICE O/P EST MOD 30 MIN: CPT | Mod: HCNC,S$GLB,, | Performed by: NURSE PRACTITIONER

## 2021-11-22 PROCEDURE — 99214 PR OFFICE/OUTPT VISIT, EST, LEVL IV, 30-39 MIN: ICD-10-PCS | Mod: HCNC,S$GLB,, | Performed by: NURSE PRACTITIONER

## 2021-11-22 PROCEDURE — 99499 UNLISTED E&M SERVICE: CPT | Mod: HCNC,S$GLB,, | Performed by: NURSE PRACTITIONER

## 2021-11-22 PROCEDURE — 99999 PR PBB SHADOW E&M-EST. PATIENT-LVL V: ICD-10-PCS | Mod: PBBFAC,HCNC,, | Performed by: NURSE PRACTITIONER

## 2021-11-22 PROCEDURE — 99499 RISK ADDL DX/OHS AUDIT: ICD-10-PCS | Mod: HCNC,S$GLB,, | Performed by: NURSE PRACTITIONER

## 2021-11-22 RX ORDER — HYDRALAZINE HYDROCHLORIDE 25 MG/1
25 TABLET, FILM COATED ORAL DAILY PRN
Qty: 90 TABLET | Refills: 11 | Status: SHIPPED | OUTPATIENT
Start: 2021-11-22 | End: 2024-02-26

## 2021-12-14 ENCOUNTER — IMMUNIZATION (OUTPATIENT)
Dept: PHARMACY | Facility: CLINIC | Age: 86
End: 2021-12-14
Payer: MEDICARE

## 2021-12-22 ENCOUNTER — PES CALL (OUTPATIENT)
Dept: ADMINISTRATIVE | Facility: CLINIC | Age: 86
End: 2021-12-22
Payer: MEDICARE

## 2022-01-03 ENCOUNTER — TELEPHONE (OUTPATIENT)
Dept: INTERNAL MEDICINE | Facility: CLINIC | Age: 87
End: 2022-01-03
Payer: MEDICARE

## 2022-01-03 DIAGNOSIS — U07.1 COVID: Primary | ICD-10-CM

## 2022-01-04 ENCOUNTER — TELEPHONE (OUTPATIENT)
Dept: GASTROENTEROLOGY | Facility: CLINIC | Age: 87
End: 2022-01-04
Payer: MEDICARE

## 2022-01-04 NOTE — TELEPHONE ENCOUNTER
Please advise her to quarantine for 5 days, needs to wear a mask for 5 additional days after that. She is eligible for the COVID antibody infusion. Order in,please schedule

## 2022-01-04 NOTE — TELEPHONE ENCOUNTER
Spoke to pt. Pt states she was called to set up the infusion but feels fine.Did remind her to continue to quarantine wash hands and wear her mask. Pt verbalized understanding with verbal read back.

## 2022-01-04 NOTE — TELEPHONE ENCOUNTER
Pt was calling to reschdule her appt she is positive for Covid 19. ----- Message from Dana Castillo sent at 1/3/2022  3:03 PM CST -----  Contact: Pt @Pt @[phone]  Patient would like to get medical advice.  Positive covid on 12/29    Would you like a call back, or a response through your [facility] portal?:   call back       Comments:     Pt is scheduled to come in on 1/6/2022 to be seen and would like a call back to advise iif she should reschedule or come in. Please call back to advise..

## 2022-01-04 NOTE — TELEPHONE ENCOUNTER
----- Message from Mary Dawn MA sent at 1/3/2022  3:16 PM CST -----  Contact: Pt @[phone]    ----- Message -----  From: Dana Castillo  Sent: 1/3/2022   3:09 PM CST  To: [facility] Staff    Patient would like to get medical advice.    Tested positive on 12/29/2021    Would you like a call back, or a response through your [facility] portal?:   call back       Comments:     Pt states that she has been tested positive but would like to  know what to do at this time. She would like to know how long she need to quarantine and when she can take her booster. She also has an appt with GI and would like to know what to do about that (message sent to GI). Please call back to advise.

## 2022-01-12 NOTE — PROGRESS NOTES
"HPI     DLS: 9/21/2021    Pt here for HVF/OCT review;  Pt c/o dry eyes.    Meds;   T1/2 GFS QAM OU  Alphagan 0.15% TID OD   Camacho 4% TID OD   Lumugan QHS OU  AT's PRN OU    1) Residula OAG / MMG OU   2) Blind Hypertensive OD   3) NS OD   4) FABY   5) APD OD   6) Band Keratopathy OD   7) Asteroid Hyalosis OS   8) Hx Acute Dacryocystitis OS   9) CRVO OD   10) Ant. Capsule Phimosis OS   11) PCIOL O S   12) s/p trab w/ express mini shunt  os     Last edited by Sohail Cotton MD on 1/18/2022  1:15 PM. (History)              Assessment /Plan     For exam results, see Encounter Report.    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   Angle open after PI's ou   -Followed at [facility] since at least '01   First HVF 2001   First photos 2001   S/p PI OU   s/p Gonioplasty OU     Family history none   Glaucoma meds Lumigan, timolol gfs  qAM, Agan tid, Camacho tid  - all OD ((off all gtts os s/p combined)   H/O adverse rxn to glaucoma drops Azopt "felt weird"   LASERS S/p PI OU, s/p Gonioplasty OU   GLAUCOMA SURGERIES    combined phaco/trab with mini shunt OS 5/22/2013   OTHER EYE SURGERIES    S/P DCR sx OD x 2 or 3 with Damaris    Combined phaco/IOL/ trab with mini shunt OS 5/22/2013   CDR 0.9 w/ shunt vessels  /0.9   Tbase 42/23   Tmax 42/23 (when stopped gtts)   Ttarget pain control od // 18 os   HVF 20  VF '01 -'22 - Ext   od  // SALT / IAD (gen dep) os   Gonio +3/+3   /539   OCT 11 test 2006 to 2021 - RNFL - OD:poor test  // OS:dec Dec thru out expect NS   HRT 14 test 2004 to 2018 - MR -  Dec. S/I od // dec. S/N/I os /// CDR " 0.66 od // 0.83 os   HRT 2019 - high std dev. Ou (( NO MORE HRT'S - unreliable))   Disc photos - 2001, 2003, 2006 - slides // 2008, 2010, 2016, 2018, 2021    - OIS     Ta today 50 (occasional ache) on mult gtts od - pain free // 14 on lumigan/timolol os post phaco/trab - 2013   Test today - IOP // HVF  Os and DFE / and OCT os     2.  Blind hypertensive Right Eye   Pain free - eye comfortable   Very limited vision CF at 3'   End stage glaucoma and S/P CRVO    No rubeosis   IOP is high but eye is pain free    3. Cataract OD -However, OD VA limited 2/2 CRVO and end stge glaucoma   S/P phaco/IOL/trab OS 5/22/2013    4. FABY OU -cont ATs     5. APD OD -   Old / stable 2/2 CRVO and glaucoma    6. Band Keratopathy OD -stable. Cont ATs- being followed by Dr. Saul;    had EDTA chelation on 2/13/14 OS - now with recurrent band     7. Asteroid Hyalosis OS -stable     8. Hx of Acute Dacryocystitis OS and recent NLD OD s/p surgery 11/12 and repeat for exposure w/ Dr. Ocampo 12/5/12 -stable. Cont f/u with Damaris     9. Hx of CRVO OD -limiting visual potential OD   -No NV on todays exam     10. Ant capsule phimosis os    S/P yag laser     11. Asteroid hyalosis OS     12. PC IOL os    Combined  W/ mini shunt 5/22/2013   doing well VA is 20/25 and the IOP is 13    Plan:    s/p combined (5/22/2013)  --    IOP creeping up 9/21/2021 - added back timolol q am and lumigan q evening     Cont glaucoma gtts OD -- IOP  high, but comfortable/no pain // Blind hypertensive but pain free eye   Old  crvo     Ok to use OTC readers for now a +3.00 to +3.50 - not really able to improve distance vision with glasses at this time  Much improved from before the combined  Procedure    Cont all glaucoma gtts od   Blind hypertensive right eye - pain free     IOP creeping up os - added back timolol q am and lumigan q evening - 9/21/2021   Good resp os 18--> 14     Photo file updated 1/18/2022    Benny - Matthieu -  - refer to B - to see if hand held  magnifier might help     F/U 4 months with IOP // Gonio

## 2022-01-18 ENCOUNTER — CLINICAL SUPPORT (OUTPATIENT)
Dept: OPHTHALMOLOGY | Facility: CLINIC | Age: 87
End: 2022-01-18
Payer: MEDICARE

## 2022-01-18 ENCOUNTER — OFFICE VISIT (OUTPATIENT)
Dept: OPHTHALMOLOGY | Facility: CLINIC | Age: 87
End: 2022-01-18
Payer: MEDICARE

## 2022-01-18 DIAGNOSIS — H18.421 BAND KERATOPATHY, RIGHT: ICD-10-CM

## 2022-01-18 DIAGNOSIS — H35.031 BLIND HYPERTENSIVE RIGHT EYE: ICD-10-CM

## 2022-01-18 DIAGNOSIS — H40.89 GLAUCOMA DUE TO COMBINATION OF MECHANISMS: Primary | ICD-10-CM

## 2022-01-18 DIAGNOSIS — Z96.1 PSEUDOPHAKIA OF LEFT EYE: ICD-10-CM

## 2022-01-18 DIAGNOSIS — H21.561 AFFERENT PUPILLARY DEFECT, RIGHT: ICD-10-CM

## 2022-01-18 DIAGNOSIS — H25.11 SENILE NUCLEAR SCLEROSIS, RIGHT: ICD-10-CM

## 2022-01-18 DIAGNOSIS — H21.541 POSTERIOR SYNECHIAE, RIGHT: ICD-10-CM

## 2022-01-18 DIAGNOSIS — Z98.83 GLAUCOMA FILTERING BLEB OF LEFT EYE: ICD-10-CM

## 2022-01-18 DIAGNOSIS — H34.8112 CENTRAL RETINAL VEIN OCCLUSION OF RIGHT EYE, UNSPECIFIED COMPLICATION STATUS: ICD-10-CM

## 2022-01-18 PROCEDURE — 92014 COMPRE OPH EXAM EST PT 1/>: CPT | Mod: HCNC,S$GLB,, | Performed by: OPHTHALMOLOGY

## 2022-01-18 PROCEDURE — 1126F AMNT PAIN NOTED NONE PRSNT: CPT | Mod: HCNC,CPTII,S$GLB, | Performed by: OPHTHALMOLOGY

## 2022-01-18 PROCEDURE — 1126F PR PAIN SEVERITY QUANTIFIED, NO PAIN PRESENT: ICD-10-PCS | Mod: HCNC,CPTII,S$GLB, | Performed by: OPHTHALMOLOGY

## 2022-01-18 PROCEDURE — 1160F PR REVIEW ALL MEDS BY PRESCRIBER/CLIN PHARMACIST DOCUMENTED: ICD-10-PCS | Mod: HCNC,CPTII,S$GLB, | Performed by: OPHTHALMOLOGY

## 2022-01-18 PROCEDURE — 1101F PT FALLS ASSESS-DOCD LE1/YR: CPT | Mod: HCNC,CPTII,S$GLB, | Performed by: OPHTHALMOLOGY

## 2022-01-18 PROCEDURE — 92083 EXTENDED VISUAL FIELD XM: CPT | Mod: HCNC,S$GLB,, | Performed by: OPHTHALMOLOGY

## 2022-01-18 PROCEDURE — 3288F PR FALLS RISK ASSESSMENT DOCUMENTED: ICD-10-PCS | Mod: HCNC,CPTII,S$GLB, | Performed by: OPHTHALMOLOGY

## 2022-01-18 PROCEDURE — 92133 CPTRZD OPH DX IMG PST SGM ON: CPT | Mod: HCNC,S$GLB,, | Performed by: OPHTHALMOLOGY

## 2022-01-18 PROCEDURE — 1159F PR MEDICATION LIST DOCUMENTED IN MEDICAL RECORD: ICD-10-PCS | Mod: HCNC,CPTII,S$GLB, | Performed by: OPHTHALMOLOGY

## 2022-01-18 PROCEDURE — 1160F RVW MEDS BY RX/DR IN RCRD: CPT | Mod: HCNC,CPTII,S$GLB, | Performed by: OPHTHALMOLOGY

## 2022-01-18 PROCEDURE — 92133 POSTERIOR SEGMENT OCT OPTIC NERVE(OCULAR COHERENCE TOMOGRAPHY) - OU - BOTH EYES: ICD-10-PCS | Mod: HCNC,S$GLB,, | Performed by: OPHTHALMOLOGY

## 2022-01-18 PROCEDURE — 92014 PR EYE EXAM, EST PATIENT,COMPREHESV: ICD-10-PCS | Mod: HCNC,S$GLB,, | Performed by: OPHTHALMOLOGY

## 2022-01-18 PROCEDURE — 99999 PR PBB SHADOW E&M-EST. PATIENT-LVL III: ICD-10-PCS | Mod: PBBFAC,HCNC,, | Performed by: OPHTHALMOLOGY

## 2022-01-18 PROCEDURE — 1101F PR PT FALLS ASSESS DOC 0-1 FALLS W/OUT INJ PAST YR: ICD-10-PCS | Mod: HCNC,CPTII,S$GLB, | Performed by: OPHTHALMOLOGY

## 2022-01-18 PROCEDURE — 99999 PR PBB SHADOW E&M-EST. PATIENT-LVL III: CPT | Mod: PBBFAC,HCNC,, | Performed by: OPHTHALMOLOGY

## 2022-01-18 PROCEDURE — 1159F MED LIST DOCD IN RCRD: CPT | Mod: HCNC,CPTII,S$GLB, | Performed by: OPHTHALMOLOGY

## 2022-01-18 PROCEDURE — 3288F FALL RISK ASSESSMENT DOCD: CPT | Mod: HCNC,CPTII,S$GLB, | Performed by: OPHTHALMOLOGY

## 2022-01-18 PROCEDURE — 92083 HUMPHREY VISUAL FIELD - OU - BOTH EYES: ICD-10-PCS | Mod: HCNC,S$GLB,, | Performed by: OPHTHALMOLOGY

## 2022-01-20 DIAGNOSIS — I10 ESSENTIAL HYPERTENSION: Chronic | ICD-10-CM

## 2022-01-20 NOTE — TELEPHONE ENCOUNTER
No new care gaps identified.  Powered by [employer]. Reference number: [id number].   1/20/2022 3:06:50 PM CST

## 2022-01-20 NOTE — TELEPHONE ENCOUNTER
Encounter details require adjustment(s)/ updating by OR Staff  As of this time Protocols and CDM: did not populate or display   Adjustment(s) made: Department  CDM should display. Medication(s) delegated by the OR.  Will resend refill request encounter to P Centralized Refill Staff Pool.   [facility]   Note composed:3:05 PM 01/20/2022

## 2022-01-21 RX ORDER — CHLORTHALIDONE 25 MG/1
TABLET ORAL
Qty: 90 TABLET | Refills: 0 | Status: SHIPPED | OUTPATIENT
Start: 2022-01-21 | End: 2022-08-30 | Stop reason: SDUPTHER

## 2022-02-11 ENCOUNTER — TELEPHONE (OUTPATIENT)
Dept: INTERNAL MEDICINE | Facility: CLINIC | Age: 87
End: 2022-02-11
Payer: MEDICARE

## 2022-02-11 NOTE — TELEPHONE ENCOUNTER
----- Message from Samantha Beck sent at 2/11/2022  2:43 PM CST -----  Contact: [phone]  Patient would like to speak to the nurse for advise on getting her booster shot. Please call and advise.

## 2022-02-17 ENCOUNTER — IMMUNIZATION (OUTPATIENT)
Dept: PHARMACY | Facility: CLINIC | Age: 87
End: 2022-02-17
Payer: MEDICARE

## 2022-02-17 DIAGNOSIS — Z23 NEED FOR VACCINATION: Primary | ICD-10-CM

## 2022-03-14 NOTE — TELEPHONE ENCOUNTER
No new care gaps identified.  Powered by [email or web address]. Reference number: [id number].   3/14/2022 12:21:51 AM CDT

## 2022-03-14 NOTE — TELEPHONE ENCOUNTER

## 2022-03-15 RX ORDER — ESOMEPRAZOLE MAGNESIUM 40 MG/1
40 CAPSULE, DELAYED RELEASE ORAL DAILY PRN
Qty: 90 CAPSULE | Refills: 2 | Status: SHIPPED | OUTPATIENT
Start: 2022-03-15 | End: 2022-12-19

## 2022-03-15 RX ORDER — OLMESARTAN MEDOXOMIL 40 MG/1
TABLET ORAL
Qty: 90 TABLET | Refills: 0 | Status: SHIPPED | OUTPATIENT
Start: 2022-03-15 | End: 2022-06-16

## 2022-03-15 NOTE — TELEPHONE ENCOUNTER
No new care gaps identified.  Powered by [email or web address]. Reference number: [id number].   3/15/2022 12:20:20 AM CDT

## 2022-03-15 NOTE — TELEPHONE ENCOUNTER

## 2022-04-13 ENCOUNTER — LAB VISIT (OUTPATIENT)
Dept: LAB | Facility: HOSPITAL | Age: 87
End: 2022-04-13
Payer: MEDICARE

## 2022-04-13 ENCOUNTER — OFFICE VISIT (OUTPATIENT)
Dept: GASTROENTEROLOGY | Facility: CLINIC | Age: 87
End: 2022-04-13
Payer: MEDICARE

## 2022-04-13 VITALS
WEIGHT: 82.88 LBS | SYSTOLIC BLOOD PRESSURE: 178 MMHG | DIASTOLIC BLOOD PRESSURE: 59 MMHG | HEART RATE: 67 BPM | HEIGHT: 63 IN | BODY MASS INDEX: 14.68 KG/M2

## 2022-04-13 DIAGNOSIS — Z51.81 ENCOUNTER FOR MONITORING LONG-TERM PROTON PUMP INHIBITOR THERAPY: ICD-10-CM

## 2022-04-13 DIAGNOSIS — Z79.899 ENCOUNTER FOR MONITORING LONG-TERM PROTON PUMP INHIBITOR THERAPY: ICD-10-CM

## 2022-04-13 DIAGNOSIS — R13.10 DYSPHAGIA, UNSPECIFIED TYPE: Primary | ICD-10-CM

## 2022-04-13 LAB
25(OH)D3+25(OH)D2 SERPL-MCNC: 41 NG/ML (ref 30–96)
MAGNESIUM SERPL-MCNC: 2.1 MG/DL (ref 1.6–2.6)
VIT B12 SERPL-MCNC: 480 PG/ML (ref 210–950)

## 2022-04-13 PROCEDURE — 1159F PR MEDICATION LIST DOCUMENTED IN MEDICAL RECORD: ICD-10-PCS | Mod: CPTII,S$GLB,, | Performed by: FAMILY MEDICINE

## 2022-04-13 PROCEDURE — 83735 ASSAY OF MAGNESIUM: CPT | Performed by: FAMILY MEDICINE

## 2022-04-13 PROCEDURE — 99214 OFFICE O/P EST MOD 30 MIN: CPT | Mod: S$GLB,,, | Performed by: FAMILY MEDICINE

## 2022-04-13 PROCEDURE — 3288F FALL RISK ASSESSMENT DOCD: CPT | Mod: CPTII,S$GLB,, | Performed by: FAMILY MEDICINE

## 2022-04-13 PROCEDURE — 82607 VITAMIN B-12: CPT | Performed by: FAMILY MEDICINE

## 2022-04-13 PROCEDURE — 1160F PR REVIEW ALL MEDS BY PRESCRIBER/CLIN PHARMACIST DOCUMENTED: ICD-10-PCS | Mod: CPTII,S$GLB,, | Performed by: FAMILY MEDICINE

## 2022-04-13 PROCEDURE — 3288F PR FALLS RISK ASSESSMENT DOCUMENTED: ICD-10-PCS | Mod: CPTII,S$GLB,, | Performed by: FAMILY MEDICINE

## 2022-04-13 PROCEDURE — 1159F MED LIST DOCD IN RCRD: CPT | Mod: CPTII,S$GLB,, | Performed by: FAMILY MEDICINE

## 2022-04-13 PROCEDURE — 99999 PR PBB SHADOW E&M-EST. PATIENT-LVL IV: ICD-10-PCS | Mod: PBBFAC,,, | Performed by: FAMILY MEDICINE

## 2022-04-13 PROCEDURE — 1101F PT FALLS ASSESS-DOCD LE1/YR: CPT | Mod: CPTII,S$GLB,, | Performed by: FAMILY MEDICINE

## 2022-04-13 PROCEDURE — 36415 COLL VENOUS BLD VENIPUNCTURE: CPT | Performed by: FAMILY MEDICINE

## 2022-04-13 PROCEDURE — 1101F PR PT FALLS ASSESS DOC 0-1 FALLS W/OUT INJ PAST YR: ICD-10-PCS | Mod: CPTII,S$GLB,, | Performed by: FAMILY MEDICINE

## 2022-04-13 PROCEDURE — 99999 PR PBB SHADOW E&M-EST. PATIENT-LVL IV: CPT | Mod: PBBFAC,,, | Performed by: FAMILY MEDICINE

## 2022-04-13 PROCEDURE — 82306 VITAMIN D 25 HYDROXY: CPT | Performed by: FAMILY MEDICINE

## 2022-04-13 PROCEDURE — 1160F RVW MEDS BY RX/DR IN RCRD: CPT | Mod: CPTII,S$GLB,, | Performed by: FAMILY MEDICINE

## 2022-04-13 PROCEDURE — 1126F AMNT PAIN NOTED NONE PRSNT: CPT | Mod: CPTII,S$GLB,, | Performed by: FAMILY MEDICINE

## 2022-04-13 PROCEDURE — 1126F PR PAIN SEVERITY QUANTIFIED, NO PAIN PRESENT: ICD-10-PCS | Mod: CPTII,S$GLB,, | Performed by: FAMILY MEDICINE

## 2022-04-13 PROCEDURE — 99214 PR OFFICE/OUTPT VISIT, EST, LEVL IV, 30-39 MIN: ICD-10-PCS | Mod: S$GLB,,, | Performed by: FAMILY MEDICINE

## 2022-04-13 NOTE — PATIENT INSTRUCTIONS
OK to switch to metamucil (capsules OR powder) once daily  - if you have bowel troubles when you stop taking pericolace, OK to resume taking this    2. Start nexium 40 mg once daily in the morning for two weeks  - after two weeks, take it every OTHER day for 1 week  - after the third week, can go back to taking as needed (about twice weekly)    Week 1: every day (Su, M, Tu, Wed, Th, F, Sa)  [place] 2: every day (Su, M, Tu, Wed, Th, F, Sa)  Week 3: every other day (M, Wed, Fri)  Week 4: back to normal schedule, taking as needed    3. Labs for long-term nexium use    **Consider swallowing xray if your symptoms worsen

## 2022-04-14 ENCOUNTER — TELEPHONE (OUTPATIENT)
Dept: ENDOSCOPY | Facility: HOSPITAL | Age: 87
End: 2022-04-14
Payer: MEDICARE

## 2022-04-14 NOTE — TELEPHONE ENCOUNTER
----- Message from Elina Minor DNP sent at 4/13/2022  7:53 PM CDT -----  All vitamin and mineral levels are within normal limits.

## 2022-04-17 NOTE — PROGRESS NOTES
[facility] Consultation Note    Reason for Consult:  The primary encounter diagnosis was Dysphagia, unspecified type. A diagnosis of Encounter for monitoring long-term proton pump inhibitor therapy was also pertinent to this visit.    PCP:   Shasta Urbina       Referring MD:  No referring provider defined for this encounter.    Progress Note 4/13/2022:  This is a 86 y.o. female here for follow-up of dysphagia.  She is an established patient, new to me.  Here accompanied by a friend who drove her to her appt.    States she feels like certain foods stick in her chest when she swallows.  Notes non-toasted bread stick, however, when she toasts bread, she typically has no issue.  Also notes sometimes peanut butter on crunchy crackers give her troubles.  Taking Nexium 40 mg as needed, typically twice per week.  She does feel she has increased phlegm production when eating certain foods.  She denies odynophagia, regurgitation.  No chest pain, cough, SOB, abd pain, weight loss, fevers, melena, BRBPR.  Currently taking 2 pericolace daily. Expresses concern over this and asking if there are alternatives.         Prior visit Note w/ JOHANN Han, on 8/1/2019:  This is a 84 y.o. female here for f/u evaluation of dysphagia and GERD  Interval history  Currently doing well on Nexium 20 mg 3 times a week   Occasional breakthrough reflux  She says that she feels bad after taking it though  Tries to avoid acidic fooods  Intermittent dysphagia of solids  Recent episode was after eating Triskets and peanut butter  Recently had modified barium swallow study, results are pending     She reports good relief of her dysphagia after her February 2018 esophageal dilation     Still Spitting up mucus, possibly with more dairy intake     Her recent bone density scan revealed osteoporosis      ROS:  Constitutional: No fevers, chills, No weight loss  CV: No chest pain  Pulm: No cough, No shortness of breath  GI: see  HPI  Derm: No rash  Neuro: No syncope, No seizure      Medical History:  has a past medical history of Anemia, Arthritis (2015), Cataract, Coronary artery disease, GERD (gastroesophageal reflux disease), Glaucoma (increased eye pressure), Hyperlipidemia, Hypertension, Lactose intolerance, Legally blind in right eye, as defined in [place], Osteoporosis, post-menopausal, PAD (peripheral artery disease) (7/11/2018), Pneumonia (12/13/2015), Proximal muscle weakness, Renal cyst, and Vitamin D deficiency disease.    Surgical History:  has a past surgical history that includes Tonsillectomy; Tear duct surgery; Breast biopsy; Cardiac catheterization (01/14/2010); EDTA CHELATION (Left, 2/14); Adenoidectomy; Femur fracture surgery (Left, 05/09/2016); YAG CAPSULOTOMY (Left, 06/22/2017); Upper gastrointestinal endoscopy; Colonoscopy (N/A, 2/6/2018); Eye surgery; Fracture surgery (2016); Trabeculectomy (Left, 05/22/2013); and Cataract extraction w/  intraocular lens implant (Left, 05/22/2013).    Family History: family history includes Alzheimer's disease in her father; Arthritis in her sister; Asthma in her sister; Colon cancer (age of onset: 44) in her other; Diabetes in her brother and daughter; Glaucoma in her mother; Heart disease in her brother and mother; Hyperlipidemia in her brother; Hypertension in her brother, daughter, father, and son; Osteoporosis in her mother, sister, and sister; Peripheral vascular disease in her brother and father; Rheum arthritis in her brother and sister; Stroke in her brother..     Social History:  reports that she has never smoked. She has never used smokeless tobacco. She reports that she does not drink alcohol and does not use drugs.    Review of patient's allergies indicates:   Allergen Reactions    Strawberries [strawberry]      Blood pressure elevation    Chocolate flavor      Causes acid reflux    Atorvastatin      Myalgias    Pcn [penicillins] Rash    Pravastatin      Myalgias     Sulfa (sulfonamide antibiotics) Itching       Current Outpatient Medications on File Prior to Visit   Medication Sig Dispense Refill    amLODIPine (NORVASC) 5 MG tablet Take 1 tablet (5 mg total) by mouth once daily. 30 tablet 11    aspirin 81 mg Tab Take 81 mg by mouth every evening. 1 Tablet Oral Every day      bimatoprost (LUMIGAN) 0.01 % Drop Place 1 drop into both eyes every evening. Can use every evening at about 6 pm 7.5 mL 3    brimonidine 0.15 % OPTH DROP (ALPHAGAN) 0.15 % ophthalmic solution Place 1 drop into the right eye 3 (three) times daily. 1 Bottle 6    calcium carbonate (OS-KACI) 600 mg (1,500 mg) Tab Take 600 mg by mouth 2 (two) times daily with meals.      chlorthalidone (HYGROTEN) 25 MG Tab TAKE 1 TABLET BY MOUTH EVERY DAY 90 tablet 0    ergocalciferol (ERGOCALCIFEROL) 50,000 unit Cap TAKE 1 CAPSULE (50,000 UNITS TOTAL) BY MOUTH EVERY 30 DAYS. 3 capsule 3    esomeprazole (NEXIUM) 40 MG capsule TAKE 1 CAPSULE (40 MG TOTAL) BY MOUTH DAILY AS NEEDED. 90 capsule 2    ferrous sulfate 325 (65 FE) MG EC tablet Take 1 tablet (325 mg total) by mouth once daily. 30 tablet 0    FLUZONE HIGHDOSE QUAD 20-21  mcg/0.7 mL Syrg       hydrALAZINE (APRESOLINE) 25 MG tablet Take 1 tablet (25 mg total) by mouth daily as needed (Blood pressure >160). 90 tablet 11    multivitamin (THERAGRAN) per tablet Take 1 tablet by mouth once daily.      olmesartan (BENICAR) 40 MG tablet TAKE 1 TABLET BY MOUTH EVERY DAY 90 tablet 0    pilocarpine HCL 4% (PILOCAR) 4 % ophthalmic solution Place 1 drop into the right eye 3 (three) times daily. 15 mL 6    rosuvastatin (CRESTOR) 10 MG tablet TAKE 1 TABLET BY MOUTH EVERY DAY 90 tablet 3    senna-docusate 8.6-50 mg (PERICOLACE) 8.6-50 mg per tablet Take 1 tablet by mouth 2 (two) times daily.      timolol maleate 0.5% (TIMOPTIC) 0.5 % Drop Place 1 drop into both eyes every morning. 15 mL 3     No current facility-administered medications on file prior to visit.  "        Objective Findings:    Vital Signs:  BP (!) 178/59   Pulse 67   Ht 5' 3" (1.6 m)   Wt 37.6 kg (82 lb 14.3 oz)   LMP 11/10/1987 (Approximate)   BMI 14.68 kg/m²   Body mass index is 14.68 kg/m².    Physical Exam: conducted from wheelchair  General Appearance: Well appearing in no acute distress, thin, elderly female  Head:   Normocephalic, without obvious abnormality  Eyes:    No scleral icterus  Lungs: CTA bilaterally in anterior and posterior fields, no wheezes, no crackles.  Heart:  Regular rate and rhythm, S1, S2 normal, no murmurs heard  Abdomen: Soft, non tender, non distended with positive bowel sounds in all four quadrants  Skin: No rash noted to exposed skin areas  Neurologic: AAO x 3      Labs:  Lab Results   Component Value Date    WBC 4.32 12/21/2020    HGB 10.6 (L) 12/21/2020    HCT 33 (L) 06/01/2021     12/21/2020    CHOL 198 03/12/2021    TRIG 65 03/12/2021    HDL 77 (H) 03/12/2021    ALT 27 09/15/2021    AST 28 09/15/2021     09/15/2021    K 4.0 09/15/2021     09/15/2021    CREATININE 0.7 09/15/2021    BUN 21 09/15/2021    CO2 27 09/15/2021    TSH 1.142 09/04/2019    INR 1.0 05/23/2016       Imaging reviewed:  8/2/2019 FL Modified barium swallow study with speech therapy   General Recommendations:    1. Follow up with GI due to presence of cricopharyngeal impression during the swallow.  2. Outpatient speech pathology for remediation of pharyngo esophageal dysphagia, initiation of Shaker exercise program to dcr effects of the CP bar/impression.  Impressions:  · Oral phase of swallow is judged to be WFL for lip seal, ability to form a cohesive bolus and a-p transport  · Mild pharyngo esophageal dysphagia noted  · Delayed in trigger of the swallow reflex consistent with age  · There was presence of a large cricopharyngeal bar/impression during the swallow.     10/26/2016 Fl Esophagram Complete  Please note that the patient was only able to take small swallows during the " examination. The oral and pharyngeal stages of swallowing are normal without evidence of laryngeal penetration or aspiration. No pharyngeal mass.      The esophagus is normal in contour and caliber without evidence of masses or strictures. Normal esophageal motility is noted. Spontaneous gastroesophageal reflux was noted during this exam.  No hiatal hernia demonstrated.  The gastric cardia is unremarkable in appearance.    Endoscopy reviewed:  2/6/2018 EGD  - Small hiatal hernia.   - Mild Schatzki ring. Dilated.   - Gastric mucosal atrophy. Biopsied.   - Normal examined duodenum.     2/6/2018 Colonoscopy for STEPHANIE:  - Tortuous colon.   - The examination was otherwise normal.   - No specimens collected.       86 y.o. female here for evaluation of:    Assessment:  1. Dysphagia, unspecified type    2. Encounter for monitoring long-term proton pump inhibitor therapy         Reports similar symptoms from prior visits - occasionally experiences sensation that certain foods stick, increased phlegm products.  No regurgitation. No CP, SOB, no fevers, no weight loss.    We discussed increasing Nexium to once daily for 2 weeks and then taper down to PRN again. Also discussed OK to switch to metamucil capsules 1-2 times daily for her bowel habits, but OK to resume pericolace if she finds she begins having constipation.  Labs for long-term PPI use.  Could consider repeat esophagram with barium tablet if swallowing symptoms persist.  It was also recommended that she be referred for OP speech therapy after her MBSS so could also consider this.    Recommendations:  1. Nexium daily then taper (schedule in AVS)  2. PPI labs  3. OK to use Metamucil instead of pericolace  4. Consider repeat Esophagram with tablet and/or speech therapy if symptoms continue to be bothersome.    Follow up if symptoms worsen or fail to improve.      Order summary:  Orders Placed This Encounter    Vitamin D    Vitamin B12    Magnesium         Thank you so  much for allowing me to participate in the care of WESLY Armstrong-C

## 2022-04-21 ENCOUNTER — INFUSION (OUTPATIENT)
Dept: INFECTIOUS DISEASES | Facility: HOSPITAL | Age: 87
End: 2022-04-21
Attending: INTERNAL MEDICINE
Payer: MEDICARE

## 2022-04-21 VITALS
DIASTOLIC BLOOD PRESSURE: 71 MMHG | BODY MASS INDEX: 15.15 KG/M2 | HEART RATE: 85 BPM | OXYGEN SATURATION: 98 % | TEMPERATURE: 98 F | SYSTOLIC BLOOD PRESSURE: 166 MMHG | WEIGHT: 85.56 LBS

## 2022-04-21 DIAGNOSIS — M81.0 OSTEOPOROSIS, POSTMENOPAUSAL: Primary | ICD-10-CM

## 2022-04-21 PROCEDURE — 63600175 PHARM REV CODE 636 W HCPCS: Mod: JG | Performed by: NURSE PRACTITIONER

## 2022-04-21 PROCEDURE — 96372 THER/PROPH/DIAG INJ SC/IM: CPT

## 2022-04-21 RX ADMIN — DENOSUMAB 60 MG: 60 INJECTION SUBCUTANEOUS at 11:04

## 2022-04-21 NOTE — PROGRESS NOTES
Arrived for Prolia injection, given via RUQ abd, tolerated well.  Left via w/c with family member at side in NAD. Next appt scheduled.    Presently taking Vit D3 and calcium

## 2022-04-25 ENCOUNTER — TELEPHONE (OUTPATIENT)
Dept: ENDOCRINOLOGY | Facility: CLINIC | Age: 87
End: 2022-04-25
Payer: MEDICARE

## 2022-06-01 ENCOUNTER — PES CALL (OUTPATIENT)
Dept: ADMINISTRATIVE | Facility: CLINIC | Age: 87
End: 2022-06-01
Payer: MEDICARE

## 2022-06-07 ENCOUNTER — OFFICE VISIT (OUTPATIENT)
Dept: OPHTHALMOLOGY | Facility: CLINIC | Age: 87
End: 2022-06-07
Payer: MEDICARE

## 2022-06-07 DIAGNOSIS — H18.421 BAND KERATOPATHY, RIGHT: ICD-10-CM

## 2022-06-07 DIAGNOSIS — H35.031 BLIND HYPERTENSIVE RIGHT EYE: ICD-10-CM

## 2022-06-07 DIAGNOSIS — H40.89 GLAUCOMA DUE TO COMBINATION OF MECHANISMS: Primary | ICD-10-CM

## 2022-06-07 DIAGNOSIS — H21.541 POSTERIOR SYNECHIAE, RIGHT: ICD-10-CM

## 2022-06-07 DIAGNOSIS — H34.8112 CENTRAL RETINAL VEIN OCCLUSION OF RIGHT EYE, UNSPECIFIED COMPLICATION STATUS: ICD-10-CM

## 2022-06-07 DIAGNOSIS — H21.561 AFFERENT PUPILLARY DEFECT, RIGHT: ICD-10-CM

## 2022-06-07 DIAGNOSIS — Z98.83 GLAUCOMA FILTERING BLEB OF LEFT EYE: ICD-10-CM

## 2022-06-07 DIAGNOSIS — H25.11 SENILE NUCLEAR SCLEROSIS, RIGHT: ICD-10-CM

## 2022-06-07 DIAGNOSIS — Z96.1 PSEUDOPHAKIA OF LEFT EYE: ICD-10-CM

## 2022-06-07 PROCEDURE — 99999 PR PBB SHADOW E&M-EST. PATIENT-LVL III: CPT | Mod: PBBFAC,,, | Performed by: OPHTHALMOLOGY

## 2022-06-07 PROCEDURE — 92012 PR EYE EXAM, EST PATIENT,INTERMED: ICD-10-PCS | Mod: S$GLB,,, | Performed by: OPHTHALMOLOGY

## 2022-06-07 PROCEDURE — 92012 INTRM OPH EXAM EST PATIENT: CPT | Mod: S$GLB,,, | Performed by: OPHTHALMOLOGY

## 2022-06-07 PROCEDURE — 1159F PR MEDICATION LIST DOCUMENTED IN MEDICAL RECORD: ICD-10-PCS | Mod: CPTII,S$GLB,, | Performed by: OPHTHALMOLOGY

## 2022-06-07 PROCEDURE — 1160F PR REVIEW ALL MEDS BY PRESCRIBER/CLIN PHARMACIST DOCUMENTED: ICD-10-PCS | Mod: CPTII,S$GLB,, | Performed by: OPHTHALMOLOGY

## 2022-06-07 PROCEDURE — 1126F AMNT PAIN NOTED NONE PRSNT: CPT | Mod: CPTII,S$GLB,, | Performed by: OPHTHALMOLOGY

## 2022-06-07 PROCEDURE — 1126F PR PAIN SEVERITY QUANTIFIED, NO PAIN PRESENT: ICD-10-PCS | Mod: CPTII,S$GLB,, | Performed by: OPHTHALMOLOGY

## 2022-06-07 PROCEDURE — 1159F MED LIST DOCD IN RCRD: CPT | Mod: CPTII,S$GLB,, | Performed by: OPHTHALMOLOGY

## 2022-06-07 PROCEDURE — 3288F PR FALLS RISK ASSESSMENT DOCUMENTED: ICD-10-PCS | Mod: CPTII,S$GLB,, | Performed by: OPHTHALMOLOGY

## 2022-06-07 PROCEDURE — 3288F FALL RISK ASSESSMENT DOCD: CPT | Mod: CPTII,S$GLB,, | Performed by: OPHTHALMOLOGY

## 2022-06-07 PROCEDURE — 1101F PR PT FALLS ASSESS DOC 0-1 FALLS W/OUT INJ PAST YR: ICD-10-PCS | Mod: CPTII,S$GLB,, | Performed by: OPHTHALMOLOGY

## 2022-06-07 PROCEDURE — 1101F PT FALLS ASSESS-DOCD LE1/YR: CPT | Mod: CPTII,S$GLB,, | Performed by: OPHTHALMOLOGY

## 2022-06-07 PROCEDURE — 99999 PR PBB SHADOW E&M-EST. PATIENT-LVL III: ICD-10-PCS | Mod: PBBFAC,,, | Performed by: OPHTHALMOLOGY

## 2022-06-07 PROCEDURE — 1160F RVW MEDS BY RX/DR IN RCRD: CPT | Mod: CPTII,S$GLB,, | Performed by: OPHTHALMOLOGY

## 2022-06-07 RX ORDER — BRIMONIDINE TARTRATE 1 MG/ML
1 SOLUTION/ DROPS OPHTHALMIC 3 TIMES DAILY
Qty: 15 ML | Refills: 4 | Status: SHIPPED | OUTPATIENT
Start: 2022-06-07 | End: 2024-02-26

## 2022-06-07 RX ORDER — PILOCARPINE HYDROCHLORIDE 40 MG/ML
1 SOLUTION/ DROPS OPHTHALMIC 3 TIMES DAILY
Qty: 15 ML | Refills: 4 | Status: SHIPPED | OUTPATIENT
Start: 2022-06-07

## 2022-06-07 NOTE — PROGRESS NOTES
"HPI     DLS: 1/18/2022    Pt here for 4 Month Check;  PT states she needs refills on her Camacho and Alphagan eye drops.     Meds:  T1/2 GFS QAM OU   Alphagan 0.15% TID OD   Camacho 4% TID OD   Lumugan QHS OS    AT's 3-4 x day OU     1) Residula OAG / MMG OU   2) Blind Hypertensive OD   3) NS OD   4) FABY   5) APD OD   6) B and Keratopathy OD   7) Asteroid Hyalosis OS   8) Hx Acute Dacryocystitis OS   9) CRVO OD   10) Ant. Capsule Phimosis OS   11) PCIOL O S   12) s/p trab w/ express mini shunt  os     Last edited by Sohail Cotton MD on 6/7/2022  4:16 PM. (History)            Assessment /Plan     For exam results, see Encounter Report.    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   Angle open after PI's ou   -Followed at [facility] since at least '01   First HVF 2001   First photos 2001   S/p PI OU   s/p Gonioplasty OU     Family history none   Glaucoma meds Lumigan, timolol gfs  qAM, Agan tid, Camacho tid  - all OD ((off all gtts os s/p combined)   H/O adverse rxn to glaucoma drops Azopt "felt weird"   LASERS S/p PI OU, s/p Gonioplasty OU   GLAUCOMA SURGERIES    combined phaco/trab with mini shunt OS 5/22/2013   OTHER EYE SURGERIES    S/P DCR sx OD x 2 or 3 with Damaris    Combined phaco/IOL/ trab with mini shunt OS 5/22/2013   CDR 0.9 w/ shunt vessels  /0.9   Tbase 42/23   Tmax 42/23 (when stopped gtts)   Ttarget pain control od // 18 os   HVF 20  VF '01 -'22 - Ext   od  // SALT / IAD (gen dep) os   Gonio +3/+3   /539   OCT 11 test 2006 to 2021 - RNFL - OD:poor test  // OS:dec Dec thru out expect NS   HRT 14 test 2004 to " 2018 - MR -  Dec. S/I od // dec. S/N/I os /// CDR 0.66 od // 0.83 os   HRT 2019 - high std dev. Ou (( NO MORE HRT'S - unreliable))   Disc photos - 2001, 2003, 2006 - slides // 2008, 2010, 2016, 2018, 2021    - OIS     Ta today 26 (occasional ache) on mult gtts od - pain free // 11 on lumigan/timolol os post phaco/trab - 2013   Test today - IOP // Gonio    2.  Blind hypertensive Right Eye   Pain free - eye comfortable   Very limited vision CF at 3'   End stage glaucoma and S/P CRVO    No rubeosis   IOP is high but eye is pain free    3. Cataract OD -However, OD VA limited 2/2 CRVO and end stge glaucoma   S/P phaco/IOL/trab OS 5/22/2013    4. FABY OU -cont ATs     5. APD OD -   Old / stable 2/2 CRVO and glaucoma    6. Band Keratopathy OD -stable. Cont ATs- being followed by Dr. Saul;    had EDTA chelation on 2/13/14 OS - now with recurrent band     7. Asteroid Hyalosis OS -stable     8. Hx of Acute Dacryocystitis OS and recent NLD OD s/p surgery 11/12 and repeat for exposure w/ Dr. Ocampo 12/5/12 -stable. Cont f/u with Damaris     9. Hx of CRVO OD -limiting visual potential OD   -No NV on todays exam     10. Ant capsule phimosis os    S/P yag laser     11. Asteroid hyalosis OS     12. PC IOL os    Combined  W/ mini shunt 5/22/2013   doing well VA is 20/25 and the IOP is 13    Plan:    s/p combined (5/22/2013)  --    IOP creeping up 9/21/2021 - added back timolol q am and lumigan q evening     Cont glaucoma gtts OD -- IOP  high, but comfortable/no pain // Blind hypertensive but pain free eye   Old  crvo     Ok to use OTC readers for now a +3.00 to +3.50 - not really able to improve distance vision with glasses at this time  Much improved from before the combined  Procedure    Cont all glaucoma gtts od   Blind hypertensive right eye - pain free     IOP creeping up os - added back timolol q am and lumigan q evening - 9/21/2021   Good resp os 18--> 14     Photo file updated 1/18/2022    Benny - Matthieu -  - refer to  [facility] - to see if hand held magnifier might help     F/U 4 months with IOP

## 2022-06-13 ENCOUNTER — OFFICE VISIT (OUTPATIENT)
Dept: CARDIOLOGY | Facility: CLINIC | Age: 87
End: 2022-06-13
Payer: MEDICARE

## 2022-06-13 VITALS
WEIGHT: 78.69 LBS | HEART RATE: 89 BPM | DIASTOLIC BLOOD PRESSURE: 66 MMHG | BODY MASS INDEX: 13.94 KG/M2 | HEIGHT: 63 IN | OXYGEN SATURATION: 97 % | SYSTOLIC BLOOD PRESSURE: 146 MMHG

## 2022-06-13 DIAGNOSIS — E78.5 DYSLIPIDEMIA: Chronic | ICD-10-CM

## 2022-06-13 DIAGNOSIS — I10 ESSENTIAL HYPERTENSION: Chronic | ICD-10-CM

## 2022-06-13 DIAGNOSIS — I70.0 AORTIC ATHEROSCLEROSIS: ICD-10-CM

## 2022-06-13 DIAGNOSIS — R64 CACHECTIC: ICD-10-CM

## 2022-06-13 DIAGNOSIS — I65.23 BILATERAL CAROTID ARTERY STENOSIS: ICD-10-CM

## 2022-06-13 DIAGNOSIS — R09.89 LEFT CAROTID BRUIT: ICD-10-CM

## 2022-06-13 DIAGNOSIS — I73.9 PAD (PERIPHERAL ARTERY DISEASE): ICD-10-CM

## 2022-06-13 DIAGNOSIS — I25.10 CORONARY ARTERY DISEASE INVOLVING NATIVE CORONARY ARTERY OF NATIVE HEART WITHOUT ANGINA PECTORIS: Primary | ICD-10-CM

## 2022-06-13 PROCEDURE — 1126F AMNT PAIN NOTED NONE PRSNT: CPT | Mod: CPTII,S$GLB,, | Performed by: NURSE PRACTITIONER

## 2022-06-13 PROCEDURE — 1159F PR MEDICATION LIST DOCUMENTED IN MEDICAL RECORD: ICD-10-PCS | Mod: CPTII,S$GLB,, | Performed by: NURSE PRACTITIONER

## 2022-06-13 PROCEDURE — 1160F PR REVIEW ALL MEDS BY PRESCRIBER/CLIN PHARMACIST DOCUMENTED: ICD-10-PCS | Mod: CPTII,S$GLB,, | Performed by: NURSE PRACTITIONER

## 2022-06-13 PROCEDURE — 1101F PR PT FALLS ASSESS DOC 0-1 FALLS W/OUT INJ PAST YR: ICD-10-PCS | Mod: CPTII,S$GLB,, | Performed by: NURSE PRACTITIONER

## 2022-06-13 PROCEDURE — 99214 PR OFFICE/OUTPT VISIT, EST, LEVL IV, 30-39 MIN: ICD-10-PCS | Mod: S$GLB,,, | Performed by: NURSE PRACTITIONER

## 2022-06-13 PROCEDURE — 99999 PR PBB SHADOW E&M-EST. PATIENT-LVL V: ICD-10-PCS | Mod: PBBFAC,,, | Performed by: NURSE PRACTITIONER

## 2022-06-13 PROCEDURE — 3288F PR FALLS RISK ASSESSMENT DOCUMENTED: ICD-10-PCS | Mod: CPTII,S$GLB,, | Performed by: NURSE PRACTITIONER

## 2022-06-13 PROCEDURE — 1126F PR PAIN SEVERITY QUANTIFIED, NO PAIN PRESENT: ICD-10-PCS | Mod: CPTII,S$GLB,, | Performed by: NURSE PRACTITIONER

## 2022-06-13 PROCEDURE — 99999 PR PBB SHADOW E&M-EST. PATIENT-LVL V: CPT | Mod: PBBFAC,,, | Performed by: NURSE PRACTITIONER

## 2022-06-13 PROCEDURE — 99214 OFFICE O/P EST MOD 30 MIN: CPT | Mod: S$GLB,,, | Performed by: NURSE PRACTITIONER

## 2022-06-13 PROCEDURE — 99499 RISK ADDL DX/OHS AUDIT: ICD-10-PCS | Mod: S$GLB,,, | Performed by: NURSE PRACTITIONER

## 2022-06-13 PROCEDURE — 3288F FALL RISK ASSESSMENT DOCD: CPT | Mod: CPTII,S$GLB,, | Performed by: NURSE PRACTITIONER

## 2022-06-13 PROCEDURE — 1160F RVW MEDS BY RX/DR IN RCRD: CPT | Mod: CPTII,S$GLB,, | Performed by: NURSE PRACTITIONER

## 2022-06-13 PROCEDURE — 1101F PT FALLS ASSESS-DOCD LE1/YR: CPT | Mod: CPTII,S$GLB,, | Performed by: NURSE PRACTITIONER

## 2022-06-13 PROCEDURE — 1159F MED LIST DOCD IN RCRD: CPT | Mod: CPTII,S$GLB,, | Performed by: NURSE PRACTITIONER

## 2022-06-13 PROCEDURE — 99499 UNLISTED E&M SERVICE: CPT | Mod: S$GLB,,, | Performed by: NURSE PRACTITIONER

## 2022-06-13 NOTE — PROGRESS NOTES
Ms. Summers was last seen in [facility] Cardiology Visit 11/22/21.      Subjective:   Patient ID:  Mj Summers is a 86 y.o. female who presents for follow-up of Follow-up (6 month f/u)      Problems:  Coronary artery disease ([facility] 2010 mid LAD 40%)   mild bilateral carotid disease (20-39% bilateral in 2015 )   hyperlipidemia   hypertension   pulmonary emphysema.    HPI  Ms. Summers is in clinic today for routine follow up.  Her home BP runs 120s.  Patient denies chest pain with exertion or at rest, palpitations, SOB, YOUNGBLOOD, dizziness, syncope, edema, orthopnea, PND, or claudication.  Reports no routine exercise.  She takes care of herself performing her own ADLs.     Review of Systems   Constitutional: Negative for decreased appetite, diaphoresis, malaise/fatigue, weight gain and weight loss.   Eyes: Negative for visual disturbance.   Cardiovascular: Negative for chest pain, claudication, dyspnea on exertion, irregular heartbeat, leg swelling, near-syncope, orthopnea, palpitations, paroxysmal nocturnal dyspnea and syncope.        Denies chest pressure   Respiratory: Negative for cough, hemoptysis, shortness of breath, sleep disturbances due to breathing and snoring.    Endocrine: Negative for cold intolerance and heat intolerance.   Hematologic/Lymphatic: Negative for bleeding problem. Does not bruise/bleed easily.   Musculoskeletal: Negative for myalgias.   Gastrointestinal: Negative for bloating, abdominal pain, anorexia, change in bowel habit, constipation, diarrhea, nausea and vomiting.   Neurological: Negative for difficulty with concentration, disturbances in coordination, excessive daytime sleepiness, dizziness, headaches, light-headedness, loss of balance, numbness and weakness.   Psychiatric/Behavioral: The patient does not have insomnia.        Allergies and current medications updated and reviewed:  Review of patient's allergies indicates:   Allergen Reactions    Strawberries [strawberry]      Blood  pressure elevation    Chocolate flavor      Causes acid reflux    Atorvastatin      Myalgias    Pcn [penicillins] Rash    Pravastatin      Myalgias    Sulfa (sulfonamide antibiotics) Itching     Current Outpatient Medications   Medication Sig    amLODIPine (NORVASC) 5 MG tablet Take 1 tablet (5 mg total) by mouth once daily.    aspirin 81 mg Tab Take 81 mg by mouth every evening. 1 Tablet Oral Every day    bimatoprost (LUMIGAN) 0.01 % Drop Place 1 drop into both eyes every evening. Can use every evening at about 6 pm    brimonidine 0.1% (ALPHAGAN P) 0.1 % Drop Place 1 drop into the right eye 3 (three) times daily.    calcium carbonate (OS-KACI) 600 mg (1,500 mg) Tab Take 600 mg by mouth 2 (two) times daily with meals.    chlorthalidone (HYGROTEN) 25 MG Tab TAKE 1 TABLET BY MOUTH EVERY DAY    ergocalciferol (ERGOCALCIFEROL) 50,000 unit Cap TAKE 1 CAPSULE (50,000 UNITS TOTAL) BY MOUTH EVERY 30 DAYS.    esomeprazole (NEXIUM) 40 MG capsule TAKE 1 CAPSULE (40 MG TOTAL) BY MOUTH DAILY AS NEEDED.    ferrous sulfate 325 (65 FE) MG EC tablet Take 1 tablet (325 mg total) by mouth once daily.    hydrALAZINE (APRESOLINE) 25 MG tablet Take 1 tablet (25 mg total) by mouth daily as needed (Blood pressure >160).    multivitamin (THERAGRAN) per tablet Take 1 tablet by mouth once daily.    olmesartan (BENICAR) 40 MG tablet TAKE 1 TABLET BY MOUTH EVERY DAY    pilocarpine HCL 4% (PILOCAR) 4 % ophthalmic solution Place 1 drop into the right eye 3 (three) times daily.    rosuvastatin (CRESTOR) 10 MG tablet TAKE 1 TABLET BY MOUTH EVERY DAY    senna-docusate 8.6-50 mg (PERICOLACE) 8.6-50 mg per tablet Take 1 tablet by mouth 2 (two) times daily.    timolol maleate 0.5% (TIMOPTIC) 0.5 % Drop Place 1 drop into both eyes every morning.    FLUZONE HIGHDOSE QUAD 20-21  mcg/0.7 mL Syrg      No current facility-administered medications for this visit.       Objective:     Right Arm BP - Sittin/66 (22  "1439)  Left Arm BP - Sittin/66 (22 1439)    BP (!) 146/66 (BP Location: Left arm, Patient Position: Sitting, BP Method: Pediatric (Automatic))   Pulse 89   Ht 5' 3" (1.6 m)   Wt 35.7 kg (78 lb 11.3 oz)   LMP 11/10/1987 (Approximate)   SpO2 97%   BMI 13.94 kg/m²       Physical Exam  Vitals and nursing note reviewed.   Constitutional:       General: She is not in acute distress.     Appearance: Normal appearance. She is well-developed. She is not diaphoretic.   HENT:      Head: Normocephalic and atraumatic.   Eyes:      General: Lids are normal. No scleral icterus.     Conjunctiva/sclera: Conjunctivae normal.   Neck:      Vascular: Normal carotid pulses. Carotid bruit (left) present. No hepatojugular reflux or JVD.   Cardiovascular:      Rate and Rhythm: Normal rate and regular rhythm.      Chest Wall: PMI is not displaced.      Pulses: Intact distal pulses.           Carotid pulses are 2+ on the right side and 2+ on the left side.       Radial pulses are 2+ on the right side and 2+ on the left side.        Dorsalis pedis pulses are 2+ on the right side and 2+ on the left side.        Posterior tibial pulses are 1+ on the right side and 1+ on the left side.      Heart sounds: S1 normal and S2 normal. No murmur heard.    No friction rub. No gallop.   Pulmonary:      Effort: Pulmonary effort is normal. No respiratory distress.      Breath sounds: Normal breath sounds. No decreased breath sounds, wheezing, rhonchi or rales.   Chest:      Chest wall: No tenderness.   Abdominal:      General: Bowel sounds are normal. There is no distension or abdominal bruit.      Palpations: Abdomen is soft. There is no fluid wave or pulsatile mass.      Tenderness: There is no abdominal tenderness.   Musculoskeletal:         General: Swelling (BLE +1 pitting lower 1/3 calves) present.      Cervical back: Neck supple.   Skin:     General: Skin is warm and dry.      Findings: No rash.      Nails: There is no clubbing. "   Neurological:      Mental Status: She is alert and oriented to person, place, and time.      Gait: Gait normal.   Psychiatric:         Speech: Speech normal.         Behavior: Behavior normal.         Thought Content: Thought content normal.         Judgment: Judgment normal.         Chemistry        Component Value Date/Time     06/24/2022 1006    K 3.6 06/24/2022 1006     06/24/2022 1006    CO2 28 06/24/2022 1006    BUN 15 06/24/2022 1006    CREATININE 0.8 06/24/2022 1006    GLU 84 06/24/2022 1006        Component Value Date/Time    CALCIUM 10.3 06/24/2022 1006    ALKPHOS 46 (L) 06/24/2022 1006    AST 20 06/24/2022 1006    ALT 16 06/24/2022 1006    BILITOT 0.5 06/24/2022 1006    ESTGFRAFRICA >60.0 06/24/2022 1006    EGFRNONAA >60.0 06/24/2022 1006        No results found for: LABA1C, HGBA1C    Recent Labs   Lab 09/04/19  1543 10/07/19  1707 06/24/22  1006   WBC  --    < > 3.87 L   Hemoglobin  --    < > 10.5 L   POC Hematocrit  --    < >  --    Hematocrit  --    < > 34.0 L   MCV  --    < > 97   Platelets  --    < > 118 L   TSH 1.142  --   --    Cholesterol  --    < > 199   HDL  --    < > 68   LDL Cholesterol  --    < > 112.8   Triglycerides  --    < > 91   HDL/Cholesterol Ratio  --    < > 34.2    < > = values in this interval not displayed.              Test(s) Reviewed  I have reviewed the following in detail:  [] Stress test   [] Angiography   [x] Echocardiogram   [x] Labs   [] Other:         Assessment/Plan:   1. Coronary artery disease involving native coronary artery of native heart without angina pectoris  Asymptomatic. Stable. Monitor    - Lipid Panel; Future  - Comprehensive Metabolic Panel; Future  - Magnesium; Future  - CBC Without Differential; Future    2. Bilateral carotid artery stenosis  New bruit on the left.  Will repeat carotid us.       3. Left carotid bruit    - CV Ultrasound Bilateral Doppler Carotid; Future    4. PAD (peripheral artery disease)  Asymptomatic. Encouraged walking  program.      5. Aortic atherosclerosis      6. Dyslipidemia  LDL not at goal <70. Does not tolerate higher doses of statin. Encouraged mediterranean diet and exercise. Previously tried on zetia from March 2018 to March 2019.  Patient stopped zetia 5 mg d/t intolerance.  Could try pcsk9 inhibitor. However, she is cachectic with really no subcutaneous fat for administration.      7. Cachectic      8. Essential hypertension  Home BP at goal <140/90. She has known white coat syndrome.  Continue current regimen. DASH diet.    A copy of this note will be forwarded to Dr. Lozoya and Dr. Urbina.     Follow up in about 6 months (around 12/13/2022).

## 2022-06-16 ENCOUNTER — TELEPHONE (OUTPATIENT)
Dept: INTERNAL MEDICINE | Facility: CLINIC | Age: 87
End: 2022-06-16
Payer: MEDICARE

## 2022-06-16 RX ORDER — OLMESARTAN MEDOXOMIL 40 MG/1
TABLET ORAL
Qty: 90 TABLET | Refills: 0 | Status: SHIPPED | OUTPATIENT
Start: 2022-06-16 | End: 2022-08-30 | Stop reason: SDUPTHER

## 2022-06-16 NOTE — TELEPHONE ENCOUNTER
Notified pt of message from PCP to RTC 08/30/22 @ 3p and a reminder letter was prepared for send out.

## 2022-06-16 NOTE — TELEPHONE ENCOUNTER
Refill Routing Note   Medication(s) are not appropriate for processing by [facility] for the following reason(s):      - Required vitals are abnormal    [facility] action(s):  Defer          Medication reconciliation completed: No     Appointments  past 12m or future 3m with PCP    Date Provider   Last Visit   10/15/2021 Shasta Urbina MD   Next Visit   Visit date not found Shasta Urbina MD   ED visits in past 90 days: 0        Note composed:9:48 AM 06/16/2022

## 2022-06-16 NOTE — TELEPHONE ENCOUNTER
Care Due:                  Date            Visit Type   Department     Provider  --------------------------------------------------------------------------------                                EP -                              PRIMARY      [facility]  Last Visit: 10-      [facility]   MEDICINE       Shasta Urbina  Next Visit: None Scheduled  None         None Found                                                            Last  Test          Frequency    Reason                     Performed    Due Date  --------------------------------------------------------------------------------    CMP.........  12 months..  chlorthalidone,            09-   09-                             olmesartan...............    [employer] Embedded Care Gaps. Reference number: [id number]. 6/16/2022   12:21:24 AM CDT

## 2022-06-24 ENCOUNTER — LAB VISIT (OUTPATIENT)
Dept: LAB | Facility: HOSPITAL | Age: 87
End: 2022-06-24
Attending: NURSE PRACTITIONER
Payer: MEDICARE

## 2022-06-24 DIAGNOSIS — I25.10 CORONARY ARTERY DISEASE INVOLVING NATIVE CORONARY ARTERY OF NATIVE HEART WITHOUT ANGINA PECTORIS: ICD-10-CM

## 2022-06-24 LAB
ALBUMIN SERPL BCP-MCNC: 3.8 G/DL (ref 3.5–5.2)
ALP SERPL-CCNC: 46 U/L (ref 55–135)
ALT SERPL W/O P-5'-P-CCNC: 16 U/L (ref 10–44)
ANION GAP SERPL CALC-SCNC: 7 MMOL/L (ref 8–16)
AST SERPL-CCNC: 20 U/L (ref 10–40)
BILIRUB SERPL-MCNC: 0.5 MG/DL (ref 0.1–1)
BUN SERPL-MCNC: 15 MG/DL (ref 8–23)
CALCIUM SERPL-MCNC: 10.3 MG/DL (ref 8.7–10.5)
CHLORIDE SERPL-SCNC: 104 MMOL/L (ref 95–110)
CHOLEST SERPL-MCNC: 199 MG/DL (ref 120–199)
CHOLEST/HDLC SERPL: 2.9 {RATIO} (ref 2–5)
CO2 SERPL-SCNC: 28 MMOL/L (ref 23–29)
CREAT SERPL-MCNC: 0.8 MG/DL (ref 0.5–1.4)
ERYTHROCYTE [DISTWIDTH] IN BLOOD BY AUTOMATED COUNT: 12.9 % (ref 11.5–14.5)
EST. GFR  (AFRICAN AMERICAN): >60 ML/MIN/1.73 M^2
EST. GFR  (NON AFRICAN AMERICAN): >60 ML/MIN/1.73 M^2
GLUCOSE SERPL-MCNC: 84 MG/DL (ref 70–110)
HCT VFR BLD AUTO: 34 % (ref 37–48.5)
HDLC SERPL-MCNC: 68 MG/DL (ref 40–75)
HDLC SERPL: 34.2 % (ref 20–50)
HGB BLD-MCNC: 10.5 G/DL (ref 12–16)
LDLC SERPL CALC-MCNC: 112.8 MG/DL (ref 63–159)
MAGNESIUM SERPL-MCNC: 1.8 MG/DL (ref 1.6–2.6)
MCH RBC QN AUTO: 30.1 PG (ref 27–31)
MCHC RBC AUTO-ENTMCNC: 30.9 G/DL (ref 32–36)
MCV RBC AUTO: 97 FL (ref 82–98)
NONHDLC SERPL-MCNC: 131 MG/DL
PLATELET # BLD AUTO: 118 K/UL (ref 150–450)
PMV BLD AUTO: 13 FL (ref 9.2–12.9)
POTASSIUM SERPL-SCNC: 3.6 MMOL/L (ref 3.5–5.1)
PROT SERPL-MCNC: 7.5 G/DL (ref 6–8.4)
RBC # BLD AUTO: 3.49 M/UL (ref 4–5.4)
SODIUM SERPL-SCNC: 139 MMOL/L (ref 136–145)
TRIGL SERPL-MCNC: 91 MG/DL (ref 30–150)
WBC # BLD AUTO: 3.87 K/UL (ref 3.9–12.7)

## 2022-06-24 PROCEDURE — 85027 COMPLETE CBC AUTOMATED: CPT | Performed by: NURSE PRACTITIONER

## 2022-06-24 PROCEDURE — 83735 ASSAY OF MAGNESIUM: CPT | Performed by: NURSE PRACTITIONER

## 2022-06-24 PROCEDURE — 80053 COMPREHEN METABOLIC PANEL: CPT | Performed by: NURSE PRACTITIONER

## 2022-06-24 PROCEDURE — 80061 LIPID PANEL: CPT | Performed by: NURSE PRACTITIONER

## 2022-06-24 PROCEDURE — 36415 COLL VENOUS BLD VENIPUNCTURE: CPT | Performed by: NURSE PRACTITIONER

## 2022-08-01 ENCOUNTER — OFFICE VISIT (OUTPATIENT)
Dept: INTERNAL MEDICINE | Facility: CLINIC | Age: 87
End: 2022-08-01
Payer: MEDICARE

## 2022-08-01 VITALS
DIASTOLIC BLOOD PRESSURE: 58 MMHG | HEIGHT: 63 IN | HEART RATE: 75 BPM | RESPIRATION RATE: 16 BRPM | BODY MASS INDEX: 13.94 KG/M2 | SYSTOLIC BLOOD PRESSURE: 136 MMHG

## 2022-08-01 DIAGNOSIS — R26.9 ABNORMALITY OF GAIT AND MOBILITY: ICD-10-CM

## 2022-08-01 DIAGNOSIS — I25.10 CORONARY ARTERY DISEASE INVOLVING NATIVE CORONARY ARTERY OF NATIVE HEART WITHOUT ANGINA PECTORIS: ICD-10-CM

## 2022-08-01 DIAGNOSIS — R63.6 UNDERWEIGHT: ICD-10-CM

## 2022-08-01 DIAGNOSIS — I65.23 BILATERAL CAROTID ARTERY STENOSIS: ICD-10-CM

## 2022-08-01 DIAGNOSIS — N18.31 STAGE 3A CHRONIC KIDNEY DISEASE: ICD-10-CM

## 2022-08-01 DIAGNOSIS — I10 ESSENTIAL HYPERTENSION: Chronic | ICD-10-CM

## 2022-08-01 DIAGNOSIS — I73.9 PAD (PERIPHERAL ARTERY DISEASE): ICD-10-CM

## 2022-08-01 DIAGNOSIS — I45.10 RIGHT BUNDLE BRANCH BLOCK: ICD-10-CM

## 2022-08-01 DIAGNOSIS — K21.9 GASTROESOPHAGEAL REFLUX DISEASE, UNSPECIFIED WHETHER ESOPHAGITIS PRESENT: ICD-10-CM

## 2022-08-01 DIAGNOSIS — Z00.00 ENCOUNTER FOR PREVENTIVE HEALTH EXAMINATION: Primary | ICD-10-CM

## 2022-08-01 DIAGNOSIS — R64 CACHECTIC: ICD-10-CM

## 2022-08-01 DIAGNOSIS — D64.9 NORMOCYTIC ANEMIA: ICD-10-CM

## 2022-08-01 DIAGNOSIS — I70.0 AORTIC ATHEROSCLEROSIS: ICD-10-CM

## 2022-08-01 DIAGNOSIS — E55.9 VITAMIN D DEFICIENCY: ICD-10-CM

## 2022-08-01 DIAGNOSIS — M81.0 OSTEOPOROSIS, POSTMENOPAUSAL: ICD-10-CM

## 2022-08-01 DIAGNOSIS — R13.12 OROPHARYNGEAL DYSPHAGIA: ICD-10-CM

## 2022-08-01 DIAGNOSIS — E78.5 DYSLIPIDEMIA: Chronic | ICD-10-CM

## 2022-08-01 DIAGNOSIS — E21.3 HYPERPARATHYROIDISM, UNSPECIFIED: ICD-10-CM

## 2022-08-01 DIAGNOSIS — H40.89 GLAUCOMA DUE TO COMBINATION OF MECHANISMS: ICD-10-CM

## 2022-08-01 DIAGNOSIS — Z99.89 DEPENDENCE ON OTHER ENABLING MACHINES AND DEVICES: ICD-10-CM

## 2022-08-01 PROCEDURE — G0439 PR MEDICARE ANNUAL WELLNESS SUBSEQUENT VISIT: ICD-10-PCS | Mod: S$GLB,,, | Performed by: NURSE PRACTITIONER

## 2022-08-01 PROCEDURE — 1159F PR MEDICATION LIST DOCUMENTED IN MEDICAL RECORD: ICD-10-PCS | Mod: CPTII,S$GLB,, | Performed by: NURSE PRACTITIONER

## 2022-08-01 PROCEDURE — 1159F MED LIST DOCD IN RCRD: CPT | Mod: CPTII,S$GLB,, | Performed by: NURSE PRACTITIONER

## 2022-08-01 PROCEDURE — 1170F PR FUNCTIONAL STATUS ASSESSED: ICD-10-PCS | Mod: CPTII,S$GLB,, | Performed by: NURSE PRACTITIONER

## 2022-08-01 PROCEDURE — 1160F RVW MEDS BY RX/DR IN RCRD: CPT | Mod: CPTII,S$GLB,, | Performed by: NURSE PRACTITIONER

## 2022-08-01 PROCEDURE — 99499 UNLISTED E&M SERVICE: CPT | Mod: S$GLB,,, | Performed by: NURSE PRACTITIONER

## 2022-08-01 PROCEDURE — 99999 PR PBB SHADOW E&M-EST. PATIENT-LVL V: CPT | Mod: PBBFAC,,, | Performed by: NURSE PRACTITIONER

## 2022-08-01 PROCEDURE — 1126F AMNT PAIN NOTED NONE PRSNT: CPT | Mod: CPTII,S$GLB,, | Performed by: NURSE PRACTITIONER

## 2022-08-01 PROCEDURE — 3288F PR FALLS RISK ASSESSMENT DOCUMENTED: ICD-10-PCS | Mod: CPTII,S$GLB,, | Performed by: NURSE PRACTITIONER

## 2022-08-01 PROCEDURE — 1126F PR PAIN SEVERITY QUANTIFIED, NO PAIN PRESENT: ICD-10-PCS | Mod: CPTII,S$GLB,, | Performed by: NURSE PRACTITIONER

## 2022-08-01 PROCEDURE — 99999 PR PBB SHADOW E&M-EST. PATIENT-LVL V: ICD-10-PCS | Mod: PBBFAC,,, | Performed by: NURSE PRACTITIONER

## 2022-08-01 PROCEDURE — 1170F FXNL STATUS ASSESSED: CPT | Mod: CPTII,S$GLB,, | Performed by: NURSE PRACTITIONER

## 2022-08-01 PROCEDURE — G0439 PPPS, SUBSEQ VISIT: HCPCS | Mod: S$GLB,,, | Performed by: NURSE PRACTITIONER

## 2022-08-01 PROCEDURE — 3288F FALL RISK ASSESSMENT DOCD: CPT | Mod: CPTII,S$GLB,, | Performed by: NURSE PRACTITIONER

## 2022-08-01 PROCEDURE — 1101F PT FALLS ASSESS-DOCD LE1/YR: CPT | Mod: CPTII,S$GLB,, | Performed by: NURSE PRACTITIONER

## 2022-08-01 PROCEDURE — 99499 RISK ADDL DX/OHS AUDIT: ICD-10-PCS | Mod: S$GLB,,, | Performed by: NURSE PRACTITIONER

## 2022-08-01 PROCEDURE — 1101F PR PT FALLS ASSESS DOC 0-1 FALLS W/OUT INJ PAST YR: ICD-10-PCS | Mod: CPTII,S$GLB,, | Performed by: NURSE PRACTITIONER

## 2022-08-01 PROCEDURE — 1160F PR REVIEW ALL MEDS BY PRESCRIBER/CLIN PHARMACIST DOCUMENTED: ICD-10-PCS | Mod: CPTII,S$GLB,, | Performed by: NURSE PRACTITIONER

## 2022-08-01 NOTE — Clinical Note
Medicare AWV completed. Discussed additional covid booster, shingles vaccines, and tetanus booster. States appetite is improving. Abnormal mini cog, partial clock drawing. Recalled 2/3 words at 3 minutes. No memory concerns, will defer further eval at this time.  --Marina

## 2022-08-01 NOTE — PROGRESS NOTES
"Mj Summers presented for a  Medicare AWV and comprehensive Health Risk Assessment today. The following components were reviewed and updated:    · Medical history  · Family History  · Social history  · Allergies and Current Medications  · Health Risk Assessment  · Health Maintenance  · Care Team         ** See Completed Assessments for Annual Wellness Visit within the encounter summary.**         The following assessments were completed:  · Living Situation  · CAGE  · Depression Screening  · Timed Get Up and Go - Deferred, in wheelchair.  · Whisper Test  · Cognitive Function Screening - Abnormal mini cog, partial clock drawing. Recalled 2/3 words at 3 minutes. No memory concerns, will defer further eval at this time.    · Nutrition Screening  · ADL Screening  · PAQ Screening        Vitals:    08/01/22 1439   BP: (!) 136/58   BP Location: Left arm   Patient Position: Sitting   BP Method: Medium (Manual)   Pulse: 75   Resp: 16   Height: 5' 3" (1.6 m)     Body mass index is 13.94 kg/m².     Physical Exam  Vitals reviewed.   Constitutional:       Appearance: Normal appearance.   HENT:      Head: Normocephalic.   Cardiovascular:      Rate and Rhythm: Normal rate.   Pulmonary:      Effort: Pulmonary effort is normal.   Abdominal:      General: Bowel sounds are normal.   Musculoskeletal:      Cervical back: Normal range of motion.      Right lower leg: Edema present.      Left lower leg: Edema present.      Comments: In wheelchair.   Skin:     General: Skin is warm and dry.      Capillary Refill: Capillary refill takes less than 2 seconds.   Neurological:      Mental Status: She is alert and oriented to person, place, and time.   Psychiatric:         Behavior: Behavior normal.         Thought Content: Thought content normal.         Judgment: Judgment normal.               Diagnoses and health risks identified today and associated recommendations/orders:    1. Encounter for preventive health examination  Assessments " completed. Abnormal mini cog, partial clock drawing. Recalled 2/3 words at 3 minutes. No memory concerns, will defer further eval at this time.  HM recommendations reviewed. Discussed additional covid booster, shingles vaccines, and tetanus booster.   F/u with PCP as scheduled.    2. Stage 3a chronic kidney disease  Chronic, stable on current regimen. Followed by PCP.    3. PAD (peripheral artery disease)  Chronic, stable on current regimen. Followed by cardiology.    4. Bilateral carotid artery stenosis  Chronic, stable on current regimen. Followed by cardiology. On statin.    5. Aortic atherosclerosis  Chronic, stable on current regimen. Followed by cardiology. On statin.    6. Hyperparathyroidism, unspecified  Chronic, stable on current regimen. Followed by PCP.     7. Cachectic  Chronic, stable on current regimen. Followed by GI / PCP. Underweight, states appetite is improved.    7. Essential hypertension  Chronic, stable on current regimen. Followed by cardiology.    8. Dyslipidemia  Chronic, stable on current regimen. Followed by cardiology.    9. Coronary artery disease involving native coronary artery of native heart without angina pectoris  Chronic, stable on current regimen. Followed by cardiology.    10. Right bundle branch block  Chronic, stable on current regimen. Followed by cardiology.    11. Normocytic anemia  Chronic, stable on current regimen. Followed by PCP.    12. Vitamin D deficiency  Chronic, stable on current regimen. Followed by PCP.    13. Gastroesophageal reflux disease, unspecified whether esophagitis present  Chronic, stable on current regimen. Followed by GI.    14. Oropharyngeal dysphagia  Chronic, stable on current regimen. Followed by GI.    15. Underweight  Chronic, stable on current regimen. Followed by GI / PCP. Underweight, states appetite improved.    16. Osteoporosis, postmenopausal  Chronic, stable on current regimen. Followed by endocrinology.    17. 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 Followed by ophthalmology.    18. Dependence on other enabling machines and devices  Chronic, stable. No recent falls. Uses cane at home, in wheelchair today. Followed by PCP.    19. Abnormality of gait and mobility  Chronic, stable. No recent falls. Uses cane at home, in wheelchair today. Followed by PCP.      Provided Mj with a 5-10 year written screening schedule and personal prevention plan. Recommendations were developed using the USPSTF age appropriate recommendations. Education, counseling, and referrals were provided as needed. After Visit Summary printed and given to patient which includes a list of additional screenings\tests needed.    Follow up in about 1 year (around 8/1/2023) for Medicare AWV and with PCP as scheduled.       Marina Mccall NP     I offered to discuss advanced care planning, including how to pick a person who would make decisions for you if you were unable to make them for yourself, called a health care power of , and what kind of decisions you might make such as use of life sustaining treatments such as ventilators and tube feeding when faced with a life limiting illness recorded on a living will that they will need to know. (How you want to be cared for as you near the end of your natural life)     X Patient is interested in learning more about how to make advanced directives.  I provided them paperwork and offered to discuss this with them.

## 2022-08-01 NOTE — PROGRESS NOTES
Patient, Mj Summers (MRN #[id number]), presented with a recent Platelet count less than 150 K/uL consistent with the definition of thrombocytopenia (ICD10 - D69.6).    Platelets   Date Value Ref Range Status   06/24/2022 118 (L) 150 - 450 K/uL Final     Addendum to Assessment and Plan:  Thrombocytopenia  Chronic, stable. Noted on labs. Followed by PCP.

## 2022-08-01 NOTE — PATIENT INSTRUCTIONS
1. Follow up with Dr. Shasta Urbina MD as scheduled.    2. Eligible for additional covid booster if interested.    3. Can gibson shingles vaccines (Shingrix) through insurance if interested. Prices can vary widely from pharmacy to pharmacy.    4. Tetanus booster only if needed.    Counseling and Referral of Other Preventative  (Italic type indicates deductible and co-insurance are waived)    Patient Name: Mj Summers  Today's Date: 8/1/2022    Health Maintenance         Date Due Completion Date    TETANUS VACCINE Never done ---    Shingles Vaccine (1 of 2) Never done ---    COVID-19 Vaccine (4 - Booster for Moderna series) 06/17/2022 2/17/2022    Influenza Vaccine (1) 09/01/2022 12/13/2021    DEXA Scan 05/18/2023 5/18/2021    Lipid Panel 06/24/2023 6/24/2022    Aspirin/Antiplatelet Therapy 08/01/2023 8/1/2022          No orders of the defined types were placed in this encounter.    The following information is provided to all patients.  This information is to help you find resources for any of the problems found today that may be affecting your health:                Living healthy guide: www.[facility].[place].gov      Understanding Diabetes: www.diabetes.org      Eating healthy: www.cdc.gov/healthyweight      CDC home safety checklist: www.cdc.gov/steadi/patient.html      Agency on Aging: www.goea.[place].[facility]      Alcoholics anonymous (AA): www.aa.org      Physical Activity: www.cameron.nih.gov/iz9iodp      Tobacco use: www.quitwithusla.org

## 2022-08-15 ENCOUNTER — HOSPITAL ENCOUNTER (OUTPATIENT)
Dept: CARDIOLOGY | Facility: HOSPITAL | Age: 87
Discharge: HOME | End: 2022-08-15
Attending: NURSE PRACTITIONER
Payer: MEDICARE

## 2022-08-15 DIAGNOSIS — R09.89 LEFT CAROTID BRUIT: ICD-10-CM

## 2022-08-15 LAB
LEFT ARM DIASTOLIC BLOOD PRESSURE: 58 MMHG
LEFT ARM SYSTOLIC BLOOD PRESSURE: 181 MMHG
LEFT CBA DIAS: 9 CM/S
LEFT CBA SYS: 77 CM/S
LEFT CCA DIST DIAS: 9 CM/S
LEFT CCA DIST SYS: 92 CM/S
LEFT CCA MID DIAS: 14 CM/S
LEFT CCA MID SYS: 96 CM/S
LEFT CCA PROX DIAS: 10 CM/S
LEFT CCA PROX SYS: 85 CM/S
LEFT ECA DIAS: 0 CM/S
LEFT ECA SYS: 69 CM/S
LEFT ICA DIST DIAS: 20 CM/S
LEFT ICA DIST SYS: 123 CM/S
LEFT ICA MID DIAS: 15 CM/S
LEFT ICA MID SYS: 125 CM/S
LEFT ICA PROX DIAS: 12 CM/S
LEFT ICA PROX SYS: 94 CM/S
LEFT VERTEBRAL DIAS: 12 CM/S
LEFT VERTEBRAL SYS: 72 CM/S
OHS CV CAROTID RIGHT ICA EDV HIGHEST: 19
OHS CV CAROTID ULTRASOUND LEFT ICA/CCA RATIO: 1.36
OHS CV CAROTID ULTRASOUND RIGHT ICA/CCA RATIO: 1.39
OHS CV PV CAROTID LEFT HIGHEST CCA: 96
OHS CV PV CAROTID LEFT HIGHEST ICA: 125
OHS CV PV CAROTID RIGHT HIGHEST CCA: 129
OHS CV PV CAROTID RIGHT HIGHEST ICA: 139
OHS CV US CAROTID LEFT HIGHEST EDV: 20
RIGHT ARM DIASTOLIC BLOOD PRESSURE: 55 MMHG
RIGHT ARM SYSTOLIC BLOOD PRESSURE: 175 MMHG
RIGHT CBA DIAS: 9 CM/S
RIGHT CBA SYS: 75 CM/S
RIGHT CCA DIST DIAS: 11 CM/S
RIGHT CCA DIST SYS: 100 CM/S
RIGHT CCA MID DIAS: 7 CM/S
RIGHT CCA MID SYS: 95 CM/S
RIGHT CCA PROX DIAS: 8 CM/S
RIGHT CCA PROX SYS: 129 CM/S
RIGHT ECA DIAS: 0 CM/S
RIGHT ECA SYS: 144 CM/S
RIGHT ICA DIST DIAS: 19 CM/S
RIGHT ICA DIST SYS: 131 CM/S
RIGHT ICA MID DIAS: 18 CM/S
RIGHT ICA MID SYS: 128 CM/S
RIGHT ICA PROX DIAS: 18 CM/S
RIGHT ICA PROX SYS: 139 CM/S
RIGHT VERTEBRAL DIAS: 6 CM/S
RIGHT VERTEBRAL SYS: 57 CM/S

## 2022-08-15 PROCEDURE — 93880 EXTRACRANIAL BILAT STUDY: CPT | Mod: 26,,, | Performed by: INTERNAL MEDICINE

## 2022-08-15 PROCEDURE — 93880 CV US DOPPLER CAROTID (CUPID ONLY): ICD-10-PCS | Mod: 26,,, | Performed by: INTERNAL MEDICINE

## 2022-08-15 PROCEDURE — 93880 EXTRACRANIAL BILAT STUDY: CPT

## 2022-08-16 ENCOUNTER — TELEPHONE (OUTPATIENT)
Dept: CARDIOLOGY | Facility: CLINIC | Age: 87
End: 2022-08-16
Payer: MEDICARE

## 2022-08-30 ENCOUNTER — OFFICE VISIT (OUTPATIENT)
Dept: INTERNAL MEDICINE | Facility: CLINIC | Age: 87
End: 2022-08-30
Payer: MEDICARE

## 2022-08-30 DIAGNOSIS — Z00.00 PREVENTATIVE HEALTH CARE: Primary | ICD-10-CM

## 2022-08-30 DIAGNOSIS — I10 ESSENTIAL HYPERTENSION: Chronic | ICD-10-CM

## 2022-08-30 PROCEDURE — 1101F PT FALLS ASSESS-DOCD LE1/YR: CPT | Mod: CPTII,S$GLB,, | Performed by: INTERNAL MEDICINE

## 2022-08-30 PROCEDURE — 3288F FALL RISK ASSESSMENT DOCD: CPT | Mod: CPTII,S$GLB,, | Performed by: INTERNAL MEDICINE

## 2022-08-30 PROCEDURE — 1101F PR PT FALLS ASSESS DOC 0-1 FALLS W/OUT INJ PAST YR: ICD-10-PCS | Mod: CPTII,S$GLB,, | Performed by: INTERNAL MEDICINE

## 2022-08-30 PROCEDURE — 99999 PR PBB SHADOW E&M-EST. PATIENT-LVL IV: ICD-10-PCS | Mod: PBBFAC,,, | Performed by: INTERNAL MEDICINE

## 2022-08-30 PROCEDURE — 1126F AMNT PAIN NOTED NONE PRSNT: CPT | Mod: CPTII,S$GLB,, | Performed by: INTERNAL MEDICINE

## 2022-08-30 PROCEDURE — 1159F MED LIST DOCD IN RCRD: CPT | Mod: CPTII,S$GLB,, | Performed by: INTERNAL MEDICINE

## 2022-08-30 PROCEDURE — 99397 PR PREVENTIVE VISIT,EST,65 & OVER: ICD-10-PCS | Mod: S$GLB,,, | Performed by: INTERNAL MEDICINE

## 2022-08-30 PROCEDURE — 99397 PER PM REEVAL EST PAT 65+ YR: CPT | Mod: S$GLB,,, | Performed by: INTERNAL MEDICINE

## 2022-08-30 PROCEDURE — 3288F PR FALLS RISK ASSESSMENT DOCUMENTED: ICD-10-PCS | Mod: CPTII,S$GLB,, | Performed by: INTERNAL MEDICINE

## 2022-08-30 PROCEDURE — 1159F PR MEDICATION LIST DOCUMENTED IN MEDICAL RECORD: ICD-10-PCS | Mod: CPTII,S$GLB,, | Performed by: INTERNAL MEDICINE

## 2022-08-30 PROCEDURE — 99999 PR PBB SHADOW E&M-EST. PATIENT-LVL IV: CPT | Mod: PBBFAC,,, | Performed by: INTERNAL MEDICINE

## 2022-08-30 PROCEDURE — 1126F PR PAIN SEVERITY QUANTIFIED, NO PAIN PRESENT: ICD-10-PCS | Mod: CPTII,S$GLB,, | Performed by: INTERNAL MEDICINE

## 2022-08-30 RX ORDER — OLMESARTAN MEDOXOMIL 40 MG/1
40 TABLET ORAL DAILY
Qty: 90 TABLET | Refills: 1 | Status: SHIPPED | OUTPATIENT
Start: 2022-08-30 | End: 2023-02-28 | Stop reason: SDUPTHER

## 2022-08-30 RX ORDER — CHLORTHALIDONE 25 MG/1
25 TABLET ORAL DAILY
Qty: 90 TABLET | Refills: 1 | Status: SHIPPED | OUTPATIENT
Start: 2022-08-30 | End: 2023-02-28 | Stop reason: SDUPTHER

## 2022-09-04 VITALS
SYSTOLIC BLOOD PRESSURE: 136 MMHG | OXYGEN SATURATION: 98 % | BODY MASS INDEX: 14.92 KG/M2 | HEART RATE: 72 BPM | DIASTOLIC BLOOD PRESSURE: 88 MMHG | WEIGHT: 84.19 LBS | TEMPERATURE: 99 F | HEIGHT: 63 IN

## 2022-09-04 NOTE — PROGRESS NOTES
Subjective:       Patient ID: Mj Summers is a 87 y.o. female.    Chief Complaint: Annual Exam    HPI  She is here for annual exam     PAST MEDICAL HISTORY: Hypertension, hyperlipidemia, osteoporosis, anemia, coronary artery disease, peripheral vascular disease,  maxillary fracture, leukopenia, glaucoma, positive PPD, GERD, ORIF left hip,  tricuspid regurgitation. She had a   colonoscopy February 2018    PAST SURGICAL HISTORY: Breast cyst removal, benign.     MEDICATIONS:  one aspirin daily, Benicar 40 mg daily, Crestor 10 mg daily,  timolol drops, prolia, chlorthalidone, Norvasc 5 mg daily    ALLERGIES: Penicillin and sulfa. .       Review of Systems   Constitutional:  Negative for chills, fatigue, fever and unexpected weight change.   Respiratory:  Negative for chest tightness and shortness of breath.    Cardiovascular:  Negative for chest pain and palpitations.   Gastrointestinal:  Negative for abdominal pain and blood in stool.   Neurological:  Negative for dizziness, syncope, numbness and headaches.     Objective:      Physical Exam  HENT:      Right Ear: External ear normal.      Left Ear: External ear normal.      Nose: Nose normal.      Mouth/Throat:      Mouth: Mucous membranes are moist.      Pharynx: Oropharynx is clear.   Eyes:      Pupils: Pupils are equal, round, and reactive to light.   Cardiovascular:      Rate and Rhythm: Normal rate and regular rhythm.      Heart sounds: Murmur heard.   Pulmonary:      Breath sounds: Normal breath sounds.   Abdominal:      General: There is no distension.      Palpations: There is no hepatomegaly or splenomegaly.      Tenderness: There is no abdominal tenderness.   Musculoskeletal:      Cervical back: Normal range of motion.   Lymphadenopathy:      Cervical: No cervical adenopathy.      Upper Body:      Right upper body: No axillary adenopathy.      Left upper body: No axillary adenopathy.   Neurological:      Cranial Nerves: No cranial nerve deficit.       Sensory: No sensory deficit.      Motor: Motor function is intact.      Deep Tendon Reflexes: Reflexes are normal and symmetric.       Assessment/Plan       Assessment and plan:  Annual exam.

## 2022-10-24 ENCOUNTER — INFUSION (OUTPATIENT)
Dept: INFECTIOUS DISEASES | Facility: HOSPITAL | Age: 87
End: 2022-10-24
Attending: INTERNAL MEDICINE
Payer: MEDICARE

## 2022-10-24 VITALS
TEMPERATURE: 97 F | SYSTOLIC BLOOD PRESSURE: 142 MMHG | HEART RATE: 62 BPM | RESPIRATION RATE: 18 BRPM | DIASTOLIC BLOOD PRESSURE: 64 MMHG | OXYGEN SATURATION: 95 %

## 2022-10-24 DIAGNOSIS — M81.0 OSTEOPOROSIS, POSTMENOPAUSAL: Primary | ICD-10-CM

## 2022-10-24 PROCEDURE — 96372 THER/PROPH/DIAG INJ SC/IM: CPT

## 2022-10-24 PROCEDURE — 63600175 PHARM REV CODE 636 W HCPCS: Mod: JG | Performed by: NURSE PRACTITIONER

## 2022-10-24 RX ADMIN — DENOSUMAB 60 MG: 60 INJECTION SUBCUTANEOUS at 02:10

## 2022-10-24 NOTE — PROGRESS NOTES
Arrived for Prolia injection, given via RLQ abd, tolerated well.  Left via w/c with family member at side in NAD. Next appt scheduled.    Presently taking Vit D3 and calcium, denies dental sx in last 3 months;

## 2022-11-06 NOTE — PROGRESS NOTES
"  HPI    DLS: 6/07/2022    Pt here for 4 Month Check;  Pt states her eyes do get dry and uses her AT's more now.     Meds;  T1/2 GFS QAM OU   Alphagan 0.15% TID OD   Camacho 4% TID OD   Lumugan QHS OS     AT's 3-4 x day OU      1) Residula OAG / MMG OU   2) Blin d Hypertensive OD   3) NS OD   4) FABY   5) APD OD   6) B and Keratopathy OD   7) Asteroid Hyalosis OS   8) Hx Acute Dacryocystitis OS   9) CRVO OD   10) Ant. Capsule Phimosis OS   11) PCIOL O S   12) s/p trab w/ express mini shunt  os       Last edited by Kristi Ray on 11/7/2022  1:27 PM.            Assessment /Plan     For exam results, see Encounter Report.    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   Angle open after PI's ou   -Followed at [facility] since at least '01   First HVF 2001   First photos 2001   S/p PI OU   s/p Gonioplasty OU     Family history none   Glaucoma meds Lumigan, timolol gfs  qAM, Agan tid, Camacho tid  - all OD ((off all gtts os s/p combined)   H/O adverse rxn to glaucoma drops Azopt "felt weird"   LASERS S/p PI OU, s/p Gonioplasty OU   GLAUCOMA SURGERIES    combined phaco/trab with mini shunt OS 5/22/2013   OTHER EYE SURGERIES    S/P DCR sx OD x 2 or 3 with Damaris    Combined phaco/IOL/ trab with mini shunt OS 5/22/2013   CDR 0.9 w/ shunt vessels  /0.9   Tbase 42/23   Tmax 42/23 (when stopped gtts)   Ttarget pain control od // 18 os   HVF 20  VF '01 -'22 - Ext   od  // SALT / IAD (gen dep) os   Gonio +3/+3   /539   OCT 11 test 2006 to 2021 - RNFL - OD:poor test  // OS:dec Dec thru out expect NS   HRT 14 test 2004 to 2018 - MR -  " Dec. S/I od // dec. S/N/I os /// CDR 0.66 od // 0.83 os   HRT 2019 - high std dev. Ou (( NO MORE HRT'S - unreliable))   Disc photos - 2001, 2003, 2006 - slides // 2008, 2010, 2016, 2018, 2021    - OIS     Ta today 28(occasional ache) on mult gtts od - pain free // 12 on lumigan/timolol os post phaco/trab - 2013    Test today - IOP    2.  Blind hypertensive Right Eye   Pain free - eye comfortable   Very limited vision CF at 3'   End stage glaucoma and S/P CRVO    No rubeosis   IOP is high but eye is pain free    3. Cataract OD -However, OD VA limited 2/2 CRVO and end stge glaucoma   S/P phaco/IOL/trab OS 5/22/2013    4. FABY OU -cont ATs     5. APD OD -   Old / stable 2/2 CRVO and glaucoma    6. Band Keratopathy OD -stable. Cont ATs- being followed by Dr. Saul;    had EDTA chelation on 2/13/14 OS - now with recurrent band     7. Asteroid Hyalosis OS -stable     8. Hx of Acute Dacryocystitis OS and recent NLD OD s/p surgery 11/12 and repeat for exposure w/ Dr. Ocampo 12/5/12 -stable. Cont f/u with Damaris     9. Hx of CRVO OD -limiting visual potential OD   -No NV on todays exam     10. Ant capsule phimosis os    S/P yag laser     11. Asteroid hyalosis OS     12. PC IOL os    Combined  W/ mini shunt 5/22/2013   doing well VA is 20/25 and the IOP is 13    Plan:    s/p combined (5/22/2013)  --    IOP creeping up 9/21/2021 - added back timolol q am and lumigan q evening     Cont glaucoma gtts OD -- IOP  high, but comfortable/no pain // Blind hypertensive but pain free eye   Old  crvo     Ok to use OTC readers for now a +3.00 to +3.50 - not really able to improve distance vision with glasses at this time  Much improved from before the combined  Procedure    Cont all glaucoma gtts od   Blind hypertensive right eye - pain free     IOP was creeping up os - added back timolol q am and lumigan q evening - 9/21/2021   Good resp os 18--> 14     Photo file updated 1/18/2022     Benny - Matthieu -  - refer to B - to see if  hand held magnifier might help     F/U 4 months with IOP HVF-os //  OCT RNFL

## 2022-11-07 ENCOUNTER — OFFICE VISIT (OUTPATIENT)
Dept: OPHTHALMOLOGY | Facility: CLINIC | Age: 87
End: 2022-11-07
Payer: MEDICARE

## 2022-11-07 DIAGNOSIS — Z96.1 PSEUDOPHAKIA OF LEFT EYE: ICD-10-CM

## 2022-11-07 DIAGNOSIS — H18.421 BAND KERATOPATHY, RIGHT: ICD-10-CM

## 2022-11-07 DIAGNOSIS — H21.561 AFFERENT PUPILLARY DEFECT, RIGHT: ICD-10-CM

## 2022-11-07 DIAGNOSIS — H34.8112 CENTRAL RETINAL VEIN OCCLUSION OF RIGHT EYE, UNSPECIFIED COMPLICATION STATUS: ICD-10-CM

## 2022-11-07 DIAGNOSIS — H25.11 SENILE NUCLEAR SCLEROSIS, RIGHT: ICD-10-CM

## 2022-11-07 DIAGNOSIS — H21.541 POSTERIOR SYNECHIAE, RIGHT: ICD-10-CM

## 2022-11-07 DIAGNOSIS — H35.031 BLIND HYPERTENSIVE RIGHT EYE: ICD-10-CM

## 2022-11-07 DIAGNOSIS — Z98.83 GLAUCOMA FILTERING BLEB OF LEFT EYE: ICD-10-CM

## 2022-11-07 DIAGNOSIS — H40.89 GLAUCOMA DUE TO COMBINATION OF MECHANISMS: Primary | ICD-10-CM

## 2022-11-07 PROCEDURE — 1159F MED LIST DOCD IN RCRD: CPT | Mod: CPTII,S$GLB,, | Performed by: OPHTHALMOLOGY

## 2022-11-07 PROCEDURE — 99999 PR PBB SHADOW E&M-EST. PATIENT-LVL III: CPT | Mod: PBBFAC,,, | Performed by: OPHTHALMOLOGY

## 2022-11-07 PROCEDURE — 3288F FALL RISK ASSESSMENT DOCD: CPT | Mod: CPTII,S$GLB,, | Performed by: OPHTHALMOLOGY

## 2022-11-07 PROCEDURE — 1160F PR REVIEW ALL MEDS BY PRESCRIBER/CLIN PHARMACIST DOCUMENTED: ICD-10-PCS | Mod: CPTII,S$GLB,, | Performed by: OPHTHALMOLOGY

## 2022-11-07 PROCEDURE — 3288F PR FALLS RISK ASSESSMENT DOCUMENTED: ICD-10-PCS | Mod: CPTII,S$GLB,, | Performed by: OPHTHALMOLOGY

## 2022-11-07 PROCEDURE — 1160F RVW MEDS BY RX/DR IN RCRD: CPT | Mod: CPTII,S$GLB,, | Performed by: OPHTHALMOLOGY

## 2022-11-07 PROCEDURE — 99999 PR PBB SHADOW E&M-EST. PATIENT-LVL III: ICD-10-PCS | Mod: PBBFAC,,, | Performed by: OPHTHALMOLOGY

## 2022-11-07 PROCEDURE — 92012 INTRM OPH EXAM EST PATIENT: CPT | Mod: S$GLB,,, | Performed by: OPHTHALMOLOGY

## 2022-11-07 PROCEDURE — 1101F PT FALLS ASSESS-DOCD LE1/YR: CPT | Mod: CPTII,S$GLB,, | Performed by: OPHTHALMOLOGY

## 2022-11-07 PROCEDURE — 1126F AMNT PAIN NOTED NONE PRSNT: CPT | Mod: CPTII,S$GLB,, | Performed by: OPHTHALMOLOGY

## 2022-11-07 PROCEDURE — 1159F PR MEDICATION LIST DOCUMENTED IN MEDICAL RECORD: ICD-10-PCS | Mod: CPTII,S$GLB,, | Performed by: OPHTHALMOLOGY

## 2022-11-07 PROCEDURE — 92012 PR EYE EXAM, EST PATIENT,INTERMED: ICD-10-PCS | Mod: S$GLB,,, | Performed by: OPHTHALMOLOGY

## 2022-11-07 PROCEDURE — 1101F PR PT FALLS ASSESS DOC 0-1 FALLS W/OUT INJ PAST YR: ICD-10-PCS | Mod: CPTII,S$GLB,, | Performed by: OPHTHALMOLOGY

## 2022-11-07 PROCEDURE — 1126F PR PAIN SEVERITY QUANTIFIED, NO PAIN PRESENT: ICD-10-PCS | Mod: CPTII,S$GLB,, | Performed by: OPHTHALMOLOGY

## 2022-11-11 NOTE — PROGRESS NOTES
[facility] Note    Reason for Visit:  The primary encounter diagnosis was Oropharyngeal dysphagia. Diagnoses of Anemia, unspecified type and Gastroesophageal reflux disease without esophagitis were also pertinent to this visit.    PCP:   Shasta Urbina       Referring MD:  Shasta Urbina Md  [id number] [place] [address]    HPI:  This is a 83 y.o. female here for f/u evaluation of dysphagia and anemia.   Last seen by MARCELINO Hernandez 6 month ago for anemia and GERD. VCE was discussed but pt deferred.     Interval Hx  Still having some dysphagia   Spitting up mucus  Denies melena or rectal bleeding  Taking iron daily. Iron levels are now normal  Hgb stable at 11.6  Taking nexium a couple times a month only, instead of daily as advised.     2/2018 visit notes  Hx of dysphagia. Was feeling solids not go down. Occurring a couple times per month. No difficulty swallowing liquids, painful swallowing, inducing vomiting. NSAID usage- none. Hx of Gerd not taking Nexium daily, taking every couple days. EGD done 2/6/18 Small hiatal hernia. Mild Schatzki ring. Dilated. Gastric mucosal atrophy. Currently, pt reports improvement in dysphagia and taking Nexium daily.     Hx of Anemia, recently followed up with Hematology recommending EGD and Colonoscopy to rule out GI bleed. Hgb ranging 10-12 since 2010. Taking iron daily for years and will see darker stool. Denies blood in stool and melena. Having normal formed stool daily. FIT test 1/23/18 negative. Iron studies 1/8/18 normal. Colonoscopy done 2/6/18 Tortuous colon, otherwise normal. Currently, pt denies SOB at rest, CP, fatigue, lightheadedness, syncope, blood in stool.     ROS:  Constitutional: No fevers, no chills, No unintentional weight loss, no fatigue   ENT: No allergies  CV: No chest pain, no palpitations, no perif. edema  Pulm: No cough, No shortness of breath, no wheezes, no sputum  Ophtho: No vision changes  GI: see HPI  Derm:  No rash  Heme: No lymphadenopathy, No bruising  MSK: No arthritis, no muscle pain, no muscle weakness  : No dysuria, No hematuria  Endo: No hot or cold intolerance  Neuro: No syncope, No seizure     Medical History:  has a past medical history of Anemia; Arthritis (2015); Cataract; Coronary artery disease; GERD (gastroesophageal reflux disease); Glaucoma (increased eye pressure); Hyperlipidemia; Hypertension; Lactose intolerance; Legally blind in right eye, as defined in [place]; Osteoporosis, post-menopausal; PAD (peripheral artery disease) (7/11/2018); Pneumonia (12/13/2015); Proximal muscle weakness; Renal cyst; and Vitamin D deficiency disease.    Surgical History:  has a past surgical history that includes Tonsillectomy; Tear duct surgery; Breast biopsy; Cardiac catheterization (01/14/2010); Cataract extraction w/ intraocular lens implant w/ trabeculectomy (5/22/13); EDTA CHELATION (Left, 2/14); Adenoidectomy; Femur fracture surgery (Left, 05/09/2016); Cataract extraction w/  intraocular lens implant (Left, 5/22/13); YAG CAPSULOTOMY (Left, 06/22/2017); Upper gastrointestinal endoscopy; and Colonoscopy (N/A, 2/6/2018).    Family History: family history includes Alzheimer's disease in her father; Arthritis in her sister; Asthma in her sister; Colon cancer (age of onset: 44) in her other; Diabetes in her brother and daughter; Glaucoma in her mother; Heart disease in her brother and mother; Hyperlipidemia in her brother; Hypertension in her brother, daughter, father, and son; Osteoporosis in her mother, sister, and sister; Peripheral vascular disease in her brother and father; Rheum arthritis in her brother and sister; Stroke in her brother..     Social History:  reports that she has never smoked. She has never used smokeless tobacco. She reports that she does not drink alcohol or use drugs.    Review of patient's allergies indicates:   Allergen Reactions    Pcn [penicillins] Rash    Sulfa (sulfonamide antibiotics)  "Itching     Current Outpatient Prescriptions   Medication Sig    aspirin 81 mg Tab Take 81 mg by mouth every evening. 1 Tablet Oral Every day    bimatoprost (LUMIGAN) 0.01 % Drop Place 1 drop into the right eye every evening.    brimonidine 0.15 % OPTH DROP (ALPHAGAN) 0.15 % ophthalmic solution Place 1 drop into the right eye 3 (three) times daily.    calcium carbonate (OS-KACI) 600 mg (1,500 mg) Tab Take 600 mg by mouth 2 (two) times daily with meals.    chlorthalidone (HYGROTEN) 25 MG Tab Take one tablet daily    ergocalciferol (ERGOCALCIFEROL) 50,000 unit Cap Take 1 capsule (50,000 Units total) by mouth every 30 days.    ezetimibe (ZETIA) 10 mg tablet Take 0.5 tablets (5 mg total) by mouth once daily.    ferrous sulfate 325 (65 FE) MG EC tablet Take 1 tablet (325 mg total) by mouth once daily.    lovastatin (MEVACOR) 40 MG tablet Take 1 tablet (40 mg total) by mouth every evening.    multivitamin (ONE DAILY MULTIVITAMIN) per tablet Take 1 tablet by mouth once daily.    pilocarpine HCL 4% (PILOCAR) 4 % ophthalmic solution Place 1 drop into the right eye 3 (three) times daily.    senna-docusate 8.6-50 mg (PERICOLACE) 8.6-50 mg per tablet Take 1 tablet by mouth 2 (two) times daily. (Patient taking differently: Take 1 tablet by mouth 2 (two) times daily as needed. )    timolol maleate 0.5% (TIMOPTIC-XE) 0.5 % SolG Place 1 drop into the right eye every morning.    valsartan (DIOVAN) 160 MG tablet TAKE 1 TABLET (160 MG TOTAL) BY MOUTH 2 (TWO) TIMES DAILY.    esomeprazole (NEXIUM) 20 MG capsule Take 1 capsule (20 mg total) by mouth before breakfast.     No current facility-administered medications for this visit.      Objective Findings:    Vital Signs:  BP (!) 118/48   Pulse 62   Ht 5' 3" (1.6 m)   Wt 38 kg (83 lb 12.8 oz)   LMP 11/10/1987 (Approximate)   BMI 14.84 kg/m²   Body mass index is 14.84 kg/m².    Physical Exam:  General Appearance: Well appearing in no acute distress  Head: Normocephalic, " without obvious abnormality  Eyes: No scleral icterus  ENT: Neck supple, Lips, mucosa, and tongue normal  Abdomen: soft, NT to palpation, BS x 4  Extremities: No clubbing, cyanosis or edema  Skin: No rash to exposed areas  Neurologic: AAOx4    Labs:  Lab Results   Component Value Date    WBC 5.50 07/09/2018    HGB 11.6 (L) 07/09/2018    HCT 35.7 (L) 07/09/2018     07/09/2018    CHOL 266 (H) 07/09/2018    TRIG 111 07/09/2018    HDL 70 07/09/2018    ALT 23 07/09/2018    AST 25 07/09/2018     07/09/2018    K 3.8 07/09/2018     07/09/2018    CREATININE 0.8 07/09/2018    BUN 20 07/09/2018    CO2 27 07/09/2018    TSH 0.934 11/06/2017    INR 1.0 05/23/2016     Endoscopy:    EGD- 6/2014 normal.     EGD- 2/6/18- Small hiatal hernia. Mild Schatzki ring. Dilated. Gastric mucosal atrophy    Colonoscopy- 2007- Tortuous colon, otherwise normal. Repeat in 5-10 years.     Assessment:  1. Oropharyngeal dysphagia    2. Anemia, unspecified type    3. Gastroesophageal reflux disease without esophagitis      84yo F with Hx of dysphagia, GERD and Schatzki's ring requiring dilation. Only taking nexium a couple times a month    Recommendations:  1. MBSS for dysphagia    2. Take Nexium 20mg daily    Order summary:  Orders Placed This Encounter    Fl Modified Barium Swallow Speech    SLP video swallow    esomeprazole (NEXIUM) 20 MG capsule     Thank you so much for allowing me to participate in the care of Mj Mccall PA-C   None

## 2022-12-19 RX ORDER — ESOMEPRAZOLE MAGNESIUM 40 MG/1
CAPSULE, DELAYED RELEASE ORAL
Qty: 90 CAPSULE | Refills: 2 | Status: SHIPPED | OUTPATIENT
Start: 2022-12-19 | End: 2023-12-22

## 2022-12-19 NOTE — TELEPHONE ENCOUNTER
Refill Decision Note   Juanitaan Kristopher  is requesting a refill authorization.  Brief Assessment and Rationale for Refill:  Approve     Medication Therapy Plan:       Medication Reconciliation Completed: No   Comments:     No Care Gaps recommended.     Note composed:1:46 PM 12/19/2022

## 2023-02-07 ENCOUNTER — IMMUNIZATION (OUTPATIENT)
Dept: INTERNAL MEDICINE | Facility: CLINIC | Age: 88
End: 2023-02-07
Payer: MEDICARE

## 2023-02-07 DIAGNOSIS — Z00.00 ENCOUNTER FOR MEDICARE ANNUAL WELLNESS EXAM: ICD-10-CM

## 2023-02-07 PROCEDURE — G0008 ADMIN INFLUENZA VIRUS VAC: HCPCS | Mod: HCNC,S$GLB,, | Performed by: INTERNAL MEDICINE

## 2023-02-07 PROCEDURE — 90694 VACC AIIV4 NO PRSRV 0.5ML IM: CPT | Mod: HCNC,S$GLB,, | Performed by: INTERNAL MEDICINE

## 2023-02-07 PROCEDURE — G0008 FLU VACCINE - QUADRIVALENT - ADJUVANTED: ICD-10-PCS | Mod: HCNC,S$GLB,, | Performed by: INTERNAL MEDICINE

## 2023-02-07 PROCEDURE — 90694 FLU VACCINE - QUADRIVALENT - ADJUVANTED: ICD-10-PCS | Mod: HCNC,S$GLB,, | Performed by: INTERNAL MEDICINE

## 2023-02-09 DIAGNOSIS — Z00.00 ENCOUNTER FOR MEDICARE ANNUAL WELLNESS EXAM: ICD-10-CM

## 2023-02-23 ENCOUNTER — TELEPHONE (OUTPATIENT)
Dept: ENDOCRINOLOGY | Facility: CLINIC | Age: 88
End: 2023-02-23
Payer: MEDICARE

## 2023-02-28 ENCOUNTER — LAB VISIT (OUTPATIENT)
Dept: LAB | Facility: HOSPITAL | Age: 88
End: 2023-02-28
Attending: INTERNAL MEDICINE
Payer: MEDICARE

## 2023-02-28 ENCOUNTER — OFFICE VISIT (OUTPATIENT)
Dept: INTERNAL MEDICINE | Facility: CLINIC | Age: 88
End: 2023-02-28
Payer: MEDICARE

## 2023-02-28 DIAGNOSIS — E78.5 HYPERLIPIDEMIA, UNSPECIFIED HYPERLIPIDEMIA TYPE: ICD-10-CM

## 2023-02-28 DIAGNOSIS — I10 ESSENTIAL HYPERTENSION: Chronic | ICD-10-CM

## 2023-02-28 DIAGNOSIS — N18.31 STAGE 3A CHRONIC KIDNEY DISEASE: ICD-10-CM

## 2023-02-28 DIAGNOSIS — Z12.31 SCREENING MAMMOGRAM, ENCOUNTER FOR: Primary | ICD-10-CM

## 2023-02-28 DIAGNOSIS — I70.0 AORTIC ATHEROSCLEROSIS: ICD-10-CM

## 2023-02-28 DIAGNOSIS — E21.3 HYPERPARATHYROIDISM, UNSPECIFIED: ICD-10-CM

## 2023-02-28 LAB
ALBUMIN SERPL BCP-MCNC: 3.6 G/DL (ref 3.5–5.2)
ALP SERPL-CCNC: 61 U/L (ref 55–135)
ALT SERPL W/O P-5'-P-CCNC: 16 U/L (ref 10–44)
ANION GAP SERPL CALC-SCNC: 8 MMOL/L (ref 8–16)
AST SERPL-CCNC: 24 U/L (ref 10–40)
BILIRUB SERPL-MCNC: 0.3 MG/DL (ref 0.1–1)
BUN SERPL-MCNC: 21 MG/DL (ref 8–23)
CALCIUM SERPL-MCNC: 9.9 MG/DL (ref 8.7–10.5)
CHLORIDE SERPL-SCNC: 103 MMOL/L (ref 95–110)
CO2 SERPL-SCNC: 26 MMOL/L (ref 23–29)
CREAT SERPL-MCNC: 1 MG/DL (ref 0.5–1.4)
EST. GFR  (NO RACE VARIABLE): 54.5 ML/MIN/1.73 M^2
GLUCOSE SERPL-MCNC: 90 MG/DL (ref 70–110)
POTASSIUM SERPL-SCNC: 3.8 MMOL/L (ref 3.5–5.1)
PROT SERPL-MCNC: 7.5 G/DL (ref 6–8.4)
SODIUM SERPL-SCNC: 137 MMOL/L (ref 136–145)

## 2023-02-28 PROCEDURE — 99214 OFFICE O/P EST MOD 30 MIN: CPT | Mod: HCNC,S$GLB,, | Performed by: INTERNAL MEDICINE

## 2023-02-28 PROCEDURE — 99214 PR OFFICE/OUTPT VISIT, EST, LEVL IV, 30-39 MIN: ICD-10-PCS | Mod: HCNC,S$GLB,, | Performed by: INTERNAL MEDICINE

## 2023-02-28 PROCEDURE — 1159F PR MEDICATION LIST DOCUMENTED IN MEDICAL RECORD: ICD-10-PCS | Mod: HCNC,CPTII,S$GLB, | Performed by: INTERNAL MEDICINE

## 2023-02-28 PROCEDURE — 36415 COLL VENOUS BLD VENIPUNCTURE: CPT | Mod: HCNC | Performed by: INTERNAL MEDICINE

## 2023-02-28 PROCEDURE — 3288F PR FALLS RISK ASSESSMENT DOCUMENTED: ICD-10-PCS | Mod: HCNC,CPTII,S$GLB, | Performed by: INTERNAL MEDICINE

## 2023-02-28 PROCEDURE — 1159F MED LIST DOCD IN RCRD: CPT | Mod: HCNC,CPTII,S$GLB, | Performed by: INTERNAL MEDICINE

## 2023-02-28 PROCEDURE — 99999 PR PBB SHADOW E&M-EST. PATIENT-LVL IV: CPT | Mod: PBBFAC,HCNC,, | Performed by: INTERNAL MEDICINE

## 2023-02-28 PROCEDURE — 80053 COMPREHEN METABOLIC PANEL: CPT | Mod: HCNC | Performed by: INTERNAL MEDICINE

## 2023-02-28 PROCEDURE — 1101F PR PT FALLS ASSESS DOC 0-1 FALLS W/OUT INJ PAST YR: ICD-10-PCS | Mod: HCNC,CPTII,S$GLB, | Performed by: INTERNAL MEDICINE

## 2023-02-28 PROCEDURE — 1126F PR PAIN SEVERITY QUANTIFIED, NO PAIN PRESENT: ICD-10-PCS | Mod: HCNC,CPTII,S$GLB, | Performed by: INTERNAL MEDICINE

## 2023-02-28 PROCEDURE — 1101F PT FALLS ASSESS-DOCD LE1/YR: CPT | Mod: HCNC,CPTII,S$GLB, | Performed by: INTERNAL MEDICINE

## 2023-02-28 PROCEDURE — 1126F AMNT PAIN NOTED NONE PRSNT: CPT | Mod: HCNC,CPTII,S$GLB, | Performed by: INTERNAL MEDICINE

## 2023-02-28 PROCEDURE — 99999 PR PBB SHADOW E&M-EST. PATIENT-LVL IV: ICD-10-PCS | Mod: PBBFAC,HCNC,, | Performed by: INTERNAL MEDICINE

## 2023-02-28 PROCEDURE — 3288F FALL RISK ASSESSMENT DOCD: CPT | Mod: HCNC,CPTII,S$GLB, | Performed by: INTERNAL MEDICINE

## 2023-02-28 RX ORDER — AMLODIPINE BESYLATE 5 MG/1
5 TABLET ORAL DAILY
Qty: 90 TABLET | Refills: 1 | Status: SHIPPED | OUTPATIENT
Start: 2023-02-28 | End: 2023-11-02 | Stop reason: ALTCHOICE

## 2023-02-28 RX ORDER — CHLORTHALIDONE 25 MG/1
25 TABLET ORAL DAILY
Qty: 90 TABLET | Refills: 1 | Status: SHIPPED | OUTPATIENT
Start: 2023-02-28 | End: 2023-11-02 | Stop reason: SDUPTHER

## 2023-02-28 RX ORDER — OLMESARTAN MEDOXOMIL 40 MG/1
40 TABLET ORAL DAILY
Qty: 90 TABLET | Refills: 1 | Status: SHIPPED | OUTPATIENT
Start: 2023-02-28 | End: 2023-09-14

## 2023-03-04 VITALS
HEIGHT: 63 IN | DIASTOLIC BLOOD PRESSURE: 64 MMHG | BODY MASS INDEX: 15.58 KG/M2 | TEMPERATURE: 99 F | OXYGEN SATURATION: 99 % | HEART RATE: 81 BPM | SYSTOLIC BLOOD PRESSURE: 136 MMHG | WEIGHT: 87.94 LBS

## 2023-03-04 PROBLEM — N18.31 STAGE 3A CHRONIC KIDNEY DISEASE: Status: ACTIVE | Noted: 2023-03-04

## 2023-03-05 NOTE — PROGRESS NOTES
Subjective:       Patient ID: Mj Summers is a 87 y.o. female.    Chief Complaint: Hypertension    HPI  She returns for management of hypertension.  She has had hypertension for over a year.  Current treatment has included medications outlined in medication list.  She denies chest pain or shortness of breath.  No palpitations.  Denies left arm or neck pain.  She has hyperlipidemia.  Currently on Crestor     Medications:  See medication list     Social history:  Does not smoke, does not drink alcohol       Review of Systems   Constitutional:  Negative for chills, fatigue, fever and unexpected weight change.   Respiratory:  Negative for chest tightness and shortness of breath.    Cardiovascular:  Negative for chest pain and palpitations.   Gastrointestinal:  Negative for abdominal pain and blood in stool.   Neurological:  Negative for dizziness, syncope, numbness and headaches.     Objective:      Physical Exam  HENT:      Right Ear: External ear normal.      Left Ear: External ear normal.      Nose: Nose normal.      Mouth/Throat:      Mouth: Mucous membranes are moist.      Pharynx: Oropharynx is clear.   Eyes:      Pupils: Pupils are equal, round, and reactive to light.   Cardiovascular:      Rate and Rhythm: Normal rate and regular rhythm.      Heart sounds: No murmur heard.  Pulmonary:      Breath sounds: Normal breath sounds.   Abdominal:      General: There is no distension.      Palpations: There is no hepatomegaly or splenomegaly.      Tenderness: There is no abdominal tenderness.   Musculoskeletal:      Cervical back: Normal range of motion.   Lymphadenopathy:      Cervical: No cervical adenopathy.      Upper Body:      Right upper body: No axillary adenopathy.      Left upper body: No axillary adenopathy.   Neurological:      Cranial Nerves: No cranial nerve deficit.      Sensory: No sensory deficit.      Motor: Motor function is intact.      Deep Tendon Reflexes: Reflexes are normal and symmetric.      Breast exam: No masses palpated, no nipple discharge expressed  Assessment/Plan       Assessment and plan:  1.  Hypertension:  Controlled.  Continue Benicar.  Check CMP  2.  Hyperlipidemia:  Continue Crestor  3.  Schedule mammogram.  Discussed Pap smear, pelvic exam.  She declined all

## 2023-03-07 NOTE — PROGRESS NOTES
"  HPI    DLS: 11/07/2022    Pt here for HVF review/OCT;  Pt states at times she would get pain to her OD. Pt states at times she   doesn't put in her eye drops in her OD.    Meds:  T1/2 GFS QAM OU   Alphagan 0.15% TID OD   Camacho 4% TID OD   Lumugan QHS OS     Systane 3-4 x day OU       1) Residula OAG / MMG OU   2) Blin d H ypertensive OD   3) NS OD   4) FABY   5) APD OD   6) B and Keratopathy OD   7) Asteroid Hyalosis OS   8) Hx Acute Dacryocystitis OS   9) CRVO OD   10) Ant. Capsule Phimosis OS   11) PCIOL O S   12) s/p trab w/ express mini shunt  os        Last edited by Kristi Ray on 3/9/2023  3:01 PM.            Assessment /Plan     For exam results, see Encounter Report.    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   Angle open after PI's ou   -Followed at [facility] since at least '01   First HVF 2001   First photos 2001   S/p PI OU   s/p Gonioplasty OU     Family history none   Glaucoma meds Lumigan, timolol gfs  qAM, Agan tid, Camacho tid  - all OD ((off all gtts os s/p combined)   H/O adverse rxn to glaucoma drops Azopt "felt weird"   LASERS S/p PI OU, s/p Gonioplasty OU   GLAUCOMA SURGERIES    combined phaco/trab with mini shunt OS 5/22/2013   OTHER EYE SURGERIES    S/P DCR sx OD x 2 or 3 with Damaris    Combined phaco/IOL/ trab with mini shunt OS 5/22/2013   CDR 0.9 w/ shunt vessels  /0.9   Tbase 42/23   Tmax 42/23 (when stopped gtts)   Ttarget pain control od // 18 os   HVF 21  VF '01 -'23 - Ext   od  // SALT / IAD (gen dep) os   Gonio +3/+3   /539   OCT 12 test 2006 to 2023 - RNFL - OD:poor test  // " OS:dec Dec thru out expect NS   HRT 14 test 2004 to 2018 - MR -  Dec. S/I od // dec. S/N/I os /// CDR 0.66 od // 0.83 os   HRT 2019 - high std dev. Ou (( NO MORE HRT'S - unreliable))   Disc photos - 2001, 2003, 2006 - slides // 2008, 2010, 2016, 2018, 2021    - OIS     Ta today 30 (bareLP - pain free) // 14  Test today - IOP// HVF os // DFE // OCT os     2.  Blind hypertensive Right Eye   Pain free - eye comfortable   Very limited vision CF at 3'   End stage glaucoma and S/P CRVO    No rubeosis   IOP is high but eye is pain free    3. Cataract OD -However, OD VA limited 2/2 CRVO and end stge glaucoma   S/P phaco/IOL/trab OS 5/22/2013    4. FABY OU -cont ATs     5. APD OD -   Old / stable 2/2 CRVO and glaucoma    6. Band Keratopathy OD -stable. Cont ATs- being followed by Dr. Saul;    had EDTA chelation on 2/13/14 OS - now with recurrent band     7. Asteroid Hyalosis OS -stable     8. Hx of Acute Dacryocystitis OS and recent NLD OD s/p surgery 11/12 and repeat for exposure w/ Dr. Ocampo 12/5/12 -stable. Cont f/u with Damaris     9. Hx of CRVO OD -limiting visual potential OD   -No NV on todays exam     10. Ant capsule phimosis os    S/P yag laser     11. Asteroid hyalosis OS     12. PC IOL os    Combined  W/ mini shunt 5/22/2013   doing well VA is 20/25 and the IOP is 13    Plan:    s/p combined (5/22/2013)  --    IOP creeping up 9/21/2021 - added back timolol q am and lumigan q evening     Cont glaucoma gtts OD -- IOP  high, but comfortable/no pain // Blind hypertensive but pain free eye   Old  crvo     Ok to use OTC readers for now a +3.00 to +3.50 - not really able to improve distance vision with glasses at this time  Much improved from before the combined  Procedure    Cont all glaucoma gtts od   Blind hypertensive right eye - pain free     IOP was creeping up os - added back timolol q am and lumigan q evening - 9/21/2021   Good resp os 18--> 14     Photo file updated 3/9/2023     Glasses - Matthieu -  - refer to  [facility] - to see if hand held magnifier might help     F/U 4 months with IOP

## 2023-03-09 ENCOUNTER — CLINICAL SUPPORT (OUTPATIENT)
Dept: OPHTHALMOLOGY | Facility: CLINIC | Age: 88
End: 2023-03-09
Payer: MEDICARE

## 2023-03-09 ENCOUNTER — OFFICE VISIT (OUTPATIENT)
Dept: OPHTHALMOLOGY | Facility: CLINIC | Age: 88
End: 2023-03-09
Payer: MEDICARE

## 2023-03-09 DIAGNOSIS — H35.031 BLIND HYPERTENSIVE RIGHT EYE: ICD-10-CM

## 2023-03-09 DIAGNOSIS — H21.541 POSTERIOR SYNECHIAE, RIGHT: ICD-10-CM

## 2023-03-09 DIAGNOSIS — Z98.83 GLAUCOMA FILTERING BLEB OF LEFT EYE: ICD-10-CM

## 2023-03-09 DIAGNOSIS — H40.89 GLAUCOMA DUE TO COMBINATION OF MECHANISMS: Primary | ICD-10-CM

## 2023-03-09 DIAGNOSIS — H34.8112 CENTRAL RETINAL VEIN OCCLUSION OF RIGHT EYE, UNSPECIFIED COMPLICATION STATUS: ICD-10-CM

## 2023-03-09 DIAGNOSIS — H21.561 AFFERENT PUPILLARY DEFECT, RIGHT: ICD-10-CM

## 2023-03-09 DIAGNOSIS — H25.11 SENILE NUCLEAR SCLEROSIS, RIGHT: ICD-10-CM

## 2023-03-09 DIAGNOSIS — H40.89 GLAUCOMA DUE TO COMBINATION OF MECHANISMS: ICD-10-CM

## 2023-03-09 DIAGNOSIS — H18.421 BAND KERATOPATHY, RIGHT: ICD-10-CM

## 2023-03-09 DIAGNOSIS — Z96.1 PSEUDOPHAKIA OF LEFT EYE: ICD-10-CM

## 2023-03-09 PROCEDURE — 99214 PR OFFICE/OUTPT VISIT, EST, LEVL IV, 30-39 MIN: ICD-10-PCS | Mod: HCNC,S$GLB,, | Performed by: OPHTHALMOLOGY

## 2023-03-09 PROCEDURE — 3288F PR FALLS RISK ASSESSMENT DOCUMENTED: ICD-10-PCS | Mod: HCNC,CPTII,S$GLB, | Performed by: OPHTHALMOLOGY

## 2023-03-09 PROCEDURE — 1159F PR MEDICATION LIST DOCUMENTED IN MEDICAL RECORD: ICD-10-PCS | Mod: HCNC,CPTII,S$GLB, | Performed by: OPHTHALMOLOGY

## 2023-03-09 PROCEDURE — 1126F AMNT PAIN NOTED NONE PRSNT: CPT | Mod: HCNC,CPTII,S$GLB, | Performed by: OPHTHALMOLOGY

## 2023-03-09 PROCEDURE — 99214 OFFICE O/P EST MOD 30 MIN: CPT | Mod: HCNC,S$GLB,, | Performed by: OPHTHALMOLOGY

## 2023-03-09 PROCEDURE — 1101F PT FALLS ASSESS-DOCD LE1/YR: CPT | Mod: HCNC,CPTII,S$GLB, | Performed by: OPHTHALMOLOGY

## 2023-03-09 PROCEDURE — 99999 PR PBB SHADOW E&M-EST. PATIENT-LVL III: CPT | Mod: PBBFAC,HCNC,, | Performed by: OPHTHALMOLOGY

## 2023-03-09 PROCEDURE — 1160F RVW MEDS BY RX/DR IN RCRD: CPT | Mod: HCNC,CPTII,S$GLB, | Performed by: OPHTHALMOLOGY

## 2023-03-09 PROCEDURE — 1126F PR PAIN SEVERITY QUANTIFIED, NO PAIN PRESENT: ICD-10-PCS | Mod: HCNC,CPTII,S$GLB, | Performed by: OPHTHALMOLOGY

## 2023-03-09 PROCEDURE — 1160F PR REVIEW ALL MEDS BY PRESCRIBER/CLIN PHARMACIST DOCUMENTED: ICD-10-PCS | Mod: HCNC,CPTII,S$GLB, | Performed by: OPHTHALMOLOGY

## 2023-03-09 PROCEDURE — 1101F PR PT FALLS ASSESS DOC 0-1 FALLS W/OUT INJ PAST YR: ICD-10-PCS | Mod: HCNC,CPTII,S$GLB, | Performed by: OPHTHALMOLOGY

## 2023-03-09 PROCEDURE — 3288F FALL RISK ASSESSMENT DOCD: CPT | Mod: HCNC,CPTII,S$GLB, | Performed by: OPHTHALMOLOGY

## 2023-03-09 PROCEDURE — 99999 PR PBB SHADOW E&M-EST. PATIENT-LVL III: ICD-10-PCS | Mod: PBBFAC,HCNC,, | Performed by: OPHTHALMOLOGY

## 2023-03-09 PROCEDURE — 1159F MED LIST DOCD IN RCRD: CPT | Mod: HCNC,CPTII,S$GLB, | Performed by: OPHTHALMOLOGY

## 2023-04-05 ENCOUNTER — OFFICE VISIT (OUTPATIENT)
Dept: CARDIOLOGY | Facility: CLINIC | Age: 88
End: 2023-04-05
Payer: MEDICARE

## 2023-04-05 VITALS
HEART RATE: 74 BPM | DIASTOLIC BLOOD PRESSURE: 66 MMHG | BODY MASS INDEX: 15.66 KG/M2 | SYSTOLIC BLOOD PRESSURE: 169 MMHG | OXYGEN SATURATION: 99 % | WEIGHT: 88.38 LBS | HEIGHT: 63 IN

## 2023-04-05 DIAGNOSIS — E78.5 DYSLIPIDEMIA: Chronic | ICD-10-CM

## 2023-04-05 DIAGNOSIS — I10 ESSENTIAL HYPERTENSION: Primary | Chronic | ICD-10-CM

## 2023-04-05 DIAGNOSIS — I70.0 AORTIC ATHEROSCLEROSIS: ICD-10-CM

## 2023-04-05 DIAGNOSIS — N18.31 STAGE 3A CHRONIC KIDNEY DISEASE: ICD-10-CM

## 2023-04-05 DIAGNOSIS — I65.23 BILATERAL CAROTID ARTERY STENOSIS: ICD-10-CM

## 2023-04-05 DIAGNOSIS — I25.10 CORONARY ARTERY DISEASE INVOLVING NATIVE CORONARY ARTERY OF NATIVE HEART WITHOUT ANGINA PECTORIS: ICD-10-CM

## 2023-04-05 PROCEDURE — 3288F PR FALLS RISK ASSESSMENT DOCUMENTED: ICD-10-PCS | Mod: HCNC,CPTII,S$GLB, | Performed by: PHYSICIAN ASSISTANT

## 2023-04-05 PROCEDURE — 1159F MED LIST DOCD IN RCRD: CPT | Mod: HCNC,CPTII,S$GLB, | Performed by: PHYSICIAN ASSISTANT

## 2023-04-05 PROCEDURE — 1159F PR MEDICATION LIST DOCUMENTED IN MEDICAL RECORD: ICD-10-PCS | Mod: HCNC,CPTII,S$GLB, | Performed by: PHYSICIAN ASSISTANT

## 2023-04-05 PROCEDURE — 1126F AMNT PAIN NOTED NONE PRSNT: CPT | Mod: HCNC,CPTII,S$GLB, | Performed by: PHYSICIAN ASSISTANT

## 2023-04-05 PROCEDURE — 99999 PR PBB SHADOW E&M-EST. PATIENT-LVL V: ICD-10-PCS | Mod: PBBFAC,HCNC,, | Performed by: PHYSICIAN ASSISTANT

## 2023-04-05 PROCEDURE — 1101F PT FALLS ASSESS-DOCD LE1/YR: CPT | Mod: HCNC,CPTII,S$GLB, | Performed by: PHYSICIAN ASSISTANT

## 2023-04-05 PROCEDURE — 99999 PR PBB SHADOW E&M-EST. PATIENT-LVL V: CPT | Mod: PBBFAC,HCNC,, | Performed by: PHYSICIAN ASSISTANT

## 2023-04-05 PROCEDURE — 1126F PR PAIN SEVERITY QUANTIFIED, NO PAIN PRESENT: ICD-10-PCS | Mod: HCNC,CPTII,S$GLB, | Performed by: PHYSICIAN ASSISTANT

## 2023-04-05 PROCEDURE — 1101F PR PT FALLS ASSESS DOC 0-1 FALLS W/OUT INJ PAST YR: ICD-10-PCS | Mod: HCNC,CPTII,S$GLB, | Performed by: PHYSICIAN ASSISTANT

## 2023-04-05 PROCEDURE — 99214 OFFICE O/P EST MOD 30 MIN: CPT | Mod: HCNC,S$GLB,, | Performed by: PHYSICIAN ASSISTANT

## 2023-04-05 PROCEDURE — 1160F PR REVIEW ALL MEDS BY PRESCRIBER/CLIN PHARMACIST DOCUMENTED: ICD-10-PCS | Mod: HCNC,CPTII,S$GLB, | Performed by: PHYSICIAN ASSISTANT

## 2023-04-05 PROCEDURE — 3288F FALL RISK ASSESSMENT DOCD: CPT | Mod: HCNC,CPTII,S$GLB, | Performed by: PHYSICIAN ASSISTANT

## 2023-04-05 PROCEDURE — 99214 PR OFFICE/OUTPT VISIT, EST, LEVL IV, 30-39 MIN: ICD-10-PCS | Mod: HCNC,S$GLB,, | Performed by: PHYSICIAN ASSISTANT

## 2023-04-05 PROCEDURE — 1160F RVW MEDS BY RX/DR IN RCRD: CPT | Mod: HCNC,CPTII,S$GLB, | Performed by: PHYSICIAN ASSISTANT

## 2023-04-05 NOTE — PROGRESS NOTES
General Cardiology Clinic Note  Reason for Visit: 6 month follow up   Last Clinic Visit: 6/13/2022 with Isela Faith     HPI:   Mj Summers is a 87 y.o. female who presents for 6 month follow up.     Problems:  Coronary artery disease ([facility] 2010 mid LAD 40%)   Mild bilateral carotid disease (20-39% bilateral in 2015 )   Hyperlipidemia   Hypertension   Pulmonary emphysema.  PAD     Interval HPI  Patient presents for 6 month follow up. She does have chronic leg swelling that is stable. She usually wears compression stockings and tries to keep legs elevated, which does help. Otherwise, denies symptoms of CAD, CHF, arrhythmia, hypotension, TIA, claudication. She checks her BP at least twice a day at home, and it runs 120s-130s systolic. She has a healthy diet. She is not currently doing any routine exercise, but wants to start going to [employer].     6/13/2022 HPI (Isela Faith)  Ms. Summers is in clinic today for routine follow up.  Her home BP runs 120s.  Patient denies chest pain with exertion or at rest, palpitations, SOB, YOUNGBLOOD, dizziness, syncope, edema, orthopnea, PND, or claudication.  Reports no routine exercise.  She takes care of herself performing her own ADLs.     ROS:      Review of Systems   Constitutional: Negative for diaphoresis, malaise/fatigue, weight gain and weight loss.   HENT:  Negative for nosebleeds.    Eyes:  Negative for vision loss in left eye, vision loss in right eye and visual disturbance.   Cardiovascular:  Positive for leg swelling. Negative for chest pain, claudication, dyspnea on exertion, irregular heartbeat, near-syncope, orthopnea, palpitations, paroxysmal nocturnal dyspnea and syncope.   Respiratory:  Negative for cough, shortness of breath, sleep disturbances due to breathing, snoring and wheezing.    Hematologic/Lymphatic: Negative for bleeding problem. Does not bruise/bleed easily.   Skin:  Negative for poor wound healing and rash.   Musculoskeletal:  Negative for  muscle cramps and myalgias.   Gastrointestinal:  Negative for bloating, abdominal pain, diarrhea, heartburn, melena, nausea and vomiting.   Genitourinary:  Negative for hematuria and nocturia.   Neurological:  Negative for brief paralysis, dizziness, headaches, light-headedness, numbness and weakness.   Psychiatric/Behavioral:  Negative for depression.    Allergic/Immunologic: Negative for hives.     PMH:     Past Medical History:   Diagnosis Date    Anemia     Arthritis 2015    back    Cataract     s/p surgery    Coronary artery disease     [facility] 1/2010 - mid LAD 40%    GERD (gastroesophageal reflux disease)     Glaucoma (increased eye pressure)     Hyperlipidemia     Hypertension     Lactose intolerance     Legally blind in right eye, as defined in [place]     Osteoporosis, post-menopausal     PAD (peripheral artery disease) 7/11/2018    Pneumonia 12/13/2015    Proximal muscle weakness     Renal cyst     left kidney    Vitamin D deficiency disease      Past Surgical History:   Procedure Laterality Date    ADENOIDECTOMY      BREAST BIOPSY      left breast    CARDIAC CATHETERIZATION  01/14/2010    mid LAD, 40% stenosis; LCX, luminal irregularities;                 CATARACT EXTRACTION W/  INTRAOCULAR LENS IMPLANT Left 05/22/2013    COMBINED WITH TRAB ()    COLONOSCOPY N/A 2/6/2018    Procedure: COLONOSCOPY;  Surgeon: Rufus Villela MD;  Location: [facility] ([address] [place] [address]);  Service: Endoscopy;  Laterality: N/A;    EDTA CHELATION Left 2/14    OS ()    EYE SURGERY      FEMUR FRACTURE SURGERY Left 05/09/2016    FRACTURE SURGERY  2016    TEAR DUCT SURGERY      right eye    TONSILLECTOMY      TRABECULECTOMY Left 05/22/2013    WITH EXPRESS MINI SHUNT AND CE ()    UPPER GASTROINTESTINAL ENDOSCOPY      YAG CAPSULOTOMY Left 06/22/2017         Allergies:     Review of patient's allergies indicates:   Allergen Reactions    Strawberries [strawberry]      Blood pressure elevation    Chocolate  flavor      Causes acid reflux    Atorvastatin      Myalgias    Pcn [penicillins] Rash    Pravastatin      Myalgias    Sulfa (sulfonamide antibiotics) Itching     Medications:     Current Outpatient Medications on File Prior to Visit   Medication Sig Dispense Refill    amLODIPine (NORVASC) 5 MG tablet Take 1 tablet (5 mg total) by mouth once daily. 90 tablet 1    aspirin 81 mg Tab Take 81 mg by mouth every evening. 1 Tablet Oral Every day      bimatoprost (LUMIGAN) 0.01 % Drop Place 1 drop into both eyes every evening. Can use every evening at about 6 pm 7.5 mL 3    brimonidine 0.1% (ALPHAGAN P) 0.1 % Drop Place 1 drop into the right eye 3 (three) times daily. 15 mL 4    calcium carbonate (OS-KACI) 600 mg (1,500 mg) Tab Take 600 mg by mouth 2 (two) times daily with meals.      chlorthalidone (HYGROTEN) 25 MG Tab Take 1 tablet (25 mg total) by mouth once daily. 90 tablet 1    ergocalciferol (ERGOCALCIFEROL) 50,000 unit Cap Take 1 capsule (50,000 Units total) by mouth every 30 days. 3 capsule 3    esomeprazole (NEXIUM) 40 MG capsule TAKE 1 CAPSULE BY MOUTH DAILY AS NEEDED. 90 capsule 2    ferrous sulfate 325 (65 FE) MG EC tablet Take 1 tablet (325 mg total) by mouth once daily. 30 tablet 0    hydrALAZINE (APRESOLINE) 25 MG tablet Take 1 tablet (25 mg total) by mouth daily as needed (Blood pressure >160). 90 tablet 11    multivitamin (THERAGRAN) per tablet Take 1 tablet by mouth once daily.      olmesartan (BENICAR) 40 MG tablet Take 1 tablet (40 mg total) by mouth once daily. 90 tablet 1    pilocarpine HCL 4% (PILOCAR) 4 % ophthalmic solution Place 1 drop into the right eye 3 (three) times daily. 15 mL 4    rosuvastatin (CRESTOR) 10 MG tablet TAKE 1 TABLET BY MOUTH EVERY DAY 90 tablet 3    senna-docusate 8.6-50 mg (PERICOLACE) 8.6-50 mg per tablet Take 1 tablet by mouth 2 (two) times daily.      timolol maleate 0.5% (TIMOPTIC) 0.5 % Drop INSTILL 1 DROP INTO BOTH EYES EVERY MORNING 15 mL 3     No current  "facility-administered medications on file prior to visit.     Social History:     Social History     Tobacco Use    Smoking status: Never    Smokeless tobacco: Never   Substance Use Topics    Alcohol use: No     Family History:     Family History   Problem Relation Age of Onset    Asthma Sister     Rheum arthritis Sister     Osteoporosis Sister     Arthritis Sister     Hyperlipidemia Brother     Rheum arthritis Brother     Diabetes Brother     Osteoporosis Mother     Heart disease Mother     Glaucoma Mother     Hypertension Father     Peripheral vascular disease Father     Alzheimer's disease Father     Osteoporosis Sister     Hypertension Brother     Peripheral vascular disease Brother         bilateral leg amputation    Hypertension Son     Hypertension Daughter     Colon cancer Other 44        death from colon cancer at age 44    Stroke Brother     Heart disease Brother     Diabetes Daughter         GDM that is resolved    Macular degeneration Neg Hx     Retinal detachment Neg Hx     Strabismus Neg Hx     Amblyopia Neg Hx     Blindness Neg Hx     Esophageal cancer Neg Hx      Physical Exam:   BP (!) 169/66 (BP Location: Left arm, Patient Position: Sitting, BP Method: Small (Automatic))   Pulse 74   Ht 5' 3" (1.6 m)   Wt 40.1 kg (88 lb 6.5 oz)   LMP 11/10/1987 (Approximate)   SpO2 99%   BMI 15.66 kg/m²        Physical Exam  Vitals and nursing note reviewed.   Constitutional:       General: She is not in acute distress.     Appearance: Normal appearance. She is not ill-appearing.   HENT:      Head: Normocephalic and atraumatic.   Eyes:      General: No scleral icterus.     Conjunctiva/sclera: Conjunctivae normal.   Neck:      Thyroid: No thyromegaly.      Vascular: No carotid bruit or JVD.   Cardiovascular:      Rate and Rhythm: Normal rate and regular rhythm.      Pulses: Normal pulses.      Heart sounds: Normal heart sounds. No murmur heard.    No friction rub. No gallop.   Pulmonary:      Effort: Pulmonary " effort is normal.      Breath sounds: Normal breath sounds. No wheezing, rhonchi or rales.   Chest:      Chest wall: No tenderness.   Abdominal:      General: Bowel sounds are normal. There is no distension.      Palpations: Abdomen is soft.      Tenderness: There is no abdominal tenderness.   Musculoskeletal:         General: No swelling.      Cervical back: Neck supple.      Right lower le+ Pitting Edema present.      Left lower le+ Pitting Edema present.   Skin:     General: Skin is warm and dry.      Coloration: Skin is not pale.      Findings: No erythema or rash.      Nails: There is no clubbing.   Neurological:      Mental Status: She is alert and oriented to person, place, and time. Mental status is at baseline.   Psychiatric:         Mood and Affect: Mood and affect normal.         Behavior: Behavior normal.        Labs:     Lab Results   Component Value Date     2023    K 3.8 2023     2023    CO2 26 2023    BUN 21 2023    CREATININE 1.0 2023    ANIONGAP 8 2023     No results found for: HGBA1C  Lab Results   Component Value Date     (H) 10/21/2012    Lab Results   Component Value Date    WBC 3.87 (L) 2022    HGB 10.5 (L) 2022    HCT 34.0 (L) 2022    HCT 33 (L) 2021     (L) 2022    GRAN 2.7 2020    GRAN 62.3 2020     Lab Results   Component Value Date    CHOL 199 2022    HDL 68 2022    LDLCALC 112.8 2022    TRIG 91 2022          Imaging:   Echocardiograms:   TTE 2017  CONCLUSIONS     1 - Normal left ventricular systolic function (EF 60-65%).     2 - Normal left ventricular diastolic function.     3 - Normal right ventricular systolic function .     4 - The estimated PA systolic pressure is 31 mmHg.     5 - Trivial tricuspid regurgitation.     6 - Intermediate central venous pressure.     7 - No wall motion abnormalities.      Stress Tests:   None    Caths:    None    Other:  Carotid ultrasound 8/15/2022  There is less than 50% right Internal Carotid Stenosis.  There is less than 50% left Internal Carotid Stenosis.    ABIs 7/6/2018  The right ankle brachial index was 0.55 which suggests severe right lower extremity arterial disease.   The left ankle brachial index was 0.83 which suggests mild left lower extremity arterial disease.     Carotid ultrasound 10/14/2015  There is 20 - 39% right Internal Carotid stenosis.   There is 20 - 39% left Internal Carotid stenosis.     Event monitor 2014  IMPRESSION:  Negative event monitor.       Assessment:     1. Essential hypertension    2. Dyslipidemia    3. Bilateral carotid artery stenosis    4. Coronary artery disease involving native coronary artery of native heart without angina pectoris    5. Aortic atherosclerosis    6. Stage 3a chronic kidney disease      Plan:     Hypertension  Controlled at home.  Continue Amlodipine 5 mg daily   Continue Chlorthalidone 25 mg daily   Continue Olmesartan 40 mg daily     Dyslipidemia  LDL above goal but has been unable to tolerate higher doses of statin or Zetia  Continue Crestor 20 mg daily    Bilateral carotid artery disease  Aortic atherosclerosis  Mild carotid disease. Asymptomatic  Continue ASA and statin     Coronary artery disease  Denies angina. Continue medications.     CKD Stage 3a   Stable     Follow up in 9 months.     Signed:  Jonelle Pitt PA-C  Cardiology   [phone] - General  4/5/2023 11:32 AM

## 2023-04-09 ENCOUNTER — HOSPITAL ENCOUNTER (EMERGENCY)
Facility: OTHER | Age: 88
Discharge: HOME | End: 2023-04-09
Attending: EMERGENCY MEDICINE
Payer: MEDICARE

## 2023-04-09 VITALS
HEART RATE: 74 BPM | OXYGEN SATURATION: 100 % | DIASTOLIC BLOOD PRESSURE: 77 MMHG | TEMPERATURE: 98 F | SYSTOLIC BLOOD PRESSURE: 164 MMHG | RESPIRATION RATE: 16 BRPM
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
## 2023-04-09 DIAGNOSIS — S09.90XA INJURY OF HEAD, INITIAL ENCOUNTER: ICD-10-CM

## 2023-04-09 DIAGNOSIS — M25.512 ACUTE PAIN OF LEFT SHOULDER: ICD-10-CM

## 2023-04-09 DIAGNOSIS — Z87.81 HISTORY OF FRACTURE OF LEFT HIP: ICD-10-CM

## 2023-04-09 PROCEDURE — 99284 EMERGENCY DEPT VISIT MOD MDM: CPT | Mod: 25,HCNC

## 2023-04-09 PROCEDURE — 25000003 PHARM REV CODE 250: Mod: HCNC | Performed by: PHYSICIAN ASSISTANT

## 2023-04-09 RX ORDER — ACETAMINOPHEN 500 MG
1000 TABLET ORAL
Status: COMPLETED | OUTPATIENT
Start: 2023-04-09 | End: 2023-04-09

## 2023-04-09 RX ADMIN — ACETAMINOPHEN 1000 MG: 500 TABLET, FILM COATED ORAL at 10:04

## 2023-04-09 NOTE — ED TRIAGE NOTES
86 yo female reports to ed after falling after her walker collapsed. Pt fell onto the walker hitting her L side of head. Minimal bruising noted to L eyelid. Pt denies LOC, denies blood thinner use. Reports nausea at the time of fall, denies current N/V.

## 2023-04-09 NOTE — DISCHARGE INSTRUCTIONS
You can purchase lactose-free boost from [employer] or many grocery stores such as your [employer]

## 2023-04-09 NOTE — ED PROVIDER NOTES
Source of History:  Patient    Chief complaint:  Fall (Pt had fall after walker collapsed, pt reports hitting head on walker parts, complaining of pain to L side of head; no LOC, not on blood thinners)      HPI:  Mj Summers is a 87 y.o. female with PMH of osteoporosis, vitamin-D deficiency, hyperlipidemia, lactose intolerance, renal cyst, hypertension arthritis, pneumonia, GERD, CAD and PAD on no anticoagulant therapy presenting with head injury.  Patient reports that her walker malfunction and collapsed causing her to have ground level fall with impact to the left head.  States this is proximally 2 hours prior to arrival.  Denies any LOC.  States mild pain to the reported location of impact.  Also reports some mild left shoulder pain.  Denies any vision changes, dizziness, nausea, vomiting or change in gait.  Has not tried any medications for the symptoms.      This is the extent to the patients complaints today here in the emergency department.    ROS: As per HPI and below:  Constitutional: No fever.  No chills.  Eyes: No visual changes.   ENT: No sore throat. No ear pain.  Urinary: No abnormal urination.  MSK:  Left shoulder pain  Neuro:  Left head discomfort  Integument: No rashes or lesions.    Review of patient's allergies indicates:   Allergen Reactions    Strawberries [strawberry]      Blood pressure elevation    Chocolate flavor      Causes acid reflux    Atorvastatin      Myalgias    Pcn [penicillins] Rash    Pravastatin      Myalgias    Sulfa (sulfonamide antibiotics) Itching       PMH:  As per HPI and below:  Past Medical History:   Diagnosis Date    Anemia     Arthritis 2015    back    Cataract     s/p surgery    Coronary artery disease     [facility] 1/2010 - mid LAD 40%    GERD (gastroesophageal reflux disease)     Glaucoma (increased eye pressure)     Hyperlipidemia     Hypertension     Lactose intolerance     Legally blind in right eye, as defined in [place]     Osteoporosis, post-menopausal     PAD  (peripheral artery disease) 7/11/2018    Pneumonia 12/13/2015    Proximal muscle weakness     Renal cyst     left kidney    Vitamin D deficiency disease      Past Surgical History:   Procedure Laterality Date    ADENOIDECTOMY      BREAST BIOPSY      left breast    CARDIAC CATHETERIZATION  01/14/2010    mid LAD, 40% stenosis; LCX, luminal irregularities;                 CATARACT EXTRACTION W/  INTRAOCULAR LENS IMPLANT Left 05/22/2013    COMBINED WITH TRAB ()    COLONOSCOPY N/A 2/6/2018    Procedure: COLONOSCOPY;  Surgeon: Rufus Villela MD;  Location: [facility] ([address] [place] [address]);  Service: Endoscopy;  Laterality: N/A;    EDTA CHELATION Left 2/14    OS ()    EYE SURGERY      FEMUR FRACTURE SURGERY Left 05/09/2016    FRACTURE SURGERY  2016    TEAR DUCT SURGERY      right eye    TONSILLECTOMY      TRABECULECTOMY Left 05/22/2013    WITH EXPRESS MINI SHUNT AND CE ()    UPPER GASTROINTESTINAL ENDOSCOPY      YAG CAPSULOTOMY Left 06/22/2017           Social History     Tobacco Use    Smoking status: Never    Smokeless tobacco: Never   Substance Use Topics    Alcohol use: No    Drug use: No       Physical Exam:    BP (!) 164/77   Pulse 74   Temp 98.2 °F (36.8 °C) (Oral)   Resp 16   LMP 11/10/1987 (Approximate)   SpO2 100%   Breastfeeding No   Nursing note and vital signs reviewed.  Constitutional: No acute distress.  Frail-appearing  Eyes: No conjunctival injection.  Extraocular muscles are intact.  HENT: Normal phonation.  Left parietal region reported site of pain however no cephalohematoma, ecchymosis, bony deformity or signs of head injury  Musculoskeletal:  Fair passive range of motion of all extremities with strength equal bilaterally.  Mild tenderness palpation of the left lateral shoulder with no obvious bony deformity ecchymosis or crepitus.  Kyphotic changes of the spine noted no midline tenderness to palpation.  Skin: No rashes seen.  Good turgor.  No abrasions.  No  ecchymoses.  Psych: Appropriate, conversant.        I decided to obtain the patient's medical records.      Imaging Results              CT Cervical Spine Without Contrast (Final result)  Result time 04/09/23 11:24:47      Final result by Harley Rose MD (04/09/23 11:24:47)                   Impression:      1. No acute intracranial findings.  2. No acute cervical spine fracture.      Electronically signed by: Harley Rose  Date:    04/09/2023  Time:    11:24               Narrative:    EXAMINATION:  CT HEAD WITHOUT CONTRAST; CT CERVICAL SPINE WITHOUT CONTRAST    CLINICAL HISTORY:  Head trauma, minor (Age >= 65y);; Neck trauma (Age >= 65y);    TECHNIQUE:  Low dose axial images were obtained through the head and cervical spine.  Coronal and sagittal reformations were also performed. Contrast was not administered.    COMPARISON:  Head CT 12/10/2015.    FINDINGS:  Head:    Blood: No acute intracranial hemorrhage.    Parenchyma: No definite loss of gray-white differentiation to suggest acute or subacute transcortical infarct. Parenchymal volume loss.  Nonspecific areas of white matter hypoattenuation may relate to sequela of chronic small vessel ischemic disease.  Remote lacunar type infarct involving the left basal ganglia.    Ventricles/Extra-axial spaces: No abnormal extra-axial fluid collection. Basal cisterns are patent.    Vessels: Moderate atherosclerotic calcifications.    Orbits: Status post left lens replacement.  Nonspecific metallic density anterior left lobe.  This appears to been present dating back to 12/10/2015 and 09/18/2014 CT examinations, possibly iatrogenic.    Scalp: Unremarkable.    Skull: There are no depressed skull fractures or destructive bone lesions.    Sinuses and mastoids: Scattered paranasal sinus mucosal thickening retention cysts.  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    Other findings: TMJ DJD.  Partially empty sella.    Cervical spine:    Fractures:  No acute fractures    Alignment: There is no significant vertebral subluxation  Atlanto-axial and atlanto-occipital joints: Atlanto-axial and atlanto-occipital intervals are not widened.  Degenerative pannus versus pseudo pannus formation at C1-2 mildly deforms ventral thecal sac without significant deformity of the craniocervical junction or upper cervical cord.  Facet joints: There is no traumatic facet joint widening.  Degenerative facet arthropathy.  Vertebral bodies: Query osseous demineralization.  Degenerative endplate change, most advanced at C5-6 and C6-7.  Discs: Disc osteophyte complex formation.  Spinal canal and foraminal narrowing: Although CT does not optimally evaluate the soft tissue contents of the spinal canal and foramina, no critical stenosis is suggested.  Paraspinal soft tissues: Unremarkable.    Upper Lungs:Emphysema.  Biapical pleuroparenchymal scarring.                                       CT Head Without Contrast (Final result)  Result time 04/09/23 11:24:47      Final result by Harley Rose MD (04/09/23 11:24:47)                   Impression:      1. No acute intracranial findings.  2. No acute cervical spine fracture.      Electronically signed by: Harley Rose  Date:    04/09/2023  Time:    11:24               Narrative:    EXAMINATION:  CT HEAD WITHOUT CONTRAST; CT CERVICAL SPINE WITHOUT CONTRAST    CLINICAL HISTORY:  Head trauma, minor (Age >= 65y);; Neck trauma (Age >= 65y);    TECHNIQUE:  Low dose axial images were obtained through the head and cervical spine.  Coronal and sagittal reformations were also performed. Contrast was not administered.    COMPARISON:  Head CT 12/10/2015.    FINDINGS:  Head:    Blood: No acute intracranial hemorrhage.    Parenchyma: No definite loss of gray-white differentiation to suggest acute or subacute transcortical infarct. Parenchymal volume loss.  Nonspecific areas of white matter hypoattenuation may relate to sequela of chronic small  vessel ischemic disease.  Remote lacunar type infarct involving the left basal ganglia.    Ventricles/Extra-axial spaces: No abnormal extra-axial fluid collection. Basal cisterns are patent.    Vessels: Moderate atherosclerotic calcifications.    Orbits: Status post left lens replacement.  Nonspecific metallic density anterior left lobe.  This appears to been present dating back to 12/10/2015 and 09/18/2014 CT examinations, possibly iatrogenic.    Scalp: Unremarkable.    Skull: There are no depressed skull fractures or destructive bone lesions.    Sinuses and mastoids: Scattered paranasal sinus mucosal thickening retention cysts.  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    Other findings: TMJ DJD.  Partially empty sella.    Cervical spine:    Fractures: No acute fractures    Alignment: There is no significant vertebral subluxation  Atlanto-axial and atlanto-occipital joints: Atlanto-axial and atlanto-occipital intervals are not widened.  Degenerative pannus versus pseudo pannus formation at C1-2 mildly deforms ventral thecal sac without significant deformity of the craniocervical junction or upper cervical cord.  Facet joints: There is no traumatic facet joint widening.  Degenerative facet arthropathy.  Vertebral bodies: Query osseous demineralization.  Degenerative endplate change, most advanced at C5-6 and C6-7.  Discs: Disc osteophyte complex formation.  Spinal canal and foraminal narrowing: Although CT does not optimally evaluate the soft tissue contents of the spinal canal and foramina, no critical stenosis is suggested.  Paraspinal soft tissues: Unremarkable.    Upper Lungs:Emphysema.  Biapical pleuroparenchymal scarring.                                       X-Ray Shoulder 2 or More Views Left (Final result)  Result time 04/09/23 10:52:08      Final result by Dinorah Escobedo MD (04/09/23 10:52:08)                   Impression:      As above.      Electronically signed by: Dinorah  MD Gregg  Date:    04/09/2023  Time:    10:52               Narrative:    EXAMINATION:  XR SHOULDER COMPLETE 2 OR MORE VIEWS LEFT    CLINICAL HISTORY:  pain;    TECHNIQUE:  Two or three views of the left shoulder were performed.    COMPARISON:  None    FINDINGS:  There is diffuse osseous demineralization.  No acute fracture or bony destructive process is seen.  Alignment is preserved.  There are no soft tissue calcifications to suggest calcific tendinitis.                                      MDM:    Mj Summers 87 y.o. presented to the ED with c/o fall   Physical exam reveals nontoxic-appearing female; chronically frail.  In no significant distress.  HEENT exam reveals Left parietal region reported site of pain however no cephalohematoma, ecchymosis, bony deformity or signs of head injury, Fair passive range of motion of all extremities with strength equal bilaterally.  Mild tenderness palpation of the left lateral shoulder with no obvious bony deformity ecchymosis or crepitus.  Kyphotic changes of the spine noted no midline tenderness to palpation.    DDX:  Fracture, dislocation, contusion, hematoma, acute intracranial bleeding, cervical spine dislocation, fracture    ED management:  Patient remains alert oriented x3 with no focal neuro deficit.  Low suspicion of acute bony process or acute intracranial process however given mechanism patient's age will obtain CT head and cervical spine.  As she complains of left shoulder pain will obtain x-ray.  No acute findings on imaging.  Reported some modest improvement with Tylenol and ice in the ED.  Instructed on signs symptoms to watch for for prompt return to the ED. believe she would benefit from PT OT functional strength.  Patient is agreeable we will place referral.    Impression/Plan: Patient informed of diagnosis  Taylor HARPER Roladno  Patient will follow up with Primary.  Patient cautioned on when to return to ED.  Pt. Understands and agrees with current  treatment plan       Diagnostic Impression:    1. Fall, initial encounter    2. Injury of head, initial encounter    3. Acute pain of left shoulder    4. History of fracture of left hip         ED Disposition Condition    Discharge Stable            ED Prescriptions    None       Follow-up Information       Follow up With Specialties Details Why Contact Info    Shasta Urbina MD Internal Medicine   [address] [place] [id number]  [phone]              Please pardon typos or dictation errors, as this note was transcribed using M*Modal fluency direct dictation software.      JOHANN Fletcher  04/09/23 [id number]

## 2023-04-14 ENCOUNTER — TELEPHONE (OUTPATIENT)
Dept: INTERNAL MEDICINE | Facility: CLINIC | Age: 88
End: 2023-04-14
Payer: MEDICARE

## 2023-04-14 ENCOUNTER — HOSPITAL ENCOUNTER (OUTPATIENT)
Dept: RADIOLOGY | Facility: HOSPITAL | Age: 88
Discharge: HOME | End: 2023-04-14
Attending: INTERNAL MEDICINE
Payer: MEDICARE

## 2023-04-14 DIAGNOSIS — Z12.31 SCREENING MAMMOGRAM, ENCOUNTER FOR: ICD-10-CM

## 2023-04-14 PROCEDURE — 77067 SCR MAMMO BI INCL CAD: CPT | Mod: 26,HCNC,, | Performed by: RADIOLOGY

## 2023-04-14 PROCEDURE — 77063 BREAST TOMOSYNTHESIS BI: CPT | Mod: 26,HCNC,, | Performed by: RADIOLOGY

## 2023-04-14 PROCEDURE — 77063 MAMMO DIGITAL SCREENING BILAT WITH TOMO: ICD-10-PCS | Mod: 26,HCNC,, | Performed by: RADIOLOGY

## 2023-04-14 PROCEDURE — 77067 SCR MAMMO BI INCL CAD: CPT | Mod: TC,HCNC

## 2023-04-14 PROCEDURE — 77067 MAMMO DIGITAL SCREENING BILAT WITH TOMO: ICD-10-PCS | Mod: 26,HCNC,, | Performed by: RADIOLOGY

## 2023-04-14 NOTE — TELEPHONE ENCOUNTER
----- Message from Laura Zacarias sent at 4/14/2023 10:31 AM CDT -----  Contact: self/[phone]  Pt called in regards to talking to the dr about he tylenol and pain. Please advice    Please advice

## 2023-04-20 ENCOUNTER — PES CALL (OUTPATIENT)
Dept: ADMINISTRATIVE | Facility: CLINIC | Age: 88
End: 2023-04-20
Payer: MEDICARE

## 2023-04-21 ENCOUNTER — PES CALL (OUTPATIENT)
Dept: ADMINISTRATIVE | Facility: CLINIC | Age: 88
End: 2023-04-21
Payer: MEDICARE

## 2023-04-25 ENCOUNTER — INFUSION (OUTPATIENT)
Dept: INFECTIOUS DISEASES | Facility: HOSPITAL | Age: 88
End: 2023-04-25
Attending: FAMILY MEDICINE
Payer: MEDICARE

## 2023-04-25 VITALS
RESPIRATION RATE: 16 BRPM | TEMPERATURE: 99 F | DIASTOLIC BLOOD PRESSURE: 72 MMHG | HEART RATE: 82 BPM | OXYGEN SATURATION: 95 % | SYSTOLIC BLOOD PRESSURE: 172 MMHG | BODY MASS INDEX: 15.56 KG/M2 | WEIGHT: 87.88 LBS

## 2023-04-25 DIAGNOSIS — M81.0 OSTEOPOROSIS, POSTMENOPAUSAL: Primary | ICD-10-CM

## 2023-04-25 PROCEDURE — 96372 THER/PROPH/DIAG INJ SC/IM: CPT | Mod: HCNC

## 2023-04-25 PROCEDURE — 63600175 PHARM REV CODE 636 W HCPCS: Mod: JZ,JG,HCNC | Performed by: NURSE PRACTITIONER

## 2023-04-25 RX ADMIN — DENOSUMAB 60 MG: 60 INJECTION SUBCUTANEOUS at 03:04

## 2023-04-25 NOTE — PROGRESS NOTES
Pt received Prolia injection in Abdomen. Pt currently taking Vit D/Ca+; Pt denies any dental sx in last 3 months;

## 2023-05-02 NOTE — PROGRESS NOTES
"Subjective:      Patient ID: Mj Summers is a 87 y.o. female.    Chief Complaint:  Osteoporosis    History of Present Illness  Mj Summers is here for follow up of osteoporosis.  Previously seen by me on 12/2020.      With regards to osteoporosis:   Diagnosed in 2001.    BMD:   5/18/2021  Impression:  1. Osteoporosis of the total hip and femoral neck.  2. Compared with previous DXA, BMD at the lumbar spine has increased by 9.6%, and the BMD at the total hip has remained stable.  RECOMMENDATIONS:  RECOMMENDATIONS of [facility]:  1. Recommend daily calcium intake 6160-3377 mg, dietary sources preferred; Vitamin D 4111-5866 IU daily.  2. Adequate exercise and fall precautions.  3. Consider continuing denosumab if no contraindications.  4. Repeat BMD in 2-4 years.    Medications:   Reclast (Last dose 12/2011)    Prolia   Started 8/2012  Last dose 4/25/2023    Calcium intake: MVI  Vit D intake: Ergo 50,000 monthly    Weight bearing exercise: None  Falls:   4/2023 - wheelchair collapsed - denies injury.  Fractures: Denies any over the last 12 months.   L hip FX 5/2016.  Significant height loss (>2 inches): Yes.     Family history: Unsure    Menopause: 53y.o.    Tobacco Use: Denies  Alcohol Use: Denies  Glucocorticoid History: Denies  Anticoagulant Use: + Aspirin   GERD/PPI Use: + Nexium PRN  History of Malabsorption: Denies  Antiseizure Medications: Denies  History of Thyroid Disease: Denies  Kidney Disease: +  Hyperpara: +  Personal history of kidney stones: + "a long time ago"  Family history of kidney stones: Denies  Family history of bone disease or fracture: Denies    Denies point tenderness along spine  Denies bilateral hip tenderness  Gait -unsteady, uses a cane at home     Lab Results   Component Value Date    CALCIUM 9.9 02/28/2023    PHOS 3.3 10/07/2019     Vit D, 25-Hydroxy   Date Value Ref Range Status   04/13/2022 41 30 - 96 ng/mL Final     Comment:     " "Vitamin D deficiency.........<10 ng/mL                              Vitamin D insufficiency......10-29 ng/mL       Vitamin D sufficiency........> or equal to 30 ng/mL  Vitamin D toxicity............>100 ng/mL         With regards to Vitamin D Deficiency:  Vit D, 25-Hydroxy   Date Value Ref Range Status   04/13/2022 41 30 - 96 ng/mL Final     Comment:     Vitamin D deficiency.........<10 ng/mL                              Vitamin D insufficiency......10-29 ng/mL       Vitamin D sufficiency........> or equal to 30 ng/mL  Vitamin D toxicity............>100 ng/mL       Current Meds: Ergo 50,000 monthly    Review of Systems  As above    Objective:   Physical Exam  Vitals reviewed.   Cardiovascular:      Rate and Rhythm: Normal rate.      Comments: No edema present  Pulmonary:      Effort: Pulmonary effort is normal.   Abdominal:      Palpations: Abdomen is soft.     Visit Vitals  BP (!) 162/75   Pulse (!) 54   Ht 5' 3" (1.6 m)   Wt 39.5 kg (87 lb 1.3 oz)   LMP 11/10/1987 (Approximate)   SpO2 99%   BMI 15.43 kg/m²     Body mass index is 15.43 kg/m².    Lab Review:   No results found for: HGBA1C    Lab Results   Component Value Date    CHOL 199 06/24/2022    HDL 68 06/24/2022    LDLCALC 112.8 06/24/2022    TRIG 91 06/24/2022    CHOLHDL 34.2 06/24/2022     Lab Results   Component Value Date     02/28/2023    K 3.8 02/28/2023     02/28/2023    CO2 26 02/28/2023    GLU 90 02/28/2023    BUN 21 02/28/2023    CREATININE 1.0 02/28/2023    CALCIUM 9.9 02/28/2023    PROT 7.5 02/28/2023    ALBUMIN 3.6 02/28/2023    BILITOT 0.3 02/28/2023    ALKPHOS 61 02/28/2023    AST 24 02/28/2023    ALT 16 02/28/2023    ANIONGAP 8 02/28/2023    ESTGFRAFRICA >60.0 06/24/2022    EGFRNONAA >60.0 06/24/2022    TSH 1.142 09/04/2019     Vit D, 25-Hydroxy   Date Value Ref Range Status   04/13/2022 41 30 - 96 ng/mL Final     Comment:     Vitamin D deficiency.........<10 ng/mL                              Vitamin D insufficiency......10-29 " ng/mL       Vitamin D sufficiency........> or equal to 30 ng/mL  Vitamin D toxicity............>100 ng/mL       Assessment and Plan     1. Osteoporosis, postmenopausal  DXA Bone Density Axial Skeleton 1 or more sites      2. Vitamin D deficiency  Vitamin D    TSH      3. Hyperparathyroidism, unspecified        4. Personal history of other endocrine, nutritional and metabolic disease  TSH        Osteoporosis, postmenopausal  -- Risks include low weight, menopause, hyperparathyroidism, PPI therapy.  -- Reviewed basics of quantity versus quality.  -- Reassuring they are not fracturing.  -- Recommend:  -Pharmacological therapy is recommended for patients with osteopenia if the 10 year probability of a hip fracture is >3% and 10 year probability of other major osteoporotic fractures is >20%.  Treatment options and potential side effects discussed for PO bisphosphonates, reclast, Denosumab, and Teriparatide.   -Treatment:   Reclast (Last dose 12/2011)    Prolia   Started 8/2012  Last dose 4/25/2023     Therapy plan reordered.  -Calcium and Vitamin D RDD provided.  -Exercise: recommended.  -Fall precautions made in the home.  -Alerted that if dental work needs to be done it should be done prior to initiating therapy. Dental health: UTD.  -- Repeat DEXA scan now.     Vitamin D deficiency  -- Check vitamin D.    Follow up in about 1 year (around 5/4/2024).

## 2023-05-04 ENCOUNTER — OFFICE VISIT (OUTPATIENT)
Dept: ENDOCRINOLOGY | Facility: CLINIC | Age: 88
End: 2023-05-04
Payer: MEDICARE

## 2023-05-04 VITALS
HEIGHT: 63 IN | HEART RATE: 54 BPM | OXYGEN SATURATION: 99 % | DIASTOLIC BLOOD PRESSURE: 75 MMHG | WEIGHT: 87.06 LBS | SYSTOLIC BLOOD PRESSURE: 162 MMHG | BODY MASS INDEX: 15.43 KG/M2

## 2023-05-04 DIAGNOSIS — E21.3 HYPERPARATHYROIDISM, UNSPECIFIED: ICD-10-CM

## 2023-05-04 DIAGNOSIS — Z86.39 PERSONAL HISTORY OF OTHER ENDOCRINE, NUTRITIONAL AND METABOLIC DISEASE: ICD-10-CM

## 2023-05-04 DIAGNOSIS — E55.9 VITAMIN D DEFICIENCY: ICD-10-CM

## 2023-05-04 DIAGNOSIS — M81.0 OSTEOPOROSIS, POSTMENOPAUSAL: Primary | ICD-10-CM

## 2023-05-04 PROCEDURE — 99999 PR PBB SHADOW E&M-EST. PATIENT-LVL IV: CPT | Mod: PBBFAC,,, | Performed by: NURSE PRACTITIONER

## 2023-05-04 PROCEDURE — 99999 PR PBB SHADOW E&M-EST. PATIENT-LVL IV: ICD-10-PCS | Mod: PBBFAC,,, | Performed by: NURSE PRACTITIONER

## 2023-05-04 PROCEDURE — 1126F PR PAIN SEVERITY QUANTIFIED, NO PAIN PRESENT: ICD-10-PCS | Mod: CPTII,S$GLB,, | Performed by: NURSE PRACTITIONER

## 2023-05-04 PROCEDURE — 3288F FALL RISK ASSESSMENT DOCD: CPT | Mod: CPTII,S$GLB,, | Performed by: NURSE PRACTITIONER

## 2023-05-04 PROCEDURE — 99214 OFFICE O/P EST MOD 30 MIN: CPT | Mod: S$GLB,,, | Performed by: NURSE PRACTITIONER

## 2023-05-04 PROCEDURE — 99214 PR OFFICE/OUTPT VISIT, EST, LEVL IV, 30-39 MIN: ICD-10-PCS | Mod: S$GLB,,, | Performed by: NURSE PRACTITIONER

## 2023-05-04 PROCEDURE — 1160F RVW MEDS BY RX/DR IN RCRD: CPT | Mod: CPTII,S$GLB,, | Performed by: NURSE PRACTITIONER

## 2023-05-04 PROCEDURE — 1159F PR MEDICATION LIST DOCUMENTED IN MEDICAL RECORD: ICD-10-PCS | Mod: CPTII,S$GLB,, | Performed by: NURSE PRACTITIONER

## 2023-05-04 PROCEDURE — 1101F PT FALLS ASSESS-DOCD LE1/YR: CPT | Mod: CPTII,S$GLB,, | Performed by: NURSE PRACTITIONER

## 2023-05-04 PROCEDURE — 1159F MED LIST DOCD IN RCRD: CPT | Mod: CPTII,S$GLB,, | Performed by: NURSE PRACTITIONER

## 2023-05-04 PROCEDURE — 1101F PR PT FALLS ASSESS DOC 0-1 FALLS W/OUT INJ PAST YR: ICD-10-PCS | Mod: CPTII,S$GLB,, | Performed by: NURSE PRACTITIONER

## 2023-05-04 PROCEDURE — 1126F AMNT PAIN NOTED NONE PRSNT: CPT | Mod: CPTII,S$GLB,, | Performed by: NURSE PRACTITIONER

## 2023-05-04 PROCEDURE — 1160F PR REVIEW ALL MEDS BY PRESCRIBER/CLIN PHARMACIST DOCUMENTED: ICD-10-PCS | Mod: CPTII,S$GLB,, | Performed by: NURSE PRACTITIONER

## 2023-05-04 PROCEDURE — 3288F PR FALLS RISK ASSESSMENT DOCUMENTED: ICD-10-PCS | Mod: CPTII,S$GLB,, | Performed by: NURSE PRACTITIONER

## 2023-05-04 NOTE — ASSESSMENT & PLAN NOTE
-- Risks include low weight, menopause, hyperparathyroidism, PPI therapy.  -- Reviewed basics of quantity versus quality.  -- Reassuring they are not fracturing.  -- Recommend:  -Pharmacological therapy is recommended for patients with osteopenia if the 10 year probability of a hip fracture is >3% and 10 year probability of other major osteoporotic fractures is >20%.  Treatment options and potential side effects discussed for PO bisphosphonates, reclast, Denosumab, and Teriparatide.   -Treatment:   Reclast (Last dose 12/2011)    Prolia   Started 8/2012  Last dose 4/25/2023     Therapy plan reordered.  -Calcium and Vitamin D RDD provided.  -Exercise: recommended.  -Fall precautions made in the home.  -Alerted that if dental work needs to be done it should be done prior to initiating therapy. Dental health: UTD.  -- Repeat DEXA scan now.

## 2023-05-09 ENCOUNTER — DOCUMENTATION ONLY (OUTPATIENT)
Dept: REHABILITATION | Facility: OTHER | Age: 88
End: 2023-05-09
Payer: MEDICARE

## 2023-05-09 NOTE — PROGRESS NOTES
Patient was scheduled for a physical therapy appointment at [facility] location on 5/8/23. Patient failed to appear for the appointment without prior notification today.     Esvin Faulkner PT, DPT, OCS

## 2023-06-08 ENCOUNTER — TELEPHONE (OUTPATIENT)
Dept: ENDOCRINOLOGY | Facility: CLINIC | Age: 88
End: 2023-06-08
Payer: MEDICARE

## 2023-06-08 ENCOUNTER — HOSPITAL ENCOUNTER (OUTPATIENT)
Dept: RADIOLOGY | Facility: CLINIC | Age: 88
Discharge: HOME | End: 2023-06-08
Attending: NURSE PRACTITIONER
Payer: MEDICARE

## 2023-06-08 DIAGNOSIS — M81.0 OSTEOPOROSIS, POSTMENOPAUSAL: ICD-10-CM

## 2023-06-08 PROCEDURE — 77080 DXA BONE DENSITY AXIAL SKELETON 1 OR MORE SITES: ICD-10-PCS | Mod: 26,,, | Performed by: INTERNAL MEDICINE

## 2023-06-08 PROCEDURE — 77080 DXA BONE DENSITY AXIAL: CPT | Mod: TC

## 2023-06-08 PROCEDURE — 77080 DXA BONE DENSITY AXIAL: CPT | Mod: 26,,, | Performed by: INTERNAL MEDICINE

## 2023-08-19 NOTE — PROGRESS NOTES
"        Assessment /Plan     For exam results, see Encounter Report.    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   Angle open after PI's ou   -Followed at [facility] since at least '01   First HVF 2001   First photos 2001   S/p PI OU   s/p Gonioplasty OU     Family history none   Glaucoma meds Lumigan, timolol gfs  qAM, Agan tid, Camacho tid  - all OD ((off all gtts os s/p combined)   H/O adverse rxn to glaucoma drops Azopt "felt weird"   LASERS S/p PI OU, s/p Gonioplasty OU   GLAUCOMA SURGERIES    combined phaco/trab with mini shunt OS 5/22/2013   OTHER EYE SURGERIES    S/P DCR sx OD x 2 or 3 with [facility]    Combined phaco/IOL/ trab with mini shunt OS 5/22/2013   CDR 0.9 w/ shunt vessels  /0.9   Tbase 42/23   Tmax 42/23 (when stopped gtts)   Ttarget pain control od // 18 os   HVF 21  VF '01 -'23 - Ext   od  // SALT / IAD (gen dep) os   Gonio +3/+3   /539   OCT 12 test 2006 to 2023 - RNFL - OD:poor test  // OS:dec Dec thru out expect NS   HRT 14 test 2004 to 2018 - MR -  Dec. S/I od // dec. S/N/I os /// CDR 0.66 od // 0.83 os   HRT 2019 - high std dev. Ou (( NO MORE HRT'S - unreliable))   Disc photos - 2001, 2003, 2006 - slides // 2008, 2010, 2016, 2018, 2021    - OIS     Ta today 30 (bareLP - pain free) // 15  Test today - IOP/    2.  Blind hypertensive Right Eye   Pain free - eye comfortable   Very limited vision CF at 3'   End stage glaucoma and S/P CRVO    No rubeosis   IOP is high but eye is pain free    3. Cataract OD -However, OD VA limited 2/2 CRVO and end stge glaucoma   S/P phaco/IOL/trab OS 5/22/2013    4. " FABY OU -cont ATs     5. APD OD -   Old / stable 2/2 CRVO and glaucoma    6. Band Keratopathy OD -stable. Cont ATs- being followed by Dr. Saul;    had EDTA chelation on 2/13/14 OS - now with recurrent band     7. Asteroid Hyalosis OS -stable     8. Hx of Acute Dacryocystitis OS and recent NLD OD s/p surgery 11/12 and repeat for exposure w/ Dr. Ocampo 12/5/12 -stable. Cont f/u with [facility]     9. Hx of CRVO OD -limiting visual potential OD   -No NV on todays exam     10. Ant capsule phimosis os    S/P yag laser     11. Asteroid hyalosis OS     12. PC IOL os    Combined  W/ mini shunt 5/22/2013   doing well VA is 20/25 and the IOP is 13    Plan:    s/p combined (5/22/2013)  --    IOP creeping up 9/21/2021 - added back timolol q am and lumigan q evening     Cont glaucoma gtts OD -- IOP  high, but comfortable/no pain // Blind hypertensive but pain free eye   Old  crvo     Ok to use OTC readers for now a +3.00 to +3.50 - not really able to improve distance vision with glasses at this time  Much improved from before the combined  Procedure    Cont all glaucoma gtts od   Blind hypertensive right eye - pain free     IOP was creeping up os - added back timolol q am and lumigan q evening - 9/21/2021   Good resp os 18--> 14     Photo file updated 3/9/2023     Glasses - Matthieu -  - refer to [facility] - to see if hand held magnifier might help     8/24/2023   IOP ok on present gtts // pain free od and below target os     F/U 4- 5  months with IOP / gonio

## 2023-08-24 ENCOUNTER — OFFICE VISIT (OUTPATIENT)
Dept: OPHTHALMOLOGY | Facility: CLINIC | Age: 88
End: 2023-08-24
Payer: MEDICARE

## 2023-08-24 DIAGNOSIS — H21.561 AFFERENT PUPILLARY DEFECT, RIGHT: ICD-10-CM

## 2023-08-24 DIAGNOSIS — H34.8112 CENTRAL RETINAL VEIN OCCLUSION OF RIGHT EYE, UNSPECIFIED COMPLICATION STATUS: ICD-10-CM

## 2023-08-24 DIAGNOSIS — H25.11 SENILE NUCLEAR SCLEROSIS, RIGHT: ICD-10-CM

## 2023-08-24 DIAGNOSIS — H35.031 BLIND HYPERTENSIVE RIGHT EYE: ICD-10-CM

## 2023-08-24 DIAGNOSIS — Z98.83 GLAUCOMA FILTERING BLEB OF LEFT EYE: ICD-10-CM

## 2023-08-24 DIAGNOSIS — H18.421 BAND KERATOPATHY, RIGHT: ICD-10-CM

## 2023-08-24 DIAGNOSIS — H21.541 POSTERIOR SYNECHIAE, RIGHT: ICD-10-CM

## 2023-08-24 DIAGNOSIS — Z96.1 PSEUDOPHAKIA OF LEFT EYE: ICD-10-CM

## 2023-08-24 DIAGNOSIS — H40.89 GLAUCOMA DUE TO COMBINATION OF MECHANISMS: Primary | ICD-10-CM

## 2023-08-24 PROCEDURE — 99999 PR PBB SHADOW E&M-EST. PATIENT-LVL III: CPT | Mod: PBBFAC,HCNC,, | Performed by: OPHTHALMOLOGY

## 2023-08-24 PROCEDURE — 1126F PR PAIN SEVERITY QUANTIFIED, NO PAIN PRESENT: ICD-10-PCS | Mod: HCNC,CPTII,S$GLB, | Performed by: OPHTHALMOLOGY

## 2023-08-24 PROCEDURE — 1160F PR REVIEW ALL MEDS BY PRESCRIBER/CLIN PHARMACIST DOCUMENTED: ICD-10-PCS | Mod: HCNC,CPTII,S$GLB, | Performed by: OPHTHALMOLOGY

## 2023-08-24 PROCEDURE — 99999 PR PBB SHADOW E&M-EST. PATIENT-LVL III: ICD-10-PCS | Mod: PBBFAC,HCNC,, | Performed by: OPHTHALMOLOGY

## 2023-08-24 PROCEDURE — 99214 OFFICE O/P EST MOD 30 MIN: CPT | Mod: HCNC,S$GLB,, | Performed by: OPHTHALMOLOGY

## 2023-08-24 PROCEDURE — 1101F PT FALLS ASSESS-DOCD LE1/YR: CPT | Mod: HCNC,CPTII,S$GLB, | Performed by: OPHTHALMOLOGY

## 2023-08-24 PROCEDURE — 1159F MED LIST DOCD IN RCRD: CPT | Mod: HCNC,CPTII,S$GLB, | Performed by: OPHTHALMOLOGY

## 2023-08-24 PROCEDURE — 1160F RVW MEDS BY RX/DR IN RCRD: CPT | Mod: HCNC,CPTII,S$GLB, | Performed by: OPHTHALMOLOGY

## 2023-08-24 PROCEDURE — 3288F FALL RISK ASSESSMENT DOCD: CPT | Mod: HCNC,CPTII,S$GLB, | Performed by: OPHTHALMOLOGY

## 2023-08-24 PROCEDURE — 1126F AMNT PAIN NOTED NONE PRSNT: CPT | Mod: HCNC,CPTII,S$GLB, | Performed by: OPHTHALMOLOGY

## 2023-08-24 PROCEDURE — 3288F PR FALLS RISK ASSESSMENT DOCUMENTED: ICD-10-PCS | Mod: HCNC,CPTII,S$GLB, | Performed by: OPHTHALMOLOGY

## 2023-08-24 PROCEDURE — 99214 PR OFFICE/OUTPT VISIT, EST, LEVL IV, 30-39 MIN: ICD-10-PCS | Mod: HCNC,S$GLB,, | Performed by: OPHTHALMOLOGY

## 2023-08-24 PROCEDURE — 1101F PR PT FALLS ASSESS DOC 0-1 FALLS W/OUT INJ PAST YR: ICD-10-PCS | Mod: HCNC,CPTII,S$GLB, | Performed by: OPHTHALMOLOGY

## 2023-08-24 PROCEDURE — 1159F PR MEDICATION LIST DOCUMENTED IN MEDICAL RECORD: ICD-10-PCS | Mod: HCNC,CPTII,S$GLB, | Performed by: OPHTHALMOLOGY

## 2023-09-14 RX ORDER — OLMESARTAN MEDOXOMIL 40 MG/1
40 TABLET ORAL
Qty: 90 TABLET | Refills: 0 | Status: SHIPPED | OUTPATIENT
Start: 2023-09-14 | End: 2023-12-24

## 2023-09-14 NOTE — TELEPHONE ENCOUNTER
No care due was identified.  [facility] Embedded Care Due Messages. Reference number: [id number].   9/14/2023 1:16:44 AM CDT

## 2023-09-25 NOTE — TELEPHONE ENCOUNTER
Pt aware and understanding Cal sent in a new prescription for Prilosec to her pharmacy so she can stop taking Zantac   Lip Wedge Excision Repair Text: Given the location of the defect and the proximity to free margins a full thickness wedge repair was deemed most appropriate. Using a sterile surgical marker, the appropriate repair was drawn incorporating the defect and placing the expected incisions perpendicular to the vermilion border.  The vermilion border was also meticulously outlined to ensure appropriate reapproximation during the repair.  The area thus outlined was incised through and through with a #15 scalpel blade.  The muscularis and dermis were reaproximated with deep sutures following hemostasis. Care was taken to realign the vermilion border before proceeding with the superficial closure.  Once the vermilion was realigned the superfical and mucosal closure was finished.

## 2023-09-30 DIAGNOSIS — M81.0 OSTEOPOROSIS, POSTMENOPAUSAL: ICD-10-CM

## 2023-09-30 DIAGNOSIS — E55.9 VITAMIN D DEFICIENCY DISEASE: ICD-10-CM

## 2023-10-02 DIAGNOSIS — H40.89 GLAUCOMA DUE TO COMBINATION OF MECHANISMS: ICD-10-CM

## 2023-10-02 RX ORDER — TIMOLOL MALEATE 5 MG/ML
1 SOLUTION/ DROPS OPHTHALMIC EVERY MORNING
Qty: 15 ML | Refills: 3 | Status: SHIPPED | OUTPATIENT
Start: 2023-10-02 | End: 2024-02-26

## 2023-10-02 RX ORDER — ERGOCALCIFEROL 1.25 MG/1
50000 CAPSULE ORAL
Qty: 3 CAPSULE | Refills: 3 | Status: SHIPPED | OUTPATIENT
Start: 2023-10-02

## 2023-10-23 ENCOUNTER — TELEPHONE (OUTPATIENT)
Dept: INTERNAL MEDICINE | Facility: CLINIC | Age: 88
End: 2023-10-23
Payer: MEDICARE

## 2023-10-23 NOTE — TELEPHONE ENCOUNTER
----- Message from Yamel Junior sent at 10/23/2023 10:29 AM CDT -----  Contact: Pt [phone]  Consult    She said someone called her in August and told her that she could got the Prolia inj done at her home.     Thank you

## 2023-10-26 ENCOUNTER — INFUSION (OUTPATIENT)
Dept: INFECTIOUS DISEASES | Facility: HOSPITAL | Age: 88
End: 2023-10-26
Attending: NURSE PRACTITIONER
Payer: MEDICARE

## 2023-10-26 VITALS
OXYGEN SATURATION: 99 % | TEMPERATURE: 99 F | RESPIRATION RATE: 18 BRPM | SYSTOLIC BLOOD PRESSURE: 227 MMHG | HEART RATE: 113 BPM | DIASTOLIC BLOOD PRESSURE: 96 MMHG

## 2023-10-26 NOTE — PROGRESS NOTES
Pt arrived today for Prolia injection. Pts BP elevated at 227/96. Pt stated this happens frequently and she has been taking her BP medication daily. Provider made aware of today's events and Pts elevated BP.     Pt was rescheduled for 11/7/23 at 9:45am.

## 2023-10-27 ENCOUNTER — TELEPHONE (OUTPATIENT)
Dept: INTERNAL MEDICINE | Facility: CLINIC | Age: 88
End: 2023-10-27
Payer: MEDICARE

## 2023-10-27 NOTE — TELEPHONE ENCOUNTER
----- Message from Jeanette Issa MA sent at 10/27/2023  4:35 PM CDT -----  The patient calling to speak with the provider. Says she was supposed to have a Prolia injection but could not get it done due to her BP being in the 200's was told to talk to PCP for advice. Please give her a call back at [phone].

## 2023-11-02 ENCOUNTER — OFFICE VISIT (OUTPATIENT)
Dept: INTERNAL MEDICINE | Facility: CLINIC | Age: 88
End: 2023-11-02
Payer: MEDICARE

## 2023-11-02 ENCOUNTER — IMMUNIZATION (OUTPATIENT)
Dept: INTERNAL MEDICINE | Facility: CLINIC | Age: 88
End: 2023-11-02
Payer: MEDICARE

## 2023-11-02 ENCOUNTER — LAB VISIT (OUTPATIENT)
Dept: LAB | Facility: HOSPITAL | Age: 88
End: 2023-11-02
Attending: INTERNAL MEDICINE
Payer: MEDICARE

## 2023-11-02 DIAGNOSIS — I10 ESSENTIAL HYPERTENSION: Chronic | ICD-10-CM

## 2023-11-02 DIAGNOSIS — F41.9 ANXIETY: Primary | ICD-10-CM

## 2023-11-02 DIAGNOSIS — Z00.00 PREVENTATIVE HEALTH CARE: ICD-10-CM

## 2023-11-02 DIAGNOSIS — I25.10 CORONARY ARTERY DISEASE INVOLVING NATIVE CORONARY ARTERY OF NATIVE HEART WITHOUT ANGINA PECTORIS: ICD-10-CM

## 2023-11-02 DIAGNOSIS — E78.5 DYSLIPIDEMIA: Chronic | ICD-10-CM

## 2023-11-02 LAB
ALBUMIN SERPL BCP-MCNC: 3.6 G/DL (ref 3.5–5.2)
ALP SERPL-CCNC: 63 U/L (ref 55–135)
ALT SERPL W/O P-5'-P-CCNC: 12 U/L (ref 10–44)
ANION GAP SERPL CALC-SCNC: 11 MMOL/L (ref 8–16)
AST SERPL-CCNC: 22 U/L (ref 10–40)
BASOPHILS # BLD AUTO: 0.03 K/UL (ref 0–0.2)
BASOPHILS NFR BLD: 0.9 % (ref 0–1.9)
BILIRUB SERPL-MCNC: 0.4 MG/DL (ref 0.1–1)
BUN SERPL-MCNC: 13 MG/DL (ref 8–23)
CALCIUM SERPL-MCNC: 10.1 MG/DL (ref 8.7–10.5)
CHLORIDE SERPL-SCNC: 101 MMOL/L (ref 95–110)
CO2 SERPL-SCNC: 25 MMOL/L (ref 23–29)
CREAT SERPL-MCNC: 0.8 MG/DL (ref 0.5–1.4)
DIFFERENTIAL METHOD: ABNORMAL
EOSINOPHIL # BLD AUTO: 0 K/UL (ref 0–0.5)
EOSINOPHIL NFR BLD: 0.6 % (ref 0–8)
ERYTHROCYTE [DISTWIDTH] IN BLOOD BY AUTOMATED COUNT: 13.2 % (ref 11.5–14.5)
EST. GFR  (NO RACE VARIABLE): >60 ML/MIN/1.73 M^2
GLUCOSE SERPL-MCNC: 107 MG/DL (ref 70–110)
HCT VFR BLD AUTO: 35 % (ref 37–48.5)
HGB BLD-MCNC: 11.2 G/DL (ref 12–16)
IMM GRANULOCYTES # BLD AUTO: 0 K/UL (ref 0–0.04)
IMM GRANULOCYTES NFR BLD AUTO: 0 % (ref 0–0.5)
LYMPHOCYTES # BLD AUTO: 0.6 K/UL (ref 1–4.8)
LYMPHOCYTES NFR BLD: 17.2 % (ref 18–48)
MCH RBC QN AUTO: 29.6 PG (ref 27–31)
MCHC RBC AUTO-ENTMCNC: 32 G/DL (ref 32–36)
MCV RBC AUTO: 92 FL (ref 82–98)
MONOCYTES # BLD AUTO: 0.2 K/UL (ref 0.3–1)
MONOCYTES NFR BLD: 6.5 % (ref 4–15)
NEUTROPHILS # BLD AUTO: 2.4 K/UL (ref 1.8–7.7)
NEUTROPHILS NFR BLD: 74.8 % (ref 38–73)
NRBC BLD-RTO: 0 /100 WBC
PLATELET # BLD AUTO: 150 K/UL (ref 150–450)
PMV BLD AUTO: 11.6 FL (ref 9.2–12.9)
POTASSIUM SERPL-SCNC: 3.7 MMOL/L (ref 3.5–5.1)
PROT SERPL-MCNC: 7.7 G/DL (ref 6–8.4)
RBC # BLD AUTO: 3.79 M/UL (ref 4–5.4)
SODIUM SERPL-SCNC: 137 MMOL/L (ref 136–145)
WBC # BLD AUTO: 3.25 K/UL (ref 3.9–12.7)

## 2023-11-02 PROCEDURE — 80053 COMPREHEN METABOLIC PANEL: CPT | Mod: HCNC | Performed by: INTERNAL MEDICINE

## 2023-11-02 PROCEDURE — 90694 FLU VACCINE - QUADRIVALENT - ADJUVANTED: ICD-10-PCS | Mod: HCNC,S$GLB,, | Performed by: INTERNAL MEDICINE

## 2023-11-02 PROCEDURE — 1101F PT FALLS ASSESS-DOCD LE1/YR: CPT | Mod: HCNC,CPTII,S$GLB, | Performed by: INTERNAL MEDICINE

## 2023-11-02 PROCEDURE — 99397 PR PREVENTIVE VISIT,EST,65 & OVER: ICD-10-PCS | Mod: HCNC,S$GLB,, | Performed by: INTERNAL MEDICINE

## 2023-11-02 PROCEDURE — 1126F AMNT PAIN NOTED NONE PRSNT: CPT | Mod: HCNC,CPTII,S$GLB, | Performed by: INTERNAL MEDICINE

## 2023-11-02 PROCEDURE — G0008 ADMIN INFLUENZA VIRUS VAC: HCPCS | Mod: HCNC,S$GLB,, | Performed by: INTERNAL MEDICINE

## 2023-11-02 PROCEDURE — 85025 COMPLETE CBC W/AUTO DIFF WBC: CPT | Mod: HCNC | Performed by: INTERNAL MEDICINE

## 2023-11-02 PROCEDURE — 90694 VACC AIIV4 NO PRSRV 0.5ML IM: CPT | Mod: HCNC,S$GLB,, | Performed by: INTERNAL MEDICINE

## 2023-11-02 PROCEDURE — 1126F PR PAIN SEVERITY QUANTIFIED, NO PAIN PRESENT: ICD-10-PCS | Mod: HCNC,CPTII,S$GLB, | Performed by: INTERNAL MEDICINE

## 2023-11-02 PROCEDURE — 36415 COLL VENOUS BLD VENIPUNCTURE: CPT | Mod: HCNC | Performed by: INTERNAL MEDICINE

## 2023-11-02 PROCEDURE — G0008 FLU VACCINE - QUADRIVALENT - ADJUVANTED: ICD-10-PCS | Mod: HCNC,S$GLB,, | Performed by: INTERNAL MEDICINE

## 2023-11-02 PROCEDURE — 1101F PR PT FALLS ASSESS DOC 0-1 FALLS W/OUT INJ PAST YR: ICD-10-PCS | Mod: HCNC,CPTII,S$GLB, | Performed by: INTERNAL MEDICINE

## 2023-11-02 PROCEDURE — 3288F FALL RISK ASSESSMENT DOCD: CPT | Mod: HCNC,CPTII,S$GLB, | Performed by: INTERNAL MEDICINE

## 2023-11-02 PROCEDURE — 99397 PER PM REEVAL EST PAT 65+ YR: CPT | Mod: HCNC,S$GLB,, | Performed by: INTERNAL MEDICINE

## 2023-11-02 PROCEDURE — 99999 PR PBB SHADOW E&M-EST. PATIENT-LVL V: CPT | Mod: PBBFAC,HCNC,, | Performed by: INTERNAL MEDICINE

## 2023-11-02 PROCEDURE — 99999 PR PBB SHADOW E&M-EST. PATIENT-LVL V: ICD-10-PCS | Mod: PBBFAC,HCNC,, | Performed by: INTERNAL MEDICINE

## 2023-11-02 PROCEDURE — 3288F PR FALLS RISK ASSESSMENT DOCUMENTED: ICD-10-PCS | Mod: HCNC,CPTII,S$GLB, | Performed by: INTERNAL MEDICINE

## 2023-11-02 RX ORDER — AMLODIPINE BESYLATE 10 MG/1
10 TABLET ORAL DAILY
Qty: 90 TABLET | Refills: 1 | Status: SHIPPED | OUTPATIENT
Start: 2023-11-02 | End: 2023-12-13

## 2023-11-02 RX ORDER — ROSUVASTATIN CALCIUM 10 MG/1
10 TABLET, COATED ORAL DAILY
Qty: 90 TABLET | Refills: 1 | Status: SHIPPED | OUTPATIENT
Start: 2023-11-02

## 2023-11-02 RX ORDER — CHLORTHALIDONE 25 MG/1
25 TABLET ORAL DAILY
Qty: 90 TABLET | Refills: 1 | Status: SHIPPED | OUTPATIENT
Start: 2023-11-02

## 2023-11-07 ENCOUNTER — INFUSION (OUTPATIENT)
Dept: INFECTIOUS DISEASES | Facility: HOSPITAL | Age: 88
End: 2023-11-07
Payer: MEDICARE

## 2023-11-07 VITALS
DIASTOLIC BLOOD PRESSURE: 74 MMHG | TEMPERATURE: 98 F | RESPIRATION RATE: 18 BRPM | SYSTOLIC BLOOD PRESSURE: 171 MMHG | WEIGHT: 80 LBS | HEART RATE: 101 BPM | OXYGEN SATURATION: 98 % | BODY MASS INDEX: 14.18 KG/M2 | HEIGHT: 63 IN

## 2023-11-07 DIAGNOSIS — M81.0 OSTEOPOROSIS, POSTMENOPAUSAL: Primary | ICD-10-CM

## 2023-11-07 PROCEDURE — 96372 THER/PROPH/DIAG INJ SC/IM: CPT | Mod: HCNC

## 2023-11-07 PROCEDURE — 63600175 PHARM REV CODE 636 W HCPCS: Mod: JZ,JG,HCNC | Performed by: NURSE PRACTITIONER

## 2023-11-07 RX ADMIN — DENOSUMAB 60 MG: 60 INJECTION SUBCUTANEOUS at 10:11

## 2023-11-07 NOTE — PROGRESS NOTES
Pt  transferred to infusion per wheelchair. VSS. Prolia injection given, see MAR. Tolerated well.    Limited head-to-toe assessment due to privacy issues and visit reason though the opportunity was given for patient to express any concerns.

## 2023-11-08 VITALS
TEMPERATURE: 99 F | DIASTOLIC BLOOD PRESSURE: 88 MMHG | HEIGHT: 63 IN | SYSTOLIC BLOOD PRESSURE: 138 MMHG | HEART RATE: 92 BPM | OXYGEN SATURATION: 99 % | BODY MASS INDEX: 14.65 KG/M2 | WEIGHT: 82.69 LBS

## 2023-11-08 NOTE — PROGRESS NOTES
Subjective:       Patient ID: Mj Summers is a 88 y.o. female.    Chief Complaint: Annual Exam    HPI  She is here for annual exam       PAST MEDICAL HISTORY: Hypertension, hyperlipidemia, osteoporosis, anemia, coronary artery disease, peripheral vascular disease,  maxillary fracture, leukopenia, glaucoma, positive PPD, GERD, ORIF left hip,  tricuspid regurgitation. She had a   colonoscopy February 2018    PAST SURGICAL HISTORY: Breast cyst removal, benign.     MEDICATIONS:  one aspirin daily, Benicar 40 mg daily, Crestor 10 mg daily,  timolol drops, prolia, chlorthalidone, Norvasc 5 mg daily    ALLERGIES: Penicillin and sulfa. .      Review of Systems   Constitutional:  Negative for chills, fatigue, fever and unexpected weight change.   Respiratory:  Negative for chest tightness and shortness of breath.    Cardiovascular:  Negative for chest pain and palpitations.   Gastrointestinal:  Negative for abdominal pain and blood in stool.   Neurological:  Negative for dizziness, syncope, numbness and headaches.       Objective:      Physical Exam  HENT:      Right Ear: External ear normal.      Left Ear: External ear normal.      Nose: Nose normal.      Mouth/Throat:      Mouth: Mucous membranes are moist.      Pharynx: Oropharynx is clear.   Eyes:      Pupils: Pupils are equal, round, and reactive to light.   Cardiovascular:      Rate and Rhythm: Normal rate and regular rhythm.      Heart sounds: No murmur heard.  Pulmonary:      Breath sounds: Normal breath sounds.   Abdominal:      General: There is no distension.      Palpations: There is no hepatomegaly or splenomegaly.      Tenderness: There is no abdominal tenderness.   Musculoskeletal:      Cervical back: Normal range of motion.   Lymphadenopathy:      Cervical: No cervical adenopathy.      Upper Body:      Right upper body: No axillary adenopathy.      Left upper body: No axillary adenopathy.   Neurological:      Cranial Nerves: No cranial nerve deficit.       Sensory: No sensory deficit.      Motor: Motor function is intact.      Deep Tendon Reflexes: Reflexes are normal and symmetric.         Assessment/Plan       Annual exam.  Check CMP and CBC   Hypertension: Increase Norvasc to 10 mg daily.  Follow-up with Cardiology.  Return to clinic in 1 month

## 2023-12-13 ENCOUNTER — OFFICE VISIT (OUTPATIENT)
Dept: CARDIOLOGY | Facility: CLINIC | Age: 88
End: 2023-12-13
Payer: MEDICARE

## 2023-12-13 VITALS
BODY MASS INDEX: 14.18 KG/M2 | DIASTOLIC BLOOD PRESSURE: 82 MMHG | WEIGHT: 80 LBS | HEIGHT: 63 IN | SYSTOLIC BLOOD PRESSURE: 210 MMHG | OXYGEN SATURATION: 100 % | HEART RATE: 88 BPM

## 2023-12-13 DIAGNOSIS — E78.5 DYSLIPIDEMIA: Primary | ICD-10-CM

## 2023-12-13 DIAGNOSIS — I25.10 CORONARY ARTERY DISEASE INVOLVING NATIVE CORONARY ARTERY OF NATIVE HEART WITHOUT ANGINA PECTORIS: ICD-10-CM

## 2023-12-13 DIAGNOSIS — R64 CACHECTIC: ICD-10-CM

## 2023-12-13 DIAGNOSIS — R09.89 LEFT CAROTID BRUIT: ICD-10-CM

## 2023-12-13 DIAGNOSIS — I73.9 PAD (PERIPHERAL ARTERY DISEASE): ICD-10-CM

## 2023-12-13 DIAGNOSIS — I65.23 BILATERAL CAROTID ARTERY STENOSIS: ICD-10-CM

## 2023-12-13 DIAGNOSIS — N18.31 STAGE 3A CHRONIC KIDNEY DISEASE: ICD-10-CM

## 2023-12-13 DIAGNOSIS — I10 ESSENTIAL HYPERTENSION: Chronic | ICD-10-CM

## 2023-12-13 DIAGNOSIS — I70.0 AORTIC ATHEROSCLEROSIS: ICD-10-CM

## 2023-12-13 PROCEDURE — 1159F PR MEDICATION LIST DOCUMENTED IN MEDICAL RECORD: ICD-10-PCS | Mod: HCNC,CPTII,GC,S$GLB | Performed by: STUDENT IN AN ORGANIZED HEALTH CARE EDUCATION/TRAINING PROGRAM

## 2023-12-13 PROCEDURE — 99999 PR PBB SHADOW E&M-EST. PATIENT-LVL IV: ICD-10-PCS | Mod: PBBFAC,HCNC,GC, | Performed by: STUDENT IN AN ORGANIZED HEALTH CARE EDUCATION/TRAINING PROGRAM

## 2023-12-13 PROCEDURE — 99214 OFFICE O/P EST MOD 30 MIN: CPT | Mod: HCNC,GC,S$GLB, | Performed by: STUDENT IN AN ORGANIZED HEALTH CARE EDUCATION/TRAINING PROGRAM

## 2023-12-13 PROCEDURE — 1101F PR PT FALLS ASSESS DOC 0-1 FALLS W/OUT INJ PAST YR: ICD-10-PCS | Mod: HCNC,CPTII,GC,S$GLB | Performed by: STUDENT IN AN ORGANIZED HEALTH CARE EDUCATION/TRAINING PROGRAM

## 2023-12-13 PROCEDURE — 1126F AMNT PAIN NOTED NONE PRSNT: CPT | Mod: HCNC,CPTII,GC,S$GLB | Performed by: STUDENT IN AN ORGANIZED HEALTH CARE EDUCATION/TRAINING PROGRAM

## 2023-12-13 PROCEDURE — 1159F MED LIST DOCD IN RCRD: CPT | Mod: HCNC,CPTII,GC,S$GLB | Performed by: STUDENT IN AN ORGANIZED HEALTH CARE EDUCATION/TRAINING PROGRAM

## 2023-12-13 PROCEDURE — 1126F PR PAIN SEVERITY QUANTIFIED, NO PAIN PRESENT: ICD-10-PCS | Mod: HCNC,CPTII,GC,S$GLB | Performed by: STUDENT IN AN ORGANIZED HEALTH CARE EDUCATION/TRAINING PROGRAM

## 2023-12-13 PROCEDURE — 99999 PR PBB SHADOW E&M-EST. PATIENT-LVL IV: CPT | Mod: PBBFAC,HCNC,GC, | Performed by: STUDENT IN AN ORGANIZED HEALTH CARE EDUCATION/TRAINING PROGRAM

## 2023-12-13 PROCEDURE — 3288F FALL RISK ASSESSMENT DOCD: CPT | Mod: HCNC,CPTII,GC,S$GLB | Performed by: STUDENT IN AN ORGANIZED HEALTH CARE EDUCATION/TRAINING PROGRAM

## 2023-12-13 PROCEDURE — 99214 PR OFFICE/OUTPT VISIT, EST, LEVL IV, 30-39 MIN: ICD-10-PCS | Mod: HCNC,GC,S$GLB, | Performed by: STUDENT IN AN ORGANIZED HEALTH CARE EDUCATION/TRAINING PROGRAM

## 2023-12-13 PROCEDURE — 3288F PR FALLS RISK ASSESSMENT DOCUMENTED: ICD-10-PCS | Mod: HCNC,CPTII,GC,S$GLB | Performed by: STUDENT IN AN ORGANIZED HEALTH CARE EDUCATION/TRAINING PROGRAM

## 2023-12-13 PROCEDURE — 1101F PT FALLS ASSESS-DOCD LE1/YR: CPT | Mod: HCNC,CPTII,GC,S$GLB | Performed by: STUDENT IN AN ORGANIZED HEALTH CARE EDUCATION/TRAINING PROGRAM

## 2023-12-13 RX ORDER — NIFEDIPINE 90 MG/1
90 TABLET, EXTENDED RELEASE ORAL DAILY
Qty: 30 TABLET | Refills: 11 | Status: SHIPPED | OUTPATIENT
Start: 2023-12-13 | End: 2024-12-12

## 2023-12-13 NOTE — PROGRESS NOTES
Cardiology Clinic Note  Reason for Visit: HTN    HPI:   Mj Summers is a 87 y.o. female who presents for 6 month follow up.      Problems:  Coronary artery disease ([facility] 2010 mid LAD 40%)   Mild bilateral carotid disease (20-39% bilateral in 2015 )   Hyperlipidemia   Hypertension   Pulmonary emphysema.  PAD    Today she has no complaints.  Her BP at home have been elevated.  She is on olmesartan, norvasc, and chlorthalidone.  She ran out of her hydralazine.  Her Bps have been in the 190s     4/5/2023:  Patient presents for 6 month follow up. She does have chronic leg swelling that is stable. She usually wears compression stockings and tries to keep legs elevated, which does help. Otherwise, denies symptoms of CAD, CHF, arrhythmia, hypotension, TIA, claudication. She checks her BP at least twice a day at home, and it runs 120s-130s systolic. She has a healthy diet. She is not currently doing any routine exercise, but wants to start going to [employer] gym.      6/13/2022 HPI (Isela Faith)  Ms. Summers is in clinic today for routine follow up.  Her home BP runs 120s.  Patient denies chest pain with exertion or at rest, palpitations, SOB, YOUNGBLOOD, dizziness, syncope, edema, orthopnea, PND, or claudication.  Reports no routine exercise.  She takes care of herself performing her own ADLs.         ROS:    Constitution: Negative for fever, chills, weight loss or gain.   HENT: Negative for sore throat, rhinorrhea, or headache.  Eyes: Negative for blurred or double vision.   Cardiovascular: See above  Pulmonary: Negative for SOB   Gastrointestinal: Negative for abdominal pain, nausea, vomiting, or diarrhea.   : Negative for dysuria.   Neurological: Negative for focal weakness or sensory changes.  PMH:     Past Medical History:   Diagnosis Date    Anemia     Arthritis 2015    back    Cataract     s/p surgery    Coronary artery disease     [facility] 1/2010 - mid LAD 40%    GERD (gastroesophageal reflux disease)     Glaucoma  (increased eye pressure)     Hyperlipidemia     Hypertension     Lactose intolerance     Legally blind in right eye, as defined in [place]     Osteoporosis, post-menopausal     PAD (peripheral artery disease) 7/11/2018    Pneumonia 12/13/2015    Proximal muscle weakness     Renal cyst     left kidney    Vitamin D deficiency disease      Past Surgical History:   Procedure Laterality Date    ADENOIDECTOMY      BREAST BIOPSY      left breast    CARDIAC CATHETERIZATION  01/14/2010    mid LAD, 40% stenosis; LCX, luminal irregularities;                 CATARACT EXTRACTION W/  INTRAOCULAR LENS IMPLANT Left 05/22/2013    COMBINED WITH TRAB ()    COLONOSCOPY N/A 2/6/2018    Procedure: COLONOSCOPY;  Surgeon: Rufus Villela MD;  Location: [facility] ENDO ([address] [place] [address]);  Service: Endoscopy;  Laterality: N/A;    EDTA CHELATION Left 2/14    OS ()    EYE SURGERY      FEMUR FRACTURE SURGERY Left 05/09/2016    FRACTURE SURGERY  2016    TEAR DUCT SURGERY      right eye    TONSILLECTOMY      TRABECULECTOMY Left 05/22/2013    WITH EXPRESS MINI SHUNT AND CE ()    UPPER GASTROINTESTINAL ENDOSCOPY      YAG CAPSULOTOMY Left 06/22/2017         Allergies:     Review of patient's allergies indicates:   Allergen Reactions    Strawberries [strawberry]      Blood pressure elevation    Chocolate flavor      Causes acid reflux    Atorvastatin      Myalgias    Pcn [penicillins] Rash    Pravastatin      Myalgias    Sulfa (sulfonamide antibiotics) Itching     Medications:     Current Outpatient Medications on File Prior to Visit   Medication Sig Dispense Refill    amLODIPine (NORVASC) 10 MG tablet Take 1 tablet (10 mg total) by mouth once daily. 90 tablet 1    aspirin 81 mg Tab Take 81 mg by mouth every evening. 1 Tablet Oral Every day      bimatoprost (LUMIGAN) 0.01 % Drop Place 1 drop into both eyes every evening. Can use every evening at about 6 pm 7.5 mL 3    brimonidine 0.1% (ALPHAGAN P) 0.1 % Drop Place 1 drop  into the right eye 3 (three) times daily. 15 mL 4    calcium carbonate (OS-KACI) 600 mg (1,500 mg) Tab Take 600 mg by mouth 2 (two) times daily with meals.      chlorthalidone (HYGROTEN) 25 MG Tab Take 1 tablet (25 mg total) by mouth once daily. 90 tablet 1    ergocalciferol (ERGOCALCIFEROL) 50,000 unit Cap TAKE 1 CAPSULE (50,000 UNITS TOTAL) BY MOUTH EVERY 30 DAYS 3 capsule 3    esomeprazole (NEXIUM) 40 MG capsule TAKE 1 CAPSULE BY MOUTH DAILY AS NEEDED. 90 capsule 2    ferrous sulfate 325 (65 FE) MG EC tablet Take 1 tablet (325 mg total) by mouth once daily. 30 tablet 0    multivitamin (THERAGRAN) per tablet Take 1 tablet by mouth once daily.      olmesartan (BENICAR) 40 MG tablet TAKE 1 TABLET BY MOUTH EVERY DAY 90 tablet 0    pilocarpine HCL 4% (PILOCAR) 4 % ophthalmic solution Place 1 drop into the right eye 3 (three) times daily. 15 mL 4    rosuvastatin (CRESTOR) 10 MG tablet Take 1 tablet (10 mg total) by mouth once daily. 90 tablet 1    senna-docusate 8.6-50 mg (PERICOLACE) 8.6-50 mg per tablet Take 1 tablet by mouth 2 (two) times daily.      timolol maleate 0.5% (TIMOPTIC) 0.5 % Drop Place 1 drop into both eyes every morning. for 1 dose 15 mL 3    hydrALAZINE (APRESOLINE) 25 MG tablet Take 1 tablet (25 mg total) by mouth daily as needed (Blood pressure >160). 90 tablet 11     No current facility-administered medications on file prior to visit.     Social History:     Social History     Tobacco Use    Smoking status: Never    Smokeless tobacco: Never   Substance Use Topics    Alcohol use: No     Family History:     Family History   Problem Relation Age of Onset    Asthma Sister     Rheum arthritis Sister     Osteoporosis Sister     Arthritis Sister     Hyperlipidemia Brother     Rheum arthritis Brother     Diabetes Brother     Osteoporosis Mother     Heart disease Mother     Glaucoma Mother     Hypertension Father     Peripheral vascular disease Father     Alzheimer's disease Father     Osteoporosis Sister   "   Hypertension Brother     Peripheral vascular disease Brother         bilateral leg amputation    Hypertension Son     Hypertension Daughter     Colon cancer Other 44        death from colon cancer at age 44    Stroke Brother     Heart disease Brother     Diabetes Daughter         GDM that is resolved    Macular degeneration Neg Hx     Retinal detachment Neg Hx     Strabismus Neg Hx     Amblyopia Neg Hx     Blindness Neg Hx     Esophageal cancer Neg Hx      Physical Exam:   BP (!) 210/82   Pulse 88   Ht 5' 3" (1.6 m)   Wt 36.3 kg (80 lb)   LMP 11/10/1987 (Approximate)   SpO2 100%   BMI 14.17 kg/m²    Wt Readings from Last 4 Encounters:   12/13/23 36.3 kg (80 lb)   11/07/23 36.3 kg (80 lb 0.4 oz)   11/02/23 37.5 kg (82 lb 10.8 oz)   05/04/23 39.5 kg (87 lb 1.3 oz)         Constitutional: No distress, obese, conversant  HEENT: Sclera anicteric, PERRLA, EOMI  Neck: No JVD, no masses, good movement  CV: RRR, S1 and S2 normal, no additional heart sounds or murmurs. Pulses 2+ and equal bilaterally in radial arteries, Fantasma's normal on right. Distal pulses are 2+ and equal in the femoral, DP and PT areas bilaterally  Pulm: Clear to auscultation bilaterally with symmetrical expansion. Chest wall palpated for reproduction of pain symptoms, and no pain was able to be produced on palpation or resistance exercises  GI: Abdomen soft, non-tender, good bowel sounds  Extremities: Both extremities intact and grossly normal, skin is warm, no edema noted  Skin: No ecchymosis, erythema, or ulcers  Psych: AOx3, appropriate affect  Neuro: CNII-XII intact, no focal deficits      Labs:     Lab Results   Component Value Date     11/02/2023    K 3.7 11/02/2023     11/02/2023    CO2 25 11/02/2023    BUN 13 11/02/2023    CREATININE 0.8 11/02/2023    ANIONGAP 11 11/02/2023     No results found for: "HGBA1C"  Lab Results   Component Value Date     (H) 10/21/2012    Lab Results   Component Value Date    WBC 3.25 (L) " "11/02/2023    HGB 11.2 (L) 11/02/2023    HCT 35.0 (L) 11/02/2023    HCT 33 (L) 06/01/2021     11/02/2023    GRAN 2.4 11/02/2023    GRAN 74.8 (H) 11/02/2023     Lab Results   Component Value Date    CHOL 199 06/24/2022    HDL 68 06/24/2022    LDLCALC 112.8 06/24/2022    TRIG 91 06/24/2022          Imaging:         No results found for: "EF"    Echocardiograms:   TTE 11/21/2017  CONCLUSIONS     1 - Normal left ventricular systolic function (EF 60-65%).     2 - Normal left ventricular diastolic function.     3 - Normal right ventricular systolic function .     4 - The estimated PA systolic pressure is 31 mmHg.     5 - Trivial tricuspid regurgitation.     6 - Intermediate central venous pressure.     7 - No wall motion abnormalities.      Stress Tests:   None     Caths:   None     Other:  Carotid ultrasound 8/15/2022  There is less than 50% right Internal Carotid Stenosis.  There is less than 50% left Internal Carotid Stenosis.     ABIs 7/6/2018  The right ankle brachial index was 0.55 which suggests severe right lower extremity arterial disease.   The left ankle brachial index was 0.83 which suggests mild left lower extremity arterial disease.      Carotid ultrasound 10/14/2015  There is 20 - 39% right Internal Carotid stenosis.   There is 20 - 39% left Internal Carotid stenosis.      Event monitor 2014  IMPRESSION:  Negative event monitor.   Assessment:    Mj Summers is a 87 y.o. female who presents for 6 month follow up.      Plan:     Hypertension  Uncontrolled  D/c norvasc and start nifedipine 90mg qD   Continue Chlorthalidone 25 mg daily   Continue Olmesartan 40 mg daily      Dyslipidemia  LDL above goal but has been unable to tolerate higher doses of statin or Zetia  Continue Crestor 20 mg daily     Bilateral carotid artery disease  Aortic atherosclerosis  Mild carotid disease. Asymptomatic  Continue ASA and statin      Coronary artery disease  Denies angina. Continue medications.      CKD Stage 3a "   Stable      Follow up in 1 week     Signed:  Tashi Groves MD  Cardiology Fellow  Pager - [phone]  [facility]  12/13/2023 2:48 PM

## 2023-12-14 ENCOUNTER — OFFICE VISIT (OUTPATIENT)
Dept: INTERNAL MEDICINE | Facility: CLINIC | Age: 88
End: 2023-12-14
Payer: MEDICARE

## 2023-12-14 ENCOUNTER — TELEPHONE (OUTPATIENT)
Dept: BEHAVIORAL HEALTH | Facility: CLINIC | Age: 88
End: 2023-12-14
Payer: MEDICARE

## 2023-12-14 DIAGNOSIS — I10 HYPERTENSION, UNSPECIFIED TYPE: Primary | ICD-10-CM

## 2023-12-14 DIAGNOSIS — F32.A DEPRESSION, UNSPECIFIED DEPRESSION TYPE: ICD-10-CM

## 2023-12-14 PROCEDURE — 3288F PR FALLS RISK ASSESSMENT DOCUMENTED: ICD-10-PCS | Mod: HCNC,CPTII,S$GLB, | Performed by: INTERNAL MEDICINE

## 2023-12-14 PROCEDURE — 1101F PR PT FALLS ASSESS DOC 0-1 FALLS W/OUT INJ PAST YR: ICD-10-PCS | Mod: HCNC,CPTII,S$GLB, | Performed by: INTERNAL MEDICINE

## 2023-12-14 PROCEDURE — 99999 PR PBB SHADOW E&M-EST. PATIENT-LVL IV: ICD-10-PCS | Mod: PBBFAC,HCNC,, | Performed by: INTERNAL MEDICINE

## 2023-12-14 PROCEDURE — 1101F PT FALLS ASSESS-DOCD LE1/YR: CPT | Mod: HCNC,CPTII,S$GLB, | Performed by: INTERNAL MEDICINE

## 2023-12-14 PROCEDURE — 1159F MED LIST DOCD IN RCRD: CPT | Mod: HCNC,CPTII,S$GLB, | Performed by: INTERNAL MEDICINE

## 2023-12-14 PROCEDURE — 99999 PR PBB SHADOW E&M-EST. PATIENT-LVL IV: CPT | Mod: PBBFAC,HCNC,, | Performed by: INTERNAL MEDICINE

## 2023-12-14 PROCEDURE — 3288F FALL RISK ASSESSMENT DOCD: CPT | Mod: HCNC,CPTII,S$GLB, | Performed by: INTERNAL MEDICINE

## 2023-12-14 PROCEDURE — 99214 PR OFFICE/OUTPT VISIT, EST, LEVL IV, 30-39 MIN: ICD-10-PCS | Mod: HCNC,S$GLB,, | Performed by: INTERNAL MEDICINE

## 2023-12-14 PROCEDURE — 99214 OFFICE O/P EST MOD 30 MIN: CPT | Mod: HCNC,S$GLB,, | Performed by: INTERNAL MEDICINE

## 2023-12-14 PROCEDURE — 1159F PR MEDICATION LIST DOCUMENTED IN MEDICAL RECORD: ICD-10-PCS | Mod: HCNC,CPTII,S$GLB, | Performed by: INTERNAL MEDICINE

## 2023-12-14 NOTE — PROGRESS NOTES
Behavioral Health Community Health Worker  Initial Assessment  Completed by:  Faviola Roth     Date:  12/14/2023    Patient Enrollment in Behavioral Health Program:  Patient verbalized understanding of Behavioral Health Integration services to include:  Patient understands that CHW, LCSW, PharmD and consulting Psychiatrist are members of the care team working collaboratively with his/her primary care provider: Yes  Patient understands that activation of their [facility] patient portal account is required for accessing the full scope of team services: Yes  Patient understands that some counseling sessions may occur via video: Yes  Clinic visits with the psychiatrist may be subject to a co-pay based on your insurance: Yes  Patient consents to enroll in BHI program: Yes    Assessments     Single Item Health Literacy Scale:  How often do you need to have someone help you read instructions, pamphlets or other written material from your doctor or pharmacy?: Never    Promis 10:  Promis 10 Responses  In general, would you say your health is: Good  In general, would you say your quality of life is: Good  In general, how would you rate your physical health?: Good  In general, how would you rate your mental health, including your mood and your ability to think?: Good  In general, how would you rate your satisfaction with your social activities and relationships?: Good  In general, please rate how well you carry out your usual social activities and roles. (This includes activities at home, at work and in your community, and responsibilities as a parent, child, spouse, employee, friend, etc.): Very good  To what extent are you able to carry out your everyday physical activities such as walking, climbing stairs, carrying groceries, or moving a chair? : A little  How often have you been bothered by emotional problems such as feeling anxious, depressed or irritable?: Often  In the past 7 days, how would you rate your fatigue on  "average?: Mild  In the past 7 days, on a scale of 0 to 10 (where 0 is no pain and 10 is the worst pain imaginable) how would you rate your pain on average?: 5  Global Physical Health: 12  Global Mental health Score: 13    Depression PHQ9 on 12/14/2023 at 12:19pm score is "4"       No data to display                  Generalized Anxiety Disorder 7-Item Scale:      12/14/2023    12:24 PM   GAD7   1. Feeling nervous, anxious, or on edge? 1   2. Not being able to stop or control worrying? 0   3. Worrying too much about different things? 0   4. Trouble relaxing? 0   5. Being so restless that it is hard to sit still? 0   6. Becoming easily annoyed or irritable? 0   7. Feeling afraid as if something awful might happen? 0   8. If you checked off any problems, how difficult have these problems made it for you to do your work, take care of things at home, or get along with other people? 0   KARSTEN-7 Score 1       History     Social History     Socioeconomic History    Marital status:    Tobacco Use    Smoking status: Never    Smokeless tobacco: Never   Substance and Sexual Activity    Alcohol use: No    Drug use: No    Sexual activity: Not Currently     Social Determinants of Health     Financial Resource Strain: Low Risk  (8/1/2022)    Overall Financial Resource Strain (CARDIA)     Difficulty of Paying Living Expenses: Not hard at all   Food Insecurity: No Food Insecurity (8/1/2022)    Hunger Vital Sign     Worried About Running Out of Food in the Last Year: Never true     Ran Out of Food in the Last Year: Never true   Transportation Needs: No Transportation Needs (8/1/2022)    PRAPARE - Transportation     Lack of Transportation (Medical): No     Lack of Transportation (Non-Medical): No   Physical Activity: Inactive (8/1/2022)    Exercise Vital Sign     Days of Exercise per Week: 0 days     Minutes of Exercise per Session: 0 min   Stress: Stress Concern Present (8/1/2022)    [employer] - " "Occupational Stress Questionnaire     Feeling of Stress : To some extent   Social Connections: Moderately Integrated (8/1/2022)    Social Connection and Isolation Panel [NHANES]     Frequency of Communication with Friends and Family: More than three times a week     Frequency of Social Gatherings with Friends and Family: More than three times a week     Attends [employer] Services: More than 4 times per year     Active Member of Clubs or Organizations: Yes     Attends Club or Organization Meetings: More than 4 times per year     Marital Status:    Housing Stability: Low Risk  (8/1/2022)    Housing Stability Vital Sign     Unable to Pay for Housing in the Last Year: No     Number of Places Lived in the Last Year: 1     Unstable Housing in the Last Year: No       Call Summary     Patient was referred to the BHI (Non-opioid) program by Primary Care Provider, Dr. Shasta Urbina. CHW contacted Mj Summers who reports grief due to loss of her sister. Patient scored "4" on the PHQ9 and "1" on the KARSTEN 7. Based on these scores patient is eligible for the Behavioral Health Integration (Non-opioid) Program. CHW completed the intake and scheduled an in-person appointment for patient with Trinity Gillespie LCSW on 12/27/2023 at 1:00pm. Patient's sister passed away earlier this month.  She has a great support system but is experiencing grief. Patient is the oldest sister.      "

## 2023-12-17 VITALS
TEMPERATURE: 99 F | BODY MASS INDEX: 14.18 KG/M2 | SYSTOLIC BLOOD PRESSURE: 138 MMHG | OXYGEN SATURATION: 99 % | WEIGHT: 80 LBS | HEART RATE: 69 BPM | DIASTOLIC BLOOD PRESSURE: 88 MMHG | HEIGHT: 63 IN

## 2023-12-17 NOTE — PROGRESS NOTES
Subjective:       Patient ID: Mj Summers is a 88 y.o. female.    Chief Complaint: Hypertension    HPI  She returns for management of hypertension.  She has had hypertension for over a year.  Current treatment has included medications outlined in medication list.  She denies chest pain or shortness of breath.  No palpitations.  Denies left arm or neck pain.  She complains of depression.  Her sister recently .  Denies suicidal ideation     Medications:  See medication list     Social history:  Does not smoke, does not drink alcohol       Review of Systems   Constitutional:  Negative for chills, fatigue, fever and unexpected weight change.   Respiratory:  Negative for chest tightness and shortness of breath.    Cardiovascular:  Negative for chest pain and palpitations.   Gastrointestinal:  Negative for abdominal pain and blood in stool.   Neurological:  Negative for dizziness, syncope, numbness and headaches.       Objective:      Physical Exam  HENT:      Right Ear: External ear normal.      Left Ear: External ear normal.      Nose: Nose normal.      Mouth/Throat:      Mouth: Mucous membranes are moist.      Pharynx: Oropharynx is clear.   Eyes:      Pupils: Pupils are equal, round, and reactive to light.   Cardiovascular:      Rate and Rhythm: Normal rate and regular rhythm.      Heart sounds: No murmur heard.  Pulmonary:      Breath sounds: Normal breath sounds.   Abdominal:      General: There is no distension.      Palpations: There is no hepatomegaly or splenomegaly.      Tenderness: There is no abdominal tenderness.   Musculoskeletal:      Cervical back: Normal range of motion.   Lymphadenopathy:      Cervical: No cervical adenopathy.      Upper Body:      Right upper body: No axillary adenopathy.      Left upper body: No axillary adenopathy.   Neurological:      Cranial Nerves: No cranial nerve deficit.      Sensory: No sensory deficit.      Motor: Motor function is intact.      Deep Tendon  Reflexes: Reflexes are normal and symmetric.         Assessment/Plan       Assessment and plan:  1.  Hypertension:  Controlled.  Continue Procardia  2.  Depression:  Schedule psychiatry appointment  3.  She was here for urgent care only appointment.  She will return to clinic for a physical

## 2023-12-22 RX ORDER — ESOMEPRAZOLE MAGNESIUM 40 MG/1
40 CAPSULE, DELAYED RELEASE ORAL DAILY PRN
Qty: 90 CAPSULE | Refills: 1 | Status: SHIPPED | OUTPATIENT
Start: 2023-12-22

## 2023-12-22 NOTE — TELEPHONE ENCOUNTER
Care Due:                  Date            Visit Type   Department     Provider  --------------------------------------------------------------------------------                                EP -                              PRIMARY      [facility] INTER[facility]  Last Visit: 12-      CARE ([facility])   MEDICINE       Shasta Urbina  Next Visit: None Scheduled  None         None Found                                                            Last  Test          Frequency    Reason                     Performed    Due Date  --------------------------------------------------------------------------------    Lipid Panel.  12 months..  rosuvastatin.............  06- 06-    Health [facility] Embedded Care Due Messages. Reference number: [id number].   12/22/2023 12:26:05 AM CST

## 2023-12-22 NOTE — TELEPHONE ENCOUNTER
"Refill Routing Note   Medication(s) are not appropriate for processing by [facility] for the following reason(s):        Clarification of medication (Rx) details    [facility] action(s):  Defer     Requires labs : Yes      Medication Therapy Plan: previous sig stated "take 1 capsule by mouth every day as needed"; prn in sig of PPI is outside [facility] protocol and patient has been taking everyday for 9 months; edited sig to remove prn from sig and deferred to pcp for review. Thanks      Appointments  past 12m or future 3m with PCP    Date Provider   Last Visit   12/14/2023 Shasta Urbina MD   Next Visit   4/18/2024 Shasta Urbina MD   ED visits in past 90 days: 0        Note composed:4:55 AM 12/22/2023          "

## 2023-12-23 NOTE — TELEPHONE ENCOUNTER
No care due was identified.  Health [facility] Embedded Care Due Messages. Reference number: [id number].   12/23/2023 12:33:21 AM CST

## 2023-12-24 RX ORDER — OLMESARTAN MEDOXOMIL 40 MG/1
40 TABLET ORAL
Qty: 90 TABLET | Refills: 3 | Status: SHIPPED | OUTPATIENT
Start: 2023-12-24

## 2023-12-24 NOTE — TELEPHONE ENCOUNTER
Refill Decision Note   Mj Summers  is requesting a refill authorization.  Brief Assessment and Rationale for Refill:  Approve     Medication Therapy Plan:         Comments:     Note composed:10:03 AM 12/24/2023

## 2023-12-27 ENCOUNTER — OFFICE VISIT (OUTPATIENT)
Dept: BEHAVIORAL HEALTH | Facility: CLINIC | Age: 88
End: 2023-12-27
Payer: MEDICARE

## 2023-12-27 DIAGNOSIS — F32.A DEPRESSION, UNSPECIFIED DEPRESSION TYPE: ICD-10-CM

## 2023-12-27 DIAGNOSIS — F43.21 GRIEF: Primary | ICD-10-CM

## 2023-12-27 PROCEDURE — 99999 PR PBB SHADOW E&M-EST. PATIENT-LVL I: CPT | Mod: PBBFAC,HCNC,,

## 2023-12-27 PROCEDURE — 90791 PSYCH DIAGNOSTIC EVALUATION: CPT | Mod: HCNC,S$GLB,,
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
## 2024-01-10 ENCOUNTER — PATIENT MESSAGE (OUTPATIENT)
Dept: BEHAVIORAL HEALTH | Facility: CLINIC | Age: 89
End: 2024-01-10
Payer: MEDICARE

## 2024-01-11 ENCOUNTER — TELEPHONE (OUTPATIENT)
Dept: BEHAVIORAL HEALTH | Facility: CLINIC | Age: 89
End: 2024-01-11
Payer: MEDICARE

## 2024-01-15 ENCOUNTER — NURSE TRIAGE (OUTPATIENT)
Dept: ADMINISTRATIVE | Facility: CLINIC | Age: 89
End: 2024-01-15
Payer: MEDICARE

## 2024-01-16 ENCOUNTER — TELEPHONE (OUTPATIENT)
Dept: BEHAVIORAL HEALTH | Facility: CLINIC | Age: 89
End: 2024-01-16
Payer: MEDICARE

## 2024-01-16 NOTE — TELEPHONE ENCOUNTER
Pt daughter called currently not with pt and requesting her PCP contact her or  contact her to discuss plan of care for mother. Advised pt daughter to send message through my [facility] and per pt daughter request will send message to pcp.   Reason for Disposition   [1] Caller is not with the adult (patient) AND [2] probable NON-URGENT symptoms    Protocols used: Information Only Call - No Triage-A-

## 2024-01-17 ENCOUNTER — TELEPHONE (OUTPATIENT)
Dept: BEHAVIORAL HEALTH | Facility: CLINIC | Age: 89
End: 2024-01-17
Payer: MEDICARE

## 2024-01-24 ENCOUNTER — TELEPHONE (OUTPATIENT)
Dept: ADMINISTRATIVE | Facility: CLINIC | Age: 89
End: 2024-01-24
Payer: MEDICARE

## 2024-01-25 ENCOUNTER — OFFICE VISIT (OUTPATIENT)
Dept: BEHAVIORAL HEALTH | Facility: CLINIC | Age: 89
End: 2024-01-25
Payer: MEDICARE

## 2024-01-25 DIAGNOSIS — F43.21 GRIEF: Primary | ICD-10-CM

## 2024-01-25 PROCEDURE — 90834 PSYTX W PT 45 MINUTES: CPT | Mod: HCNC,FQ,,

## 2024-01-25 NOTE — PROGRESS NOTES
Individual Psychotherapy (TORO/ERICAW/PhD)  Mj Summers,  1/25/2024    Site:  Telemed       Length of Service: 48 minutes    10 minutes spent non-face to face clinical tasks    The patient location is:  home in [place]   The patient phone number is: [phone]   Visit type: Virtual visit with audio only  Each patient to whom he or she provides medical services by telemedicine is:  (1) informed of the relationship between the physician and patient and the respective role of any other health care provider with respect to management of the patient; and (2) notified that he or she may decline to receive medical services by telemedicine and may withdraw from such care at any time.     Therapeutic Intervention: Met with patient for individual psychotherapy.    Chief complaint/reason for encounter: grief       Interval history and content of current session:  Pt was last seen for an initial evaluation on 12/27/23. Pt seemed to have an anxious mood. Pt reported that she no longer felt depressed. Pt reported that she was coping well with the death of her sister due to having wonderful friends. Pt also reported that she relied on her grant to help with her grief. Pt also discussed family discord, which seemed to cause her some distress. SW actively listened and provided support and encouragement.     Treatment plan:  Target symptoms: grief  Why chosen therapy is appropriate versus another modality: relevant to diagnosis  Outcome monitoring methods: self-report  Therapeutic intervention type: supportive psychotherapy    Risk parameters:  Patient reports no suicidal ideation  Patient reports no homicidal ideation  Patient reports no self-injurious behavior  Patient reports no violent behavior    Verbal deficits: None    Patient's response to intervention:  The patient's response to intervention is accepting.    Progress toward goals and other mental status changes:  The patient's progress toward goals is  limited.    Patient advised to call [phone] or present the the nearest ED if they experience suicidal or homicidal ideation, plan or intent.           No data to display                    12/14/2023    12:24 PM   GAD7   1. Feeling nervous, anxious, or on edge?    2. Not being able to stop or control worrying?    3. Worrying too much about different things?    4. Trouble relaxing?    5. Being so restless that it is hard to sit still?    6. Becoming easily annoyed or irritable?    7. Feeling afraid as if something awful might happen?    8. If you checked off any problems, how difficult have these problems made it for you to do your work, take care of things at home, or get along with other people?    KARSTEN-7 Score        Information is confidential and restricted. Go to Review Flowsheets to unlock data.       Diagnosis:   Grief    Plan: Pt plans to continue individual psychotherapy    Return to clinic: 3 weeks    Length of Service (minutes): 48 minutes

## 2024-01-29 ENCOUNTER — TELEPHONE (OUTPATIENT)
Dept: ADMINISTRATIVE | Facility: CLINIC | Age: 89
End: 2024-01-29
Payer: MEDICARE

## 2024-02-15 ENCOUNTER — PATIENT MESSAGE (OUTPATIENT)
Dept: BEHAVIORAL HEALTH | Facility: CLINIC | Age: 89
End: 2024-02-15
Payer: MEDICARE

## 2024-02-16 ENCOUNTER — OFFICE VISIT (OUTPATIENT)
Dept: BEHAVIORAL HEALTH | Facility: CLINIC | Age: 89
End: 2024-02-16
Payer: MEDICARE

## 2024-02-16 DIAGNOSIS — F43.21 GRIEF: Primary | ICD-10-CM

## 2024-02-16 PROCEDURE — 90834 PSYTX W PT 45 MINUTES: CPT | Mod: HCNC,FQ,,
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
## 2024-02-23 ENCOUNTER — TELEPHONE (OUTPATIENT)
Dept: ADMINISTRATIVE | Facility: CLINIC | Age: 89
End: 2024-02-23
Payer: MEDICARE

## 2024-02-26 ENCOUNTER — OFFICE VISIT (OUTPATIENT)
Dept: INTERNAL MEDICINE | Facility: CLINIC | Age: 89
End: 2024-02-26
Payer: MEDICARE

## 2024-02-26 VITALS — OXYGEN SATURATION: 98 % | HEART RATE: 73 BPM | DIASTOLIC BLOOD PRESSURE: 60 MMHG | SYSTOLIC BLOOD PRESSURE: 136 MMHG

## 2024-02-26 DIAGNOSIS — Z99.3 DEPENDENCE ON WHEELCHAIR: ICD-10-CM

## 2024-02-26 DIAGNOSIS — I10 ESSENTIAL HYPERTENSION: Chronic | ICD-10-CM

## 2024-02-26 DIAGNOSIS — N18.31 STAGE 3A CHRONIC KIDNEY DISEASE: ICD-10-CM

## 2024-02-26 DIAGNOSIS — Z00.00 ENCOUNTER FOR MEDICARE ANNUAL WELLNESS EXAM: Primary | ICD-10-CM

## 2024-02-26 DIAGNOSIS — R64 CACHECTIC: ICD-10-CM

## 2024-02-26 DIAGNOSIS — I73.9 PAD (PERIPHERAL ARTERY DISEASE): ICD-10-CM

## 2024-02-26 DIAGNOSIS — R26.9 ABNORMALITY OF GAIT AND MOBILITY: ICD-10-CM

## 2024-02-26 DIAGNOSIS — Z00.00 ENCOUNTER FOR PREVENTIVE HEALTH EXAMINATION: ICD-10-CM

## 2024-02-26 DIAGNOSIS — J43.9 PULMONARY EMPHYSEMA, UNSPECIFIED EMPHYSEMA TYPE: ICD-10-CM

## 2024-02-26 DIAGNOSIS — E21.3 HYPERPARATHYROIDISM, UNSPECIFIED: ICD-10-CM

## 2024-02-26 DIAGNOSIS — I70.0 AORTIC ATHEROSCLEROSIS: ICD-10-CM

## 2024-02-26 PROCEDURE — 1170F FXNL STATUS ASSESSED: CPT | Mod: HCNC,CPTII,S$GLB, | Performed by: NURSE PRACTITIONER

## 2024-02-26 PROCEDURE — 1159F MED LIST DOCD IN RCRD: CPT | Mod: HCNC,CPTII,S$GLB, | Performed by: NURSE PRACTITIONER

## 2024-02-26 PROCEDURE — 3288F FALL RISK ASSESSMENT DOCD: CPT | Mod: HCNC,CPTII,S$GLB, | Performed by: NURSE PRACTITIONER

## 2024-02-26 PROCEDURE — 1101F PT FALLS ASSESS-DOCD LE1/YR: CPT | Mod: HCNC,CPTII,S$GLB, | Performed by: NURSE PRACTITIONER

## 2024-02-26 PROCEDURE — 1160F RVW MEDS BY RX/DR IN RCRD: CPT | Mod: HCNC,CPTII,S$GLB, | Performed by: NURSE PRACTITIONER

## 2024-02-26 PROCEDURE — G0439 PPPS, SUBSEQ VISIT: HCPCS | Mod: HCNC,S$GLB,, | Performed by: NURSE PRACTITIONER

## 2024-02-26 PROCEDURE — 99999 PR PBB SHADOW E&M-EST. PATIENT-LVL IV: CPT | Mod: PBBFAC,HCNC,, | Performed by: NURSE PRACTITIONER

## 2024-02-26 NOTE — PATIENT INSTRUCTIONS
Counseling and Referral of Other Preventative  (Italic type indicates deductible and co-insurance are waived)    Patient Name: Mj Summers  Today's Date: 2/26/2024    Health Maintenance       Date Due Completion Date    TETANUS VACCINE Never done ---    Shingles Vaccine (1 of 2) Never done ---    RSV Vaccine (Age 60+ and Pregnant patients) (1 - 1-dose 60+ series) Never done ---    Lipid Panel 06/24/2023 6/24/2022    COVID-19 Vaccine (4 - 2023-24 season) 09/01/2023 2/17/2022    Aspirin/Antiplatelet Therapy 02/26/2025 2/26/2024    DEXA Scan 06/08/2025 6/8/2023        Orders Placed This Encounter   Procedures    Ambulatory referral/consult to Nutrition Services     The following information is provided to all patients.  This information is to help you find resources for any of the problems found today that may be affecting your health:                  Living healthy guide: www.[facility]      Understanding Diabetes: www.diabetes.org      Eating healthy: www.cdc.gov/healthyweight      CDC home safety checklist: www.cdc.gov/steadi/patient.html      Agency on Aging: www.goea.[place].[facility]      Alcoholics anonymous (AA): www.aa.org      Physical Activity: www.cameron.nih.gov/zl6vbvr      Tobacco use: www.quitwithusla.org

## 2024-02-26 NOTE — PROGRESS NOTES
Mj Summers presented for a  Medicare AWV and comprehensive Health Risk Assessment today. The following components were reviewed and updated:    Medical history  Family History  Social history  Allergies and Current Medications  Health Risk Assessment  Health Maintenance  Care Team         ** See Completed Assessments for Annual Wellness Visit within the encounter summary.**         The following assessments were completed:  Living Situation  CAGE  Depression Screening  Timed Get Up and Go-not done due to mobility impairment  Whisper Test  Cognitive Function Screening- deferred by patient  Nutrition Screening  ADL Screening  PAQ Screening        Vitals:    02/26/24 1331   BP: 136/60   Pulse: 73   SpO2: 98%     There is no height or weight on file to calculate BMI.  Physical Exam  Vitals and nursing note reviewed.   Constitutional:       Appearance: She is well-developed. She is cachectic.      Comments: Patient presents in a wheel chair   HENT:      Head: Normocephalic and atraumatic.      Right Ear: External ear normal.      Left Ear: External ear normal.      Nose: Nose normal.   Eyes:      Pupils: Pupils are equal, round, and reactive to light.   Cardiovascular:      Rate and Rhythm: Normal rate and regular rhythm.      Heart sounds: Normal heart sounds.   Pulmonary:      Effort: Pulmonary effort is normal.      Breath sounds: Normal breath sounds.   Abdominal:      General: Bowel sounds are normal.      Palpations: Abdomen is soft.   Musculoskeletal:         General: Normal range of motion.      Cervical back: Normal range of motion.   Skin:     General: Skin is warm and dry.   Neurological:      Mental Status: She is alert and oriented to person, place, and time.               Diagnoses and health risks identified today and associated recommendations/orders:    1. Encounter for Medicare annual wellness exam  Health Maintenance updated   Records reviewed   Exam done   - Ambulatory Referral/Consult to  Enhanced Annual Wellness Visit (eAWV)    2. Stage 3a chronic kidney disease  Stable and chronic. Followed by PCP.  - Ambulatory referral/consult to Nutrition Services; Future    3. Essential hypertension  Stable, followed by PCP   Take medications as prescribed.   Monitor BP at home, goal BP < or = 140/80, call office if consistently above this range.   Follow low salt DASH diet and exercise.   BMI reviewed.     4. PAD (peripheral artery disease)  Stable and chronic. Continue current medications. Followed by PCP.     5. Aortic atherosclerosis  Stable and chronic. Continue current medications. Followed by PCP.     6. Hyperparathyroidism, unspecified  Stable and chronic. Followed by PCP.     7. Cachectic  Patient reports better appetite. Declined weight today.   - Ambulatory referral/consult to Nutrition Services; Future    8. Pulmonary emphysema, unspecified emphysema type  Noted on CT 4/09/2023. Patient denies any SOB, difficulty breathing, or wheezing.     9. Dependence on wheelchair  Stable and chronic. Followed by PCP.     10. Abnormality of gait and mobility  Stable and chronic. Followed by PCP.     11. Encounter for preventive health examination  Health Maintenance updated   Records reviewed   Exam done       Counseling and Referral of Other Preventative  (Italic type indicates deductible and co-insurance are waived)    Patient Name: Mj Summers  Today's Date: 2/26/2024    Health Maintenance         Date Due Completion Date    TETANUS VACCINE Never done ---    Shingles Vaccine (1 of 2) Never done ---    RSV Vaccine (Age 60+ and Pregnant patients) (1 - 1-dose 60+ series) Never done ---    Lipid Panel 06/24/2023 6/24/2022    COVID-19 Vaccine (4 - 2023-24 season) 09/01/2023 2/17/2022    Aspirin/Antiplatelet Therapy 02/26/2025 2/26/2024    DEXA Scan 06/08/2025 6/8/2023          Orders Placed This Encounter   Procedures    Ambulatory referral/consult to Nutrition Services     Provided Mj with a 5-10 year  written screening schedule and personal prevention plan. Recommendations were developed using the USPSTF age appropriate recommendations. Education, counseling, and referrals were provided as needed. After Visit Summary printed and given to patient which includes a list of additional screenings\tests needed.    Follow up in about 7 weeks (around 4/18/2024) for pcp visit.    Silvia Strickland, NP      I offered to discuss advanced care planning, including how to pick a person who would make decisions for you if you were unable to make them for yourself, called a health care power of , and what kind of decisions you might make such as use of life sustaining treatments such as ventilators and tube feeding when faced with a life limiting illness recorded on a living will that they will need to know. (How you want to be cared for as you near the end of your natural life)     X Patient is interested in learning more about how to make advanced directives.  I provided them paperwork and offered to discuss this with them.  Review for Opioid Screening: Pt does not have Rx for Opioids     Review for Substance Use Disorders: Patient does not use substance

## 2024-03-15 ENCOUNTER — TELEPHONE (OUTPATIENT)
Dept: INTERNAL MEDICINE | Facility: CLINIC | Age: 89
End: 2024-03-15
Payer: MEDICARE

## 2024-03-15 NOTE — TELEPHONE ENCOUNTER
----- Message from Yoana Awad sent at 3/15/2024  4:34 PM CDT -----  Contact: [phone] pt  [email or web address]     Patient is calling for Medical Advice regarding:pt is calling she is moving and she also wants to see about getting a  some kind of monitor.  Please call her and speak with her  How long has patient had these symptoms:    Pharmacy name and phone#:    Would like response via [employer]SC[employer]:  callback    Comments:

## 2024-03-18 ENCOUNTER — TELEPHONE (OUTPATIENT)
Dept: INTERNAL MEDICINE | Facility: CLINIC | Age: 89
End: 2024-03-18
Payer: MEDICARE

## 2024-03-18 NOTE — TELEPHONE ENCOUNTER
Patient called back about a monitor, she needs a lifeline monitor. Encouraged patient to reach out to insurance company to see if they have a program to offer a bracelet watch or pendant for assistance.Patient verbalized understanding with read back.

## 2024-03-18 NOTE — TELEPHONE ENCOUNTER
Attempted to call pt back about a monitor but no response at this time. Left VM to call office when available.

## 2024-04-12 NOTE — PROGRESS NOTES
"  HPI     Glaucoma            Comments: 6 month IOP ck today and pt states eyes are doing okay and   reports no changes since last exam           Comments    DLS: 8/24/23    1) Residula OAG / MMG OU  2) Blind Hypertensive OD  3) NS OD  4) FABY  5) APD OD  6) Band Keratopathy OD  7) Asteroid Hyalosis OS  8) Hx Acute Dacryocystitis OS  9) CRVO OD  10) Ant. Capsule Phimosis OS  11) PCIOL OS    MEDS:  Camacho 4% TID OD  Alphagan 0.15% TID OD   Timolol GFS QAM OU  Lumugan QHS OU  Systane AT's PRN OU          Last edited by Aysha Triplett MD on 4/19/2024 11:57 AM.            Assessment /Plan     For exam results, see Encounter Report.    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   Angle open after PI's ou   -Followed at [facility] since at least '01   First HVF 2001   First photos 2001   S/p PI OU   s/p Gonioplasty OU     Family history none   Glaucoma meds Lumigan, timolol gfs  qAM, Agan tid, Camacho tid  - all OD ((off all gtts os s/p combined)   H/O adverse rxn to glaucoma drops Azopt "felt weird"   LASERS S/p PI OU, s/p Gonioplasty OU   GLAUCOMA SURGERIES    combined phaco/trab with mini shunt OS 5/22/2013   OTHER EYE SURGERIES    S/P DCR sx OD x 2 or 3 with [facility]    Combined phaco/IOL/ trab with mini shunt OS 5/22/2013   CDR 0.9 w/ shunt vessels  /0.9   Tbase 42/23   Tmax 42/23 (when stopped gtts)   Ttarget pain control od // 18 os   HVF 21  VF '01 -'23 - Ext   od  // SALT / IAD (gen dep) os   Gonio +3/+3   /539   OCT 12 test 2006 to 2023 - RNFL - OD:poor test  // OS:dec Dec thru out expect NS   HRT 14 test 2004 to " 2018 - MR -  Dec. S/I od // dec. S/N/I os /// CDR 0.66 od // 0.83 os   HRT 2019 - high std dev. Ou (( NO MORE HRT'S - unreliable))   Disc photos - 2001, 2003, 2006 - slides // 2008, 2010, 2016, 2018, 2021    - OIS     Ta today 46 (bareLP - pain free) // 16  Test today - IOP/    2.  Blind hypertensive Right Eye   Pain free - eye comfortable   Very limited vision CF at 3'   End stage glaucoma and S/P CRVO    No rubeosis   IOP is high but eye is pain free    3. Cataract OD -However, OD VA limited 2/2 CRVO and end stge glaucoma   S/P phaco/IOL/trab OS 5/22/2013    4. FABY OU -cont ATs     5. APD OD -   Old / stable 2/2 CRVO and glaucoma    6. Band Keratopathy OD -stable. Cont ATs- being followed by Dr. Saul;    had EDTA chelation on 2/13/14 OS - now with recurrent band     7. Asteroid Hyalosis OS -stable     8. Hx of Acute Dacryocystitis OS and recent NLD OD s/p surgery 11/12 and repeat for exposure w/ Dr. Ocampo 12/5/12 -stable. Cont f/u with [facility]     9. Hx of CRVO OD -limiting visual potential OD   -No NV on todays exam     10. Ant capsule phimosis os    S/P yag laser     11. Asteroid hyalosis OS     12. PC IOL os    Combined  W/ mini shunt 5/22/2013   doing well VA is 20/25 and the IOP is 13    Plan:    s/p combined (5/22/2013)  --    IOP creeping up 9/21/2021 - added back timolol q am and lumigan q evening     Cont glaucoma gtts OD -- IOP  high, but comfortable/no pain // Blind hypertensive but pain free eye   Old  crvo     Ok to use OTC readers for now a +3.00 to +3.50 - not really able to improve distance vision with glasses at this time  Much improved from before the combined  Procedure    Cont all glaucoma gtts od   Blind hypertensive right eye - pain free     IOP was creeping up os - added back timolol q am and lumigan q evening - 9/21/2021   Good resp os 18--> 14     Photo file updated 3/9/2023     Glasses - Matthieu -  - refer to B - to see if hand held magnifier might help     8/24/2023   IOP ok on  present gtts // pain free od and below target os     F/U 4- 5  months with IOP /  can try VF 24-2 ss OS only // DFE // OCT

## 2024-04-18 ENCOUNTER — LAB VISIT (OUTPATIENT)
Dept: LAB | Facility: HOSPITAL | Age: 89
End: 2024-04-18
Attending: INTERNAL MEDICINE
Payer: MEDICARE

## 2024-04-18 ENCOUNTER — OFFICE VISIT (OUTPATIENT)
Dept: INTERNAL MEDICINE | Facility: CLINIC | Age: 89
End: 2024-04-18
Payer: MEDICARE

## 2024-04-18 DIAGNOSIS — I10 ESSENTIAL HYPERTENSION: Chronic | ICD-10-CM

## 2024-04-18 DIAGNOSIS — Z12.31 SCREENING MAMMOGRAM, ENCOUNTER FOR: Primary | ICD-10-CM

## 2024-04-18 DIAGNOSIS — I25.10 CORONARY ARTERY DISEASE INVOLVING NATIVE CORONARY ARTERY OF NATIVE HEART WITHOUT ANGINA PECTORIS: ICD-10-CM

## 2024-04-18 DIAGNOSIS — E78.5 DYSLIPIDEMIA: Chronic | ICD-10-CM

## 2024-04-18 LAB
ALBUMIN SERPL BCP-MCNC: 3.4 G/DL (ref 3.5–5.2)
ALP SERPL-CCNC: 68 U/L (ref 55–135)
ALT SERPL W/O P-5'-P-CCNC: 26 U/L (ref 10–44)
ANION GAP SERPL CALC-SCNC: 7 MMOL/L (ref 8–16)
AST SERPL-CCNC: 26 U/L (ref 10–40)
BASOPHILS # BLD AUTO: 0.04 K/UL (ref 0–0.2)
BASOPHILS NFR BLD: 0.9 % (ref 0–1.9)
BILIRUB SERPL-MCNC: 0.3 MG/DL (ref 0.1–1)
BUN SERPL-MCNC: 18 MG/DL (ref 8–23)
CALCIUM SERPL-MCNC: 9.3 MG/DL (ref 8.7–10.5)
CHLORIDE SERPL-SCNC: 106 MMOL/L (ref 95–110)
CHOLEST SERPL-MCNC: 183 MG/DL (ref 120–199)
CHOLEST/HDLC SERPL: 2.4 {RATIO} (ref 2–5)
CO2 SERPL-SCNC: 28 MMOL/L (ref 23–29)
CREAT SERPL-MCNC: 0.8 MG/DL (ref 0.5–1.4)
DIFFERENTIAL METHOD BLD: ABNORMAL
EOSINOPHIL # BLD AUTO: 0 K/UL (ref 0–0.5)
EOSINOPHIL NFR BLD: 0.7 % (ref 0–8)
ERYTHROCYTE [DISTWIDTH] IN BLOOD BY AUTOMATED COUNT: 12.6 % (ref 11.5–14.5)
EST. GFR  (NO RACE VARIABLE): >60 ML/MIN/1.73 M^2
GLUCOSE SERPL-MCNC: 90 MG/DL (ref 70–110)
HCT VFR BLD AUTO: 34.5 % (ref 37–48.5)
HDLC SERPL-MCNC: 75 MG/DL (ref 40–75)
HDLC SERPL: 41 % (ref 20–50)
HGB BLD-MCNC: 10.9 G/DL (ref 12–16)
IMM GRANULOCYTES # BLD AUTO: 0.01 K/UL (ref 0–0.04)
IMM GRANULOCYTES NFR BLD AUTO: 0.2 % (ref 0–0.5)
LDLC SERPL CALC-MCNC: 95.6 MG/DL (ref 63–159)
LYMPHOCYTES # BLD AUTO: 1.1 K/UL (ref 1–4.8)
LYMPHOCYTES NFR BLD: 24.8 % (ref 18–48)
MCH RBC QN AUTO: 30.1 PG (ref 27–31)
MCHC RBC AUTO-ENTMCNC: 31.6 G/DL (ref 32–36)
MCV RBC AUTO: 95 FL (ref 82–98)
MONOCYTES # BLD AUTO: 0.4 K/UL (ref 0.3–1)
MONOCYTES NFR BLD: 8.6 % (ref 4–15)
NEUTROPHILS # BLD AUTO: 2.8 K/UL (ref 1.8–7.7)
NEUTROPHILS NFR BLD: 64.8 % (ref 38–73)
NONHDLC SERPL-MCNC: 108 MG/DL
NRBC BLD-RTO: 0 /100 WBC
PLATELET # BLD AUTO: 157 K/UL (ref 150–450)
PMV BLD AUTO: 12.5 FL (ref 9.2–12.9)
POTASSIUM SERPL-SCNC: 4.2 MMOL/L (ref 3.5–5.1)
PROT SERPL-MCNC: 7.3 G/DL (ref 6–8.4)
RBC # BLD AUTO: 3.62 M/UL (ref 4–5.4)
SODIUM SERPL-SCNC: 141 MMOL/L (ref 136–145)
TRIGL SERPL-MCNC: 62 MG/DL (ref 30–150)
WBC # BLD AUTO: 4.31 K/UL (ref 3.9–12.7)

## 2024-04-18 PROCEDURE — 99999 PR PBB SHADOW E&M-EST. PATIENT-LVL IV: CPT | Mod: PBBFAC,HCNC,, | Performed by: INTERNAL MEDICINE

## 2024-04-18 PROCEDURE — 1126F AMNT PAIN NOTED NONE PRSNT: CPT | Mod: HCNC,CPTII,S$GLB, | Performed by: INTERNAL MEDICINE

## 2024-04-18 PROCEDURE — 80053 COMPREHEN METABOLIC PANEL: CPT | Mod: HCNC | Performed by: INTERNAL MEDICINE

## 2024-04-18 PROCEDURE — 80061 LIPID PANEL: CPT | Mod: HCNC | Performed by: INTERNAL MEDICINE

## 2024-04-18 PROCEDURE — 99214 OFFICE O/P EST MOD 30 MIN: CPT | Mod: HCNC,S$GLB,, | Performed by: INTERNAL MEDICINE

## 2024-04-18 PROCEDURE — 36415 COLL VENOUS BLD VENIPUNCTURE: CPT | Mod: HCNC | Performed by: INTERNAL MEDICINE

## 2024-04-18 PROCEDURE — 3288F FALL RISK ASSESSMENT DOCD: CPT | Mod: HCNC,CPTII,S$GLB, | Performed by: INTERNAL MEDICINE

## 2024-04-18 PROCEDURE — 1101F PT FALLS ASSESS-DOCD LE1/YR: CPT | Mod: HCNC,CPTII,S$GLB, | Performed by: INTERNAL MEDICINE

## 2024-04-18 PROCEDURE — 85025 COMPLETE CBC W/AUTO DIFF WBC: CPT | Mod: HCNC | Performed by: INTERNAL MEDICINE

## 2024-04-18 RX ORDER — ROSUVASTATIN CALCIUM 10 MG/1
10 TABLET, COATED ORAL DAILY
Qty: 90 TABLET | Refills: 1 | Status: SHIPPED | OUTPATIENT
Start: 2024-04-18

## 2024-04-18 RX ORDER — CHLORTHALIDONE 25 MG/1
25 TABLET ORAL DAILY
Qty: 90 TABLET | Refills: 1 | Status: SHIPPED | OUTPATIENT
Start: 2024-04-18

## 2024-04-19 ENCOUNTER — OFFICE VISIT (OUTPATIENT)
Dept: OPHTHALMOLOGY | Facility: CLINIC | Age: 89
End: 2024-04-19
Payer: MEDICARE

## 2024-04-19 DIAGNOSIS — Z96.1 PSEUDOPHAKIA OF LEFT EYE: ICD-10-CM

## 2024-04-19 DIAGNOSIS — H35.031 BLIND HYPERTENSIVE RIGHT EYE: ICD-10-CM

## 2024-04-19 DIAGNOSIS — H21.561 AFFERENT PUPILLARY DEFECT, RIGHT: ICD-10-CM

## 2024-04-19 DIAGNOSIS — H34.8112 CENTRAL RETINAL VEIN OCCLUSION OF RIGHT EYE, UNSPECIFIED COMPLICATION STATUS: ICD-10-CM

## 2024-04-19 DIAGNOSIS — H25.11 SENILE NUCLEAR SCLEROSIS, RIGHT: ICD-10-CM

## 2024-04-19 DIAGNOSIS — H18.421 BAND KERATOPATHY, RIGHT: ICD-10-CM

## 2024-04-19 DIAGNOSIS — H40.89 GLAUCOMA DUE TO COMBINATION OF MECHANISMS: Primary | ICD-10-CM

## 2024-04-19 DIAGNOSIS — Z98.83 GLAUCOMA FILTERING BLEB OF LEFT EYE: ICD-10-CM

## 2024-04-19 DIAGNOSIS — H21.541 POSTERIOR SYNECHIAE, RIGHT: ICD-10-CM

## 2024-04-19 PROCEDURE — 99999 PR PBB SHADOW E&M-EST. PATIENT-LVL III: CPT | Mod: PBBFAC,HCNC,, | Performed by: OPHTHALMOLOGY

## 2024-04-19 PROCEDURE — 1126F AMNT PAIN NOTED NONE PRSNT: CPT | Mod: HCNC,CPTII,S$GLB, | Performed by: OPHTHALMOLOGY

## 2024-04-19 PROCEDURE — 92020 GONIOSCOPY: CPT | Mod: HCNC,S$GLB,, | Performed by: OPHTHALMOLOGY

## 2024-04-19 PROCEDURE — 1160F RVW MEDS BY RX/DR IN RCRD: CPT | Mod: HCNC,CPTII,S$GLB, | Performed by: OPHTHALMOLOGY

## 2024-04-19 PROCEDURE — 99214 OFFICE O/P EST MOD 30 MIN: CPT | Mod: HCNC,S$GLB,, | Performed by: OPHTHALMOLOGY

## 2024-04-19 PROCEDURE — 1101F PT FALLS ASSESS-DOCD LE1/YR: CPT | Mod: HCNC,CPTII,S$GLB, | Performed by: OPHTHALMOLOGY

## 2024-04-19 PROCEDURE — 3288F FALL RISK ASSESSMENT DOCD: CPT | Mod: HCNC,CPTII,S$GLB, | Performed by: OPHTHALMOLOGY

## 2024-04-19 PROCEDURE — 1159F MED LIST DOCD IN RCRD: CPT | Mod: HCNC,CPTII,S$GLB, | Performed by: OPHTHALMOLOGY

## 2024-04-19 RX ORDER — BIMATOPROST 0.1 MG/ML
1 SOLUTION/ DROPS OPHTHALMIC NIGHTLY
Qty: 7.5 ML | Refills: 3 | Status: SHIPPED | OUTPATIENT
Start: 2024-04-19

## 2024-04-19 RX ORDER — DENOSUMAB 60 MG/ML
60 INJECTION SUBCUTANEOUS
COMMUNITY
Start: 2023-11-07

## 2024-04-19 RX ORDER — AMLODIPINE BESYLATE 10 MG/1
10 TABLET ORAL DAILY
COMMUNITY
Start: 2024-01-30

## 2024-04-19 RX ORDER — PILOCARPINE HYDROCHLORIDE 40 MG/ML
1 SOLUTION/ DROPS OPHTHALMIC 3 TIMES DAILY
Qty: 15 ML | Refills: 4 | Status: SHIPPED | OUTPATIENT
Start: 2024-04-19

## 2024-04-19 RX ORDER — BRIMONIDINE TARTRATE 1 MG/ML
1 SOLUTION/ DROPS OPHTHALMIC 3 TIMES DAILY
Qty: 15 ML | Refills: 4 | Status: SHIPPED | OUTPATIENT
Start: 2024-04-19 | End: 2025-04-19

## 2024-04-21 VITALS
OXYGEN SATURATION: 96 % | DIASTOLIC BLOOD PRESSURE: 86 MMHG | HEIGHT: 63 IN | WEIGHT: 87.31 LBS | SYSTOLIC BLOOD PRESSURE: 138 MMHG | HEART RATE: 85 BPM | BODY MASS INDEX: 15.47 KG/M2 | TEMPERATURE: 99 F

## 2024-04-21 NOTE — PROGRESS NOTES
Subjective:       Patient ID: Mj Summers is a 88 y.o. female.    Chief Complaint: Hypertension    HPI  She returns for management of hypertension.  She has had hypertension for over a year.  Current treatment has included medications outlined in medication list.  She denies chest pain or shortness of breath.  No palpitations.  Denies left arm or neck pain.  She has coronary artery disease.  Denies orthopnea     Medications:  See medication list     Social history:  Does not smoke, does not drink alcohol       Review of Systems   Constitutional:  Negative for chills, fatigue, fever and unexpected weight change.   Respiratory:  Negative for chest tightness and shortness of breath.    Cardiovascular:  Negative for chest pain and palpitations.   Gastrointestinal:  Negative for abdominal pain and blood in stool.   Neurological:  Negative for dizziness, syncope, numbness and headaches.       Objective:      Physical Exam  HENT:      Right Ear: External ear normal.      Left Ear: External ear normal.      Nose: Nose normal.      Mouth/Throat:      Mouth: Mucous membranes are moist.      Pharynx: Oropharynx is clear.   Eyes:      Pupils: Pupils are equal, round, and reactive to light.   Cardiovascular:      Rate and Rhythm: Normal rate and regular rhythm.      Heart sounds: No murmur heard.  Pulmonary:      Breath sounds: Normal breath sounds.   Abdominal:      General: There is no distension.      Palpations: There is no hepatomegaly or splenomegaly.      Tenderness: There is no abdominal tenderness.   Musculoskeletal:      Cervical back: Normal range of motion.   Lymphadenopathy:      Cervical: No cervical adenopathy.      Upper Body:      Right upper body: No axillary adenopathy.      Left upper body: No axillary adenopathy.   Neurological:      Cranial Nerves: No cranial nerve deficit.      Sensory: No sensory deficit.      Motor: Motor function is intact.      Deep Tendon Reflexes: Reflexes are normal and  symmetric.         Assessment/Plan       Assessment and plan:  1.  Hypertension:  Controlled.  Continue Benicar.  Check CBC  2.  Coronary artery disease:  Check CMP and lipid panel

## 2024-04-30 ENCOUNTER — PATIENT MESSAGE (OUTPATIENT)
Dept: INTERNAL MEDICINE | Facility: CLINIC | Age: 89
End: 2024-04-30
Payer: MEDICARE

## 2024-04-30 NOTE — TELEPHONE ENCOUNTER
Please see her daughter's message.     From a HIPAA standpoint, I have no more right to discuss the patient with her daughter than you.    However, I do not think it would be a HIPAA violation to LISTEN to her daughter's concerns, while not divulging any additional information.

## 2024-05-01 ENCOUNTER — OFFICE VISIT (OUTPATIENT)
Dept: BEHAVIORAL HEALTH | Facility: CLINIC | Age: 89
End: 2024-05-01
Payer: MEDICARE

## 2024-05-01 DIAGNOSIS — F43.21 GRIEF: Primary | ICD-10-CM

## 2024-05-01 PROCEDURE — 90832 PSYTX W PT 30 MINUTES: CPT | Mod: HCNC,95,,
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
## 2024-05-02 ENCOUNTER — TELEPHONE (OUTPATIENT)
Dept: BEHAVIORAL HEALTH | Facility: CLINIC | Age: 89
End: 2024-05-02
Payer: MEDICARE

## 2024-05-02 NOTE — TELEPHONE ENCOUNTER
SW contacted the pt at [phone] after receiving an Epic message which indicated that the pt called and requested a call back. Pt called to notify SW that she was not able to come in to complete the Involvement In Care form and requested to have the form mailed. SW will request that the CHW mail the form.

## 2024-05-09 ENCOUNTER — INFUSION (OUTPATIENT)
Dept: INFECTIOUS DISEASES | Facility: HOSPITAL | Age: 89
End: 2024-05-09
Payer: MEDICARE

## 2024-05-09 VITALS
TEMPERATURE: 98 F | SYSTOLIC BLOOD PRESSURE: 178 MMHG | HEART RATE: 97 BPM | OXYGEN SATURATION: 97 % | BODY MASS INDEX: 15.49 KG/M2 | HEIGHT: 63 IN | WEIGHT: 87.44 LBS | DIASTOLIC BLOOD PRESSURE: 80 MMHG | RESPIRATION RATE: 21 BRPM

## 2024-05-09 DIAGNOSIS — M81.0 OSTEOPOROSIS, POSTMENOPAUSAL: Primary | ICD-10-CM

## 2024-05-09 PROCEDURE — 63600175 PHARM REV CODE 636 W HCPCS: Mod: JZ,JG,HCNC | Performed by: NURSE PRACTITIONER

## 2024-05-09 PROCEDURE — 96372 THER/PROPH/DIAG INJ SC/IM: CPT | Mod: HCNC

## 2024-05-09 RX ADMIN — DENOSUMAB 60 MG: 60 INJECTION SUBCUTANEOUS at 10:05

## 2024-05-09 NOTE — PROGRESS NOTES
Patient arrives for Prolia injection - confirms use of calcium and vitamin D supplements and denies dental procedures over past 3 months - administered per guidelines.    Limited head-to-toe assessment due to privacy issues and visit reason though the opportunity was given for patient to express any concerns.

## 2024-06-06 ENCOUNTER — TELEPHONE (OUTPATIENT)
Dept: BEHAVIORAL HEALTH | Facility: CLINIC | Age: 89
End: 2024-06-06
Payer: MEDICARE

## 2024-06-06 NOTE — TELEPHONE ENCOUNTER
Late entry for 5/31/24 at 5:10 pm: SW spoke with Mihaela Bowen [phone] for legal advice. SW informed Ms. Bowen that pt's daughter requested to speak with SW, although there was no Involvement of Care on pt's chart. Ms. Bowen, advised if there was no concern for safety for SW to err on the side of caution and await receipt of Involvement of Care form.

## 2024-06-10 ENCOUNTER — PATIENT MESSAGE (OUTPATIENT)
Dept: INTERNAL MEDICINE | Facility: CLINIC | Age: 89
End: 2024-06-10
Payer: MEDICARE

## 2024-06-25 ENCOUNTER — TELEPHONE (OUTPATIENT)
Dept: BEHAVIORAL HEALTH | Facility: CLINIC | Age: 89
End: 2024-06-25
Payer: MEDICARE

## 2024-06-25 NOTE — TELEPHONE ENCOUNTER
Late entry for 6/6/24 at 10:45 am: SW contacted pt to confirm receipt of the Involvement of Care form. Pt reported that she had not received the form. SW informed pt that another form would be mailed out. [facility] will request that the CHW, Swathi Acuna, send out another Involvement of Care form to the pt.

## 2024-07-03 NOTE — TELEPHONE ENCOUNTER
No care due was identified.  [facility] Embedded Care Due Messages. Reference number: [id number].   7/03/2024 12:25:11 AM CDT

## 2024-07-03 NOTE — TELEPHONE ENCOUNTER
Refill Routing Note   Medication(s) are not appropriate for processing by [facility] for the following reason(s):        Outside of protocol    [facility] action(s):  Route        Medication Therapy Plan: PRN usage outside [facility] protocol      Appointments  past 12m or future 3m with PCP    Date Provider   Last Visit   4/18/2024 Shasta Urbina MD   Next Visit   Visit date not found Shasta Urbina MD   ED visits in past 90 days: 0        Note composed:3:55 AM 07/03/2024

## 2024-07-05 RX ORDER — ESOMEPRAZOLE MAGNESIUM 40 MG/1
CAPSULE, DELAYED RELEASE ORAL
Qty: 90 CAPSULE | Refills: 1 | Status: SHIPPED | OUTPATIENT
Start: 2024-07-05

## 2024-08-15 ENCOUNTER — PATIENT MESSAGE (OUTPATIENT)
Dept: INTERNAL MEDICINE | Facility: CLINIC | Age: 89
End: 2024-08-15
Payer: MEDICARE

## 2024-08-15 DIAGNOSIS — I25.10 CORONARY ARTERY DISEASE, UNSPECIFIED VESSEL OR LESION TYPE, UNSPECIFIED WHETHER ANGINA PRESENT, UNSPECIFIED WHETHER NATIVE OR TRANSPLANTED HEART: Primary | ICD-10-CM

## 2024-08-20 ENCOUNTER — OUTPATIENT CASE MANAGEMENT (OUTPATIENT)
Dept: ADMINISTRATIVE | Facility: OTHER | Age: 89
End: 2024-08-20
Payer: MEDICARE

## 2024-08-20 NOTE — PROGRESS NOTES
Outpatient Care Management   - Patient Assessment    Patient: Mj Summers  MRN:  [id number]  Date of Service:  8/20/2024  Completed by:  Paige Acuna LMSW  Referral Date: 08/15/2024    Reason for Visit   Patient presents with    Social Work Assessment    Plan Of Care       Brief Summary:  received a referral from PCP for the following Low/Mod SW psychosocial needs housing .  The patient also requests assistance with N/A. Care plan was created in collaboration with patient/caregiver input.   completed the SDOH questionnaire. SW completed assessment with patient. Patient resides alone. Patient resides alone. Patient reports next door neighbor and friend check in on her. Patient reports being independent with IADL's and ADL's. Patient denied needing assistance with food, shelter, medication, medical and or utilities. Patient reports friend provides transportation to and from appointments. Ashleynet reports having document for ACP and will complete and return back to PCP. Patient ambulates with a walker.

## 2024-08-21 ENCOUNTER — OUTPATIENT CASE MANAGEMENT (OUTPATIENT)
Dept: ADMINISTRATIVE | Facility: OTHER | Age: 89
End: 2024-08-21
Payer: MEDICARE

## 2024-08-21 NOTE — PROGRESS NOTES
Outpatient Care Management   - Care Plan Follow Up    Patient: Mj Summers  MRN:  [id number]  Date of Service:  8/21/2024  Completed by:  Paige Acuna LMSW  Referral Date: 08/15/2024    Reason for Visit   Patient presents with    Update Plan Of Care    Case Closure       Brief Summary: Patient gave this SW to contact her daughter regarding needs. SW spoke with daughter Irene regarding concerns with patient mental health as well as living situation.     Complex Care Plan     Care plan was discussed and completed today with input from patient and/or caregiver.    Patient Instructions     No follow-ups on file.

## 2024-10-26 ENCOUNTER — HOSPITAL ENCOUNTER (OUTPATIENT)
Facility: OTHER | Age: 89
Discharge: NURSING, REHAB OR LONG-TERM CARE | End: 2024-10-30
Admitting: INTERNAL MEDICINE
Payer: MEDICARE

## 2024-10-26 DIAGNOSIS — S81.811A SKIN TEAR OF RIGHT LOWER LEG WITHOUT COMPLICATION, INITIAL ENCOUNTER: ICD-10-CM

## 2024-10-26 DIAGNOSIS — R29.6 FALLS: ICD-10-CM

## 2024-10-26 DIAGNOSIS — R74.8 ELEVATED CPK: ICD-10-CM

## 2024-10-26 DIAGNOSIS — R53.1 GENERALIZED WEAKNESS: Primary | ICD-10-CM
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
## 2024-10-26 LAB
ALBUMIN SERPL BCP-MCNC: 3.7 G/DL (ref 3.5–5.2)
ALP SERPL-CCNC: 70 U/L (ref 40–150)
ALT SERPL W/O P-5'-P-CCNC: 27 U/L (ref 10–44)
ANION GAP SERPL CALC-SCNC: 15 MMOL/L (ref 8–16)
AST SERPL-CCNC: 34 U/L (ref 10–40)
BACTERIA #/AREA URNS HPF: ABNORMAL /HPF
BASOPHILS # BLD AUTO: 0.02 K/UL (ref 0–0.2)
BASOPHILS NFR BLD: 0.3 % (ref 0–1.9)
BILIRUB SERPL-MCNC: 0.6 MG/DL (ref 0.1–1)
BILIRUB UR QL STRIP: NEGATIVE
BUN SERPL-MCNC: 16 MG/DL (ref 8–23)
CALCIUM SERPL-MCNC: 9.3 MG/DL (ref 8.7–10.5)
CHLORIDE SERPL-SCNC: 103 MMOL/L (ref 95–110)
CK SERPL-CCNC: 418 U/L (ref 20–180)
CLARITY UR: CLEAR
CO2 SERPL-SCNC: 23 MMOL/L (ref 23–29)
COLOR UR: COLORLESS
CREAT SERPL-MCNC: 0.8 MG/DL (ref 0.5–1.4)
DIFFERENTIAL METHOD BLD: ABNORMAL
EOSINOPHIL # BLD AUTO: 0 K/UL (ref 0–0.5)
EOSINOPHIL NFR BLD: 0 % (ref 0–8)
ERYTHROCYTE [DISTWIDTH] IN BLOOD BY AUTOMATED COUNT: 13 % (ref 11.5–14.5)
EST. GFR  (NO RACE VARIABLE): >60 ML/MIN/1.73 M^2
GLUCOSE SERPL-MCNC: 127 MG/DL (ref 70–110)
GLUCOSE UR QL STRIP: NEGATIVE
HCT VFR BLD AUTO: 32.9 % (ref 37–48.5)
HGB BLD-MCNC: 10.9 G/DL (ref 12–16)
HGB UR QL STRIP: ABNORMAL
HYALINE CASTS #/AREA URNS LPF: 0 /LPF
IMM GRANULOCYTES # BLD AUTO: 0.04 K/UL (ref 0–0.04)
IMM GRANULOCYTES NFR BLD AUTO: 0.6 % (ref 0–0.5)
KETONES UR QL STRIP: ABNORMAL
LEUKOCYTE ESTERASE UR QL STRIP: NEGATIVE
LYMPHOCYTES # BLD AUTO: 0.5 K/UL (ref 1–4.8)
LYMPHOCYTES NFR BLD: 6.7 % (ref 18–48)
MCH RBC QN AUTO: 30.3 PG (ref 27–31)
MCHC RBC AUTO-ENTMCNC: 33.1 G/DL (ref 32–36)
MCV RBC AUTO: 91 FL (ref 82–98)
MICROSCOPIC COMMENT: ABNORMAL
MONOCYTES # BLD AUTO: 0.3 K/UL (ref 0.3–1)
MONOCYTES NFR BLD: 4.8 % (ref 4–15)
NEUTROPHILS # BLD AUTO: 6 K/UL (ref 1.8–7.7)
NEUTROPHILS NFR BLD: 87.6 % (ref 38–73)
NITRITE UR QL STRIP: NEGATIVE
NRBC BLD-RTO: 0 /100 WBC
PH UR STRIP: 7 [PH] (ref 5–8)
PLATELET # BLD AUTO: 243 K/UL (ref 150–450)
PMV BLD AUTO: 10.2 FL (ref 9.2–12.9)
POTASSIUM SERPL-SCNC: 3.7 MMOL/L (ref 3.5–5.1)
PROT SERPL-MCNC: 7.8 G/DL (ref 6–8.4)
PROT UR QL STRIP: ABNORMAL
RBC # BLD AUTO: 3.6 M/UL (ref 4–5.4)
RBC #/AREA URNS HPF: 6 /HPF (ref 0–4)
SODIUM SERPL-SCNC: 141 MMOL/L (ref 136–145)
SP GR UR STRIP: 1.01 (ref 1–1.03)
SQUAMOUS #/AREA URNS HPF: 4 /HPF
URN SPEC COLLECT METH UR: ABNORMAL
UROBILINOGEN UR STRIP-ACNC: NEGATIVE EU/DL
WBC # BLD AUTO: 6.88 K/UL (ref 3.9–12.7)
WBC #/AREA URNS HPF: 1 /HPF (ref 0–5)

## 2024-10-26 PROCEDURE — 90715 TDAP VACCINE 7 YRS/> IM: CPT | Mod: HCNC

## 2024-10-26 PROCEDURE — 80053 COMPREHEN METABOLIC PANEL: CPT | Mod: HCNC

## 2024-10-26 PROCEDURE — 99285 EMERGENCY DEPT VISIT HI MDM: CPT | Mod: 25,HCNC

## 2024-10-26 PROCEDURE — 82550 ASSAY OF CK (CPK): CPT | Mod: HCNC

## 2024-10-26 PROCEDURE — 25000003 PHARM REV CODE 250: Mod: HCNC

## 2024-10-26 PROCEDURE — 81000 URINALYSIS NONAUTO W/SCOPE: CPT | Mod: HCNC

## 2024-10-26 PROCEDURE — 51701 INSERT BLADDER CATHETER: CPT | Mod: HCNC

## 2024-10-26 PROCEDURE — 93010 ELECTROCARDIOGRAM REPORT: CPT | Mod: HCNC,,, | Performed by: INTERNAL MEDICINE

## 2024-10-26 PROCEDURE — 93005 ELECTROCARDIOGRAM TRACING: CPT | Mod: HCNC

## 2024-10-26 PROCEDURE — 85025 COMPLETE CBC W/AUTO DIFF WBC: CPT | Mod: HCNC

## 2024-10-26 PROCEDURE — 63600175 PHARM REV CODE 636 W HCPCS: Mod: HCNC

## 2024-10-26 PROCEDURE — 90471 IMMUNIZATION ADMIN: CPT | Mod: HCNC

## 2024-10-26 PROCEDURE — 96360 HYDRATION IV INFUSION INIT: CPT | Mod: 59,HCNC

## 2024-10-26 RX ORDER — CHLORTHALIDONE 25 MG/1
25 TABLET ORAL
Status: COMPLETED | OUTPATIENT
Start: 2024-10-26 | End: 2024-10-26

## 2024-10-26 RX ORDER — NIFEDIPINE 30 MG/1
90 TABLET, EXTENDED RELEASE ORAL
Status: COMPLETED | OUTPATIENT
Start: 2024-10-26 | End: 2024-10-26

## 2024-10-26 RX ORDER — ACETAMINOPHEN 160 MG/5ML
15 SOLUTION ORAL
Status: COMPLETED | OUTPATIENT
Start: 2024-10-26 | End: 2024-10-26

## 2024-10-26 RX ORDER — MUPIROCIN 20 MG/G
1 OINTMENT TOPICAL
Status: COMPLETED | OUTPATIENT
Start: 2024-10-26 | End: 2024-10-26

## 2024-10-26 RX ORDER — AMLODIPINE BESYLATE 5 MG/1
10 TABLET ORAL
Status: DISCONTINUED | OUTPATIENT
Start: 2024-10-26 | End: 2024-10-26

## 2024-10-26 RX ORDER — AMLODIPINE BESYLATE 5 MG/1
10 TABLET ORAL
Status: COMPLETED | OUTPATIENT
Start: 2024-10-26 | End: 2024-10-26

## 2024-10-26 RX ADMIN — MUPIROCIN 1 TUBE: 20 OINTMENT TOPICAL at 08:10

## 2024-10-26 RX ADMIN — AMLODIPINE BESYLATE 10 MG: 5 TABLET ORAL at 11:10

## 2024-10-26 RX ADMIN — NIFEDIPINE 90 MG: 30 TABLET, FILM COATED, EXTENDED RELEASE ORAL at 08:10

## 2024-10-26 RX ADMIN — TETANUS TOXOID, REDUCED DIPHTHERIA TOXOID AND ACELLULAR PERTUSSIS VACCINE, ADSORBED 0.5 ML: 5; 2.5; 8; 8; 2.5 SUSPENSION INTRAMUSCULAR at 09:10

## 2024-10-26 RX ADMIN — SODIUM CHLORIDE 500 ML: 9 INJECTION, SOLUTION INTRAVENOUS at 09:10

## 2024-10-26 RX ADMIN — ACETAMINOPHEN 611.2 MG: 160 SUSPENSION ORAL at 11:10

## 2024-10-26 RX ADMIN — CHLORTHALIDONE 25 MG: 25 TABLET ORAL at 11:10

## 2024-10-27 PROBLEM — R74.8 ELEVATED CPK: Status: ACTIVE | Noted: 2024-10-27

## 2024-10-27 PROBLEM — N18.31 STAGE 3A CHRONIC KIDNEY DISEASE: Chronic | Status: ACTIVE | Noted: 2023-03-04

## 2024-10-27 PROBLEM — M62.82 RHABDOMYOLYSIS: Status: RESOLVED | Noted: 2024-10-27 | Resolved: 2024-10-27

## 2024-10-27 PROBLEM — M62.82 RHABDOMYOLYSIS: Status: ACTIVE | Noted: 2024-10-27

## 2024-10-27 PROBLEM — R29.6 FALLS: Status: ACTIVE | Noted: 2024-10-27

## 2024-10-27 LAB
ANION GAP SERPL CALC-SCNC: 14 MMOL/L (ref 8–16)
BASOPHILS # BLD AUTO: 0.01 K/UL (ref 0–0.2)
BASOPHILS NFR BLD: 0.2 % (ref 0–1.9)
BUN SERPL-MCNC: 12 MG/DL (ref 8–23)
CALCIUM SERPL-MCNC: 8.7 MG/DL (ref 8.7–10.5)
CHLORIDE SERPL-SCNC: 104 MMOL/L (ref 95–110)
CO2 SERPL-SCNC: 24 MMOL/L (ref 23–29)
CREAT SERPL-MCNC: 0.8 MG/DL (ref 0.5–1.4)
DIFFERENTIAL METHOD BLD: ABNORMAL
EOSINOPHIL # BLD AUTO: 0 K/UL (ref 0–0.5)
EOSINOPHIL NFR BLD: 0.3 % (ref 0–8)
ERYTHROCYTE [DISTWIDTH] IN BLOOD BY AUTOMATED COUNT: 12.9 % (ref 11.5–14.5)
EST. GFR  (NO RACE VARIABLE): >60 ML/MIN/1.73 M^2
GLUCOSE SERPL-MCNC: 105 MG/DL (ref 70–110)
HCT VFR BLD AUTO: 35 % (ref 37–48.5)
HGB BLD-MCNC: 11.2 G/DL (ref 12–16)
IMM GRANULOCYTES # BLD AUTO: 0.02 K/UL (ref 0–0.04)
IMM GRANULOCYTES NFR BLD AUTO: 0.3 % (ref 0–0.5)
LYMPHOCYTES # BLD AUTO: 0.7 K/UL (ref 1–4.8)
LYMPHOCYTES NFR BLD: 10.9 % (ref 18–48)
MAGNESIUM SERPL-MCNC: 1.8 MG/DL (ref 1.6–2.6)
MCH RBC QN AUTO: 29.6 PG (ref 27–31)
MCHC RBC AUTO-ENTMCNC: 32 G/DL (ref 32–36)
MCV RBC AUTO: 93 FL (ref 82–98)
MONOCYTES # BLD AUTO: 0.6 K/UL (ref 0.3–1)
MONOCYTES NFR BLD: 9.7 % (ref 4–15)
NEUTROPHILS # BLD AUTO: 5 K/UL (ref 1.8–7.7)
NEUTROPHILS NFR BLD: 78.6 % (ref 38–73)
NRBC BLD-RTO: 0 /100 WBC
OHS QRS DURATION: 106 MS
OHS QRS DURATION: 110 MS
OHS QTC CALCULATION: 480 MS
OHS QTC CALCULATION: 486 MS
PLATELET # BLD AUTO: 218 K/UL (ref 150–450)
PMV BLD AUTO: 10.1 FL (ref 9.2–12.9)
POTASSIUM SERPL-SCNC: 3.5 MMOL/L (ref 3.5–5.1)
RBC # BLD AUTO: 3.78 M/UL (ref 4–5.4)
SODIUM SERPL-SCNC: 142 MMOL/L (ref 136–145)
WBC # BLD AUTO: 6.31 K/UL (ref 3.9–12.7)

## 2024-10-27 PROCEDURE — 36415 COLL VENOUS BLD VENIPUNCTURE: CPT | Mod: HCNC | Performed by: PHYSICIAN ASSISTANT

## 2024-10-27 PROCEDURE — G0378 HOSPITAL OBSERVATION PER HR: HCPCS | Mod: HCNC

## 2024-10-27 PROCEDURE — 85025 COMPLETE CBC W/AUTO DIFF WBC: CPT | Mod: HCNC | Performed by: PHYSICIAN ASSISTANT

## 2024-10-27 PROCEDURE — 97166 OT EVAL MOD COMPLEX 45 MIN: CPT | Mod: HCNC

## 2024-10-27 PROCEDURE — 97162 PT EVAL MOD COMPLEX 30 MIN: CPT | Mod: HCNC | Performed by: PHYSICAL THERAPIST

## 2024-10-27 PROCEDURE — 25000003 PHARM REV CODE 250: Mod: HCNC | Performed by: PHYSICIAN ASSISTANT

## 2024-10-27 PROCEDURE — 96374 THER/PROPH/DIAG INJ IV PUSH: CPT

## 2024-10-27 PROCEDURE — 97530 THERAPEUTIC ACTIVITIES: CPT | Mod: HCNC

## 2024-10-27 PROCEDURE — 63600175 PHARM REV CODE 636 W HCPCS: Mod: HCNC | Performed by: NURSE PRACTITIONER

## 2024-10-27 PROCEDURE — 97535 SELF CARE MNGMENT TRAINING: CPT | Mod: HCNC

## 2024-10-27 PROCEDURE — 96361 HYDRATE IV INFUSION ADD-ON: CPT

## 2024-10-27 PROCEDURE — 80048 BASIC METABOLIC PNL TOTAL CA: CPT | Mod: HCNC | Performed by: PHYSICIAN ASSISTANT

## 2024-10-27 PROCEDURE — 83735 ASSAY OF MAGNESIUM: CPT | Mod: HCNC | Performed by: PHYSICIAN ASSISTANT

## 2024-10-27 PROCEDURE — A4216 STERILE WATER/SALINE, 10 ML: HCPCS | Mod: HCNC | Performed by: PHYSICIAN ASSISTANT

## 2024-10-27 RX ORDER — LANOLIN ALCOHOL/MO/W.PET/CERES
1 CREAM (GRAM) TOPICAL DAILY
Status: DISCONTINUED | OUTPATIENT
Start: 2024-10-27 | End: 2024-10-31 | Stop reason: HOSPADM

## 2024-10-27 RX ORDER — METHOCARBAMOL 500 MG/1
500 TABLET, FILM COATED ORAL 3 TIMES DAILY PRN
Status: DISCONTINUED | OUTPATIENT
Start: 2024-10-27 | End: 2024-10-31 | Stop reason: HOSPADM

## 2024-10-27 RX ORDER — PILOCARPINE HYDROCHLORIDE 40 MG/ML
1 SOLUTION/ DROPS OPHTHALMIC 3 TIMES DAILY
Status: DISCONTINUED | OUTPATIENT
Start: 2024-10-27 | End: 2024-10-31 | Stop reason: HOSPADM

## 2024-10-27 RX ORDER — TALC
6 POWDER (GRAM) TOPICAL NIGHTLY PRN
Status: DISCONTINUED | OUTPATIENT
Start: 2024-10-27 | End: 2024-10-31 | Stop reason: HOSPADM

## 2024-10-27 RX ORDER — IBUPROFEN 200 MG
16 TABLET ORAL
Status: DISCONTINUED | OUTPATIENT
Start: 2024-10-27 | End: 2024-10-31 | Stop reason: HOSPADM

## 2024-10-27 RX ORDER — AMOXICILLIN 250 MG
1 CAPSULE ORAL 2 TIMES DAILY PRN
Status: DISCONTINUED | OUTPATIENT
Start: 2024-10-27 | End: 2024-10-31 | Stop reason: HOSPADM

## 2024-10-27 RX ORDER — VALSARTAN 80 MG/1
160 TABLET ORAL DAILY
Status: DISCONTINUED | OUTPATIENT
Start: 2024-10-27 | End: 2024-10-29

## 2024-10-27 RX ORDER — GLUCAGON 1 MG
1 KIT INJECTION
Status: DISCONTINUED | OUTPATIENT
Start: 2024-10-27 | End: 2024-10-31 | Stop reason: HOSPADM

## 2024-10-27 RX ORDER — SODIUM CHLORIDE 9 MG/ML
INJECTION, SOLUTION INTRAVENOUS CONTINUOUS
Status: DISCONTINUED | OUTPATIENT
Start: 2024-10-27 | End: 2024-10-27

## 2024-10-27 RX ORDER — NALOXONE HCL 0.4 MG/ML
0.02 VIAL (ML) INJECTION
Status: DISCONTINUED | OUTPATIENT
Start: 2024-10-27 | End: 2024-10-31 | Stop reason: HOSPADM

## 2024-10-27 RX ORDER — BRIMONIDINE TARTRATE 1.5 MG/ML
1 SOLUTION/ DROPS OPHTHALMIC 3 TIMES DAILY
Status: DISCONTINUED | OUTPATIENT
Start: 2024-10-27 | End: 2024-10-31 | Stop reason: HOSPADM

## 2024-10-27 RX ORDER — SODIUM CHLORIDE 0.9 % (FLUSH) 0.9 %
10 SYRINGE (ML) INJECTION EVERY 8 HOURS
Status: DISCONTINUED | OUTPATIENT
Start: 2024-10-27 | End: 2024-10-31 | Stop reason: HOSPADM

## 2024-10-27 RX ORDER — IBUPROFEN 200 MG
24 TABLET ORAL
Status: DISCONTINUED | OUTPATIENT
Start: 2024-10-27 | End: 2024-10-31 | Stop reason: HOSPADM

## 2024-10-27 RX ORDER — ACETAMINOPHEN 325 MG/1
650 TABLET ORAL EVERY 6 HOURS PRN
Status: DISCONTINUED | OUTPATIENT
Start: 2024-10-27 | End: 2024-10-31 | Stop reason: HOSPADM

## 2024-10-27 RX ORDER — VALSARTAN 80 MG/1
320 TABLET ORAL DAILY
Status: DISCONTINUED | OUTPATIENT
Start: 2024-10-27 | End: 2024-10-27

## 2024-10-27 RX ORDER — METHOCARBAMOL 750 MG/1
750 TABLET, FILM COATED ORAL ONCE
Status: DISCONTINUED | OUTPATIENT
Start: 2024-10-27 | End: 2024-10-27

## 2024-10-27 RX ORDER — KETOROLAC TROMETHAMINE 30 MG/ML
15 INJECTION, SOLUTION INTRAMUSCULAR; INTRAVENOUS ONCE
Status: COMPLETED | OUTPATIENT
Start: 2024-10-27 | End: 2024-10-27

## 2024-10-27 RX ORDER — NAPROXEN SODIUM 220 MG/1
81 TABLET, FILM COATED ORAL DAILY
Status: DISCONTINUED | OUTPATIENT
Start: 2024-10-27 | End: 2024-10-31 | Stop reason: HOSPADM

## 2024-10-27 RX ADMIN — BRIMONIDINE TARTRATE 1 DROP: 1.5 SOLUTION/ DROPS OPHTHALMIC at 03:10

## 2024-10-27 RX ADMIN — Medication 10 ML: at 03:10

## 2024-10-27 RX ADMIN — BIMATOPROST 1 DROP: 0.1 SOLUTION/ DROPS OPHTHALMIC at 08:10

## 2024-10-27 RX ADMIN — SODIUM CHLORIDE: 9 INJECTION, SOLUTION INTRAVENOUS at 05:10

## 2024-10-27 RX ADMIN — ACETAMINOPHEN 650 MG: 325 TABLET, FILM COATED ORAL at 04:10

## 2024-10-27 RX ADMIN — ACETAMINOPHEN 650 MG: 325 TABLET, FILM COATED ORAL at 11:10

## 2024-10-27 RX ADMIN — VALSARTAN 160 MG: 80 TABLET, FILM COATED ORAL at 08:10

## 2024-10-27 RX ADMIN — PILOCARPINE HYDROCHLORIDE OPHTHALMIC SOLUTION 1 DROP: 40 SOLUTION/ DROPS OPHTHALMIC at 10:10

## 2024-10-27 RX ADMIN — Medication 10 ML: at 05:10

## 2024-10-27 RX ADMIN — KETOROLAC TROMETHAMINE 15 MG: 30 INJECTION, SOLUTION INTRAMUSCULAR; INTRAVENOUS at 09:10

## 2024-10-27 RX ADMIN — ACETAMINOPHEN 650 MG: 325 TABLET, FILM COATED ORAL at 05:10

## 2024-10-27 RX ADMIN — ASPIRIN 81 MG CHEWABLE TABLET 81 MG: 81 TABLET CHEWABLE at 08:10

## 2024-10-27 RX ADMIN — PILOCARPINE HYDROCHLORIDE OPHTHALMIC SOLUTION 1 DROP: 40 SOLUTION/ DROPS OPHTHALMIC at 03:10

## 2024-10-27 RX ADMIN — Medication 10 ML: at 09:10

## 2024-10-27 RX ADMIN — PILOCARPINE HYDROCHLORIDE OPHTHALMIC SOLUTION 1 DROP: 40 SOLUTION/ DROPS OPHTHALMIC at 08:10

## 2024-10-27 RX ADMIN — FERROUS SULFATE TAB 325 MG (65 MG ELEMENTAL FE) 1 EACH: 325 (65 FE) TAB at 08:10

## 2024-10-27 RX ADMIN — THERA TABS 1 TABLET: TAB at 08:10

## 2024-10-27 RX ADMIN — BRIMONIDINE TARTRATE 1 DROP: 1.5 SOLUTION/ DROPS OPHTHALMIC at 08:10

## 2024-10-27 NOTE — ED TRIAGE NOTES
Mj Summers, a 89 y.o. female presents to the ED via private car driven by pt's friend with complaints of falling out of bed unto floor without carpet. Patient denies loc. Patient reports that she was trying to reach for her medications. Reports three falls within the last three weeks. Patient have been unable to walk since her falls. Patient lives alone but her friend checks on her often. Pt resting comfortably. Connected to cardiac monitor, BP cuff, and pulse oximeter. ED workup in progress. Call light within reach. Safety measures in place. Denies further needs. Plan of care ongoing.      Chief Complaint   Patient presents with    Fall     Pt had a fall today and landed on her bottom and now has pain. Pt states that she is having a harder time taking care of herself after her first fall 3 weeks ago.      Review of patient's allergies indicates:   Allergen Reactions    Strawberries [strawberry]      Blood pressure elevation    Chocolate flavor      Causes acid reflux    Atorvastatin      Myalgias    Pcn [penicillins] Rash    Pravastatin      Myalgias    Sulfa (sulfonamide antibiotics) Itching     Past Medical History:   Diagnosis Date    Anemia     Arthritis 2015    back    Cataract     s/p surgery    Coronary artery disease     [facility] 1/2010 - mid LAD 40%    GERD (gastroesophageal reflux disease)     Glaucoma (increased eye pressure)     Hyperlipidemia     Hypertension     Lactose intolerance     Legally blind in right eye, as defined in [place]     Osteoporosis, post-menopausal     PAD (peripheral artery disease) 07/11/2018    Pneumonia 12/13/2015    Proximal muscle weakness     Renal cyst     left kidney    Vitamin D deficiency disease      Past Surgical History:   Procedure Laterality Date    ADENOIDECTOMY      BREAST BIOPSY      left breast    CARDIAC CATHETERIZATION  01/14/2010    mid LAD, 40% stenosis; LCX, luminal irregularities;                 CATARACT EXTRACTION W/  INTRAOCULAR LENS IMPLANT Left  05/22/2013    COMBINED WITH TRAB ()    COLONOSCOPY N/A 2/6/2018    Procedure: COLONOSCOPY;  Surgeon: Rufus Villela MD;  Location: [facility] ([address] [place] [address]);  Service: Endoscopy;  Laterality: N/A;    EDTA CHELATION Left 2/14    OS ()    EYE SURGERY      FEMUR FRACTURE SURGERY Left 05/09/2016    FRACTURE SURGERY  2016    TEAR DUCT SURGERY      right eye    TONSILLECTOMY      TRABECULECTOMY Left 05/22/2013    WITH EXPRESS MINI SHUNT AND CE ()    UPPER GASTROINTESTINAL ENDOSCOPY      YAG CAPSULOTOMY Left 06/22/2017

## 2024-10-27 NOTE — PLAN OF CARE
Pt is AAOx3.  Independent at baseline.  DME includes rolling walker and cane.  PCP is correct on face sheet.  Family will transport pt to home when discharged.     10/27/24 1128   Discharge Assessment   Assessment Type Discharge Planning Assessment   Confirmed/corrected address, phone number and insurance Yes   Confirmed Demographics Correct on Facesheet   Source of Information patient   Communicated LIDIA with patient/caregiver Date not available/Unable to determine   Reason For Admission Falls   People in Home alone   Do you expect to return to your current living situation? Yes   Do you have help at home or someone to help you manage your care at home? Yes   Who are your caregiver(s) and their phone number(s)? Aimee Green (Daughter)  [phone]   Prior to hospitilization cognitive status: Alert/Oriented   Current cognitive status: Alert/Oriented   Walking or Climbing Stairs Difficulty yes   Walking or Climbing Stairs ambulation difficulty, requires equipment   Dressing/Bathing Difficulty no   Do you have any problems with: Errands/Grocery   Equipment Currently Used at Home walker, rolling;cane, quad   Readmission within 30 days? No   Patient currently being followed by outpatient case management? No   Do you currently have service(s) that help you manage your care at home? No   Do you take prescription medications? Yes   Do you have prescription coverage? Yes   Do you have any problems affording any of your prescribed medications? No   Is the patient taking medications as prescribed? yes   Who is going to help you get home at discharge? Aimee Green (Daughter)  [phone]   How do you get to doctors appointments? family or friend will provide   Are you on dialysis? No   Do you take coumadin? No   Discharge Plan A Home   Discharge Plan B Home with family   DME Needed Upon Discharge  none   Discharge Plan discussed with: Patient   Transition of Care Barriers None     [facility] - Med Surg (Nadine)  Initial Discharge  Assessment       Primary Care Provider: Shasta Urbina MD    Admission Diagnosis: Fall [W19.XXXA]  Elevated CPK [R74.8]  Generalized weakness [R53.1]  Skin tear of right lower leg without complication, initial encounter [S81.811A]    Admission Date: 10/26/2024  Expected Discharge Date:     Transition of Care Barriers: (P) None    Payor: [employer] MANAGED MEDICARE / Plan: [employer] MEDICARE HMO / Product Type: Capitation /     Extended Emergency Contact Information  Primary Emergency Contact: Aimee Green  Address: [address] [place] [address] [facility]  Home Phone: [phone]  Work Phone: [phone]  Mobile Phone: [phone]  Relation: Daughter  Secondary Emergency Contact: Gaytan,Irene  Mobile Phone: [phone]  Relation: Daughter    Discharge Plan A: (P) Home  Discharge Plan B: (P) Home with family      [id number] - [place], [place] - [address]  [address]  [place] [address]  Phone: [phone] Fax: [phone]      Initial Assessment (most recent)       Adult Discharge Assessment - 10/27/24 1128          Discharge Assessment    Assessment Type Discharge Planning Assessment (P)      Confirmed/corrected address, phone number and insurance Yes (P)      Confirmed Demographics Correct on Facesheet (P)      Source of Information patient (P)      Communicated LIDIA with patient/caregiver Date not available/Unable to determine (P)      Reason For Admission Falls (P)      People in Home alone (P)      Do you expect to return to your current living situation? Yes (P)      Do you have help at home or someone to help you manage your care at home? Yes (P)      Who are your caregiver(s) and their phone number(s)? Aimee Green (Daughter)  [phone] (P)      Prior to hospitilization cognitive status: Alert/Oriented (P)      Current cognitive status: Alert/Oriented (P)      Walking or Climbing Stairs Difficulty yes (P)      Walking or Climbing  Stairs ambulation difficulty, requires equipment (P)      Dressing/Bathing Difficulty no (P)      Do you have any problems with: Errands/Grocery (P)      Equipment Currently Used at Home walker, rolling;cane, quad (P)      Readmission within 30 days? No (P)      Patient currently being followed by outpatient case management? No (P)      Do you currently have service(s) that help you manage your care at home? No (P)      Do you take prescription medications? Yes (P)      Do you have prescription coverage? Yes (P)      Do you have any problems affording any of your prescribed medications? No (P)      Is the patient taking medications as prescribed? yes (P)      Who is going to help you get home at discharge? Aimee Green (Daughter)  [phone] (P)      How do you get to doctors appointments? family or friend will provide (P)      Are you on dialysis? No (P)      Do you take coumadin? No (P)      Discharge Plan A Home (P)      Discharge Plan B Home with family (P)      DME Needed Upon Discharge  none (P)      Discharge Plan discussed with: Patient (P)      Transition of Care Barriers None (P)

## 2024-10-27 NOTE — ASSESSMENT & PLAN NOTE
- Continue aspirin 81mg PO daily; statin interchange held given reported myalgias with atorvastatin.

## 2024-10-27 NOTE — ASSESSMENT & PLAN NOTE
- Ms Mj Summers presents after being found down after a fall  - she is found to have elevated CPK without liver or renal function changes   - IV fluids to hydrate   - encourage PO intake   - monitor labs daily

## 2024-10-27 NOTE — ED PROVIDER NOTES
Encounter Date: 10/26/2024       History     Chief Complaint   Patient presents with    Fall     Pt had a fall today and landed on her bottom and now has pain. Pt states that she is having a harder time taking care of herself after her first fall 3 weeks ago.      89-year-old female with history of hypertension, hyperlipidemia, glaucoma is presenting to the emergency department status post multiple falls.  Patient lives at home alone.  States that she slid out of bed.  Later of friend arrives who says that they found her in the apartment on the floor in different locations throughout the house over the past week.  Patient is unable to provide any additional history regarding the falls.  She just continues to discuss how the carpet does not have a lot of cushioning and this is why the falls are happening.  States she uses a walker to ambulate.  States that she cooks for herself, has friend who helps take her to appointments.    The history is provided by the patient. The history is limited by the condition of the patient.     Review of patient's allergies indicates:   Allergen Reactions    Strawberries [strawberry]      Blood pressure elevation    Chocolate flavor      Causes acid reflux    Atorvastatin      Myalgias    Pcn [penicillins] Rash    Pravastatin      Myalgias    Sulfa (sulfonamide antibiotics) Itching     Past Medical History:   Diagnosis Date    Anemia     Arthritis 2015    back    Cataract     s/p surgery    Coronary artery disease     C 1/2010 - mid LAD 40%    GERD (gastroesophageal reflux disease)     Glaucoma (increased eye pressure)     Hyperlipidemia     Hypertension     Lactose intolerance     Legally blind in right eye, as defined in [place]     Osteoporosis, post-menopausal     PAD (peripheral artery disease) 07/11/2018    Pneumonia 12/13/2015    Proximal muscle weakness     Renal cyst     left kidney    Vitamin D deficiency disease      Past Surgical History:   Procedure Laterality Date     ADENOIDECTOMY      BREAST BIOPSY      left breast    CARDIAC CATHETERIZATION  01/14/2010    mid LAD, 40% stenosis; LCX, luminal irregularities;                 CATARACT EXTRACTION W/  INTRAOCULAR LENS IMPLANT Left 05/22/2013    COMBINED WITH TRAB ()    COLONOSCOPY N/A 2/6/2018    Procedure: COLONOSCOPY;  Surgeon: Rufus Villela MD;  Location: [facility] ENDO ([address] [place] [address]);  Service: Endoscopy;  Laterality: N/A;    EDTA CHELATION Left 2/14    OS ()    EYE SURGERY      FEMUR FRACTURE SURGERY Left 05/09/2016    FRACTURE SURGERY  2016    TEAR DUCT SURGERY      right eye    TONSILLECTOMY      TRABECULECTOMY Left 05/22/2013    WITH EXPRESS MINI SHUNT AND CE ()    UPPER GASTROINTESTINAL ENDOSCOPY      YAG CAPSULOTOMY Left 06/22/2017         Family History   Problem Relation Name Age of Onset    Asthma Sister Fawn     Rheum arthritis Sister Fawn     Osteoporosis Sister Fawn     Arthritis Sister Fawn     Hyperlipidemia Brother Fantasma     Rheum arthritis Brother Fantasma     Diabetes Brother Fantasma     Osteoporosis Mother      Heart disease Mother      Glaucoma Mother      Hypertension Father      Peripheral vascular disease Father      Alzheimer's disease Father      Osteoporosis Sister Paige     Hypertension Brother Mike     Peripheral vascular disease Brother Mike         bilateral leg amputation    Hypertension Son Aravind     Hypertension Daughter Aimee     Colon cancer Other niece 44        death from colon cancer at age 44    Stroke Brother Jaylan     Heart disease Brother Kvng     Diabetes Daughter Irene         GDM that is resolved    Macular degeneration Neg Hx      Retinal detachment Neg Hx      Strabismus Neg Hx      Amblyopia Neg Hx      Blindness Neg Hx      Esophageal cancer Neg Hx       Social History     Tobacco Use    Smoking status: Never    Smokeless tobacco: Never   Substance Use Topics    Alcohol use: No    Drug use: No       Physical Exam     Initial Vitals [10/26/24  1803]   BP Pulse Resp Temp SpO2   (!) 200/98 (!) 121 18 98 °F (36.7 °C) (!) 93 %      MAP       --         Physical Exam    Nursing note and vitals reviewed.  Constitutional: She is not diaphoretic. No distress.   Cachectic, chronically ill-appearing   HENT:   Head: Normocephalic and atraumatic. Head is without raccoon's eyes and without Arechiga's sign.   Eyes: Conjunctivae are normal. Right eye exhibits discharge (White discharge, which patient states is chronic). Left eye exhibits no discharge.   Neck: No tracheal deviation present.   Cardiovascular:  Normal rate, regular rhythm and intact distal pulses.           Pulmonary/Chest: No respiratory distress. She has no wheezes. She has no rhonchi. She has no rales.   Abdominal: Abdomen is soft. She exhibits no distension. There is no abdominal tenderness. There is no rebound and no guarding.   Musculoskeletal:         General: No edema. Normal range of motion.      Cervical back: No deformity or bony tenderness. No spinous process tenderness or muscular tenderness.      Thoracic back: No deformity or bony tenderness.      Lumbar back: No deformity or bony tenderness.     Neurological: She is alert and oriented to person, place, and time.   Moving all extremities spontaneously and symmetrically, diffusely weak   Skin: Skin is warm and dry.   Skin tear to right shin, see image below   Psychiatric: She has a normal mood and affect. Thought content normal.         ED Course   Procedures  Labs Reviewed   CBC W/ AUTO DIFFERENTIAL - Abnormal       Result Value    WBC 6.88      RBC 3.60 (*)     Hemoglobin 10.9 (*)     Hematocrit 32.9 (*)     MCV 91      MCH 30.3      MCHC 33.1      RDW 13.0      Platelets 243      MPV 10.2      Immature Granulocytes 0.6 (*)     Gran # (ANC) 6.0      Immature Grans (Abs) 0.04      Lymph # 0.5 (*)     Mono # 0.3      Eos # 0.0      Baso # 0.02      nRBC 0      Gran % 87.6 (*)     Lymph % 6.7 (*)     Mono % 4.8      Eosinophil % 0.0       Basophil % 0.3      Differential Method Automated     COMPREHENSIVE METABOLIC PANEL - Abnormal    Sodium 141      Potassium 3.7      Chloride 103      CO2 23      Glucose 127 (*)     BUN 16      Creatinine 0.8      Calcium 9.3      Total Protein 7.8      Albumin 3.7      Total Bilirubin 0.6      Alkaline Phosphatase 70      AST 34      ALT 27      eGFR >60      Anion Gap 15     URINALYSIS, REFLEX TO URINE CULTURE - Abnormal    Specimen UA Urine, Clean Catch      Color, UA Colorless (*)     Appearance, UA Clear      pH, UA 7.0      Specific Gravity, UA 1.010      Protein, UA 1+ (*)     Glucose, UA Negative      Ketones, UA Trace (*)     Bilirubin (UA) Negative      Occult Blood UA 1+ (*)     Nitrite, UA Negative      Urobilinogen, UA Negative      Leukocytes, UA Negative      Narrative:     Specimen Source->Urine   CK - Abnormal     (*)    URINALYSIS MICROSCOPIC - Abnormal    RBC, UA 6 (*)     WBC, UA 1      Bacteria Rare      Squam Epithel, UA 4      Hyaline Casts, UA 0      Microscopic Comment SEE COMMENT      Narrative:     Specimen Source->Urine          Imaging Results              X-Ray Hip 2 or 3 views Left with Pelvis when performed (Final result)  Result time 10/27/24 00:31:04      Final result by Lacey Carranza MD (10/27/24 00:31:04)                   Impression:      As above described.      Electronically signed by: Lacey Carranza  Date:    10/27/2024  Time:    00:31               Narrative:    EXAMINATION:  TWO VIEWS OF THE LEFT HIP    CLINICAL HISTORY:  fall;    TECHNIQUE:  AP and lateral view of the left hip    COMPARISON:  05/10/2016    FINDINGS:  There is dynamic pinning of the left hip.  Two views of the left hip demonstrate no acute fracture or dislocation.  There is remote displaced fracture of the lesser trochanter of the left hip.  Mild degenerative changes are seen at both hip joints.  The bones are osteopenic.  Incidental note is made of vascular calcifications.  If pain is out  of proportion to the radiological findings seen, consider cross-sectional imaging.                                       CT Cervical Spine Without Contrast (Final result)  Result time 10/26/24 22:34:44      Final result by Lacey Carranza MD (10/26/24 22:34:44)                   Impression:      No acute intracranial abnormality detected.    No acute cervical fracture.  Spondylitic changes.    Possible anemia.      Electronically signed by: Lacey Carranza  Date:    10/26/2024  Time:    22:34               Narrative:    EXAMINATION:  CT OF THE HEAD WITHOUT AND CT CERVICAL SPINE    CLINICAL HISTORY:  Head trauma, minor (Age >= 65y);; Neck trauma (Age >= 65y);    TECHNIQUE:  5 mm unenhanced axial images were obtained from the skull base to the vertex.  1.25 mm axial images were obtained through the cervical spine.    COMPARISON:  04/09/2023    FINDINGS:  CT head: Moderate cerebral atrophy and chronic small vessel ischemic changes.  There is no acute intracranial hemorrhage, territorial infarct or mass effect, or midline shift.  There is redemonstration of a left basal ganglia lacunar infarct.  In the visualized paranasal sinuses and mastoid air cells, there is a right maxillary sinus mucous retention cyst or polyp.    CT cervical spine: There is exaggerated cervical lordosis.  There is no acute fracture.  There is grade 1 retrolisthesis of C2 on C3.  Severe degenerative changes are seen at the lower lumbar spine..  Spondylitic changes are present.  The bones are diffusely osteopenic.  There is biapical scarring or fibrosis.  The blood in the carotid vessels are low in attenuation, which may suggest anemia.                                       CT Head Without Contrast (Final result)  Result time 10/26/24 22:34:44      Final result by Lacey Carranza MD (10/26/24 22:34:44)                   Impression:      No acute intracranial abnormality detected.    No acute cervical fracture.  Spondylitic  changes.    Possible anemia.      Electronically signed by: Lacey Carranza  Date:    10/26/2024  Time:    22:34               Narrative:    EXAMINATION:  CT OF THE HEAD WITHOUT AND CT CERVICAL SPINE    CLINICAL HISTORY:  Head trauma, minor (Age >= 65y);; Neck trauma (Age >= 65y);    TECHNIQUE:  5 mm unenhanced axial images were obtained from the skull base to the vertex.  1.25 mm axial images were obtained through the cervical spine.    COMPARISON:  04/09/2023    FINDINGS:  CT head: Moderate cerebral atrophy and chronic small vessel ischemic changes.  There is no acute intracranial hemorrhage, territorial infarct or mass effect, or midline shift.  There is redemonstration of a left basal ganglia lacunar infarct.  In the visualized paranasal sinuses and mastoid air cells, there is a right maxillary sinus mucous retention cyst or polyp.    CT cervical spine: There is exaggerated cervical lordosis.  There is no acute fracture.  There is grade 1 retrolisthesis of C2 on C3.  Severe degenerative changes are seen at the lower lumbar spine..  Spondylitic changes are present.  The bones are diffusely osteopenic.  There is biapical scarring or fibrosis.  The blood in the carotid vessels are low in attenuation, which may suggest anemia.                                       X-Ray Knee 3 View Right (Final result)  Result time 10/26/24 21:49:47      Final result by Lacey Carranza MD (10/26/24 21:49:47)                   Impression:      No acute bony abnormality detected.      Electronically signed by: Lacey Carranza  Date:    10/26/2024  Time:    21:49               Narrative:    EXAMINATION:  THREE VIEWS OF THE RIGHT ANKLE AND THREE VIEWS OF THE RIGHT KNEE    CLINICAL HISTORY:  Unspecified fall, initial encounter    TECHNIQUE:  AP, lateral and oblique views of the right ankle    COMPARISON:  None.    FINDINGS:  Three views of the right ankle demonstrate no acute fracture or dislocation.  A tiny plantar spur is noted.   Vascular calcifications are seen.  The bones are diffusely osteopenic.    Three views of the right knee demonstrate no acute fracture or dislocation. There is a suprapatellar spur. Vascular calcifications are present.  The bones are osteopenic.                                       X-Ray Ankle Complete Right (Final result)  Result time 10/26/24 21:49:47      Final result by Lacey Carranza MD (10/26/24 21:49:47)                   Impression:      No acute bony abnormality detected.      Electronically signed by: Lacey Carranza  Date:    10/26/2024  Time:    21:49               Narrative:    EXAMINATION:  THREE VIEWS OF THE RIGHT ANKLE AND THREE VIEWS OF THE RIGHT KNEE    CLINICAL HISTORY:  Unspecified fall, initial encounter    TECHNIQUE:  AP, lateral and oblique views of the right ankle    COMPARISON:  None.    FINDINGS:  Three views of the right ankle demonstrate no acute fracture or dislocation.  A tiny plantar spur is noted.  Vascular calcifications are seen.  The bones are diffusely osteopenic.    Three views of the right knee demonstrate no acute fracture or dislocation. There is a suprapatellar spur. Vascular calcifications are present.  The bones are osteopenic.                                       X-Ray Chest AP Portable (Final result)  Result time 10/26/24 21:15:55      Final result by Lacey Carranza MD (10/26/24 21:15:55)                   Impression:      No acute intrathoracic abnormality detected.  Mild cardiomegaly.      Electronically signed by: Lacey Carranza  Date:    10/26/2024  Time:    21:15               Narrative:    EXAMINATION:  AP PORTABLE CHEST    CLINICAL HISTORY:  Unspecified fall, initial encounter    TECHNIQUE:  AP portable chest radiograph was submitted.    COMPARISON:  07/12/2016    FINDINGS:  AP portable chest radiograph demonstrates a mildly enlarged cardiac silhouette.  Dense vascular calcifications are seen at the aortic knob.  There is no focal consolidation,  pneumothorax, or pleural effusion.  There is biapical pleural thickening.  The lungs are diffusely osteopenic.  There is left calcific tendinitis.                                       X-Ray Pelvis Routine AP (Final result)  Result time 10/26/24 21:47:12      Final result by Lacey Carranza MD (10/26/24 21:47:12)                   Impression:      As above described.      Electronically signed by: Lacey Carranza  Date:    10/26/2024  Time:    21:47               Narrative:    EXAMINATION:  PELVIS ROUTINE AP    CLINICAL HISTORY:  fall;    TECHNIQUE:  AP view of the pelvis    COMPARISON:  None.    FINDINGS:  Suboptimal positioning of the pelvis on AP projection.  Consider additional view of the left hip in order to better assess the femoral neck.  There is dynamic pinning of the left hip.  No hip dislocation is detected.  Mild degenerative changes are seen at the left hip joint.  The bones are osteopenic.                                       Medications   Tdap (BOOSTRIX) vaccine injection 0.5 mL (0.5 mLs Intramuscular Given 10/26/24 2112)   mupirocin 2 % ointment 1 Tube (1 Tube Topical (Top) Given 10/26/24 2049)   NIFEdipine 24 hr tablet 90 mg (90 mg Oral Given 10/26/24 2049)   sodium chloride 0.9% bolus 500 mL 500 mL (0 mLs Intravenous Stopped 10/26/24 2217)   chlorthalidone tablet 25 mg (25 mg Oral Given 10/26/24 2307)   acetaminophen 32 mg/mL liquid (PEDS) 611.2 mg (611.2 mg Oral Given 10/26/24 2328)   amLODIPine tablet 10 mg (10 mg Oral Given 10/26/24 2339)     Medical Decision Making  89-year-old female with history of hypertension, hyperlipidemia, glaucoma is presenting to the emergency department status post multiple falls.  Patient lives at home alone.  States that she slid out of bed.  Trauma exam reveals skin tear to right shin.  Otherwise unremarkable but patient unable to explain if she hit her head.  She was cachectic and appears chronically debilitated.  Lives at home.  She was unable to get up  from the falls alone.  I see that social work has been involved in her care because daughter was concerned and thinks she needs assisted living but patient wants to continue to live independently.  She was alert and oriented, however clearly has some memory issues in regards to the events that led to her visit to the emergency department today.  Imaging, basic labs, CPK ordered.    Amount and/or Complexity of Data Reviewed  Labs: ordered. Decision-making details documented in ED Course.  Radiology: ordered.    Risk  Prescription drug management.               ED Course as of 10/27/24 0039   Sat Oct 26, 2024   2054 CPK(!): 418  CPK is elevated.  Will order some IV fluid.  No evidence of kidney dysfunction. [KL]   2054 CBC auto differential(!)  CBC is grossly unremarkable.  No leukocytosis. Chronic, stable anemia.    [KL]   2055 Comprehensive metabolic panel(!)  CMP grossly unremarkable.  No significant acidosis.  Electrolytes within normal limits.  Normal kidney function. Normal LFTs.   [KL]   2236 Urinalysis, Reflex to Urine Culture Urine, Clean Catch(!)  Urinalysis not consistent with infection. [KL]   2247 BP(!): 207/81  Also ordered patient's home amlodipine and chlorthalidone, as she has not taking these today. [KL]   2325 CT Head Without Contrast [KL]   2325 CT Cervical Spine Without Contrast  No acute injury on CT C-spine are CT head. [KL]   2335 BP(!): 207/81  Patient was trying to refuse amlodipine, explained that we need this to bring her blood pressure down for admission.  She will now try to take it.  Awaiting read of x-ray of the left hip. [KL]   Sun Oct 27, 2024   0036 X-Ray Hip 2 or 3 views Left with Pelvis when performed  X-ray negative for acute fracture [KL]   0036 BP(!): 154/63  Blood pressure improved after amlodipine. [KL]   0038 Discussed plan for observation with the patient and daughter at bedside. [KL]   0038 Discussed case with Hospital Medicine. Plan for observation in the hospital for  continued monitoring, evaluation and treatment.   [KL]      ED Course User Index  [KL] Cheyanne Costa MD                           Clinical Impression:  Final diagnoses:  [W19.XXXA] Fall  [R53.1] Generalized weakness (Primary)  [R74.8] Elevated CPK  [S81.811A] Skin tear of right lower leg without complication, initial encounter          ED Disposition Condition    Observation Stable                Cheyanne Costa MD  10/27/24 0039

## 2024-10-27 NOTE — PT/OT/SLP EVAL
Occupational Therapy   Evaluation    Name: Mj Summers  MRN: [id number]  Admitting Diagnosis: Falls  Recent Surgery: * No surgery found *      Recommendations:     Discharge Recommendations: Moderate Intensity Therapy  Discharge Equipment Recommendations:  wheelchair, walker, rolling, hospital bed, hip kit, bath bench  Barriers to discharge:  Decreased caregiver support    Assessment:     Mj Summers is a 89 y.o. female with a medical diagnosis of Falls. Pt tolerated sesison fairly 2/2 pain in feet. Pt declined attempt at functional mobility 2/2 pain in feet. Pt completed ADLs seated at EOB /c SBA- Mod A. Pt noted to have moderate posterior lean when seated at EOB, intermittently using bed rail to maintain upright posture. Pt /c 3 falls in recent months and req (A) to stand for 2/3 falls. Pt shows excellent motivation and potential to improve, but the pt continues to require assistance to perform self-care tasks and mobility. Pt strengths include Attention to task and Motivation and Willingness to participate.  However, pt would continue to benefit from cont'd OT services in the acute setting to improve safety and independence /c functional tasks and ADLs upon discharge.   Performance deficits affecting function: weakness, impaired endurance, impaired self care skills, impaired functional mobility, gait instability, impaired balance, visual deficits, decreased coordination, decreased upper extremity function, decreased lower extremity function, abnormal tone, pain, impaired coordination, decreased ROM, impaired fine motor, impaired joint extensibility.      Rehab Prognosis: Fair; patient would benefit from acute skilled OT services to address these deficits and reach maximum level of function.       Plan:     Patient to be seen 4 x/week to address the above listed problems via self-care/home management, therapeutic activities, therapeutic exercises  Plan of Care Expires: 11/10/24  Plan of Care Reviewed  with: patient    Subjective     Chief Complaint: Pain in B feet  Patient/Family Comments/goals: Improve function and strength    Occupational Profile:  Living Environment: [facility] Pt has T/S combo /c SC  Previous level of function: Mod I for ADLs, QC for home mobility, and RW for community mobility  Roles and Routines: Pt caring for self and home, req (A) for IADLs outside of home, was not driving, and enjoys spending time /c friends  Equipment Used at Home: walker, rolling, cane, quad  Assistance upon Discharge: (A) available from friends/family, however not 24/7    Pain/Comfort:  Pain Rating 1: 10/10  Location - Side 1: Bilateral  Location - Orientation 1: generalized  Location 1: foot  Pain Rating Post-Intervention 1: 10/10    Patients cultural, spiritual, [employer]alism conflicts given the current situation: no    Objective:     Communicated with: NSG prior to session.  Patient found HOB elevated with peripheral IV, telemetry upon OT entry to room. Pt alert and agreeable to therapy. MD entered into room during session.       General Precautions: Standard, fall  Orthopedic Precautions: N/A  Braces: N/A  Respiratory Status: Room air    Occupational Performance:    Bed Mobility:    Patient completed Supine to Sit with maximal assistance and with side rail  Patient completed Sit to Supine with maximal assistance and with side rail    Functional Mobility/Transfers:  Pt deferred 2/2 pain in feet    Activities of Daily Living:  Grooming: stand by assistance seated at EOB to use mouthwash  Upper Body Dressing: moderate assistance to doff/don gown seated at EOB  Lower Body Dressing: moderate assistance seated at EOB to doff/don socks    Cognitive/Visual Perceptual:  Cognitive/Psychosocial Skills:  -       Oriented to: Person, Place, Time, and Situation   -       Follows Commands/attention:Follows multistep  commands  -       Communication: clear/fluent  -       Memory: No Deficits noted  -       Safety  awareness/insight to disability: intact   -       Mood/Affect/Coping skills/emotional control: Appropriate to situation    Physical Exam:  Balance: -       Static sitting balance- SPV; Dynamic sitting balance- SBA /c intermittent use of bed rail   Postural examination/scapula alignment: -       Rounded shoulders  -       Forward head  -       Posterior pelvic tilt  Motor Planning: -       WFL  Dominant hand: -       R  Upper Extremity Range of Motion/Strength:  -       Right Upper Extremity: 3+/5 grossly  -       Left Upper Extremity: 3+/5 grossly   Strength: -       Right Upper Extremity: Fair  -       Left Upper Extremity: Fair  Fine Motor Coordination: -       Impaired  Left hand, finger to nose , Right hand, finger to nose , Left hand thumb/finger opposition skills , Right hand thumb/finger opposition skills , Left hand, manipulation of objects , and Right hand, manipulation of objects   Gross motor coordination: Impaired    AMPAC 6 Click ADL:  AMPAC Total Score: 16    Treatment & Education:  Pt educated on POC, role of OT, and safety. Pt performed tasks to improve safety and independence in functional tasks and ADLs as mentioned above.       Patient left HOB elevated with all lines intact, call button in reach, and all needs met    GOALS:   Multidisciplinary Problems       Occupational Therapy Goals          Problem: Occupational Therapy    Goal Priority Disciplines Outcome Interventions   Occupational Therapy Goal     OT, PT/OT Progressing    Description: Goals to be met by: 11/10/2024     Patient will increase functional independence with ADLs by performing:    UE Dressing with Stand-by Assistance.  LE Dressing with Minimal Assistance and AE as needed  Toileting from bedside commode with Moderate Assistance for hygiene and clothing management.   Bathing from  edge of bed with Moderate Assistance.  Toilet transfer to bedside commode with Moderate Assistance.                         History:     Past  Medical History:   Diagnosis Date    Anemia     Arthritis 2015    back    Cataract     s/p surgery    Coronary artery disease     [facility] 1/2010 - mid LAD 40%    GERD (gastroesophageal reflux disease)     Glaucoma (increased eye pressure)     Hyperlipidemia     Hypertension     Lactose intolerance     Legally blind in right eye, as defined in [place]     Osteoporosis, post-menopausal     PAD (peripheral artery disease) 07/11/2018    Pneumonia 12/13/2015    Proximal muscle weakness     Renal cyst     left kidney    Vitamin D deficiency disease          Past Surgical History:   Procedure Laterality Date    ADENOIDECTOMY      BREAST BIOPSY      left breast    CARDIAC CATHETERIZATION  01/14/2010    mid LAD, 40% stenosis; LCX, luminal irregularities;                 CATARACT EXTRACTION W/  INTRAOCULAR LENS IMPLANT Left 05/22/2013    COMBINED WITH TRAB ()    COLONOSCOPY N/A 2/6/2018    Procedure: COLONOSCOPY;  Surgeon: Rufus Villela MD;  Location: [facility] ([address] [place] [address]);  Service: Endoscopy;  Laterality: N/A;    EDTA CHELATION Left 2/14    OS ()    EYE SURGERY      FEMUR FRACTURE SURGERY Left 05/09/2016    FRACTURE SURGERY  2016    TEAR DUCT SURGERY      right eye    TONSILLECTOMY      TRABECULECTOMY Left 05/22/2013    WITH EXPRESS MINI SHUNT AND CE ()    UPPER GASTROINTESTINAL ENDOSCOPY      YAG CAPSULOTOMY Left 06/22/2017           Time Tracking:     OT Date of Treatment: 10/27/24  OT Start Time: 0929  OT Stop Time: 1002  OT Total Time (min): 33 min    Billable Minutes:Evaluation 10  Self Care/Home Management 15  Therapeutic Activity 8    10/27/2024

## 2024-10-27 NOTE — SUBJECTIVE & OBJECTIVE
Past Medical History:   Diagnosis Date    Anemia     Arthritis 2015    back    Cataract     s/p surgery    Coronary artery disease     [facility] 1/2010 - mid LAD 40%    GERD (gastroesophageal reflux disease)     Glaucoma (increased eye pressure)     Hyperlipidemia     Hypertension     Lactose intolerance     Legally blind in right eye, as defined in [place]     Osteoporosis, post-menopausal     PAD (peripheral artery disease) 07/11/2018    Pneumonia 12/13/2015    Proximal muscle weakness     Renal cyst     left kidney    Vitamin D deficiency disease        Past Surgical History:   Procedure Laterality Date    ADENOIDECTOMY      BREAST BIOPSY      left breast    CARDIAC CATHETERIZATION  01/14/2010    mid LAD, 40% stenosis; LCX, luminal irregularities;                 CATARACT EXTRACTION W/  INTRAOCULAR LENS IMPLANT Left 05/22/2013    COMBINED WITH TRAB ()    COLONOSCOPY N/A 2/6/2018    Procedure: COLONOSCOPY;  Surgeon: Rufus Villela MD;  Location: [facility] ([address] [place] [address]);  Service: Endoscopy;  Laterality: N/A;    EDTA CHELATION Left 2/14    OS ()    EYE SURGERY      FEMUR FRACTURE SURGERY Left 05/09/2016    FRACTURE SURGERY  2016    TEAR DUCT SURGERY      right eye    TONSILLECTOMY      TRABECULECTOMY Left 05/22/2013    WITH EXPRESS MINI SHUNT AND CE ()    UPPER GASTROINTESTINAL ENDOSCOPY      YAG CAPSULOTOMY Left 06/22/2017           Review of patient's allergies indicates:   Allergen Reactions    Strawberries [strawberry]      Blood pressure elevation    Chocolate flavor      Causes acid reflux    Atorvastatin      Myalgias    Pcn [penicillins] Rash    Pravastatin      Myalgias    Sulfa (sulfonamide antibiotics) Itching       No current facility-administered medications on file prior to encounter.     Current Outpatient Medications on File Prior to Encounter   Medication Sig    amLODIPine (NORVASC) 10 MG tablet Take 10 mg by mouth once daily.    aspirin 81 mg Tab Take 81 mg by mouth  every evening. 1 Tablet Oral Every day    bimatoprost (LUMIGAN) 0.01 % Drop Place 1 drop into both eyes every evening. Can use every evening at about 6 pm    brimonidine 0.1% (ALPHAGAN P) 0.1 % Drop Place 1 drop into the right eye 3 (three) times daily.    calcium carbonate (OS-KACI) 600 mg (1,500 mg) Tab Take 600 mg by mouth 2 (two) times daily with meals.    chlorthalidone (HYGROTEN) 25 MG Tab Take 1 tablet (25 mg total) by mouth once daily.    ergocalciferol (ERGOCALCIFEROL) 50,000 unit Cap TAKE 1 CAPSULE (50,000 UNITS TOTAL) BY MOUTH EVERY 30 DAYS    esomeprazole (NEXIUM) 40 MG capsule TAKE 1 CAPSULE BY MOUTH DAILY AS NEEDED.    ferrous sulfate 325 (65 FE) MG EC tablet Take 1 tablet (325 mg total) by mouth once daily.    multivitamin (THERAGRAN) per tablet Take 1 tablet by mouth once daily.    NIFEdipine (PROCARDIA-XL) 90 MG (OSM) 24 hr tablet Take 1 tablet (90 mg total) by mouth once daily.    olmesartan (BENICAR) 40 MG tablet TAKE 1 TABLET BY MOUTH EVERY DAY    pilocarpine HCL 4% (PILOCAR) 4 % ophthalmic solution Place 1 drop into the right eye 3 (three) times daily.    PROLIA 60 mg/mL Syrg Inject 60 mg into the skin every 6 (six) months.    rosuvastatin (CRESTOR) 10 MG tablet Take 1 tablet (10 mg total) by mouth once daily.    senna-docusate 8.6-50 mg (PERICOLACE) 8.6-50 mg per tablet Take 1 tablet by mouth 2 (two) times daily.    timolol maleate 0.5% (TIMOPTIC) 0.5 % Drop Place 1 drop into both eyes every morning. for 1 dose     Family History       Problem Relation (Age of Onset)    Alzheimer's disease Father    Arthritis Sister    Asthma Sister    Colon cancer Other (44)    Diabetes Brother, Daughter    Glaucoma Mother    Heart disease Mother, Brother    Hyperlipidemia Brother    Hypertension Father, Brother, Son, Daughter    Osteoporosis Sister, Mother, Sister    Peripheral vascular disease Father, Brother    Rheum arthritis Sister, Brother    Stroke Brother          Tobacco Use    Smoking status: Never     Smokeless tobacco: Never   Substance and Sexual Activity    Alcohol use: No    Drug use: No    Sexual activity: Not Currently     Review of Systems   Constitutional:  Negative for activity change, appetite change, chills, diaphoresis, fatigue and fever.   Respiratory:  Negative for cough, shortness of breath and wheezing.    Cardiovascular:  Negative for chest pain and palpitations.   Gastrointestinal:  Negative for abdominal distention, abdominal pain, constipation, diarrhea, nausea and vomiting.   Genitourinary:  Negative for dysuria, flank pain, frequency, hematuria and urgency.   Musculoskeletal:  Positive for myalgias. Negative for arthralgias, back pain, gait problem, joint swelling, neck pain and neck stiffness.   Skin:  Negative for color change, pallor, rash and wound.   Neurological:  Negative for dizziness, syncope, weakness, light-headedness and headaches.   Psychiatric/Behavioral:  Negative for agitation and confusion.      Objective:     Vital Signs (Most Recent):  Temp: 97.6 °F (36.4 °C) (10/27/24 0500)  Pulse: 91 (10/27/24 0457)  Resp: 18 (10/27/24 0457)  BP: (!) 117/56 (10/27/24 0457)  SpO2: 98 % (10/27/24 0457) Vital Signs (24h Range):  Temp:  [97.6 °F (36.4 °C)-98.5 °F (36.9 °C)] 97.6 °F (36.4 °C)  Pulse:  [] 91  Resp:  [16-20] 18  SpO2:  [93 %-100 %] 98 %  BP: (117-212)/(56-98) 117/56     Weight: 33.1 kg (72 lb 15.6 oz)  Body mass index is 12.93 kg/m².     Physical Exam  Vitals and nursing note reviewed.   Constitutional:       General: She is not in acute distress.     Appearance: She is well-developed and normal weight. She is not ill-appearing, toxic-appearing or diaphoretic.      Comments: Elderly and chronically ill appearing female.    HENT:      Head: Normocephalic and atraumatic.   Eyes:      General: No scleral icterus.        Right eye: No discharge.         Left eye: No discharge.      Conjunctiva/sclera: Conjunctivae normal.   Neck:      Trachea: No tracheal deviation.  "  Cardiovascular:      Rate and Rhythm: Normal rate and regular rhythm.      Heart sounds: Normal heart sounds. No murmur heard.     No gallop.   Pulmonary:      Effort: Pulmonary effort is normal. No respiratory distress.      Breath sounds: Normal breath sounds. No stridor. No wheezing or rales.   Abdominal:      General: Bowel sounds are normal. There is no distension.      Palpations: Abdomen is soft. There is no mass.      Tenderness: There is no abdominal tenderness. There is no guarding.   Musculoskeletal:         General: No deformity. Normal range of motion.      Cervical back: Normal range of motion and neck supple.   Skin:     General: Skin is warm and dry.      Coloration: Skin is not pale.      Findings: No erythema or rash.      Comments: Skin is very dry, particularly near the distal lower extremities.    Neurological:      General: No focal deficit present.      Mental Status: She is alert and oriented to person, place, and time.      Cranial Nerves: No cranial nerve deficit.      Motor: No abnormal muscle tone.   Psychiatric:         Mood and Affect: Mood normal.         Behavior: Behavior normal.         Thought Content: Thought content normal.         Judgment: Judgment normal.                Significant Labs: All pertinent labs within the past 24 hours have been reviewed.  BMP:   Recent Labs   Lab 10/26/24  2016   *      K 3.7      CO2 23   BUN 16   CREATININE 0.8   CALCIUM 9.3     CBC:   Recent Labs   Lab 10/26/24  2016   WBC 6.88   HGB 10.9*   HCT 32.9*        CMP:   Recent Labs   Lab 10/26/24  2016      K 3.7      CO2 23   *   BUN 16   CREATININE 0.8   CALCIUM 9.3   PROT 7.8   ALBUMIN 3.7   BILITOT 0.6   ALKPHOS 70   AST 34   ALT 27   ANIONGAP 15     Urine Culture: No results for input(s): "LABURIN" in the last 48 hours.  Urine Studies:   Recent Labs   Lab 10/26/24  2207   COLORU Colorless*   APPEARANCEUA Clear   PHUR 7.0   SPECGRAV 1.010 "   PROTEINUA 1+*   GLUCUA Negative   KETONESU Trace*   BILIRUBINUA Negative   OCCULTUA 1+*   NITRITE Negative   UROBILINOGEN Negative   LEUKOCYTESUR Negative   RBCUA 6*   WBCUA 1   BACTERIA Rare   SQUAMEPITHEL 4   HYALINECASTS 0       Significant Imaging: I have reviewed all pertinent imaging results/findings within the past 24 hours.  Imaging Results              X-Ray Hip 2 or 3 views Left with Pelvis when performed (Final result)  Result time 10/27/24 00:31:04      Final result by Lacey Carranza MD (10/27/24 00:31:04)                   Impression:      As above described.      Electronically signed by: Lacey Carranza  Date:    10/27/2024  Time:    00:31               Narrative:    EXAMINATION:  TWO VIEWS OF THE LEFT HIP    CLINICAL HISTORY:  fall;    TECHNIQUE:  AP and lateral view of the left hip    COMPARISON:  05/10/2016    FINDINGS:  There is dynamic pinning of the left hip.  Two views of the left hip demonstrate no acute fracture or dislocation.  There is remote displaced fracture of the lesser trochanter of the left hip.  Mild degenerative changes are seen at both hip joints.  The bones are osteopenic.  Incidental note is made of vascular calcifications.  If pain is out of proportion to the radiological findings seen, consider cross-sectional imaging.                                       CT Cervical Spine Without Contrast (Final result)  Result time 10/26/24 22:34:44      Final result by Lacey Carranza MD (10/26/24 22:34:44)                   Impression:      No acute intracranial abnormality detected.    No acute cervical fracture.  Spondylitic changes.    Possible anemia.      Electronically signed by: Lacey Carranza  Date:    10/26/2024  Time:    22:34               Narrative:    EXAMINATION:  CT OF THE HEAD WITHOUT AND CT CERVICAL SPINE    CLINICAL HISTORY:  Head trauma, minor (Age >= 65y);; Neck trauma (Age >= 65y);    TECHNIQUE:  5 mm unenhanced axial images were obtained from the skull  base to the vertex.  1.25 mm axial images were obtained through the cervical spine.    COMPARISON:  04/09/2023    FINDINGS:  CT head: Moderate cerebral atrophy and chronic small vessel ischemic changes.  There is no acute intracranial hemorrhage, territorial infarct or mass effect, or midline shift.  There is redemonstration of a left basal ganglia lacunar infarct.  In the visualized paranasal sinuses and mastoid air cells, there is a right maxillary sinus mucous retention cyst or polyp.    CT cervical spine: There is exaggerated cervical lordosis.  There is no acute fracture.  There is grade 1 retrolisthesis of C2 on C3.  Severe degenerative changes are seen at the lower lumbar spine..  Spondylitic changes are present.  The bones are diffusely osteopenic.  There is biapical scarring or fibrosis.  The blood in the carotid vessels are low in attenuation, which may suggest anemia.                                       CT Head Without Contrast (Final result)  Result time 10/26/24 22:34:44      Final result by Lacey Carranza MD (10/26/24 22:34:44)                   Impression:      No acute intracranial abnormality detected.    No acute cervical fracture.  Spondylitic changes.    Possible anemia.      Electronically signed by: Lacye Carranza  Date:    10/26/2024  Time:    22:34               Narrative:    EXAMINATION:  CT OF THE HEAD WITHOUT AND CT CERVICAL SPINE    CLINICAL HISTORY:  Head trauma, minor (Age >= 65y);; Neck trauma (Age >= 65y);    TECHNIQUE:  5 mm unenhanced axial images were obtained from the skull base to the vertex.  1.25 mm axial images were obtained through the cervical spine.    COMPARISON:  04/09/2023    FINDINGS:  CT head: Moderate cerebral atrophy and chronic small vessel ischemic changes.  There is no acute intracranial hemorrhage, territorial infarct or mass effect, or midline shift.  There is redemonstration of a left basal ganglia lacunar infarct.  In the visualized paranasal sinuses  and mastoid air cells, there is a right maxillary sinus mucous retention cyst or polyp.    CT cervical spine: There is exaggerated cervical lordosis.  There is no acute fracture.  There is grade 1 retrolisthesis of C2 on C3.  Severe degenerative changes are seen at the lower lumbar spine..  Spondylitic changes are present.  The bones are diffusely osteopenic.  There is biapical scarring or fibrosis.  The blood in the carotid vessels are low in attenuation, which may suggest anemia.                                       X-Ray Knee 3 View Right (Final result)  Result time 10/26/24 21:49:47      Final result by Lacey Carranza MD (10/26/24 21:49:47)                   Impression:      No acute bony abnormality detected.      Electronically signed by: Lacey Carranza  Date:    10/26/2024  Time:    21:49               Narrative:    EXAMINATION:  THREE VIEWS OF THE RIGHT ANKLE AND THREE VIEWS OF THE RIGHT KNEE    CLINICAL HISTORY:  Unspecified fall, initial encounter    TECHNIQUE:  AP, lateral and oblique views of the right ankle    COMPARISON:  None.    FINDINGS:  Three views of the right ankle demonstrate no acute fracture or dislocation.  A tiny plantar spur is noted.  Vascular calcifications are seen.  The bones are diffusely osteopenic.    Three views of the right knee demonstrate no acute fracture or dislocation. There is a suprapatellar spur. Vascular calcifications are present.  The bones are osteopenic.                                       X-Ray Ankle Complete Right (Final result)  Result time 10/26/24 21:49:47      Final result by Lacey Carranza MD (10/26/24 21:49:47)                   Impression:      No acute bony abnormality detected.      Electronically signed by: Lacey Carranza  Date:    10/26/2024  Time:    21:49               Narrative:    EXAMINATION:  THREE VIEWS OF THE RIGHT ANKLE AND THREE VIEWS OF THE RIGHT KNEE    CLINICAL HISTORY:  Unspecified fall, initial encounter    TECHNIQUE:  AP,  lateral and oblique views of the right ankle    COMPARISON:  None.    FINDINGS:  Three views of the right ankle demonstrate no acute fracture or dislocation.  A tiny plantar spur is noted.  Vascular calcifications are seen.  The bones are diffusely osteopenic.    Three views of the right knee demonstrate no acute fracture or dislocation. There is a suprapatellar spur. Vascular calcifications are present.  The bones are osteopenic.                                       X-Ray Chest AP Portable (Final result)  Result time 10/26/24 21:15:55      Final result by Lacey Carranza MD (10/26/24 21:15:55)                   Impression:      No acute intrathoracic abnormality detected.  Mild cardiomegaly.      Electronically signed by: Lacey Carranza  Date:    10/26/2024  Time:    21:15               Narrative:    EXAMINATION:  AP PORTABLE CHEST    CLINICAL HISTORY:  Unspecified fall, initial encounter    TECHNIQUE:  AP portable chest radiograph was submitted.    COMPARISON:  07/12/2016    FINDINGS:  AP portable chest radiograph demonstrates a mildly enlarged cardiac silhouette.  Dense vascular calcifications are seen at the aortic knob.  There is no focal consolidation, pneumothorax, or pleural effusion.  There is biapical pleural thickening.  The lungs are diffusely osteopenic.  There is left calcific tendinitis.                                       X-Ray Pelvis Routine AP (Final result)  Result time 10/26/24 21:47:12      Final result by Lacey Carranza MD (10/26/24 21:47:12)                   Impression:      As above described.      Electronically signed by: Lacey Carranza  Date:    10/26/2024  Time:    21:47               Narrative:    EXAMINATION:  PELVIS ROUTINE AP    CLINICAL HISTORY:  fall;    TECHNIQUE:  AP view of the pelvis    COMPARISON:  None.    FINDINGS:  Suboptimal positioning of the pelvis on AP projection.  Consider additional view of the left hip in order to better assess the femoral neck.   There is dynamic pinning of the left hip.  No hip dislocation is detected.  Mild degenerative changes are seen at the left hip joint.  The bones are osteopenic.

## 2024-10-27 NOTE — PROGRESS NOTES
"[facility]  Progress Note    Patient Name: Mj Summers  MRN: [id number]  Patient Class: OP- Observation   Admission Date: 10/26/2024  Length of Stay: 0 days  Attending Physician: KHOI Gore MD  Primary Care Provider: Shasta Urbina MD        Subjective:     Principal Problem:Falls        HPI:  Ms. Mj Summers is a 89 y.o. female, with PMH of CAD, HTN, HLD, GERD, PAD, CKD-III, who presented to [facility] ED on 10/36/24 due to a fall earlier in the day, and subsequent pain in her buttocks. She was found on the floor of her room by a friend today, and other days of the past week. She states she had a fall 3 weeks ago, and since that time has been having difficulty caring for herself. She states she has had "many" falls, and these were her two most recent. She was evaluated in the ED with labs showing H&H of 10.9/32.9. There was no leukocytosis of left shift on CBC, and no significant changes on metabolic panel. A CPK was elevated at 418. A UA was without UTI.  CXR and CT Head, and CT C-spine were without acute changes. She was treated in the ED with diuretics, anti-HTN meds, 1L IV fluids, tylenol. She was placed on observation.     Overview/Hospital Course:  No notes on file    Interval History: No acute events overnight. Doing okay. Seen about to work with OT. No new concerns at this time.    Review of Systems   Constitutional:  Negative for chills and fever.   Respiratory:  Negative for cough and shortness of breath.    Cardiovascular:  Negative for chest pain and palpitations.   Gastrointestinal:  Negative for abdominal pain, nausea and vomiting.     Objective:     Vital Signs (Most Recent):  Temp: 98.4 °F (36.9 °C) (10/27/24 1644)  Pulse: 84 (10/27/24 1644)  Resp: 16 (10/27/24 1644)  BP: 138/60 (10/27/24 1644)  SpO2: 98 % (10/27/24 1644) Vital Signs (24h Range):  Temp:  [97.6 °F (36.4 °C)-98.5 °F (36.9 °C)] 98.4 °F (36.9 °C)  Pulse:  [] 84  Resp:  [14-20] " RN note:  Patient is here for evaluation of headaches.  Headaches started: Started December 3, 2022  Frequency:  on a daily basis   Duration: has been continuous, sometimes not as bad as other times  Associated symptoms: intolerance to light and intolerance to sound  Has tried: tylenol and ibuprofen doesn't help  Neurologic family history: none  Parental concerns: none    RNs notes reviewed and accepted    [place] AMBULATORY ENCOUNTER CONSULTATION NOTE         Pediatric  Neurology                 Patient Name:  Cat Bhakta  Patient YOB: 2007    Patient Medical Record Number:  [id number]  Date of Visit: 12/14/2022   Location: [facility], [address] [place] [address] , [place] [address]  REQUESTING PROVIDER:  Kush Smith MD  Chief Complaint:  Headache  History   History Obtained From:  Chart, patient, father      Cat Bhakta is a 15-year-old female with no significant past medical history presents for evaluation of subacute onset headache      HPI:  Cat reports headache since the December 3, 2022.  No history of preceding trauma or “thunderclap” headache precipitated by violent coughing/straining.  She feels headache started from nowhere and describes it as a sharp/throbbing pain behind the eyes continuously with intermittent periods of exacerbations.  She endorses photophobia, phonophobia and occasional nausea.  She is taking ibuprofen/Tylenol almost on daily basis with no significant impact on the headache severity.  She reports occasional headache in the past just to last for a day and got relieved with simple analgesics but never experienced prolonged headache like this event.  No history of recent change in behavior/gait or vision.  She was recently seen in ER for “atypical chest pain”    She has 2 younger siblings.  No family history suggestive of migraine.      Past Medical History:    Past Medical History:   Diagnosis Date   • Allergy    • Anxiety    • Eczema         Past  16  SpO2:  [98 %-100 %] 98 %  BP: (103-212)/(48-94) 138/60     Weight: 33.1 kg (72 lb 15.6 oz)  Body mass index is 12.93 kg/m².    Intake/Output Summary (Last 24 hours) at 10/27/2024 1803  Last data filed at 10/27/2024 1740  Gross per 24 hour   Intake 1600 ml   Output 750 ml   Net 850 ml         Physical Exam  Vitals and nursing note reviewed.   Constitutional:       General: She is not in acute distress.     Appearance: She is well-developed.      Comments: Cachectic.   HENT:      Head: Normocephalic and atraumatic.   Eyes:      General:         Right eye: No discharge.         Left eye: No discharge.      Conjunctiva/sclera: Conjunctivae normal.   Cardiovascular:      Rate and Rhythm: Normal rate.      Pulses: Normal pulses.   Pulmonary:      Effort: Pulmonary effort is normal. No respiratory distress.   Abdominal:      Palpations: Abdomen is soft.      Tenderness: There is no abdominal tenderness.   Musculoskeletal:         General: Normal range of motion.      Right lower leg: No edema.      Left lower leg: No edema.   Skin:     General: Skin is warm and dry.   Neurological:      Mental Status: She is alert and oriented to person, place, and time.           Significant Labs:   CBC:  Recent Labs   Lab 10/26/24  2016 10/27/24  0701   WBC 6.88 6.31   HGB 10.9* 11.2*   HCT 32.9* 35.0*    218   GRAN 87.6*  6.0 78.6*  5.0   LYMPH 6.7*  0.5* 10.9*  0.7*   MONO 4.8  0.3 9.7  0.6   EOS 0.0 0.0   BASO 0.02 0.01   CMP:  Recent Labs   Lab 10/26/24  2016 10/27/24  0701    142   K 3.7 3.5    104   CO2 23 24   BUN 16 12   CREATININE 0.8 0.8   * 105   CALCIUM 9.3 8.7   MG  --  1.8   ALKPHOS 70  --    AST 34  --    ALT 27  --    BILITOT 0.6  --    PROT 7.8  --    ALBUMIN 3.7  --    ANIONGAP 15 14      Significant Imaging: I have reviewed and interpreted all pertinent imaging results/findings within the past 24 hours.    Assessment/Plan:      * Falls  - Recurrent falls at home without acute bony  Surgical History:    Past Surgical History:   Procedure Laterality Date   • Tympanostomy tube placement  02/01/2008       Allergies:   ALLERGIES:   Allergen Reactions   • Augmentin [Amoclan]      GI upset     • Bandaid [Adhesive   (Environmental)]    • Mold   (Environmental) Other (See Comments)     Allergy testing       Review of Systems  A 10 point review of systems was conducted with all pertinent positive and negatives noted in HPI in areas of: Constitutional, HEENT, Skin, Pulmonary, Cardiovascular, Abdominal, Neurologic, Musculoskeletal, , Psychiatric/Behavioral.   I.      Family History:   No family history on file.  Review of patient's family status indicates:    Sister                         Alive                     Brother                        Alive                     Social History:   Lives with parents.  She is high school sophomore and wish to become a     Diet: regular family diet    Development: Normal development/met milestones      Objective:      Medications :  Current Medications    LORATADINE (CLARITIN) 10 MG TABLET    Take 10 mg by mouth daily.       Physical Exam:    GENERAL: alert and comfortable.  No obvious facial dysmorphism or neurocutaneous stigmata  HEENT:neck supple  Systems:NAD    NEUROLOGIC EXAMINATION:     Mental Status: alert, oriented x3 and level of interaction is appropriate for age.      Cranial Nerves: Visual fields appear to be full. Pupils were equal, round, and reactive to light. Extraocular movements were full and conjugate.  face symmetric and hearing intact.. Palate elevation symmetric & uvula midline.     Motor: Normal tone & Bulk, 5/5 strength in all extremities and neck muscles    Sensory:  Grossly intact for light touch    Coordination: Finger-nose-finger, intact. Heel -Shin intact    DTR:  1-2+    Gait: Normal. Heel, toe, and tandem walking intact.           Assessment and Plan   Cat Bhakta is a 15-year-old female with no significant past  abnormalities.  - Elevated CPK but not in rhabdomyolysis range.  - PT/OT evaluations. Discussed therapy may recommend potential placement.  - No further IVFs at this point.    Elevated CPK  - As above.    Stage 3a chronic kidney disease  - Chronic; stable.  - Avoid nephrotoxins, renally dose medications.    Normocytic anemia  - Chronic; stable.    Dyslipidemia  - As above.    Essential hypertension  - Continue valsartan 160mg PO daily.    CAD (coronary artery disease)  - Continue aspirin 81mg PO daily; statin interchange held given reported myalgias with atorvastatin.      VTE Risk Mitigation (From admission, onward)           Ordered     IP VTE HIGH RISK PATIENT  Once         10/27/24 0146     Place sequential compression device  Until discontinued         10/27/24 0146                    Discharge Planning   LIDIA:      Code Status: Full Code   Is the patient medically ready for discharge?:     Reason for patient still in hospital (select all that apply): Treatment  Discharge Plan A: Home                  D Cheikh Gore MD  Department of Hospital Medicine   [facility] ([place])     medical history presents for evaluation of subacute onset headache since December 3, 2022 and 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  No history suggestive of thunderclap headache or preceding trauma.  No red flags concerning for acute intracranial process.  Neurological examination including optic fundi is normal.    Discussed with father and Cat about status migrainosus and migraine cocktail.  Advised on the importance of nonpharmacological measures including hydration, sleep as well as essential oils.  I recommend to stay off school for next 48 hours while on trial of migraine cocktail.  If headaches persist despite oral migraine cocktail, shall consider neuroimaging and further measures like IV migraine cocktail in 4 days.  Advised family to call our service in 4 days to discuss clinical status and possible neuroimaging.    Plan:  1. Compazine 10 mg twice daily x3 days; Toradol 10 mg twice daily x3 days; Zofran 4 mg twice daily x3 days  2. The family to call our service on Monday December 19, 2022 with update or sooner if needed  3. Shall discuss role/utility for rescue meds and daily prophylactic medication after resolution of status migrainosus    4. Neurology Clinic follow-up in 2 months or sooner depending upon clinical progress    The  family was instructed to contact either the primary care physician office or our office by telephone if there is any deterioration in neurologic status, change in presenting symptoms, lack of beneficial response to treatment plan, or signs of adverse effects of current therapies, all of which were reviewed.    Total time spent with patient 60 minutes including chart review, history, exam, ordering medications, tests, procedures,communicating with other healthcare professionals,independently interpreting tests and documenting clinical information in the EHR. Coordination of care counseling and education 35 minutes.      Barron Pablo MD  Attending, Pediatric  Neurology & Epilepsy  [facility]

## 2024-10-27 NOTE — PLAN OF CARE
"Inpatient Upgrade Note    Mj Summers has warranted treatment spanning two or more midnights of hospital level care for the management of  89 y.o. female, with PMH of CAD, HTN, HLD, GERD, PAD, CKD-III, who [place] [facility] [place] 10[place]36/24 due to a fall earlier in the day, and subsequent pain in her buttocks. She was found on the floor of her room by a friend today, and other days of the past week. She states she had a fall 3 weeks ago, and since that time has been having difficulty caring for herself. She states she has had "many" falls, and these were her two most recent. She was evaluated in the ED with labs showing H&H of 10.9/32.9. There was no leukocytosis of left shift on CBC, and no significant changes on metabolic panel. A CPK was elevated at 41 . She continues to require daily labs and monitoring of vital signs. Her condition is also complicated by the following comorbidities: Coronary Artery Disease, Hypertension, and Chronic kidney disease.   "

## 2024-10-27 NOTE — PLAN OF CARE
10/27/24 1127   DICK Message   Medicare Outpatient and Observation Notification regarding financial responsibility Given to patient/caregiver;Explained to patient/caregiver;Signed/date by patient/caregiver   Date DICK was signed 10/27/24   Time DICK was signed 0907

## 2024-10-27 NOTE — ASSESSMENT & PLAN NOTE
Anemia with most recent hemoglobin and hematocrit :  Recent Labs     10/26/24  2016   HGB 10.9*   HCT 32.9*     Plan  - Monitor serial CBC: Daily  - Transfuse PRBC if patient becomes hemodynamically unstable, symptomatic or H/H drops below 7/21.  - Patient has not received any PRBC transfusions to date  - Patient's anemia is currently stable

## 2024-10-27 NOTE — SUBJECTIVE & OBJECTIVE
Interval History: No acute events overnight. Doing okay. Seen about to work with OT. No new concerns at this time.    Review of Systems   Constitutional:  Negative for chills and fever.   Respiratory:  Negative for cough and shortness of breath.    Cardiovascular:  Negative for chest pain and palpitations.   Gastrointestinal:  Negative for abdominal pain, nausea and vomiting.     Objective:     Vital Signs (Most Recent):  Temp: 98.4 °F (36.9 °C) (10/27/24 1644)  Pulse: 84 (10/27/24 1644)  Resp: 16 (10/27/24 1644)  BP: 138/60 (10/27/24 1644)  SpO2: 98 % (10/27/24 1644) Vital Signs (24h Range):  Temp:  [97.6 °F (36.4 °C)-98.5 °F (36.9 °C)] 98.4 °F (36.9 °C)  Pulse:  [] 84  Resp:  [14-20] 16  SpO2:  [98 %-100 %] 98 %  BP: (103-212)/(48-94) 138/60     Weight: 33.1 kg (72 lb 15.6 oz)  Body mass index is 12.93 kg/m².    Intake/Output Summary (Last 24 hours) at 10/27/2024 1803  Last data filed at 10/27/2024 1740  Gross per 24 hour   Intake 1600 ml   Output 750 ml   Net 850 ml         Physical Exam  Vitals and nursing note reviewed.   Constitutional:       General: She is not in acute distress.     Appearance: She is well-developed.      Comments: Cachectic.   HENT:      Head: Normocephalic and atraumatic.   Eyes:      General:         Right eye: No discharge.         Left eye: No discharge.      Conjunctiva/sclera: Conjunctivae normal.   Cardiovascular:      Rate and Rhythm: Normal rate.      Pulses: Normal pulses.   Pulmonary:      Effort: Pulmonary effort is normal. No respiratory distress.   Abdominal:      Palpations: Abdomen is soft.      Tenderness: There is no abdominal tenderness.   Musculoskeletal:         General: Normal range of motion.      Right lower leg: No edema.      Left lower leg: No edema.   Skin:     General: Skin is warm and dry.   Neurological:      Mental Status: She is alert and oriented to person, place, and time.           Significant Labs:   CBC:  Recent Labs   Lab 10/26/24  2016  10/27/24  0701   WBC 6.88 6.31   HGB 10.9* 11.2*   HCT 32.9* 35.0*    218   GRAN 87.6*  6.0 78.6*  5.0   LYMPH 6.7*  0.5* 10.9*  0.7*   MONO 4.8  0.3 9.7  0.6   EOS 0.0 0.0   BASO 0.02 0.01   CMP:  Recent Labs   Lab 10/26/24  2016 10/27/24  0701    142   K 3.7 3.5    104   CO2 23 24   BUN 16 12   CREATININE 0.8 0.8   * 105   CALCIUM 9.3 8.7   MG  --  1.8   ALKPHOS 70  --    AST 34  --    ALT 27  --    BILITOT 0.6  --    PROT 7.8  --    ALBUMIN 3.7  --    ANIONGAP 15 14      Significant Imaging: I have reviewed and interpreted all pertinent imaging results/findings within the past 24 hours.

## 2024-10-27 NOTE — PT/OT/SLP EVAL
Physical Therapy Evaluation    Patient Name:  Mj Summers   MRN:  [id number]    Recommendations:     Discharge Recommendations: Moderate Intensity Therapy   Discharge Equipment Recommendations: wheelchair, to be determined by next level of care   Barriers to discharge: Decreased caregiver support and increased assistance needed    Assessment:     Mj Summers is a 89 y.o. female admitted with a medical diagnosis of Falls.  She presents with the following impairments/functional limitations: weakness, impaired endurance, impaired sensation, impaired functional mobility, gait instability, impaired balance, decreased upper extremity function, decreased lower extremity function, decreased safety awareness, pain, decreased ROM Patient required Max to total A for all bed mobility and boosting today. She was not able to tolerate sitting unsupported and when attempted to stand, she did not assist and attempted to scoot her hips forward and was not able to put weight throughout the feet. She was not able to assist to return to supine and required total A to transfer back to supine. She will benefit from [facility] - as she has a general decline in functional status and since she lives alone, she is not safe to return home. KEATON TBD.    Rehab Prognosis: Fair; patient would benefit from acute skilled PT services to address these deficits and reach maximum level of function.    Recent Surgery: * No surgery found *      Plan:     During this hospitalization, patient to be seen 4 x/week to address the identified rehab impairments via gait training, therapeutic activities, therapeutic exercises, neuromuscular re-education and progress toward the following goals:    Plan of Care Expires:  11/17/24    Subjective     Chief Complaint: her feet and heels hurt the most  Patient/Family Comments/goals: none present  Pain/Comfort:  Pain Rating 1: 10/10  Location - Side 1: Bilateral  Location - Orientation 1: generalized  Location 1:  foot  Pain Rating Post-Intervention 1: 10/10    Patients cultural, spiritual, Spiritism conflicts given the current situation: no    Living Environment:  Lives alone in 1 level apartment with no steps to enter  Prior to admission, patients level of function was indep but had friends come by in case.  Equipment used at home: walker, rolling, cane, quad.  DME owned (not currently used): none.  Upon discharge, patient will have assistance from friends potentially.    Objective:     Communicated with nsg prior to session.  Patient found supine with peripheral IV, telemetry, PureWick  upon PT entry to room.    General Precautions: Standard, fall  Orthopedic Precautions:N/A   Braces: N/A  Respiratory Status: Room air    Exams:  Gross Motor Coordination:  delayed  Skin Integrity/Edema:      -       Skin integrity: Thin and Dry  RLE ROM: Deficits: min hip flexion, unable to reach full knee extension  RLE Strength: Deficits: globally 3+/5  LLE ROM: Deficits: min hip flexion, unable to extend the knee secondary to pain and stiffness  LLE Strength: Deficits: globally 3+/5    Patient donned non slip socks and gait belt for OOB mobility    Functional Mobility:  Bed Mobility:  Supine to Sit: total assistance  Sit to Supine: total assistance  Transfers:  Sit to Stand:  maximal assistance with rolling walker- unable to achieve stand with Max to total A      AM-PAC 6 CLICK MOBILITY  Total Score:8       Treatment & Education:   PT educated patient:  PT plan of care/role of PT  Safety with OOB mobility  Use of RW if able to amb  Discharge disposition    Pt  verbalized understanding       Patient left supine with call button in reach.    GOALS:   Multidisciplinary Problems       Physical Therapy Goals          Problem: Physical Therapy    Goal Priority Disciplines Outcome Interventions   Physical Therapy Goal     PT, PT/OT Progressing    Description: Goals to be met by: 11/24/2024     Patient will increase functional independence  with mobility by performin. Supine to sit with MInimal Assistance  2. Sit to supine with MInimal Assistance  3. Sit to stand transfer with Minimal Assistance  4. Bed to chair transfer with Minimal Assistance using Rolling Walker  5. Sitting at edge of bed x15 minutes with Contact Guard Assistance  6. Increased functional strength to 4/5 for functional ADLs and transfers.                         History:     Past Medical History:   Diagnosis Date    Anemia     Arthritis 2015    back    Cataract     s/p surgery    Coronary artery disease     [facility] 2010 - mid LAD 40%    GERD (gastroesophageal reflux disease)     Glaucoma (increased eye pressure)     Hyperlipidemia     Hypertension     Lactose intolerance     Legally blind in right eye, as defined in [place]     Osteoporosis, post-menopausal     PAD (peripheral artery disease) 2018    Pneumonia 2015    Proximal muscle weakness     Renal cyst     left kidney    Vitamin D deficiency disease        Past Surgical History:   Procedure Laterality Date    ADENOIDECTOMY      BREAST BIOPSY      left breast    CARDIAC CATHETERIZATION  2010    mid LAD, 40% stenosis; LCX, luminal irregularities;                 CATARACT EXTRACTION W/  INTRAOCULAR LENS IMPLANT Left 2013    COMBINED WITH TRAB ()    COLONOSCOPY N/A 2018    Procedure: COLONOSCOPY;  Surgeon: Rufus Villela MD;  Location: [address]);  Service: Endoscopy;  Laterality: N/A;    EDTA CHELATION Left     OS ()    EYE SURGERY      FEMUR FRACTURE SURGERY Left 2016    FRACTURE SURGERY  2016    TEAR DUCT SURGERY      right eye    TONSILLECTOMY      TRABECULECTOMY Left 2013    WITH EXPRESS MINI SHUNT AND CE ()    UPPER GASTROINTESTINAL ENDOSCOPY      YAG CAPSULOTOMY Left 2017           Time Tracking:     PT Received On: 10/27/24  PT Start Time: 1049     PT Stop Time: 1105  PT Total Time (min): 16 min     Billable Minutes:  Evaluation 16      10/27/2024

## 2024-10-27 NOTE — H&P
"Dictation #1  MRN:[id number]  CSN:[id number]   [facility] Emergency [facility] Medicine  History & Physical    Patient Name: Mj Summers  MRN: [id number]  Patient Class: OP- Observation  Admission Date: 10/26/2024  Attending Physician: KHOI Gore MD   Primary Care Provider: Shasta Urbina MD         Patient information was obtained from patient, past medical records, and ER records.     Subjective:     Principal Problem:Rhabdomyolysis    Chief Complaint:   Chief Complaint   Patient presents with    Fall     Pt had a fall today and landed on her bottom and now has pain. Pt states that she is having a harder time taking care of herself after her first fall 3 weeks ago.         HPI: Ms. Mj Summers is a 89 y.o. female, with PMH of CAD, HTN, HLD, GERD, PAD, CKD-III, who presented to [facility] ED on 10/36/24 due to a fall earlier in the day, and subsequent pain in her buttocks. She was found on the floor of her room by a friend today, and other days of the past week. She states she had a fall 3 weeks ago, and since that time has been having difficulty caring for herself. She states she has had "many" falls, and these were her two most recent. She was evaluated in the ED with labs showing H&H of 10.9/32.9. There was no leukocytosis of left shift on CBC, and no significant changes on metabolic panel. A CPK was elevated at 418. A UA was without UTI.  CXR and CT Head, and CT C-spine were without acute changes. She was treated in the ED with diuretics, anti-HTN meds, 1L IV fluids, tylenol. She was placed on observation.     Past Medical History:   Diagnosis Date    Anemia     Arthritis 2015    back    Cataract     s/p surgery    Coronary artery disease     [facility] 1/2010 - mid LAD 40%    GERD (gastroesophageal reflux disease)     Glaucoma (increased eye pressure)     Hyperlipidemia     Hypertension     Lactose intolerance     Legally blind in right eye, as defined in [place]     Osteoporosis, post-menopausal     PAD " (peripheral artery disease) 07/11/2018    Pneumonia 12/13/2015    Proximal muscle weakness     Renal cyst     left kidney    Vitamin D deficiency disease        Past Surgical History:   Procedure Laterality Date    ADENOIDECTOMY      BREAST BIOPSY      left breast    CARDIAC CATHETERIZATION  01/14/2010    mid LAD, 40% stenosis; LCX, luminal irregularities;                 CATARACT EXTRACTION W/  INTRAOCULAR LENS IMPLANT Left 05/22/2013    COMBINED WITH TRAB ()    COLONOSCOPY N/A 2/6/2018    Procedure: COLONOSCOPY;  Surgeon: Rufus Villela MD;  Location: [facility] ([address] [place] [address]);  Service: Endoscopy;  Laterality: N/A;    EDTA CHELATION Left 2/14    OS ()    EYE SURGERY      FEMUR FRACTURE SURGERY Left 05/09/2016    FRACTURE SURGERY  2016    TEAR DUCT SURGERY      right eye    TONSILLECTOMY      TRABECULECTOMY Left 05/22/2013    WITH EXPRESS MINI SHUNT AND CE ()    UPPER GASTROINTESTINAL ENDOSCOPY      YAG CAPSULOTOMY Left 06/22/2017           Review of patient's allergies indicates:   Allergen Reactions    Strawberries [strawberry]      Blood pressure elevation    Chocolate flavor      Causes acid reflux    Atorvastatin      Myalgias    Pcn [penicillins] Rash    Pravastatin      Myalgias    Sulfa (sulfonamide antibiotics) Itching       No current facility-administered medications on file prior to encounter.     Current Outpatient Medications on File Prior to Encounter   Medication Sig    amLODIPine (NORVASC) 10 MG tablet Take 10 mg by mouth once daily.    aspirin 81 mg Tab Take 81 mg by mouth every evening. 1 Tablet Oral Every day    bimatoprost (LUMIGAN) 0.01 % Drop Place 1 drop into both eyes every evening. Can use every evening at about 6 pm    brimonidine 0.1% (ALPHAGAN P) 0.1 % Drop Place 1 drop into the right eye 3 (three) times daily.    calcium carbonate (OS-KACI) 600 mg (1,500 mg) Tab Take 600 mg by mouth 2 (two) times daily with meals.    chlorthalidone (HYGROTEN) 25  MG Tab Take 1 tablet (25 mg total) by mouth once daily.    ergocalciferol (ERGOCALCIFEROL) 50,000 unit Cap TAKE 1 CAPSULE (50,000 UNITS TOTAL) BY MOUTH EVERY 30 DAYS    esomeprazole (NEXIUM) 40 MG capsule TAKE 1 CAPSULE BY MOUTH DAILY AS NEEDED.    ferrous sulfate 325 (65 FE) MG EC tablet Take 1 tablet (325 mg total) by mouth once daily.    multivitamin (THERAGRAN) per tablet Take 1 tablet by mouth once daily.    NIFEdipine (PROCARDIA-XL) 90 MG (OSM) 24 hr tablet Take 1 tablet (90 mg total) by mouth once daily.    olmesartan (BENICAR) 40 MG tablet TAKE 1 TABLET BY MOUTH EVERY DAY    pilocarpine HCL 4% (PILOCAR) 4 % ophthalmic solution Place 1 drop into the right eye 3 (three) times daily.    PROLIA 60 mg/mL Syrg Inject 60 mg into the skin every 6 (six) months.    rosuvastatin (CRESTOR) 10 MG tablet Take 1 tablet (10 mg total) by mouth once daily.    senna-docusate 8.6-50 mg (PERICOLACE) 8.6-50 mg per tablet Take 1 tablet by mouth 2 (two) times daily.    timolol maleate 0.5% (TIMOPTIC) 0.5 % Drop Place 1 drop into both eyes every morning. for 1 dose     Family History       Problem Relation (Age of Onset)    Alzheimer's disease Father    Arthritis Sister    Asthma Sister    Colon cancer Other (44)    Diabetes Brother, Daughter    Glaucoma Mother    Heart disease Mother, Brother    Hyperlipidemia Brother    Hypertension Father, Brother, Son, Daughter    Osteoporosis Sister, Mother, Sister    Peripheral vascular disease Father, Brother    Rheum arthritis Sister, Brother    Stroke Brother          Tobacco Use    Smoking status: Never    Smokeless tobacco: Never   Substance and Sexual Activity    Alcohol use: No    Drug use: No    Sexual activity: Not Currently     Review of Systems   Constitutional:  Negative for activity change, appetite change, chills, diaphoresis, fatigue and fever.   Respiratory:  Negative for cough, shortness of breath and wheezing.    Cardiovascular:  Negative for chest pain and palpitations.    Gastrointestinal:  Negative for abdominal distention, abdominal pain, constipation, diarrhea, nausea and vomiting.   Genitourinary:  Negative for dysuria, flank pain, frequency, hematuria and urgency.   Musculoskeletal:  Positive for myalgias. Negative for arthralgias, back pain, gait problem, joint swelling, neck pain and neck stiffness.   Skin:  Negative for color change, pallor, rash and wound.   Neurological:  Negative for dizziness, syncope, weakness, light-headedness and headaches.   Psychiatric/Behavioral:  Negative for agitation and confusion.      Objective:     Vital Signs (Most Recent):  Temp: 97.6 °F (36.4 °C) (10/27/24 0500)  Pulse: 91 (10/27/24 0457)  Resp: 18 (10/27/24 0457)  BP: (!) 117/56 (10/27/24 0457)  SpO2: 98 % (10/27/24 0457) Vital Signs (24h Range):  Temp:  [97.6 °F (36.4 °C)-98.5 °F (36.9 °C)] 97.6 °F (36.4 °C)  Pulse:  [] 91  Resp:  [16-20] 18  SpO2:  [93 %-100 %] 98 %  BP: (117-212)/(56-98) 117/56     Weight: 33.1 kg (72 lb 15.6 oz)  Body mass index is 12.93 kg/m².     Physical Exam  Vitals and nursing note reviewed.   Constitutional:       General: She is not in acute distress.     Appearance: She is well-developed and normal weight. She is not ill-appearing, toxic-appearing or diaphoretic.      Comments: Elderly and chronically ill appearing female.    HENT:      Head: Normocephalic and atraumatic.   Eyes:      General: No scleral icterus.        Right eye: No discharge.         Left eye: No discharge.      Conjunctiva/sclera: Conjunctivae normal.   Neck:      Trachea: No tracheal deviation.   Cardiovascular:      Rate and Rhythm: Normal rate and regular rhythm.      Heart sounds: Normal heart sounds. No murmur heard.     No gallop.   Pulmonary:      Effort: Pulmonary effort is normal. No respiratory distress.      Breath sounds: Normal breath sounds. No stridor. No wheezing or rales.   Abdominal:      General: Bowel sounds are normal. There is no distension.      Palpations:  "Abdomen is soft. There is no mass.      Tenderness: There is no abdominal tenderness. There is no guarding.   Musculoskeletal:         General: No deformity. Normal range of motion.      Cervical back: Normal range of motion and neck supple.   Skin:     General: Skin is warm and dry.      Coloration: Skin is not pale.      Findings: No erythema or rash.      Comments: Skin is very dry, particularly near the distal lower extremities.    Neurological:      General: No focal deficit present.      Mental Status: She is alert and oriented to person, place, and time.      Cranial Nerves: No cranial nerve deficit.      Motor: No abnormal muscle tone.   Psychiatric:         Mood and Affect: Mood normal.         Behavior: Behavior normal.         Thought Content: Thought content normal.         Judgment: Judgment normal.                Significant Labs: All pertinent labs within the past 24 hours have been reviewed.  BMP:   Recent Labs   Lab 10/26/24  2016   *      K 3.7      CO2 23   BUN 16   CREATININE 0.8   CALCIUM 9.3     CBC:   Recent Labs   Lab 10/26/24  2016   WBC 6.88   HGB 10.9*   HCT 32.9*        CMP:   Recent Labs   Lab 10/26/24  2016      K 3.7      CO2 23   *   BUN 16   CREATININE 0.8   CALCIUM 9.3   PROT 7.8   ALBUMIN 3.7   BILITOT 0.6   ALKPHOS 70   AST 34   ALT 27   ANIONGAP 15     Urine Culture: No results for input(s): "LABURIN" in the last 48 hours.  Urine Studies:   Recent Labs   Lab 10/26/24  2207   COLORU Colorless*   APPEARANCEUA Clear   PHUR 7.0   SPECGRAV 1.010   PROTEINUA 1+*   GLUCUA Negative   KETONESU Trace*   BILIRUBINUA Negative   OCCULTUA 1+*   NITRITE Negative   UROBILINOGEN Negative   LEUKOCYTESUR Negative   RBCUA 6*   WBCUA 1   BACTERIA Rare   SQUAMEPITHEL 4   HYALINECASTS 0       Significant Imaging: I have reviewed all pertinent imaging results/findings within the past 24 hours.  Imaging Results              X-Ray Hip 2 or 3 views Left with " Pelvis when performed (Final result)  Result time 10/27/24 00:31:04      Final result by Lacey Carranza MD (10/27/24 00:31:04)                   Impression:      As above described.      Electronically signed by: Lacey Carranza  Date:    10/27/2024  Time:    00:31               Narrative:    EXAMINATION:  TWO VIEWS OF THE LEFT HIP    CLINICAL HISTORY:  fall;    TECHNIQUE:  AP and lateral view of the left hip    COMPARISON:  05/10/2016    FINDINGS:  There is dynamic pinning of the left hip.  Two views of the left hip demonstrate no acute fracture or dislocation.  There is remote displaced fracture of the lesser trochanter of the left hip.  Mild degenerative changes are seen at both hip joints.  The bones are osteopenic.  Incidental note is made of vascular calcifications.  If pain is out of proportion to the radiological findings seen, consider cross-sectional imaging.                                       CT Cervical Spine Without Contrast (Final result)  Result time 10/26/24 22:34:44      Final result by Lacey Carranza MD (10/26/24 22:34:44)                   Impression:      No acute intracranial abnormality detected.    No acute cervical fracture.  Spondylitic changes.    Possible anemia.      Electronically signed by: Lacey Carranza  Date:    10/26/2024  Time:    22:34               Narrative:    EXAMINATION:  CT OF THE HEAD WITHOUT AND CT CERVICAL SPINE    CLINICAL HISTORY:  Head trauma, minor (Age >= 65y);; Neck trauma (Age >= 65y);    TECHNIQUE:  5 mm unenhanced axial images were obtained from the skull base to the vertex.  1.25 mm axial images were obtained through the cervical spine.    COMPARISON:  04/09/2023    FINDINGS:  CT head: Moderate cerebral atrophy and chronic small vessel ischemic changes.  There is no acute intracranial hemorrhage, territorial infarct or mass effect, or midline shift.  There is redemonstration of a left basal ganglia lacunar infarct.  In the visualized paranasal  sinuses and mastoid air cells, there is a right maxillary sinus mucous retention cyst or polyp.    CT cervical spine: There is exaggerated cervical lordosis.  There is no acute fracture.  There is grade 1 retrolisthesis of C2 on C3.  Severe degenerative changes are seen at the lower lumbar spine..  Spondylitic changes are present.  The bones are diffusely osteopenic.  There is biapical scarring or fibrosis.  The blood in the carotid vessels are low in attenuation, which may suggest anemia.                                       CT Head Without Contrast (Final result)  Result time 10/26/24 22:34:44      Final result by Lacey Carranza MD (10/26/24 22:34:44)                   Impression:      No acute intracranial abnormality detected.    No acute cervical fracture.  Spondylitic changes.    Possible anemia.      Electronically signed by: Lacey Carranza  Date:    10/26/2024  Time:    22:34               Narrative:    EXAMINATION:  CT OF THE HEAD WITHOUT AND CT CERVICAL SPINE    CLINICAL HISTORY:  Head trauma, minor (Age >= 65y);; Neck trauma (Age >= 65y);    TECHNIQUE:  5 mm unenhanced axial images were obtained from the skull base to the vertex.  1.25 mm axial images were obtained through the cervical spine.    COMPARISON:  04/09/2023    FINDINGS:  CT head: Moderate cerebral atrophy and chronic small vessel ischemic changes.  There is no acute intracranial hemorrhage, territorial infarct or mass effect, or midline shift.  There is redemonstration of a left basal ganglia lacunar infarct.  In the visualized paranasal sinuses and mastoid air cells, there is a right maxillary sinus mucous retention cyst or polyp.    CT cervical spine: There is exaggerated cervical lordosis.  There is no acute fracture.  There is grade 1 retrolisthesis of C2 on C3.  Severe degenerative changes are seen at the lower lumbar spine..  Spondylitic changes are present.  The bones are diffusely osteopenic.  There is biapical scarring or  fibrosis.  The blood in the carotid vessels are low in attenuation, which may suggest anemia.                                       X-Ray Knee 3 View Right (Final result)  Result time 10/26/24 21:49:47      Final result by Lacey Carranza MD (10/26/24 21:49:47)                   Impression:      No acute bony abnormality detected.      Electronically signed by: Lacey Carranza  Date:    10/26/2024  Time:    21:49               Narrative:    EXAMINATION:  THREE VIEWS OF THE RIGHT ANKLE AND THREE VIEWS OF THE RIGHT KNEE    CLINICAL HISTORY:  Unspecified fall, initial encounter    TECHNIQUE:  AP, lateral and oblique views of the right ankle    COMPARISON:  None.    FINDINGS:  Three views of the right ankle demonstrate no acute fracture or dislocation.  A tiny plantar spur is noted.  Vascular calcifications are seen.  The bones are diffusely osteopenic.    Three views of the right knee demonstrate no acute fracture or dislocation. There is a suprapatellar spur. Vascular calcifications are present.  The bones are osteopenic.                                       X-Ray Ankle Complete Right (Final result)  Result time 10/26/24 21:49:47      Final result by Lacey Carranza MD (10/26/24 21:49:47)                   Impression:      No acute bony abnormality detected.      Electronically signed by: Lacey Carranza  Date:    10/26/2024  Time:    21:49               Narrative:    EXAMINATION:  THREE VIEWS OF THE RIGHT ANKLE AND THREE VIEWS OF THE RIGHT KNEE    CLINICAL HISTORY:  Unspecified fall, initial encounter    TECHNIQUE:  AP, lateral and oblique views of the right ankle    COMPARISON:  None.    FINDINGS:  Three views of the right ankle demonstrate no acute fracture or dislocation.  A tiny plantar spur is noted.  Vascular calcifications are seen.  The bones are diffusely osteopenic.    Three views of the right knee demonstrate no acute fracture or dislocation. There is a suprapatellar spur. Vascular  calcifications are present.  The bones are osteopenic.                                       X-Ray Chest AP Portable (Final result)  Result time 10/26/24 21:15:55      Final result by Lacey Carranza MD (10/26/24 21:15:55)                   Impression:      No acute intrathoracic abnormality detected.  Mild cardiomegaly.      Electronically signed by: Lacey Carranza  Date:    10/26/2024  Time:    21:15               Narrative:    EXAMINATION:  AP PORTABLE CHEST    CLINICAL HISTORY:  Unspecified fall, initial encounter    TECHNIQUE:  AP portable chest radiograph was submitted.    COMPARISON:  07/12/2016    FINDINGS:  AP portable chest radiograph demonstrates a mildly enlarged cardiac silhouette.  Dense vascular calcifications are seen at the aortic knob.  There is no focal consolidation, pneumothorax, or pleural effusion.  There is biapical pleural thickening.  The lungs are diffusely osteopenic.  There is left calcific tendinitis.                                       X-Ray Pelvis Routine AP (Final result)  Result time 10/26/24 21:47:12      Final result by Lacey Carranza MD (10/26/24 21:47:12)                   Impression:      As above described.      Electronically signed by: Lacey Carranza  Date:    10/26/2024  Time:    21:47               Narrative:    EXAMINATION:  PELVIS ROUTINE AP    CLINICAL HISTORY:  fall;    TECHNIQUE:  AP view of the pelvis    COMPARISON:  None.    FINDINGS:  Suboptimal positioning of the pelvis on AP projection.  Consider additional view of the left hip in order to better assess the femoral neck.  There is dynamic pinning of the left hip.  No hip dislocation is detected.  Mild degenerative changes are seen at the left hip joint.  The bones are osteopenic.                                       Assessment/Plan:     * Rhabdomyolysis  - Ms Mj Summers presents after being found down after a fall  - she is found to have elevated CPK without liver or renal function changes    - IV fluids to hydrate   - encourage PO intake   - monitor labs daily         Falls  - PT/OT evals pending   - monitor       Essential hypertension  Patients blood pressure range in the last 24 hours was: BP  Min: 129/57  Max: 212/94.The patient's inpatient anti-hypertensive regimen is listed below:  Current Antihypertensives       Plan  - BP is controlled, no changes needed to their regimen    CAD (coronary artery disease)  - continue ASA & statin     Dyslipidemia  - continue statin       Stage 3a chronic kidney disease  - chronic   - BUN/Cr are stable and near baseline   - Creatine stable for now. BMP reviewed- noted Estimated Creatinine Clearance: 30.7 mL/min (based on SCr of 0.8 mg/dL). according to latest data. Based on current GFR, CKD stage is stage 3 - GFR 30-59.  Monitor UOP and serial BMP and adjust therapy as needed. Renally dose meds. Avoid nephrotoxic medications and procedures.    Normocytic anemia  Anemia with most recent hemoglobin and hematocrit :  Recent Labs     10/26/24  2016   HGB 10.9*   HCT 32.9*     Plan  - Monitor serial CBC: Daily  - Transfuse PRBC if patient becomes hemodynamically unstable, symptomatic or H/H drops below 7/21.  - Patient has not received any PRBC transfusions to date  - Patient's anemia is currently stable      VTE Risk Mitigation (From admission, onward)           Ordered     IP VTE HIGH RISK PATIENT  Once         10/27/24 0146     Place sequential compression device  Until discontinued         10/27/24 0146                         On 10/27/2024, patient should be placed in hospital observation services under the care of Dr. KHOI Gore MD.           Yina Ram PA-C  Department of Hospital Medicine  [facility] - Emergency Dept

## 2024-10-27 NOTE — ASSESSMENT & PLAN NOTE
Patients blood pressure range in the last 24 hours was: BP  Min: 129/57  Max: 212/94.The patient's inpatient anti-hypertensive regimen is listed below:  Current Antihypertensives       Plan  - BP is controlled, no changes needed to their regimen

## 2024-10-27 NOTE — NURSING
Patient arrived from the ED to the Med surg unit via stretcher with personal belongings and family at the bedside. Patient is AAOX4, no complaint of pain, no sign of acute distress or discomfort noted. Skin  tear note to the RLE due to recent fall. Redness noted to the sacral area, applied barrier cream to area. Vital signs WDL.Patient assessment completed per flowsheet. Safety measures maintained, call light within reach, side rails up x 3, bed locked in lowest position

## 2024-10-27 NOTE — ASSESSMENT & PLAN NOTE
- Recurrent falls at home without acute bony abnormalities.  - Elevated CPK but not in rhabdomyolysis range.  - PT/OT evaluations. Discussed therapy may recommend potential placement.  - No further IVFs at this point.

## 2024-10-27 NOTE — ASSESSMENT & PLAN NOTE
- chronic   - BUN/Cr are stable and near baseline   - Creatine stable for now. BMP reviewed- noted Estimated Creatinine Clearance: 30.7 mL/min (based on SCr of 0.8 mg/dL). according to latest data. Based on current GFR, CKD stage is stage 3 - GFR 30-59.  Monitor UOP and serial BMP and adjust therapy as needed. Renally dose meds. Avoid nephrotoxic medications and procedures.

## 2024-10-27 NOTE — PLAN OF CARE
Problem: Occupational Therapy  Goal: Occupational Therapy Goal  Description: Goals to be met by: 11/10/2024     Patient will increase functional independence with ADLs by performing:    UE Dressing with Stand-by Assistance.  LE Dressing with Minimal Assistance and AE as needed  Toileting from bedside commode with Moderate Assistance for hygiene and clothing management.   Bathing from  edge of bed with Moderate Assistance.  Toilet transfer to bedside commode with Moderate Assistance.    Outcome: Progressing     Pt evaluated and goals set based on performance at admission.

## 2024-10-27 NOTE — PLAN OF CARE
Problem: Physical Therapy  Goal: Physical Therapy Goal  Description: Goals to be met by: 2024     Patient will increase functional independence with mobility by performin. Supine to sit with MInimal Assistance  2. Sit to supine with MInimal Assistance  3. Sit to stand transfer with Minimal Assistance  4. Bed to chair transfer with Minimal Assistance using Rolling Walker  5. Sitting at edge of bed x15 minutes with Contact Guard Assistance  6. Increased functional strength to 4/5 for functional ADLs and transfers.    Outcome: Progressing     Patient required Max to total A for all bed mobility and boosting today. She was not able to tolerate sitting unsupported and when attempted to stand, she did not assist and attempted to scoot her hips forward and was not able to put weight throughout the feet. She was not able to assist to return to supine and required total A to transfer back to supine. She will benefit from ADAMS - as she has a general decline in functional status and since she lives alone, she is not safe to return home. KEATON DE OLIVEIRA

## 2024-10-27 NOTE — HPI
"Ms. Mj Summers is a 89 y.o. female, with PMH of CAD, HTN, HLD, GERD, PAD, CKD-III, who presented to [facility] [place] on 10/36/24 due to a fall earlier in the day, and subsequent pain in her buttocks. She was found on the floor of her room by a friend today, and other days of the past week. She states she had a fall 3 weeks ago, and since that time has been having difficulty caring for herself. She states she has had "many" falls, and these were her two most recent. She was evaluated in the ED with labs showing H&H of 10.9/32.9. There was no leukocytosis of left shift on CBC, and no significant changes on metabolic panel. A CPK was elevated at 418. A UA was without UTI.  CXR and CT Head, and CT C-spine were without acute changes. She was treated in the ED with diuretics, anti-HTN meds, 1L IV fluids, tylenol. She was placed on observation.   "

## 2024-10-28 PROBLEM — E43 SEVERE PROTEIN-CALORIE MALNUTRITION: Status: ACTIVE | Noted: 2024-10-28

## 2024-10-28 LAB
ANION GAP SERPL CALC-SCNC: 10 MMOL/L (ref 8–16)
BASOPHILS # BLD AUTO: 0.02 K/UL (ref 0–0.2)
BASOPHILS NFR BLD: 0.4 % (ref 0–1.9)
BUN SERPL-MCNC: 13 MG/DL (ref 8–23)
CALCIUM SERPL-MCNC: 8.4 MG/DL (ref 8.7–10.5)
CHLORIDE SERPL-SCNC: 106 MMOL/L (ref 95–110)
CO2 SERPL-SCNC: 25 MMOL/L (ref 23–29)
CREAT SERPL-MCNC: 0.7 MG/DL (ref 0.5–1.4)
DIFFERENTIAL METHOD BLD: ABNORMAL
EOSINOPHIL # BLD AUTO: 0.1 K/UL (ref 0–0.5)
EOSINOPHIL NFR BLD: 1 % (ref 0–8)
ERYTHROCYTE [DISTWIDTH] IN BLOOD BY AUTOMATED COUNT: 12.8 % (ref 11.5–14.5)
EST. GFR  (NO RACE VARIABLE): >60 ML/MIN/1.73 M^2
GLUCOSE SERPL-MCNC: 89 MG/DL (ref 70–110)
HCT VFR BLD AUTO: 32.6 % (ref 37–48.5)
HGB BLD-MCNC: 10.8 G/DL (ref 12–16)
IMM GRANULOCYTES # BLD AUTO: 0.02 K/UL (ref 0–0.04)
IMM GRANULOCYTES NFR BLD AUTO: 0.4 % (ref 0–0.5)
LYMPHOCYTES # BLD AUTO: 0.8 K/UL (ref 1–4.8)
LYMPHOCYTES NFR BLD: 14.9 % (ref 18–48)
MAGNESIUM SERPL-MCNC: 1.8 MG/DL (ref 1.6–2.6)
MCH RBC QN AUTO: 30.2 PG (ref 27–31)
MCHC RBC AUTO-ENTMCNC: 33.1 G/DL (ref 32–36)
MCV RBC AUTO: 91 FL (ref 82–98)
MONOCYTES # BLD AUTO: 0.4 K/UL (ref 0.3–1)
MONOCYTES NFR BLD: 8.7 % (ref 4–15)
NEUTROPHILS # BLD AUTO: 3.8 K/UL (ref 1.8–7.7)
NEUTROPHILS NFR BLD: 74.6 % (ref 38–73)
NRBC BLD-RTO: 0 /100 WBC
PLATELET # BLD AUTO: 177 K/UL (ref 150–450)
PMV BLD AUTO: 10.9 FL (ref 9.2–12.9)
POTASSIUM SERPL-SCNC: 3.2 MMOL/L (ref 3.5–5.1)
RBC # BLD AUTO: 3.58 M/UL (ref 4–5.4)
SODIUM SERPL-SCNC: 141 MMOL/L (ref 136–145)
WBC # BLD AUTO: 5.05 K/UL (ref 3.9–12.7)

## 2024-10-28 PROCEDURE — 36415 COLL VENOUS BLD VENIPUNCTURE: CPT | Mod: HCNC | Performed by: PHYSICIAN ASSISTANT

## 2024-10-28 PROCEDURE — G0378 HOSPITAL OBSERVATION PER HR: HCPCS | Mod: HCNC

## 2024-10-28 PROCEDURE — 83735 ASSAY OF MAGNESIUM: CPT | Mod: HCNC | Performed by: PHYSICIAN ASSISTANT

## 2024-10-28 PROCEDURE — 97530 THERAPEUTIC ACTIVITIES: CPT | Mod: HCNC,CQ

## 2024-10-28 PROCEDURE — A4216 STERILE WATER/SALINE, 10 ML: HCPCS | Mod: HCNC | Performed by: PHYSICIAN ASSISTANT

## 2024-10-28 PROCEDURE — 80048 BASIC METABOLIC PNL TOTAL CA: CPT | Mod: HCNC | Performed by: PHYSICIAN ASSISTANT

## 2024-10-28 PROCEDURE — 25000003 PHARM REV CODE 250: Mod: HCNC | Performed by: PHYSICIAN ASSISTANT

## 2024-10-28 PROCEDURE — 85025 COMPLETE CBC W/AUTO DIFF WBC: CPT | Mod: HCNC | Performed by: PHYSICIAN ASSISTANT

## 2024-10-28 PROCEDURE — 25000003 PHARM REV CODE 250: Mod: HCNC | Performed by: INTERNAL MEDICINE

## 2024-10-28 PROCEDURE — 97530 THERAPEUTIC ACTIVITIES: CPT | Mod: HCNC

## 2024-10-28 RX ORDER — POTASSIUM CHLORIDE 20 MEQ/1
40 TABLET, EXTENDED RELEASE ORAL
Status: COMPLETED | OUTPATIENT
Start: 2024-10-28 | End: 2024-10-28

## 2024-10-28 RX ADMIN — THERA TABS 1 TABLET: TAB at 08:10

## 2024-10-28 RX ADMIN — ACETAMINOPHEN 650 MG: 325 TABLET, FILM COATED ORAL at 08:10

## 2024-10-28 RX ADMIN — Medication 10 ML: at 02:10

## 2024-10-28 RX ADMIN — FERROUS SULFATE TAB 325 MG (65 MG ELEMENTAL FE) 1 EACH: 325 (65 FE) TAB at 08:10

## 2024-10-28 RX ADMIN — PILOCARPINE HYDROCHLORIDE OPHTHALMIC SOLUTION 1 DROP: 40 SOLUTION/ DROPS OPHTHALMIC at 10:10

## 2024-10-28 RX ADMIN — BRIMONIDINE TARTRATE 1 DROP: 1.5 SOLUTION/ DROPS OPHTHALMIC at 08:10

## 2024-10-28 RX ADMIN — POTASSIUM CHLORIDE 40 MEQ: 1500 TABLET, EXTENDED RELEASE ORAL at 10:10

## 2024-10-28 RX ADMIN — BRIMONIDINE TARTRATE 1 DROP: 1.5 SOLUTION/ DROPS OPHTHALMIC at 10:10

## 2024-10-28 RX ADMIN — Medication 10 ML: at 10:10

## 2024-10-28 RX ADMIN — ASPIRIN 81 MG CHEWABLE TABLET 81 MG: 81 TABLET CHEWABLE at 08:10

## 2024-10-28 RX ADMIN — POTASSIUM CHLORIDE 40 MEQ: 1500 TABLET, EXTENDED RELEASE ORAL at 08:10

## 2024-10-28 RX ADMIN — ACETAMINOPHEN 650 MG: 325 TABLET, FILM COATED ORAL at 02:10

## 2024-10-28 RX ADMIN — ACETAMINOPHEN 650 MG: 325 TABLET, FILM COATED ORAL at 10:10

## 2024-10-28 RX ADMIN — BRIMONIDINE TARTRATE 1 DROP: 1.5 SOLUTION/ DROPS OPHTHALMIC at 02:10

## 2024-10-28 RX ADMIN — PILOCARPINE HYDROCHLORIDE OPHTHALMIC SOLUTION 1 DROP: 40 SOLUTION/ DROPS OPHTHALMIC at 02:10

## 2024-10-28 RX ADMIN — BIMATOPROST 1 DROP: 0.1 SOLUTION/ DROPS OPHTHALMIC at 10:10

## 2024-10-28 RX ADMIN — Medication 10 ML: at 05:10

## 2024-10-28 RX ADMIN — VALSARTAN 160 MG: 80 TABLET, FILM COATED ORAL at 08:10

## 2024-10-28 RX ADMIN — PILOCARPINE HYDROCHLORIDE OPHTHALMIC SOLUTION 1 DROP: 40 SOLUTION/ DROPS OPHTHALMIC at 08:10

## 2024-10-28 NOTE — PLAN OF CARE
"CM unable to reach lauro Yu by phone to discuss SNF placement per patient request. LUCI spoke with other daughter Irene Gaytan about SNF placement and choice needed. Irene will return phone call after discussing with family.    1245 CM emailed list of SNF to daughter Aimee Green. Awaiting update on choice. [employer] called into state, PASSR faxed, awaiting 142.    Your fax has been successfully sent to [phone] at [phone].  ------------------------------------------------------------  From: [id number]    10/28/2024 1:16:33 PM Transmission Record          Sent to [phone] with remote ID "Fax "          Result: (0/339;0/0) Success          Page record: 1 - 5          Elapsed time: 02:07 on channel 20    4631 PASSR/142 uploaded to [facility] [place]  "

## 2024-10-28 NOTE — PT/OT/SLP PROGRESS
Physical Therapy Treatment    Patient Name:  Mj Summers   MRN:  [id number]    Recommendations:     Discharge Recommendations: Moderate Intensity Therapy  Discharge Equipment Recommendations: wheelchair, to be determined by next level of care  Barriers to discharge: Decreased caregiver support and increased assistance needed    Assessment:     Mj Summers is a 89 y.o. female admitted with a medical diagnosis of Falls.  She presents with the following impairments/functional limitations: weakness, gait instability, pain, impaired balance, impaired endurance, impaired functional mobility, impaired sensation, decreased lower extremity function, decreased ROM, decreased safety awareness, decreased upper extremity function.    Roll L and R with totalA  Supine<>sit with totalA x 1-2  Sit>stand with totalA x 2, no AD, pt with cries of pain from her ankles  Static sitting EOB x 10 mins with SBA/CGA  Pt mobility is limited at this time by her pain  Rec Moderate Intensity Therapy    Rehab Prognosis: Fair; patient would benefit from acute skilled PT services to address these deficits and reach maximum level of function.    Recent Surgery: * No surgery found *      Plan:     During this hospitalization, patient to be seen 4 x/week to address the identified rehab impairments via gait training, therapeutic activities, therapeutic exercises, neuromuscular re-education and progress toward the following goals:    Plan of Care Expires:  11/17/24    Subjective     Chief Complaint: pain of feet, ankles, R lower leg  Patient/Family Comments/goals: I dragged myself along the carpet  Pain/Comfort:  Pain Rating 1: other (see comments) (intense pain with mobility, unrated)  Location - Side 1: Bilateral  Location - Orientation 1: generalized  Location 1: foot (and legs)  Pain Addressed 1: Reposition, Distraction, Cessation of Activity, Nurse notified  Pain Rating Post-Intervention 1: other (see comments) (pain with mobility,  otherwise appearing to rest comfortably)      Objective:     Communicated with nurse Briceno prior to session.  Patient found HOB elevated with peripheral IV, telemetry, PureWick upon PT entry to room.     General Precautions: Standard, fall  Orthopedic Precautions: N/A  Braces: N/A  Respiratory Status: Room air     Functional Mobility:  Bed Mobility:     Rolling Left:  total assistance  Rolling Right: total assistance  Supine to Sit: total assistance and of 1-2 persons  Sit to Supine: total assistance and of 1-2 persons  Transfers:     Sit to Stand:  total assistance and of 2 persons with no AD and hand-held assist      AM-PAC 6 CLICK MOBILITY  Turning over in bed (including adjusting bedclothes, sheets and blankets)?: 2  Sitting down on and standing up from a chair with arms (e.g., wheelchair, bedside commode, etc.): 1  Moving from lying on back to sitting on the side of the bed?: 2  Moving to and from a bed to a chair (including a wheelchair)?: 1  Need to walk in hospital room?: 1  Climbing 3-5 steps with a railing?: 1  Basic Mobility Total Score: 8       Treatment & Education:  Static sitting EOB x 10 mins with SBA/CGA  All mobility with totalA this session    Patient left HOB elevated with all lines intact, call button in reach, bed alarm on, and nurse Kaity notified..    GOALS:   Multidisciplinary Problems       Physical Therapy Goals          Problem: Physical Therapy    Goal Priority Disciplines Outcome Interventions   Physical Therapy Goal     PT, PT/OT Progressing    Description: Goals to be met by: 2024     Patient will increase functional independence with mobility by performin. Supine to sit with MInimal Assistance  2. Sit to supine with MInimal Assistance  3. Sit to stand transfer with Minimal Assistance  4. Bed to chair transfer with Minimal Assistance using Rolling Walker  5. Sitting at edge of bed x15 minutes with Contact Guard Assistance  6. Increased functional strength to 4/5  for functional ADLs and transfers.                         Time Tracking:     PT Received On: 10/28/24  PT Start Time: 1330     PT Stop Time: 1400  PT Total Time (min): 30 min     Billable Minutes: Therapeutic Activity 30  Co-treat with OT due to complex nature of patient, to insure patient safety, and to prevent injury to patient and caregivers, requiring intervention of two skilled therapists.      Treatment Type: Treatment  PT/PTA: PTA     Number of PTA visits since last PT visit: 1     10/28/2024

## 2024-10-28 NOTE — PLAN OF CARE
Recommendations  1) Consider liberalized diet / per pt's request   2) Encourage intake of meals & commercial beverages as tolerated    3) RD to monitor and follow up    Goals: Pt will meet > 50% EEN/EPN by RD follow up  Nutrition Goal Status: new  Communication of RD Recs:  (POC)

## 2024-10-28 NOTE — PT/OT/SLP PROGRESS
Occupational Therapy   Treatment    Name: Mj Summers  MRN: [id number]  Admitting Diagnosis:  Falls       Recommendations:     Discharge Recommendations: Moderate Intensity Therapy (with transition to detention NH)  Discharge Equipment Recommendations:  wheelchair, hospital bed, to be determined by next level of care  Barriers to discharge:  Decreased caregiver support (Current functional level)    Assessment:     Mj Summers is a 89 y.o. female with a medical diagnosis of Falls.  She presents with the following performance deficits affecting function are weakness, impaired endurance, impaired self care skills, impaired sensation, impaired functional mobility, gait instability, impaired balance, decreased safety awareness, decreased lower extremity function, decreased upper extremity function, decreased coordination, decreased ROM, impaired joint extensibility, impaired fine motor, impaired cardiopulmonary response to activity, pain.     Rehab Prognosis:  Fair; patient would benefit from acute skilled OT services to address these deficits and reach maximum level of function.       Plan:     Patient to be seen 4 x/week to address the above listed problems via self-care/home management, therapeutic activities, therapeutic exercises  Plan of Care Expires: 11/10/24  Plan of Care Reviewed with: patient    Subjective     Chief Complaint: Pain. Nurse notified that pt wanted pain medication during tx session.  Patient/Family Comments/goals: Pt stating she is hoping to get more therapy.   Pain/Comfort:  Pain Rating 1:  (Pt not rating pain, severe with mobility - bilateral ankles/feet)    Objective:     Communicated with: nurse prior to session.  Patient found HOB elevated with peripheral IV, PureWick, telemetry upon OT entry to room.    General Precautions: Standard, fall    Orthopedic Precautions:N/A  Braces: N/A  Respiratory Status: Room air     Occupational Performance:     Bed Mobility:    Supine to sit: Total  A, 2 persons  Sit to supine: Total A, 2 persons   Rolling: Total A  Sitting EOB: 10 minutes at SBA <>Min A     Functional Mobility/Transfers:  Attempted to stand twice but pt with strong posterior lean and picks her left foot off the floor and extends it out in front of her  Functional Mobility: N/A    Activities of Daily Living:  Toileting: Using female external catheter to suction  Feeding: Set up with Supervision      [facility] 6 Click ADL: 12    Treatment & Education:  Role of OT, ADL mobility in preparation for ADL sitting EOB, sitting balance, sitting tolerance, bed mobility, attempts to stand, discharge recs    Patient left HOB elevated with all lines intact, call button in reach, bed alarm on, and nurse notified    GOALS:   Multidisciplinary Problems       Occupational Therapy Goals          Problem: Occupational Therapy    Goal Priority Disciplines Outcome Interventions   Occupational Therapy Goal     OT, PT/OT Progressing    Description: Goals to be met by: 11/10/2024     Patient will increase functional independence with ADLs by performing:    UE Dressing with Stand-by Assistance.  LE Dressing with Minimal Assistance and AE as needed  Toileting from bedside commode with Moderate Assistance for hygiene and clothing management.   Bathing from  edge of bed with Moderate Assistance.  Toilet transfer to bedside commode with Moderate Assistance.                         Time Tracking:     OT Date of Treatment: 10/28/24  OT Start Time: 1333  OT Stop Time: 1401  OT Total Time (min): 28 min    Billable Minutes:Therapeutic Activity 28    OT/RASHIDA: OT     Number of RASHIDA visits since last OT visit: 0    10/28/2024

## 2024-10-28 NOTE — CONSULTS
[facility] - Med Surg ([place])  Wound Care    Patient Name:  Mj Summers   MRN:  [id number]  Date: 10/28/2024  Diagnosis: Falls    History:     Past Medical History:   Diagnosis Date    Anemia     Arthritis 2015    back    Cataract     s/p surgery    Coronary artery disease     [facility] 1/2010 - mid LAD 40%    GERD (gastroesophageal reflux disease)     Glaucoma (increased eye pressure)     Hyperlipidemia     Hypertension     Lactose intolerance     Legally blind in right eye, as defined in [place]     Osteoporosis, post-menopausal     PAD (peripheral artery disease) 07/11/2018    Pneumonia 12/13/2015    Proximal muscle weakness     Renal cyst     left kidney    Vitamin D deficiency disease        Social History     Socioeconomic History    Marital status:    Tobacco Use    Smoking status: Never    Smokeless tobacco: Never   Substance and Sexual Activity    Alcohol use: No    Drug use: No    Sexual activity: Not Currently     Social Drivers of Health     Financial Resource Strain: Low Risk  (10/27/2024)    Overall Financial Resource Strain (CARDIA)     Difficulty of Paying Living Expenses: Not hard at all   Food Insecurity: No Food Insecurity (10/27/2024)    Hunger Vital Sign     Worried About Running Out of Food in the Last Year: Never true     Ran Out of Food in the Last Year: Never true   Transportation Needs: No Transportation Needs (10/27/2024)    TRANSPORTATION NEEDS     Transportation : No   Physical Activity: Insufficiently Active (8/20/2024)    Exercise Vital Sign     Days of Exercise per Week: 7 days     Minutes of Exercise per Session: 10 min   Stress: Stress Concern Present (10/27/2024)    [employer] [place] [employer] - Occupational Stress Questionnaire     Feeling of Stress : To some extent   Housing Stability: Low Risk  (10/27/2024)    Housing Stability Vital Sign     Unable to Pay for Housing in the Last Year: No     Homeless in the Last Year: No       Precautions:     Allergies as of  "10/26/2024 - Reviewed 10/26/2024   Allergen Reaction Noted    Strawberries [strawberry]  02/20/2018    Chocolate flavor  02/20/2018    Atorvastatin  03/20/2018    Pcn [penicillins] Rash 07/24/2012    Pravastatin  03/20/2018    Sulfa (sulfonamide antibiotics) Itching 07/24/2012       [facility] Assessment Details/Treatment     Wound care consult received for assessment of right leg. Patient is a 89 y.o. female, with PMH of CAD, HTN, HLD, GERD, PAD, CKD-III, who presented to [facility] ED on 10/36/24 due to a fall earlier in the day, and subsequent pain in her buttocks. She was found on the floor of her room by a friend today, and other days of the past week. She states she had a fall 3 weeks ago, and since that time has been having difficulty caring for herself. She states she has had "many" falls, and these were her two most recent. She was evaluated in the ED with labs showing H&H of 10.9/32.9. There was no leukocytosis of left shift on CBC, and no significant changes on metabolic panel. A CPK was elevated at 418. A UA was without UTI. CXR and CT Head, and CT C-spine were without acute changes. She was treated in the ED with diuretics, anti-HTN meds, 1L IV fluids, tylenol. She was placed on observation.     All skin injuries were present on admit. Upon assessment noted skin tears to bilateral lower extremities. Also noted to what appears to be a healing stage 3 pressure injury to sacrum. Area with mostly pink scar tissue.     Recommendations:   - lower extremities/sacrum: cleanse with Vashe and apply triad paste to skin tears and cover with foam dressing. Change every 3 days or prn if soiled or lifting.     Nursing and MD team notified. Orders placed. Nursing to maintain pressure injury prevention interventions. Wound care will follow.        10/28/24 1035        Wound 10/1935 Traumatic Right lower Leg #1   Date First Assessed/Time First Assessed: 10/1935   Present on Original Admission: No  Primary Wound Type: " Traumatic  Side: Right  Orientation: lower  Location: Leg  Wound Approximate Age at First Assessment (Weeks): (c)   Wound Number: #1  Is th...   Wound Image    Dressing Appearance Dry;Intact;Moist drainage   Drainage Amount Scant   Drainage Characteristics/Odor Serosanguineous;No odor   Appearance Pink;Red;Moist   Tissue loss description Full thickness   Periwound Area Blistered;Dry   Wound Edges Open   Wound Length (cm) 8.5 cm   Wound Width (cm) 5 cm   Wound Depth (cm) 0.2 cm   Wound Volume (cm^3) 8.5 cm^3   Wound Surface Area (cm^2) 42.5 cm^2   Care Cleansed with:;Antimicrobial agent;Applied:  (triad paste)   Dressing Applied;Silicone;Foam        Wound 10/27/24 0701 Skin Tear Left lower;lateral Leg #2   Date First Assessed/Time First Assessed: 10/27/24 0701   Primary Wound Type: Skin Tear  Side: Left  Orientation: lower;lateral  Location: Leg  Wound Number: #2  Is this injury device related?: No   Wound Image   (left leg)   Dressing Appearance Dry;Intact;Clean   Drainage Amount Scant   Drainage Characteristics/Odor Serosanguineous;No odor   Appearance Pink;Moist   Tissue loss description Partial thickness   Periwound Area Intact;Dry   Wound Edges Open   Wound Length (cm) 2.3 cm   Wound Width (cm) 1.2 cm   Wound Depth (cm) 0.1 cm   Wound Volume (cm^3) 0.276 cm^3   Wound Surface Area (cm^2) 2.76 cm^2   Care Cleansed with:;Antimicrobial agent   Dressing Applied;Silicone;Foam   Periwound Care Dry periwound area maintained        Wound 10/27/24 2100 Pressure Injury midline Sacral spine   Date First Assessed/Time First Assessed: 10/27/24 2100   Primary Wound Type: Pressure Injury  Orientation: midline  Location: Sacral spine   Wound Image   (sacrum)   Pressure Injury Stage 3   Dressing Appearance other (see comments)  (healing stage 3)   Drainage Amount None   Drainage Characteristics/Odor No odor   Appearance Pink;Moist   Red (%), Wound Tissue Color 100 %   Periwound Area Intact;Dry;Scar tissue;Pink   Wound Edges  Defined   Periwound Care Dry periwound area maintained             10/28/2024

## 2024-10-28 NOTE — PROGRESS NOTES
"[facility] Medicine  Progress Note    Patient Name: Mj Summers  MRN: [id number]  Patient Class: OP- Observation   Admission Date: 10/26/2024  Length of Stay: 0 days  Attending Physician: KHOI Gore MD  Primary Care Provider: Shasta Urbina MD        Subjective:     Principal Problem:Falls        HPI:  Ms. Mj Summers is a 89 y.o. female, with PMH of CAD, HTN, HLD, GERD, PAD, CKD-III, who presented to [facility] ED on 10/36/24 due to a fall earlier in the day, and subsequent pain in her buttocks. She was found on the floor of her room by a friend today, and other days of the past week. She states she had a fall 3 weeks ago, and since that time has been having difficulty caring for herself. She states she has had "many" falls, and these were her two most recent. She was evaluated in the ED with labs showing H&H of 10.9/32.9. There was no leukocytosis of left shift on CBC, and no significant changes on metabolic panel. A CPK was elevated at 418. A UA was without UTI.  CXR and CT Head, and CT C-spine were without acute changes. She was treated in the ED with diuretics, anti-HTN meds, 1L IV fluids, tylenol. She was placed on observation.     Overview/Hospital Course:  Admitted with recurrent falls and elevated CPK. Received IVFs and wound care, PT/OT consulted. Discussed with patient and amenable to placement. Spoke with patient's daughters after her consent; they reported recurrent difficulties with her mentation and concern for potential cognitive impairment. Referral placed for neuropsychiatry for further testing after hospitalization. Placement sought.    Interval History: No acute events overnight. Doing okay. Seen about to work with OT. No new concerns at this time.    Review of Systems   Constitutional:  Negative for chills and fever.   Respiratory:  Negative for cough and shortness of breath.    Cardiovascular:  Negative for chest pain and palpitations. "   Gastrointestinal:  Negative for abdominal pain, nausea and vomiting.     Objective:     Vital Signs (Most Recent):  Temp: 98 °F (36.7 °C) (10/28/24 2003)  Pulse: 82 (10/28/24 2003)  Resp: 18 (10/28/24 2003)  BP: 134/60 (10/28/24 2003)  SpO2: 99 % (10/28/24 2003) Vital Signs (24h Range):  Temp:  [97.4 °F (36.3 °C)-98.3 °F (36.8 °C)] 98 °F (36.7 °C)  Pulse:  [70-97] 82  Resp:  [16-18] 18  SpO2:  [98 %-100 %] 99 %  BP: (115-160)/(55-67) 134/60     Weight: 36.1 kg (79 lb 9.4 oz)  Body mass index is 14.1 kg/m².    Intake/Output Summary (Last 24 hours) at 10/28/2024 2303  Last data filed at 10/28/2024 2215  Gross per 24 hour   Intake 1355 ml   Output 1450 ml   Net -95 ml         Physical Exam  Vitals and nursing note reviewed.   Constitutional:       General: She is not in acute distress.     Appearance: She is well-developed.      Comments: Cachectic.   HENT:      Head: Normocephalic and atraumatic.   Eyes:      General:         Right eye: No discharge.         Left eye: No discharge.      Conjunctiva/sclera: Conjunctivae normal.   Cardiovascular:      Rate and Rhythm: Normal rate.      Pulses: Normal pulses.   Pulmonary:      Effort: Pulmonary effort is normal. No respiratory distress.   Abdominal:      Palpations: Abdomen is soft.      Tenderness: There is no abdominal tenderness.   Musculoskeletal:         General: Normal range of motion.      Right lower leg: No edema.      Left lower leg: No edema.   Skin:     General: Skin is warm and dry.      Comments: Dressings in place to lower legs.   Neurological:      Mental Status: She is alert and oriented to person, place, and time.           Significant Labs:   CBC:  Recent Labs   Lab 10/26/24  2016 10/27/24  0701 10/28/24  0457   WBC 6.88 6.31 5.05   HGB 10.9* 11.2* 10.8*   HCT 32.9* 35.0* 32.6*    218 177   GRAN 87.6*  6.0 78.6*  5.0 74.6*  3.8   LYMPH 6.7*  0.5* 10.9*  0.7* 14.9*  0.8*   MONO 4.8  0.3 9.7  0.6 8.7  0.4   EOS 0.0 0.0 0.1   BASO 0.02  0.01 0.02   CMP:  Recent Labs   Lab 10/26/24  2016 10/27/24  0701 10/28/24  0457    142 141   K 3.7 3.5 3.2*    104 106   CO2 23 24 25   BUN 16 12 13   CREATININE 0.8 0.8 0.7   * 105 89   CALCIUM 9.3 8.7 8.4*   MG  --  1.8 1.8   ALKPHOS 70  --   --    AST 34  --   --    ALT 27  --   --    BILITOT 0.6  --   --    PROT 7.8  --   --    ALBUMIN 3.7  --   --    ANIONGAP 15 14 10      Significant Imaging: I have reviewed and interpreted all pertinent imaging results/findings within the past 24 hours.      Assessment/Plan:      * Falls  - Recurrent falls at home without acute bony abnormalities.  - Elevated CPK but not in rhabdomyolysis range.  - PT/OT evaluations. Pursue SNF placement.  - No further IVFs at this point.    Elevated CPK  - As above.    Stage 3a chronic kidney disease  - Chronic; stable.  - Avoid nephrotoxins, renally dose medications.    Normocytic anemia  - Chronic; stable.    Dyslipidemia  - As above.    Essential hypertension  - Continue valsartan 160mg PO daily.    CAD (coronary artery disease)  - Continue aspirin 81mg PO daily; statin interchange held given reported myalgias with atorvastatin.      VTE Risk Mitigation (From admission, onward)           Ordered     IP VTE HIGH RISK PATIENT  Once         10/27/24 0146     Place sequential compression device  Until discontinued         10/27/24 0146                    Discharge Planning   LIDIA: 10/29/2024     Code Status: Full Code   Is the patient medically ready for discharge?:     Reason for patient still in hospital (select all that apply): Pending disposition  Discharge Plan A: Skilled Nursing Facility   Discharge Delays: None known at this time              JENA Gore MD  Department of Hospital Medicine   [facility] - Med Surg ([place])

## 2024-10-28 NOTE — PLAN OF CARE
Pt remained free of falls and injuries throughout shift. AAOx4. Pt calm and cooperative. Purposeful hourly rounding performed. Pt swallows meds whole. IV flushed and saline locked. Managed c/o LLE  pain with PRN Tylenol and one time dose of IV Toradol  with good relief,  pt denies pain at this time. Frequent weight shift  encouraged, pt turned  q2h, prophylactic foam dressings to bilateral elbows  and heels. Pressure injury to sacral spine. Cleansed with vashe and applied triad paste and mepilex. Alternating pressure pump placed to mattress. VSS on room air. Pt resting comfortably in bed, denies needs at this time. Bed low and locked, bed alarm on, call light in reach. Side rails up x3. Report given to JUANITA Liriano.

## 2024-10-28 NOTE — CHAPLAIN
10/28/24 1504   Clinical Encounter Type   Visit Type Initial Visit   Visit Category General Rounding   Visited With Patient   Length of Visit 20 Minutes   Continue Visiting Yes   Hinduism Encounters   Spiritual Resources Requested Prayer   Patient Spiritual Encounters   Care Provided Compassionate presence;Reflective listening;Prayer support   Patient Coping Open/discussion;Accepting   Comments - Patient pt presents in good spirits, grateful and bright-eyed; she asked that we pray in gratitude for another day of life and for her healing and for her family all of which I did and for which she expressed gratitude

## 2024-10-28 NOTE — ASSESSMENT & PLAN NOTE
Malnutrition Type:  Context: social/environmental circumstances  Level: severe    Related to (etiology):   Advanced age    Signs and Symptoms (as evidenced by):   Body mass index is 14.1 kg/m².; 69% IBW    Inability to manage self care 2/2 falls    Malnutrition Characteristic Summary:  Weight Loss (Malnutrition):  (BMI 14)  Subcutaneous Fat (Malnutrition): severe depletion  Muscle Mass (Malnutrition): severe depletion      Interventions/Recommendations (treatment strategy):  1) Consider liberalized diet / per pt's request 2) Encourage intake of meals & commercial beverages as tolerated  3) RD to monitor and follow up    Nutrition Diagnosis Status:   New

## 2024-10-28 NOTE — PLAN OF CARE
I certify I provided patient choice and a list to the patient/family of CMS rated Home Health, SNF, IRF, LTACH, detention Nursing Homes.  Patient/Family signed Patient's Choice Disclosure Form choosing the following    1.[facility]    2. [facility]    3. [facility]     10/28/24 1431   Post-Acute Status   Post-Acute Authorization Placement   Post-Acute Placement Status Referrals Sent   Patient choice form signed by patient/caregiver List with quality metrics by geographic area provided;List from CMS Compare;List from System Post-Acute Care   Discharge Delays None known at this time   Discharge Plan   Discharge Plan A Skilled Nursing Facility   Discharge Plan B New Nursing Home placement - [facility] care facility

## 2024-10-29 PROBLEM — R41.89 COGNITIVE IMPAIRMENT: Status: ACTIVE | Noted: 2024-10-29

## 2024-10-29 LAB
ANION GAP SERPL CALC-SCNC: 8 MMOL/L (ref 8–16)
BASOPHILS # BLD AUTO: 0.04 K/UL (ref 0–0.2)
BASOPHILS NFR BLD: 0.7 % (ref 0–1.9)
BUN SERPL-MCNC: 19 MG/DL (ref 8–23)
CALCIUM SERPL-MCNC: 9.2 MG/DL (ref 8.7–10.5)
CHLORIDE SERPL-SCNC: 105 MMOL/L (ref 95–110)
CO2 SERPL-SCNC: 26 MMOL/L (ref 23–29)
CREAT SERPL-MCNC: 0.8 MG/DL (ref 0.5–1.4)
DIFFERENTIAL METHOD BLD: ABNORMAL
EOSINOPHIL # BLD AUTO: 0.1 K/UL (ref 0–0.5)
EOSINOPHIL NFR BLD: 1.7 % (ref 0–8)
ERYTHROCYTE [DISTWIDTH] IN BLOOD BY AUTOMATED COUNT: 13.1 % (ref 11.5–14.5)
EST. GFR  (NO RACE VARIABLE): >60 ML/MIN/1.73 M^2
GLUCOSE SERPL-MCNC: 91 MG/DL (ref 70–110)
HCT VFR BLD AUTO: 32.7 % (ref 37–48.5)
HGB BLD-MCNC: 10.9 G/DL (ref 12–16)
IMM GRANULOCYTES # BLD AUTO: 0.01 K/UL (ref 0–0.04)
IMM GRANULOCYTES NFR BLD AUTO: 0.2 % (ref 0–0.5)
LYMPHOCYTES # BLD AUTO: 0.8 K/UL (ref 1–4.8)
LYMPHOCYTES NFR BLD: 14.5 % (ref 18–48)
MAGNESIUM SERPL-MCNC: 1.8 MG/DL (ref 1.6–2.6)
MCH RBC QN AUTO: 30.3 PG (ref 27–31)
MCHC RBC AUTO-ENTMCNC: 33.3 G/DL (ref 32–36)
MCV RBC AUTO: 91 FL (ref 82–98)
MONOCYTES # BLD AUTO: 0.5 K/UL (ref 0.3–1)
MONOCYTES NFR BLD: 9.2 % (ref 4–15)
NEUTROPHILS # BLD AUTO: 4 K/UL (ref 1.8–7.7)
NEUTROPHILS NFR BLD: 73.7 % (ref 38–73)
NRBC BLD-RTO: 0 /100 WBC
PLATELET # BLD AUTO: 288 K/UL (ref 150–450)
PMV BLD AUTO: 9.4 FL (ref 9.2–12.9)
POTASSIUM SERPL-SCNC: 4.9 MMOL/L (ref 3.5–5.1)
RBC # BLD AUTO: 3.6 M/UL (ref 4–5.4)
SARS-COV-2 RDRP RESP QL NAA+PROBE: NEGATIVE
SODIUM SERPL-SCNC: 139 MMOL/L (ref 136–145)
WBC # BLD AUTO: 5.43 K/UL (ref 3.9–12.7)

## 2024-10-29 PROCEDURE — 94761 N-INVAS EAR/PLS OXIMETRY MLT: CPT | Mod: HCNC

## 2024-10-29 PROCEDURE — 25000003 PHARM REV CODE 250: Mod: HCNC | Performed by: PHYSICIAN ASSISTANT

## 2024-10-29 PROCEDURE — 80048 BASIC METABOLIC PNL TOTAL CA: CPT | Mod: HCNC | Performed by: PHYSICIAN ASSISTANT

## 2024-10-29 PROCEDURE — 87635 SARS-COV-2 COVID-19 AMP PRB: CPT | Mod: HCNC | Performed by: HOSPITALIST

## 2024-10-29 PROCEDURE — 36415 COLL VENOUS BLD VENIPUNCTURE: CPT | Mod: HCNC | Performed by: PHYSICIAN ASSISTANT

## 2024-10-29 PROCEDURE — 85025 COMPLETE CBC W/AUTO DIFF WBC: CPT | Mod: HCNC | Performed by: PHYSICIAN ASSISTANT

## 2024-10-29 PROCEDURE — G0378 HOSPITAL OBSERVATION PER HR: HCPCS | Mod: HCNC

## 2024-10-29 PROCEDURE — 30200315 PPD INTRADERMAL TEST REV CODE 302: Mod: HCNC | Performed by: HOSPITALIST

## 2024-10-29 PROCEDURE — A4216 STERILE WATER/SALINE, 10 ML: HCPCS | Mod: HCNC | Performed by: PHYSICIAN ASSISTANT

## 2024-10-29 PROCEDURE — 25000003 PHARM REV CODE 250: Mod: HCNC | Performed by: HOSPITALIST

## 2024-10-29 PROCEDURE — 86580 TB INTRADERMAL TEST: CPT | Mod: HCNC | Performed by: HOSPITALIST

## 2024-10-29 PROCEDURE — 83735 ASSAY OF MAGNESIUM: CPT | Mod: HCNC | Performed by: PHYSICIAN ASSISTANT

## 2024-10-29 RX ORDER — METHOCARBAMOL 500 MG/1
500 TABLET, FILM COATED ORAL 3 TIMES DAILY PRN
Start: 2024-10-29 | End: 2024-11-08

## 2024-10-29 RX ORDER — ACETAMINOPHEN 325 MG/1
650 TABLET ORAL EVERY 6 HOURS PRN
Start: 2024-10-29

## 2024-10-29 RX ORDER — VALSARTAN 80 MG/1
320 TABLET ORAL DAILY
Status: DISCONTINUED | OUTPATIENT
Start: 2024-10-30 | End: 2024-10-31 | Stop reason: HOSPADM

## 2024-10-29 RX ORDER — BISACODYL 10 MG/1
10 SUPPOSITORY RECTAL ONCE
Status: COMPLETED | OUTPATIENT
Start: 2024-10-29 | End: 2024-10-29

## 2024-10-29 RX ORDER — VALSARTAN 80 MG/1
160 TABLET ORAL ONCE
Status: COMPLETED | OUTPATIENT
Start: 2024-10-29 | End: 2024-10-29

## 2024-10-29 RX ORDER — VALSARTAN 320 MG/1
320 TABLET ORAL DAILY
Start: 2024-10-30 | End: 2025-10-30

## 2024-10-29 RX ORDER — BISACODYL 10 MG/1
10 SUPPOSITORY RECTAL ONCE
Status: DISCONTINUED | OUTPATIENT
Start: 2024-10-29 | End: 2024-10-29

## 2024-10-29 RX ORDER — TALC
6 POWDER (GRAM) TOPICAL NIGHTLY PRN
Start: 2024-10-29

## 2024-10-29 RX ADMIN — Medication 10 ML: at 06:10

## 2024-10-29 RX ADMIN — Medication 10 ML: at 03:10

## 2024-10-29 RX ADMIN — ACETAMINOPHEN 650 MG: 325 TABLET, FILM COATED ORAL at 09:10

## 2024-10-29 RX ADMIN — THERA TABS 1 TABLET: TAB at 09:10

## 2024-10-29 RX ADMIN — ACETAMINOPHEN 650 MG: 325 TABLET, FILM COATED ORAL at 03:10

## 2024-10-29 RX ADMIN — BIMATOPROST 1 DROP: 0.1 SOLUTION/ DROPS OPHTHALMIC at 09:10

## 2024-10-29 RX ADMIN — TUBERCULIN PURIFIED PROTEIN DERIVATIVE 5 UNITS: 5 INJECTION, SOLUTION INTRADERMAL at 10:10

## 2024-10-29 RX ADMIN — Medication 10 ML: at 09:10

## 2024-10-29 RX ADMIN — VALSARTAN 160 MG: 80 TABLET, FILM COATED ORAL at 10:10

## 2024-10-29 RX ADMIN — BRIMONIDINE TARTRATE 1 DROP: 1.5 SOLUTION/ DROPS OPHTHALMIC at 10:10

## 2024-10-29 RX ADMIN — PILOCARPINE HYDROCHLORIDE OPHTHALMIC SOLUTION 1 DROP: 40 SOLUTION/ DROPS OPHTHALMIC at 09:10

## 2024-10-29 RX ADMIN — ASPIRIN 81 MG CHEWABLE TABLET 81 MG: 81 TABLET CHEWABLE at 09:10

## 2024-10-29 RX ADMIN — FERROUS SULFATE TAB 325 MG (65 MG ELEMENTAL FE) 1 EACH: 325 (65 FE) TAB at 09:10

## 2024-10-29 RX ADMIN — BRIMONIDINE TARTRATE 1 DROP: 1.5 SOLUTION/ DROPS OPHTHALMIC at 09:10

## 2024-10-29 RX ADMIN — PILOCARPINE HYDROCHLORIDE OPHTHALMIC SOLUTION 1 DROP: 40 SOLUTION/ DROPS OPHTHALMIC at 03:10

## 2024-10-29 RX ADMIN — BISACODYL 10 MG: 10 SUPPOSITORY RECTAL at 06:10

## 2024-10-29 RX ADMIN — BRIMONIDINE TARTRATE 1 DROP: 1.5 SOLUTION/ DROPS OPHTHALMIC at 03:10

## 2024-10-29 RX ADMIN — PILOCARPINE HYDROCHLORIDE OPHTHALMIC SOLUTION 1 DROP: 40 SOLUTION/ DROPS OPHTHALMIC at 10:10

## 2024-10-29 NOTE — SUBJECTIVE & OBJECTIVE
Interval History: No acute events overnight. Doing okay. Seen about to work with OT. No new concerns at this time.    Review of Systems   Constitutional:  Negative for chills and fever.   Respiratory:  Negative for cough and shortness of breath.    Cardiovascular:  Negative for chest pain and palpitations.   Gastrointestinal:  Negative for abdominal pain, nausea and vomiting.     Objective:     Vital Signs (Most Recent):  Temp: 98 °F (36.7 °C) (10/28/24 2003)  Pulse: 82 (10/28/24 2003)  Resp: 18 (10/28/24 2003)  BP: 134/60 (10/28/24 2003)  SpO2: 99 % (10/28/24 2003) Vital Signs (24h Range):  Temp:  [97.4 °F (36.3 °C)-98.3 °F (36.8 °C)] 98 °F (36.7 °C)  Pulse:  [70-97] 82  Resp:  [16-18] 18  SpO2:  [98 %-100 %] 99 %  BP: (115-160)/(55-67) 134/60     Weight: 36.1 kg (79 lb 9.4 oz)  Body mass index is 14.1 kg/m².    Intake/Output Summary (Last 24 hours) at 10/28/2024 2303  Last data filed at 10/28/2024 2215  Gross per 24 hour   Intake 1355 ml   Output 1450 ml   Net -95 ml         Physical Exam  Vitals and nursing note reviewed.   Constitutional:       General: She is not in acute distress.     Appearance: She is well-developed.      Comments: Cachectic.   HENT:      Head: Normocephalic and atraumatic.   Eyes:      General:         Right eye: No discharge.         Left eye: No discharge.      Conjunctiva/sclera: Conjunctivae normal.   Cardiovascular:      Rate and Rhythm: Normal rate.      Pulses: Normal pulses.   Pulmonary:      Effort: Pulmonary effort is normal. No respiratory distress.   Abdominal:      Palpations: Abdomen is soft.      Tenderness: There is no abdominal tenderness.   Musculoskeletal:         General: Normal range of motion.      Right lower leg: No edema.      Left lower leg: No edema.   Skin:     General: Skin is warm and dry.      Comments: Dressings in place to lower legs.   Neurological:      Mental Status: She is alert and oriented to person, place, and time.           Significant Labs:    CBC:  Recent Labs   Lab 10/26/24  2016 10/27/24  0701 10/28/24  0457   WBC 6.88 6.31 5.05   HGB 10.9* 11.2* 10.8*   HCT 32.9* 35.0* 32.6*    218 177   GRAN 87.6*  6.0 78.6*  5.0 74.6*  3.8   LYMPH 6.7*  0.5* 10.9*  0.7* 14.9*  0.8*   MONO 4.8  0.3 9.7  0.6 8.7  0.4   EOS 0.0 0.0 0.1   BASO 0.02 0.01 0.02   CMP:  Recent Labs   Lab 10/26/24  2016 10/27/24  0701 10/28/24  0457    142 141   K 3.7 3.5 3.2*    104 106   CO2 23 24 25   BUN 16 12 13   CREATININE 0.8 0.8 0.7   * 105 89   CALCIUM 9.3 8.7 8.4*   MG  --  1.8 1.8   ALKPHOS 70  --   --    AST 34  --   --    ALT 27  --   --    BILITOT 0.6  --   --    PROT 7.8  --   --    ALBUMIN 3.7  --   --    ANIONGAP 15 14 10      Significant Imaging: I have reviewed and interpreted all pertinent imaging results/findings within the past 24 hours.

## 2024-10-29 NOTE — PLAN OF CARE
Problem: Adult Inpatient Plan of Care  Goal: Plan of Care Review  Outcome: Progressing  Goal: Patient-Specific Goal (Individualized)  Outcome: Progressing  Goal: Absence of Hospital-Acquired Illness or Injury  Outcome: Progressing  Goal: Optimal Comfort and Wellbeing  Outcome: Progressing  Goal: Readiness for Transition of Care  Outcome: Progressing     Problem: Skin Injury Risk Increased  Goal: Skin Health and Integrity  Outcome: Progressing     Problem: Wound  Goal: Optimal Coping  Outcome: Progressing  Goal: Optimal Functional Ability  Outcome: Progressing  Goal: Absence of Infection Signs and Symptoms  Outcome: Progressing  Goal: Improved Oral Intake  Outcome: Progressing  Goal: Optimal Pain Control and Function  Outcome: Progressing  Goal: Skin Health and Integrity  Outcome: Progressing  Goal: Optimal Wound Healing  Outcome: Progressing     Problem: Fall Injury Risk  Goal: Absence of Fall and Fall-Related Injury  Outcome: Progressing      Stable.

## 2024-10-29 NOTE — ASSESSMENT & PLAN NOTE
- Recurrent falls at home without acute bony abnormalities.  - Elevated CPK but not in rhabdomyolysis range.  - PT/OT evaluations. Pursue SNF placement.  - No further IVFs at this point.

## 2024-10-29 NOTE — SUBJECTIVE & OBJECTIVE
Interval History: No acute events overnight. Doing okay.  No new concerns at this time.    Review of Systems   Constitutional:  Negative for chills and fever.   Respiratory:  Negative for cough and shortness of breath.    Cardiovascular:  Negative for chest pain and palpitations.   Gastrointestinal:  Negative for abdominal pain, nausea and vomiting.     Objective:     Vital Signs (Most Recent):  Temp: 98 °F (36.7 °C) (10/29/24 1220)  Pulse: 85 (10/29/24 1220)  Resp: 18 (10/29/24 1220)  BP: (!) 162/67 (10/29/24 1220)  SpO2: 99 % (10/29/24 1220) Vital Signs (24h Range):  Temp:  [97.5 °F (36.4 °C)-98.3 °F (36.8 °C)] 98 °F (36.7 °C)  Pulse:  [70-92] 85  Resp:  [16-18] 18  SpO2:  [99 %-100 %] 99 %  BP: (134-162)/(60-76) 162/67     Weight: 36.6 kg (80 lb 11 oz) (2 pillows on bed, 1 blanket)  Body mass index is 14.29 kg/m².    Intake/Output Summary (Last 24 hours) at 10/29/2024 1320  Last data filed at 10/29/2024 0400  Gross per 24 hour   Intake 1090 ml   Output 1400 ml   Net -310 ml         Physical Exam  Vitals and nursing note reviewed.   Constitutional:       General: She is not in acute distress.     Appearance: She is well-developed.      Comments: Cachectic.   HENT:      Head: Normocephalic and atraumatic.   Eyes:      General:         Right eye: No discharge.         Left eye: No discharge.      Conjunctiva/sclera: Conjunctivae normal.   Cardiovascular:      Rate and Rhythm: Normal rate.      Pulses: Normal pulses.   Pulmonary:      Effort: Pulmonary effort is normal. No respiratory distress.   Abdominal:      Palpations: Abdomen is soft.      Tenderness: There is no abdominal tenderness.   Musculoskeletal:         General: Normal range of motion.      Right lower leg: No edema.      Left lower leg: No edema.   Skin:     General: Skin is warm and dry.      Comments: Dressings in place to lower legs.   Neurological:      Mental Status: She is alert and oriented to person, place, and time.      Comments: Short term  memory loss and repetitive           Significant Labs:   CBC:  Recent Labs   Lab 10/27/24  0701 10/28/24  0457 10/29/24  0424   WBC 6.31 5.05 5.43   HGB 11.2* 10.8* 10.9*   HCT 35.0* 32.6* 32.7*    177 288   GRAN 78.6*  5.0 74.6*  3.8 73.7*  4.0   LYMPH 10.9*  0.7* 14.9*  0.8* 14.5*  0.8*   MONO 9.7  0.6 8.7  0.4 9.2  0.5   EOS 0.0 0.1 0.1   BASO 0.01 0.02 0.04   CMP:  Recent Labs   Lab 10/26/24  2016 10/27/24  0701 10/28/24  0457 10/29/24  0424    142 141 139   K 3.7 3.5 3.2* 4.9    104 106 105   CO2 23 24 25 26   BUN 16 12 13 19   CREATININE 0.8 0.8 0.7 0.8   * 105 89 91   CALCIUM 9.3 8.7 8.4* 9.2   MG  --  1.8 1.8 1.8   ALKPHOS 70  --   --   --    AST 34  --   --   --    ALT 27  --   --   --    BILITOT 0.6  --   --   --    PROT 7.8  --   --   --    ALBUMIN 3.7  --   --   --    ANIONGAP 15 14 10 8      Significant Imaging: I have reviewed and interpreted all pertinent imaging results/findings within the past 24 hours.

## 2024-10-29 NOTE — HOSPITAL COURSE
Admitted with recurrent falls and elevated CPK. Received IVFs and wound care, PT/OT consulted. Discussed with patient and she was amenable to placement. Spoke with patient's daughters after her consent; they reported recurrent difficulties with her mentation and concern for potential cognitive impairment. Referral placed for neuropsychiatry for further testing after hospitalization. Placement sought and she was discharged to SNF.     Falls, elevated CPK  - Recurrent falls at home without acute bony abnormalities.  - Elevated CPK but was not in rhabdomyolysis range. Short course of IVFs and no further was needed.  - PT/OT evaluations with recommendation for moderate intensity. SNF placement done.    Cognitive impairment  - Family reported memory issues and hoarding  - Mild cognitive impairment, possible developing dementia  - Referral made for Neuropsychology evaluation as outpatient    CAD (coronary artery disease), Dyslipidemia  - Continued aspirin 81mg PO daily; statin interchange held given reported myalgias with atorvastatin.    Essential hypertension  - Continue valsartan 160mg PO daily.     Stage 3a chronic kidney disease  - Chronic; creatinine was 0.8 and stable, monitored.    Normocytic anemia  - Chronic; stable. H/H was stable. Hgb was 10.9 prior to discharge.

## 2024-10-29 NOTE — PLAN OF CARE
NORAH received a call from the patient daughter (Irene) concerning the discharge placement. Irene stated she is waiting on the admission packet  email from [facility]. Irene stated she will call NORAH once the packet is complete.       [phone]   [facility]

## 2024-10-29 NOTE — ASSESSMENT & PLAN NOTE
- Family reported memory issues and hoarding  - Mild cognitive impairment, possible developing dementia  - Referral made for Neuropsychology evaluation as outpatient

## 2024-10-29 NOTE — CARE UPDATE
Irene Gaytan (Daughter) [phone]  Aimee Green (Daughter) - conference call in with sister    Update given. All questions and concerns were addressed.    CHAYO Mcmillan MD

## 2024-10-29 NOTE — PT/OT/SLP PROGRESS
Occupational Therapy      Patient Name:  Mj Summers   MRN:  [id number]    Patient not seen today secondary to Patient unwilling to participate (Three attempts to see pt for OT today). Will follow-up per POC.    10/29/2024

## 2024-10-29 NOTE — PLAN OF CARE
Pt remained free of falls and injuries throughout shift. AAOx4. Pt calm, pleasant, and cooperative. Purposeful hourly rounding performed. Pt swallows meds whole, one at a time. IV flushed and saline locked. Managed c/o LLE pain with PRN Tylenol. Frequent weight shift  encouraged, pt turned q2h, prophylactic foam dressings to bilateral elbows  and heels. Pt c/o constipation, attempted BM, pt impacted with large hard stool at rectum. MD contacted for suppository, given. VSS on room air. Pt resting comfortably in bed, denies needs at this time. Bed low and locked, bed alarm on, call light in reach. Side rails up x3. Report given to JUANITA Zavala.

## 2024-10-29 NOTE — PLAN OF CARE
10/29/24 0835   Post-Acute Status   Post-Acute Authorization Placement   Post-Acute Placement Status Referrals Sent   Discharge Delays (!) Post-Acute Set-up   Discharge Plan   Discharge Plan A Skilled Nursing Facility   Discharge Plan B Home Health          SW/CM contacted patient family concerning their choice of skilled nursing facility. Patient daughters  Aimee Bonilla chose [facility].

## 2024-10-29 NOTE — PLAN OF CARE
NURSING HOME ORDERS    10/30/2024  [facility]  [facility] ([facility])  [address]  [place] [address]  Dept: [phone]  Loc: [phone]     Admit to Nursing Home:  Skilled Nursing Facility    Diagnoses:  Active Hospital Problems    Diagnosis  POA    *Falls [R29.6]  Not Applicable    Cognitive impairment [R41.89]  Yes    Severe protein-calorie malnutrition [E43]  Yes    Elevated CPK [R74.8]  Yes    Stage 3a chronic kidney disease [N18.31]  Yes     Chronic    Normocytic anemia [D64.9]  Yes    Dyslipidemia [E78.5]  Yes     Chronic    Essential hypertension [I10]  Yes     Chronic    CAD (coronary artery disease) [I25.10]  Yes     Chronic     Non-obstructive   01/14/2010 mid LAD, 40% stenosis; LCX, luminal irregularities        Resolved Hospital Problems   No resolved problems to display.       Patient is homebound due to:  Falls    Allergies:  Review of patient's allergies indicates:   Allergen Reactions    Strawberries [strawberry]      Blood pressure elevation    Chocolate flavor      Causes acid reflux    Atorvastatin      Myalgias    Pcn [penicillins] Rash    Pravastatin      Myalgias    Sulfa (sulfonamide antibiotics) Itching       Vitals:  Routine    Diet: cardiac diet, supplement with Boost plus with all meals    Activities:   Activity as tolerated    Goals of Care Treatment Preferences:  Code Status: Full Code      Labs:  None    Nursing Precautions:  Aspiration , Fall, and Pressure ulcer prevention    Consults:   PT to evaluate and treat- 5 times a week, OT to evaluate and treat- 5 times a week, Wound Care, and Nutrition to evaluate and recommend diet     Miscellaneous Care: Routine Skin for Bedridden Patients:  Apply moisture barrier cream to all                   Wound Care: Location legs  Cleanse area with normal saline and then apply silicone bordered foam dressing twice weekly (on Mondays and Fridays)      Medications: Discontinue all previous medication orders, if  any. See new list below.     Medication List        START taking these medications      acetaminophen 325 MG tablet  Commonly known as: TYLENOL  Take 2 tablets (650 mg total) by mouth every 6 (six) hours as needed.     melatonin 3 mg tablet  Commonly known as: MELATIN  Take 2 tablets (6 mg total) by mouth nightly as needed for Insomnia.     methocarbamoL 500 MG Tab  Commonly known as: ROBAXIN  Take 1 tablet (500 mg total) by mouth 3 (three) times daily as needed.     valsartan 320 MG tablet  Commonly known as: DIOVAN  Take 1 tablet (320 mg total) by mouth once daily.  Replaces: olmesartan 40 MG tablet            CONTINUE taking these medications      aspirin 81 mg Tab  Take 81 mg by mouth every evening. 1 Tablet Oral Every day     brimonidine 0.1% 0.1 % Drop  Commonly known as: ALPHAGAN P  Place 1 drop into the right eye 3 (three) times daily.     calcium carbonate 600 mg calcium (1,500 mg) Tab  Commonly known as: OS-KACI  Take 600 mg by mouth 2 (two) times daily with meals.     ergocalciferol 50,000 unit Cap  Commonly known as: ERGOCALCIFEROL  TAKE 1 CAPSULE (50,000 UNITS TOTAL) BY MOUTH EVERY 30 DAYS     esomeprazole 40 MG capsule  Commonly known as: NEXIUM  TAKE 1 CAPSULE BY MOUTH DAILY AS NEEDED.     ferrous sulfate 325 (65 FE) MG EC tablet  Take 1 tablet (325 mg total) by mouth once daily.     LUMIGAN 0.01 % Drop  Generic drug: bimatoprost  Place 1 drop into both eyes every evening. Can use every evening at about 6 pm     multivitamin per tablet  Commonly known as: THERAGRAN  Take 1 tablet by mouth once daily.     pilocarpine HCL 4% 4 % ophthalmic solution  Commonly known as: PILOCAR  Place 1 drop into the right eye 3 (three) times daily.        rosuvastatin 10 MG tablet  Commonly known as: CRESTOR  Take 1 tablet (10 mg total) by mouth once daily.     senna-docusate 8.6-50 mg 8.6-50 mg per tablet  Commonly known as: PERICOLACE  Take 1 tablet by mouth 2 (two) times daily.     timolol maleate 0.5% 0.5 %  Drop  Commonly known as: TIMOPTIC  Place 1 drop into both eyes every morning. for 1 dose            STOP taking these medications      amLODIPine 10 MG tablet  Commonly known as: NORVASC     chlorthalidone 25 MG Tab  Commonly known as: HYGROTEN     NIFEdipine 90 MG (OSM) 24 hr tablet  Commonly known as: PROCARDIA-XL     olmesartan 40 MG tablet  Commonly known as: BENICAR  Replaced by: valsartan 320 MG tablet                Immunizations Administered as of 10/30/2024       Name Date Dose VIS Date Route Exp Date    COVID-19, MRNA, LN-S, PF (Moderna) 4/14/2021  9:33 AM 0.5 mL 12/19/2020 Intramuscular 9/18/2021    Site: Right deltoid     Given By: Mary Solitario, PharmD     : [employer]     Lot: [id number]     COVID-19, MRNA, LN-S, PF (Moderna) 3/17/2021  9:03 AM 0.5 mL 12/19/2020 Intramuscular 9/3/2021    Site: Right deltoid     Given By: Mary Solitario, PharmD     : [employer]     Lot: [id number]             This patient has had both covid vaccinations    Some patients may experience side effects after vaccination.  These may include fever, headache, muscle or joint aches.  Most symptoms resolve with 24-48 hours and do not require urgent medical evaluation unless they persist for more than 72 hours or symptoms are concerning for an unrelated medical condition.          _________________________________  Lanie Mcmillan MD  10/30/2024

## 2024-10-29 NOTE — PT/OT/SLP PROGRESS
Physical Therapy      Patient Name:  Mj Summers   MRN:  [id number]    Patient not seen today secondary to Other (Comment) (pt eating breakfast). Will follow-up later today.    ADDENDUM 12:28 -   Pt eating lunch and stating that she hasn't had her wound dressed yet and so cannot work with therapy just yet. She also reported that she will be transferring to another facility later today.

## 2024-10-29 NOTE — PROGRESS NOTES
"[facility] Medicine  Progress Note    Patient Name: Mj Summers  MRN: [id number]  Patient Class: OP- Observation   Admission Date: 10/26/2024  Length of Stay: 0 days  Attending Physician: Lanie Mcmillan MD  Primary Care Provider: Shasta Urbina MD        Subjective:     Principal Problem:Falls        HPI:  Ms. Mj Summers is a 89 y.o. female, with PMH of CAD, HTN, HLD, GERD, PAD, CKD-III, who presented to [facility] ED on 10/36/24 due to a fall earlier in the day, and subsequent pain in her buttocks. She was found on the floor of her room by a friend today, and other days of the past week. She states she had a fall 3 weeks ago, and since that time has been having difficulty caring for herself. She states she has had "many" falls, and these were her two most recent. She was evaluated in the ED with labs showing H&H of 10.9/32.9. There was no leukocytosis of left shift on CBC, and no significant changes on metabolic panel. A CPK was elevated at 418. A UA was without UTI.  CXR and CT Head, and CT C-spine were without acute changes. She was treated in the ED with diuretics, anti-HTN meds, 1L IV fluids, tylenol. She was placed on observation.     Overview/Hospital Course:  Admitted with recurrent falls and elevated CPK. Received IVFs and wound care, PT/OT consulted. Discussed with patient and amenable to placement. Spoke with patient's daughters after her consent; they reported recurrent difficulties with her mentation and concern for potential cognitive impairment. Referral placed for neuropsychiatry for further testing after hospitalization. Placement sought.    Interval History: No acute events overnight. Doing okay.  No new concerns at this time.    Review of Systems   Constitutional:  Negative for chills and fever.   Respiratory:  Negative for cough and shortness of breath.    Cardiovascular:  Negative for chest pain and palpitations.   Gastrointestinal:  Negative for abdominal " pain, nausea and vomiting.     Objective:     Vital Signs (Most Recent):  Temp: 98 °F (36.7 °C) (10/29/24 1220)  Pulse: 85 (10/29/24 1220)  Resp: 18 (10/29/24 1220)  BP: (!) 162/67 (10/29/24 1220)  SpO2: 99 % (10/29/24 1220) Vital Signs (24h Range):  Temp:  [97.5 °F (36.4 °C)-98.3 °F (36.8 °C)] 98 °F (36.7 °C)  Pulse:  [70-92] 85  Resp:  [16-18] 18  SpO2:  [99 %-100 %] 99 %  BP: (134-162)/(60-76) 162/67     Weight: 36.6 kg (80 lb 11 oz) (2 pillows on bed, 1 blanket)  Body mass index is 14.29 kg/m².    Intake/Output Summary (Last 24 hours) at 10/29/2024 1320  Last data filed at 10/29/2024 0400  Gross per 24 hour   Intake 1090 ml   Output 1400 ml   Net -310 ml         Physical Exam  Vitals and nursing note reviewed.   Constitutional:       General: She is not in acute distress.     Appearance: She is well-developed.      Comments: Cachectic.   HENT:      Head: Normocephalic and atraumatic.   Eyes:      General:         Right eye: No discharge.         Left eye: No discharge.      Conjunctiva/sclera: Conjunctivae normal.   Cardiovascular:      Rate and Rhythm: Normal rate.      Pulses: Normal pulses.   Pulmonary:      Effort: Pulmonary effort is normal. No respiratory distress.   Abdominal:      Palpations: Abdomen is soft.      Tenderness: There is no abdominal tenderness.   Musculoskeletal:         General: Normal range of motion.      Right lower leg: No edema.      Left lower leg: No edema.   Skin:     General: Skin is warm and dry.      Comments: Dressings in place to lower legs.   Neurological:      Mental Status: She is alert and oriented to person, place, and time.      Comments: Short term memory loss and repetitive           Significant Labs:   CBC:  Recent Labs   Lab 10/27/24  0701 10/28/24  0457 10/29/24  0424   WBC 6.31 5.05 5.43   HGB 11.2* 10.8* 10.9*   HCT 35.0* 32.6* 32.7*    177 288   GRAN 78.6*  5.0 74.6*  3.8 73.7*  4.0   LYMPH 10.9*  0.7* 14.9*  0.8* 14.5*  0.8*   MONO 9.7  0.6 8.7   0.4 9.2  0.5   EOS 0.0 0.1 0.1   BASO 0.01 0.02 0.04   CMP:  Recent Labs   Lab 10/26/24  2016 10/27/24  0701 10/28/24  0457 10/29/24  0424    142 141 139   K 3.7 3.5 3.2* 4.9    104 106 105   CO2 23 24 25 26   BUN 16 12 13 19   CREATININE 0.8 0.8 0.7 0.8   * 105 89 91   CALCIUM 9.3 8.7 8.4* 9.2   MG  --  1.8 1.8 1.8   ALKPHOS 70  --   --   --    AST 34  --   --   --    ALT 27  --   --   --    BILITOT 0.6  --   --   --    PROT 7.8  --   --   --    ALBUMIN 3.7  --   --   --    ANIONGAP 15 14 10 8      Significant Imaging: I have reviewed and interpreted all pertinent imaging results/findings within the past 24 hours.    Assessment/Plan:      * Falls  - Recurrent falls at home without acute bony abnormalities.  - Elevated CPK but not in rhabdomyolysis range.  - PT/OT evaluations. Pursue SNF placement.  - No further IVFs at this point.    Cognitive impairment  - Family reported memory issues and hoarding  - Mild cognitive impairment, possible developing dementia  - Referral made for Neuropsychology evaluation as outpatient    Elevated CPK  - As above.    Stage 3a chronic kidney disease  - Chronic; stable.  - Avoid nephrotoxins, renally dose medications.    Normocytic anemia  - Chronic; stable.    Dyslipidemia  - As above.    Essential hypertension  - Continue valsartan 160mg PO daily.    CAD (coronary artery disease)  - Continue aspirin 81mg PO daily; statin interchange held given reported myalgias with atorvastatin.      VTE Risk Mitigation (From admission, onward)           Ordered     IP VTE HIGH RISK PATIENT  Once         10/27/24 0146     Place sequential compression device  Until discontinued         10/27/24 0146                      Lanie Mcmillan MD  Department of Hospital Medicine   [facility])

## 2024-10-30 VITALS
SYSTOLIC BLOOD PRESSURE: 195 MMHG | HEART RATE: 110 BPM | TEMPERATURE: 99 F | BODY MASS INDEX: 14.3 KG/M2 | WEIGHT: 80.69 LBS | DIASTOLIC BLOOD PRESSURE: 81 MMHG | RESPIRATION RATE: 18 BRPM | OXYGEN SATURATION: 94 % | HEIGHT: 63 IN

## 2024-10-30 PROCEDURE — 25000003 PHARM REV CODE 250: Mod: HCNC | Performed by: PHYSICIAN ASSISTANT

## 2024-10-30 PROCEDURE — G0378 HOSPITAL OBSERVATION PER HR: HCPCS | Mod: HCNC

## 2024-10-30 PROCEDURE — 97530 THERAPEUTIC ACTIVITIES: CPT | Mod: HCNC,CQ

## 2024-10-30 PROCEDURE — A4216 STERILE WATER/SALINE, 10 ML: HCPCS | Mod: HCNC | Performed by: PHYSICIAN ASSISTANT

## 2024-10-30 PROCEDURE — 25000003 PHARM REV CODE 250: Mod: HCNC | Performed by: HOSPITALIST

## 2024-10-30 PROCEDURE — 94761 N-INVAS EAR/PLS OXIMETRY MLT: CPT | Mod: HCNC

## 2024-10-30 RX ADMIN — ASPIRIN 81 MG CHEWABLE TABLET 81 MG: 81 TABLET CHEWABLE at 09:10

## 2024-10-30 RX ADMIN — Medication 10 ML: at 05:10

## 2024-10-30 RX ADMIN — ACETAMINOPHEN 650 MG: 325 TABLET, FILM COATED ORAL at 11:10

## 2024-10-30 RX ADMIN — BRIMONIDINE TARTRATE 1 DROP: 1.5 SOLUTION/ DROPS OPHTHALMIC at 02:10

## 2024-10-30 RX ADMIN — PILOCARPINE HYDROCHLORIDE OPHTHALMIC SOLUTION 1 DROP: 40 SOLUTION/ DROPS OPHTHALMIC at 02:10

## 2024-10-30 RX ADMIN — VALSARTAN 320 MG: 80 TABLET, FILM COATED ORAL at 09:10

## 2024-10-30 RX ADMIN — THERA TABS 1 TABLET: TAB at 09:10

## 2024-10-30 RX ADMIN — Medication 10 ML: at 02:10

## 2024-10-30 RX ADMIN — FERROUS SULFATE TAB 325 MG (65 MG ELEMENTAL FE) 1 EACH: 325 (65 FE) TAB at 09:10

## 2024-10-30 RX ADMIN — ACETAMINOPHEN 650 MG: 325 TABLET, FILM COATED ORAL at 04:10

## 2024-10-30 RX ADMIN — BRIMONIDINE TARTRATE 1 DROP: 1.5 SOLUTION/ DROPS OPHTHALMIC at 09:10

## 2024-10-30 RX ADMIN — PILOCARPINE HYDROCHLORIDE OPHTHALMIC SOLUTION 1 DROP: 40 SOLUTION/ DROPS OPHTHALMIC at 09:10

## 2024-10-30 NOTE — PLAN OF CARE
Pt being discharged to nursing home.Report given to the nurse in the facility who will be taking care of her.IV removed as per order.AVS confirmed,printed and given to pt.Needed care provided.Pt waiting for the transport arrival.ETA for transport is 1815.  Problem: Adult Inpatient Plan of Care  Goal: Plan of Care Review  Outcome: Adequate for Care Transition  Goal: Patient-Specific Goal (Individualized)  Outcome: Adequate for Care Transition  Goal: Absence of Hospital-Acquired Illness or Injury  Outcome: Adequate for Care Transition  Goal: Optimal Comfort and Wellbeing  Outcome: Adequate for Care Transition  Goal: Readiness for Transition of Care  Outcome: Adequate for Care Transition     Problem: Skin Injury Risk Increased  Goal: Skin Health and Integrity  Outcome: Adequate for Care Transition     Problem: Wound  Goal: Optimal Coping  Outcome: Adequate for Care Transition  Goal: Optimal Functional Ability  Outcome: Adequate for Care Transition  Goal: Absence of Infection Signs and Symptoms  Outcome: Adequate for Care Transition  Goal: Improved Oral Intake  Outcome: Adequate for Care Transition  Goal: Optimal Pain Control and Function  Outcome: Adequate for Care Transition  Goal: Skin Health and Integrity  Outcome: Adequate for Care Transition  Goal: Optimal Wound Healing  Outcome: Adequate for Care Transition     Problem: Fall Injury Risk  Goal: Absence of Fall and Fall-Related Injury  Outcome: Adequate for Care Transition

## 2024-10-30 NOTE — DISCHARGE SUMMARY
"[facility] Medicine  Discharge Summary      Patient Name: Mj Summers  MRN: [id number]  JOAQUÍN: [id number]  Patient Class: OP- Observation  Admission Date: 10/26/2024  Hospital Length of Stay: 0 days  Discharge Date and Time:  10/30/2024 2:33 PM  Attending Physician: Lanie Mcmillan MD   Discharging Provider: Lanie Mcmillan MD  Primary Care Provider: Shasta Urbina MD    Primary Care Team: Networked reference to record PCT     HPI:   Ms. Mj Summers is a 89 y.o. female, with PMH of CAD, HTN, HLD, GERD, PAD, CKD-III, who presented to [facility] ED on 10/36/24 due to a fall earlier in the day, and subsequent pain in her buttocks. She was found on the floor of her room by a friend today, and other days of the past week. She states she had a fall 3 weeks ago, and since that time has been having difficulty caring for herself. She states she has had "many" falls, and these were her two most recent. She was evaluated in the ED with labs showing H&H of 10.9/32.9. There was no leukocytosis of left shift on CBC, and no significant changes on metabolic panel. A CPK was elevated at 418. A UA was without UTI.  CXR and CT Head, and CT C-spine were without acute changes. She was treated in the ED with diuretics, anti-HTN meds, 1L IV fluids, tylenol. She was placed on observation.     * No surgery found *      Hospital Course:   Admitted with recurrent falls and elevated CPK. Received IVFs and wound care, PT/OT consulted. Discussed with patient and she was amenable to placement. Spoke with patient's daughters after her consent; they reported recurrent difficulties with her mentation and concern for potential cognitive impairment. Referral placed for neuropsychiatry for further testing after hospitalization. Placement sought and she was discharged to SNF.     Falls, elevated CPK  - Recurrent falls at home without acute bony abnormalities.  - Elevated CPK but was not in rhabdomyolysis range. Short course " of IVFs and no further was needed.  - PT/OT evaluations with recommendation for moderate intensity. SNF placement done.    Cognitive impairment  - Family reported memory issues and hoarding  - Mild cognitive impairment, possible developing dementia  - Referral made for Neuropsychology evaluation as outpatient    CAD (coronary artery disease), Dyslipidemia  - Continued aspirin 81mg PO daily; statin interchange held given reported myalgias with atorvastatin.    Essential hypertension  - Continue valsartan 160mg PO daily.     Stage 3a chronic kidney disease  - Chronic; creatinine was 0.8 and stable, monitored.    Normocytic anemia  - Chronic; stable. H/H was stable. Hgb was 10.9 prior to discharge.       Goals of Care Treatment Preferences:  Code Status: Full Code       Consults:   Consults (From admission, onward)          Status Ordering Provider     Inpatient consult to Registered Dietitian/Nutritionist  Once        Provider:  (Not yet assigned)    Completed KHOI EARLY     Inpatient consult to Social Work  Once        Provider:  (Not yet assigned)    Completed PEDRO ALVES          Service: Hospital Medicine    Final Active Diagnoses:    Diagnosis Date Noted POA    PRINCIPAL PROBLEM:  Falls [R29.6] 10/27/2024 Not Applicable    Cognitive impairment [R41.89] 10/29/2024 Yes    Severe protein-calorie malnutrition [E43] 10/28/2024 Yes    Elevated CPK [R74.8] 10/27/2024 Yes    Stage 3a chronic kidney disease [N18.31] 03/04/2023 Yes     Chronic    Normocytic anemia [D64.9] 06/24/2013 Yes    Dyslipidemia [E78.5] 01/18/2013 Yes     Chronic    Essential hypertension [I10]  Yes     Chronic    CAD (coronary artery disease) [I25.10] 08/08/2012 Yes     Chronic      Problems Resolved During this Admission:       Discharged Condition: good    Disposition: Skilled Nursing Facility    Follow Up:   Follow-up Information       Shasta Urbina MD Follow up in 2 day(s).    Specialty: Internal Medicine  Contact  information:  [address]  [facility] [address]  [phone]               Neuropsychiatry Follow up.                           Patient Instructions:      Ambulatory referral/consult to Adult Neuropsychology   Standing Status: Future   Referral Priority: Urgent Referral Type: Psychiatric   Referral Reason: Specialty Services Required   Number of Visits Requested: 1     Diet Cardiac     Activity as tolerated       Significant Diagnostic Studies: N/A    Pending Diagnostic Studies:       None           Medications:  Reconciled Home Medications:      Medication List        START taking these medications      acetaminophen 325 MG tablet  Commonly known as: TYLENOL  Take 2 tablets (650 mg total) by mouth every 6 (six) hours as needed.     melatonin 3 mg tablet  Commonly known as: MELATIN  Take 2 tablets (6 mg total) by mouth nightly as needed for Insomnia.     methocarbamoL 500 MG Tab  Commonly known as: ROBAXIN  Take 1 tablet (500 mg total) by mouth 3 (three) times daily as needed.     valsartan 320 MG tablet  Commonly known as: DIOVAN  Take 1 tablet (320 mg total) by mouth once daily.  Replaces: olmesartan 40 MG tablet            CONTINUE taking these medications      aspirin 81 mg Tab  Take 81 mg by mouth every evening. 1 Tablet Oral Every day     brimonidine 0.1% 0.1 % Drop  Commonly known as: ALPHAGAN P  Place 1 drop into the right eye 3 (three) times daily.     calcium carbonate 600 mg calcium (1,500 mg) Tab  Commonly known as: OS-KACI  Take 600 mg by mouth 2 (two) times daily with meals.     ergocalciferol 50,000 unit Cap  Commonly known as: ERGOCALCIFEROL  TAKE 1 CAPSULE (50,000 UNITS TOTAL) BY MOUTH EVERY 30 DAYS     esomeprazole 40 MG capsule  Commonly known as: NEXIUM  TAKE 1 CAPSULE BY MOUTH DAILY AS NEEDED.     ferrous sulfate 325 (65 FE) MG EC tablet  Take 1 tablet (325 mg total) by mouth once daily.     LUMIGAN 0.01 % Drop  Generic drug: bimatoprost  Place 1 drop into both eyes every evening. Can use  every evening at about 6 pm     multivitamin per tablet  Commonly known as: THERAGRAN  Take 1 tablet by mouth once daily.     pilocarpine HCL 4% 4 % ophthalmic solution  Commonly known as: PILOCAR  Place 1 drop into the right eye 3 (three) times daily.     PROLIA 60 mg/mL Syrg  Generic drug: denosumab  Inject 60 mg into the skin every 6 (six) months.     rosuvastatin 10 MG tablet  Commonly known as: CRESTOR  Take 1 tablet (10 mg total) by mouth once daily.     senna-docusate 8.6-50 mg 8.6-50 mg per tablet  Commonly known as: PERICOLACE  Take 1 tablet by mouth 2 (two) times daily.     timolol maleate 0.5% 0.5 % Drop  Commonly known as: TIMOPTIC  Place 1 drop into both eyes every morning. for 1 dose            STOP taking these medications      amLODIPine 10 MG tablet  Commonly known as: NORVASC     chlorthalidone 25 MG Tab  Commonly known as: HYGROTEN     NIFEdipine 90 MG (OSM) 24 hr tablet  Commonly known as: PROCARDIA-XL     olmesartan 40 MG tablet  Commonly known as: BENICAR  Replaced by: valsartan 320 MG tablet              Indwelling Lines/Drains at time of discharge:   Lines/Drains/Airways       Drain  Duration             Female External Urinary Catheter w/ Suction 10/26/24 2208 3 days                    Time spent on the discharge of patient: 35 minutes         Lanie Mcmillan MD  Department of Hospital Medicine  [facility] ([place])

## 2024-10-30 NOTE — PT/OT/SLP PROGRESS
Physical Therapy Treatment    Patient Name:  Mj Summers   MRN:  [id number]    Recommendations:     Discharge Recommendations: Moderate Intensity Therapy (with transition to [facility])  Discharge Equipment Recommendations: wheelchair, hospital bed, to be determined by next level of care  Barriers to discharge: Decreased caregiver support (pt was living alone)    Assessment:     Mj Summers is a 89 y.o. female admitted with a medical diagnosis of Falls.  She presents with the following impairments/functional limitations: weakness, impaired endurance, impaired self care skills, impaired functional mobility, impaired balance, visual deficits, decreased coordination, decreased upper extremity function, decreased lower extremity function, decreased safety awareness, pain, decreased ROM, impaired skin ;pt with fair mobility today, able to sit up EOB ~20 min w/ min/modA at first , but at times CGA/SBA for sitting balance (pt w/ post lean, appears to not want to put her feet on the floor, her toenails are very long and could be causing her pain).     Rehab Prognosis: Fair; patient would benefit from acute skilled PT services to address these deficits and reach maximum level of function.    Recent Surgery: * No surgery found *      Plan:     During this hospitalization, patient to be seen 4 x/week to address the identified rehab impairments via gait training, therapeutic activities, therapeutic exercises, neuromuscular re-education and progress toward the following goals:    Plan of Care Expires:  11/17/24    Subjective     Chief Complaint: pain in lower legs and feet  Patient/Family Comments/goals: pt agreeable to session, pleasant and cooperative.  Pain/Comfort:  Pain Rating 1:  (did not rate, though moaning and grimacing with any touch to her lower legs/feet)  Location - Side 1: Bilateral  Location - Orientation 1: lower  Location 1: leg (and feet)  Pain Addressed 1: Reposition, Distraction  Pain Rating  "Post-Intervention 1:  (appears comfortable at rest)      Objective:     Communicated with nurse prior to session.  Patient found HOB elevated with peripheral IV upon PT entry to room.     General Precautions: Standard, fall, vision impaired  Orthopedic Precautions: N/A  Braces: N/A  Respiratory Status: Room air     Functional Mobility:  Bed Mobility:     Rolling Left:  moderate assistance, maximal assistance, and of 1 persons  Scooting: total assistance, of 1 persons, and in supine  Bridging: moderate assistance  Supine to Sit: maximal assistance and of 1 persons  Sit to Supine: maximal assistance and of 1 persons      AM-PAC 6 CLICK MOBILITY  Turning over in bed (including adjusting bedclothes, sheets and blankets)?: 2  Sitting down on and standing up from a chair with arms (e.g., wheelchair, bedside commode, etc.): 1  Moving from lying on back to sitting on the side of the bed?: 2  Moving to and from a bed to a chair (including a wheelchair)?: 1  Need to walk in hospital room?: 1  Climbing 3-5 steps with a railing?: 1  Basic Mobility Total Score: 8       Treatment & Education:  Pt sat up ~20 min at EOB w/ initially modA to progressing to SBA w/ lots of cueing, pt w/ post lean, resistant to putting her feet on the floor.  Per'fd BUE/BLE ex's of shldr flex, rows, LAQ"s, AP's x 5 reps ea., Sofía for balance.    Patient left HOB elevated with all lines intact, call button in reach, bed alarm on, nruse notified, and lower legs elevated on pillow for comfort, BUE's elevated on pillows as well. ..    GOALS:   Multidisciplinary Problems       Physical Therapy Goals          Problem: Physical Therapy    Goal Priority Disciplines Outcome Interventions   Physical Therapy Goal     PT, PT/OT Progressing    Description: Goals to be met by: 2024     Patient will increase functional independence with mobility by performin. Supine to sit with MInimal Assistance  2. Sit to supine with MInimal Assistance  3. Sit to stand " transfer with Minimal Assistance  4. Bed to chair transfer with Minimal Assistance using Rolling Walker  5. Sitting at edge of bed x15 minutes with Contact Guard Assistance  6. Increased functional strength to 4/5 for functional ADLs and transfers.                         Time Tracking:     PT Received On: 10/30/24  PT Start Time: 1035     PT Stop Time: 1058  PT Total Time (min): 23 min     Billable Minutes: Therapeutic Activity 23    Treatment Type: Treatment  PT/PTA: PTA     Number of PTA visits since last PT visit: 2     10/30/2024

## 2024-10-30 NOTE — PLAN OF CARE
Problem: Adult Inpatient Plan of Care  Goal: Plan of Care Review  Outcome: Adequate for Care Transition  Goal: Patient-Specific Goal (Individualized)  Outcome: Adequate for Care Transition  Goal: Absence of Hospital-Acquired Illness or Injury  Outcome: Adequate for Care Transition  Goal: Optimal Comfort and Wellbeing  Outcome: Adequate for Care Transition  Goal: Readiness for Transition of Care  Outcome: Adequate for Care Transition     Problem: Skin Injury Risk Increased  Goal: Skin Health and Integrity  Outcome: Adequate for Care Transition     Problem: Wound  Goal: Optimal Coping  Outcome: Adequate for Care Transition  Goal: Optimal Functional Ability  Outcome: Adequate for Care Transition  Goal: Absence of Infection Signs and Symptoms  Outcome: Adequate for Care Transition  Goal: Improved Oral Intake  Outcome: Adequate for Care Transition  Goal: Optimal Pain Control and Function  Outcome: Adequate for Care Transition  Goal: Skin Health and Integrity  Outcome: Adequate for Care Transition  Goal: Optimal Wound Healing  Outcome: Adequate for Care Transition     Problem: Fall Injury Risk  Goal: Absence of Fall and Fall-Related Injury  Outcome: Adequate for Care Transition

## 2024-10-30 NOTE — PLAN OF CARE
[facility] ([place])  Discharge Final Note    Primary Care Provider: Shasta Urbina MD    Expected Discharge Date: 10/30/2024    Final Discharge Note (most recent)       Final Note - 10/30/24 1608          Final Note    Assessment Type Final Discharge Note (P)      Anticipated Discharge Disposition Skilled Nursing Facility (P)      Hospital Resources/Appts/Education Provided Provided patient/caregiver with written discharge plan information;Appointments scheduled and added to AVS (P)         Post-Acute Status    Post-Acute Authorization Placement (P)      Post-Acute Placement Status Set-up Complete/Auth obtained (P)      Discharge Delays None known at this time (P)                      Important Message from Medicare             Contact Info       Shasta Urbina MD   Specialty: Internal Medicine   Relationship: PCP - General    [facility] [address]  [place] [address]   Phone: [phone]       Next Steps: Follow up in 2 day(s)    Neuropsychiatry        Next Steps: Follow up          Patient will discharge to [facility].     Patient will be transported by [facility] ambulance to [address] [place] [address], [place] [address]      Patient will go in room 911B, Please call report to [phone] ask for the [address] [place] [address] nurse.     Patient discharge needs/orders were addressed from a SW/CM standpoint.

## 2024-10-30 NOTE — PLAN OF CARE
COLLEEN/LUCI attempt to contact [facility] admission 4x's. NORAH did not get a reply, NORAH submitted the following SNF orders, PPD, Covid-19 test & escalated this situation to Case Management director.       [facility]   [phone]

## 2024-10-30 NOTE — PLAN OF CARE
Problem: Adult Inpatient Plan of Care  Goal: Plan of Care Review  Outcome: Progressing  Goal: Patient-Specific Goal (Individualized)  Outcome: Progressing  Goal: Absence of Hospital-Acquired Illness or Injury  Outcome: Progressing  Goal: Optimal Comfort and Wellbeing  Outcome: Progressing  Goal: Readiness for Transition of Care  Outcome: Progressing     Problem: Skin Injury Risk Increased  Goal: Skin Health and Integrity  Outcome: Progressing     Problem: Wound  Goal: Optimal Coping  Outcome: Progressing  Goal: Optimal Functional Ability  Outcome: Progressing  Goal: Absence of Infection Signs and Symptoms  Outcome: Progressing  Goal: Improved Oral Intake  Outcome: Progressing  Goal: Optimal Pain Control and Function  Outcome: Progressing  Goal: Skin Health and Integrity  Outcome: Progressing  Goal: Optimal Wound Healing  Outcome: Progressing     Problem: Fall Injury Risk  Goal: Absence of Fall and Fall-Related Injury  Outcome: Progressing     Problem: Wound  Goal: Optimal Coping  Outcome: Progressing

## 2024-10-30 NOTE — PLAN OF CARE
10/30/24 0902   Post-Acute Status   Post-Acute Authorization Placement   Post-Acute Placement Status Pending payor review/awaiting authorization (if required)   Discharge Delays (!) Post-Acute Set-up   Discharge Plan   Discharge Plan A Skilled Nursing Facility   Discharge Plan B Home Health;Home with family     Patient auth is pending from her insurance ([employer])  Patient daughter completed admission packet & sent back to [facility]

## 2024-10-30 NOTE — PT/OT/SLP PROGRESS
Occupational Therapy      Patient Name:  Mj Summers   MRN:  [id number]    Patient not seen today secondary to Patient unwilling to participate (Attempted OT tx session twice today with pt.). Pt stating she is going to another place today for therapy and not wanting to do anything before leaving.  Though pt encouraged to participate in therapy session, pt declined both attempts.    10/30/2024

## 2024-11-18 ENCOUNTER — PATIENT MESSAGE (OUTPATIENT)
Dept: INFECTIOUS DISEASES | Facility: HOSPITAL | Age: 89
End: 2024-11-18
Payer: MEDICARE

## 2024-12-03 ENCOUNTER — HOSPITAL ENCOUNTER (EMERGENCY)
Facility: HOSPITAL | Age: 89
Discharge: HOME | End: 2024-12-04
Attending: EMERGENCY MEDICINE
Payer: MEDICARE

## 2024-12-03 DIAGNOSIS — R00.0 TACHYCARDIA: ICD-10-CM

## 2024-12-03 DIAGNOSIS — R41.82 AMS (ALTERED MENTAL STATUS): ICD-10-CM

## 2024-12-03 DIAGNOSIS — N39.0 URINARY TRACT INFECTION WITHOUT HEMATURIA, SITE UNSPECIFIED: Primary | ICD-10-CM

## 2024-12-03 LAB
ALBUMIN SERPL BCP-MCNC: 3.2 G/DL (ref 3.5–5.2)
ALP SERPL-CCNC: 81 U/L (ref 40–150)
ALT SERPL W/O P-5'-P-CCNC: 20 U/L (ref 10–44)
ANION GAP SERPL CALC-SCNC: 13 MMOL/L (ref 8–16)
AST SERPL-CCNC: 33 U/L (ref 10–40)
BACTERIA #/AREA URNS HPF: ABNORMAL /HPF
BASOPHILS # BLD AUTO: 0.06 K/UL (ref 0–0.2)
BASOPHILS NFR BLD: 0.9 % (ref 0–1.9)
BILIRUB SERPL-MCNC: 0.4 MG/DL (ref 0.1–1)
BILIRUB UR QL STRIP: NEGATIVE
BNP SERPL-MCNC: 87 PG/ML (ref 0–99)
BUN SERPL-MCNC: 17 MG/DL (ref 8–23)
CALCIUM SERPL-MCNC: 9.7 MG/DL (ref 8.7–10.5)
CHLORIDE SERPL-SCNC: 105 MMOL/L (ref 95–110)
CLARITY UR: ABNORMAL
CO2 SERPL-SCNC: 22 MMOL/L (ref 23–29)
COLOR UR: YELLOW
CREAT SERPL-MCNC: 0.8 MG/DL (ref 0.5–1.4)
DIFFERENTIAL METHOD BLD: NORMAL
EOSINOPHIL # BLD AUTO: 0.2 K/UL (ref 0–0.5)
EOSINOPHIL NFR BLD: 2.2 % (ref 0–8)
ERYTHROCYTE [DISTWIDTH] IN BLOOD BY AUTOMATED COUNT: 13.2 % (ref 11.5–14.5)
EST. GFR  (NO RACE VARIABLE): >60 ML/MIN/1.73 M^2
GLUCOSE SERPL-MCNC: 139 MG/DL (ref 70–110)
GLUCOSE UR QL STRIP: NEGATIVE
HCT VFR BLD AUTO: 37.8 % (ref 37–48.5)
HGB BLD-MCNC: 12.4 G/DL (ref 12–16)
HGB UR QL STRIP: NEGATIVE
HYALINE CASTS #/AREA URNS LPF: 1 /LPF
IMM GRANULOCYTES # BLD AUTO: 0.02 K/UL (ref 0–0.04)
IMM GRANULOCYTES NFR BLD AUTO: 0.3 % (ref 0–0.5)
KETONES UR QL STRIP: NEGATIVE
LEUKOCYTE ESTERASE UR QL STRIP: ABNORMAL
LYMPHOCYTES # BLD AUTO: 1.5 K/UL (ref 1–4.8)
LYMPHOCYTES NFR BLD: 21.3 % (ref 18–48)
MAGNESIUM SERPL-MCNC: 1.9 MG/DL (ref 1.6–2.6)
MCH RBC QN AUTO: 30.4 PG (ref 27–31)
MCHC RBC AUTO-ENTMCNC: 32.8 G/DL (ref 32–36)
MCV RBC AUTO: 93 FL (ref 82–98)
MICROSCOPIC COMMENT: ABNORMAL
MONOCYTES # BLD AUTO: 0.4 K/UL (ref 0.3–1)
MONOCYTES NFR BLD: 5.3 % (ref 4–15)
NEUTROPHILS # BLD AUTO: 4.8 K/UL (ref 1.8–7.7)
NEUTROPHILS NFR BLD: 70 % (ref 38–73)
NITRITE UR QL STRIP: NEGATIVE
NRBC BLD-RTO: 0 /100 WBC
PH UR STRIP: 6 [PH] (ref 5–8)
PHOSPHATE SERPL-MCNC: 3.4 MG/DL (ref 2.7–4.5)
PLATELET # BLD AUTO: 308 K/UL (ref 150–450)
PMV BLD AUTO: 10.9 FL (ref 9.2–12.9)
POTASSIUM SERPL-SCNC: 4.5 MMOL/L (ref 3.5–5.1)
PROT SERPL-MCNC: 8 G/DL (ref 6–8.4)
PROT UR QL STRIP: ABNORMAL
RBC # BLD AUTO: 4.08 M/UL (ref 4–5.4)
RBC #/AREA URNS HPF: 4 /HPF (ref 0–4)
SODIUM SERPL-SCNC: 140 MMOL/L (ref 136–145)
SP GR UR STRIP: 1.02 (ref 1–1.03)
SQUAMOUS #/AREA URNS HPF: 2 /HPF
TROPONIN I SERPL DL<=0.01 NG/ML-MCNC: 0.03 NG/ML (ref 0–0.03)
URN SPEC COLLECT METH UR: ABNORMAL
UROBILINOGEN UR STRIP-ACNC: NEGATIVE EU/DL
WBC # BLD AUTO: 6.82 K/UL (ref 3.9–12.7)
WBC #/AREA URNS HPF: 11 /HPF (ref 0–5)

## 2024-12-03 PROCEDURE — 83880 ASSAY OF NATRIURETIC PEPTIDE: CPT | Mod: HCNC | Performed by: EMERGENCY MEDICINE

## 2024-12-03 PROCEDURE — 99285 EMERGENCY DEPT VISIT HI MDM: CPT | Mod: 25,HCNC

## 2024-12-03 PROCEDURE — 85025 COMPLETE CBC W/AUTO DIFF WBC: CPT | Mod: HCNC | Performed by: EMERGENCY MEDICINE

## 2024-12-03 PROCEDURE — 96374 THER/PROPH/DIAG INJ IV PUSH: CPT | Mod: HCNC

## 2024-12-03 PROCEDURE — 93005 ELECTROCARDIOGRAM TRACING: CPT | Mod: HCNC

## 2024-12-03 PROCEDURE — 80053 COMPREHEN METABOLIC PANEL: CPT | Mod: HCNC | Performed by: EMERGENCY MEDICINE

## 2024-12-03 PROCEDURE — 25000003 PHARM REV CODE 250: Mod: HCNC | Performed by: EMERGENCY MEDICINE

## 2024-12-03 PROCEDURE — 84484 ASSAY OF TROPONIN QUANT: CPT | Mod: HCNC | Performed by: EMERGENCY MEDICINE

## 2024-12-03 PROCEDURE — 81000 URINALYSIS NONAUTO W/SCOPE: CPT | Mod: HCNC | Performed by: EMERGENCY MEDICINE

## 2024-12-03 PROCEDURE — 63600175 PHARM REV CODE 636 W HCPCS: Mod: HCNC | Performed by: EMERGENCY MEDICINE

## 2024-12-03 PROCEDURE — 84100 ASSAY OF PHOSPHORUS: CPT | Mod: HCNC | Performed by: EMERGENCY MEDICINE

## 2024-12-03 PROCEDURE — 87086 URINE CULTURE/COLONY COUNT: CPT | Mod: HCNC | Performed by: EMERGENCY MEDICINE

## 2024-12-03 PROCEDURE — 83735 ASSAY OF MAGNESIUM: CPT | Mod: HCNC | Performed by: EMERGENCY MEDICINE

## 2024-12-03 PROCEDURE — 93010 ELECTROCARDIOGRAM REPORT: CPT | Mod: HCNC,,, | Performed by: INTERNAL MEDICINE

## 2024-12-03 RX ORDER — CEPHALEXIN 250 MG/5ML
500 POWDER, FOR SUSPENSION ORAL EVERY 12 HOURS
Qty: 100 ML | Refills: 0 | Status: SHIPPED | OUTPATIENT
Start: 2024-12-03 | End: 2024-12-08

## 2024-12-03 RX ORDER — HYDRALAZINE HYDROCHLORIDE 20 MG/ML
5 INJECTION INTRAMUSCULAR; INTRAVENOUS
Status: COMPLETED | OUTPATIENT
Start: 2024-12-03 | End: 2024-12-03

## 2024-12-03 RX ORDER — CEPHALEXIN 250 MG/5ML
500 POWDER, FOR SUSPENSION ORAL
Status: COMPLETED | OUTPATIENT
Start: 2024-12-03 | End: 2024-12-03

## 2024-12-03 RX ORDER — CEPHALEXIN 500 MG/1
500 CAPSULE ORAL
Status: DISCONTINUED | OUTPATIENT
Start: 2024-12-03 | End: 2024-12-03

## 2024-12-03 RX ADMIN — HYDRALAZINE HYDROCHLORIDE 5 MG: 20 INJECTION INTRAMUSCULAR; INTRAVENOUS at 09:12

## 2024-12-03 RX ADMIN — CEPHALEXIN 500 MG: 250 POWDER, FOR SUSPENSION ORAL at 11:12

## 2024-12-04 VITALS
HEART RATE: 104 BPM | SYSTOLIC BLOOD PRESSURE: 175 MMHG | DIASTOLIC BLOOD PRESSURE: 74 MMHG | TEMPERATURE: 98 F | OXYGEN SATURATION: 99 % | RESPIRATION RATE: 18 BRPM

## 2024-12-04 LAB
OHS QRS DURATION: 106 MS
OHS QTC CALCULATION: 495 MS

## 2024-12-04 NOTE — DISCHARGE INSTRUCTIONS
Thank you for coming in to see us at [facility] It was nice to meet you, and I hope you feel better soon. Please feel free to return to the ER at any time should your symptoms get worse, or if you have different emergent concerns.    Our goal in the emergency department is to always give you outstanding care and exceptional service. You may receive a survey by mail or e-mail in the next week regarding your experience in our ED. We would greatly appreciate your completing and returning the survey. Your feedback provides us with a way to recognize our staff who give very good care and it helps us learn how to improve when your experience was below our aspiration of excellence.      It is important to remember that some problems or medical conditions are difficult to diagnose and may not be found or addressed during your Emergency Department visit.  These conditions often start with non-specific symptoms and can only be diagnosed on follow up visits with your primary care physician or specialist when the symptoms continue or change. Please remember that all medical conditions can change, and we cannot predict how you will be feeling tomorrow or the next day.    Return to the ER with any questions/concerns, new/concerning symptoms, worsening, failure to improve or inability to obtain follow-up.       Be sure to follow up with your primary care doctor and review all labs/imaging/tests that were performed during your ER visit with them. It is very common for us to identify non-emergent incidental findings which must be followed up with your primary care physician.  Some labs/imaging/tests may be outside of the normal range, and require non-emergent follow-up and/or further investigation/treatment/procedures/testing to help diagnose/exclude/prevent complications or other potentially serious medical conditions. Some abnormalities may not have been discussed or addressed during your ER visit. Some lab  results may not return during your ER visit but can be accessible by downloading the free [facility] kathie or by visiting [email or web address] . It is important for you to review all labs/imaging/tests which are outside of the normal range with your physician.    An ER visit does not replace a primary care visit, and many screening tests or follow-up tests cannot be ordered by an ER doctor or performed by the ER. Some tests may even require pre-approval.    If you do not have a primary care doctor, you may contact the one listed on your discharge paperwork or you may also call the [facility] Appointment Desk at [phone] , or [email or web address] at  [phone] to schedule an appointment, or establish care with a primary care doctor or even a specialist and to obtain information about local resources. It is important to your health that you have a primary care doctor.    Please take all medications as directed. We have done our best to select a medication for you that will treat your condition however, all medications may potentially have side-effects and it is impossible to predict which medications may give you side-effects or what those side-effects (if any) those medications may give you.  If you feel that you are having a negative effect or side-effect of any medication you should stop taking those medications immediately and seek medical attention. If you feel that you are having a life-threatening reaction call 911.        Do not drive, swim, climb to height, take a bath, operate heavy machinery, drink alcohol or take potentially sedating medications, sign any legal documents or make any important decisions for 24 hours if you have received any pain medications, sedatives or mood altering drugs during your ER visit or within 24 hours of taking them if they have been prescribed to you.     You can find additional resources for Dentists, hearing aids, durable medical equipment, low cost pharmacies and  other resources at [email or web address]

## 2024-12-04 NOTE — ED PROVIDER NOTES
Encounter Date: 12/3/2024       History     Chief Complaint   Patient presents with    Altered Mental Status     From [facility]. Increased confusion over the past week. Combative per nursing home staff. Calm and cooperative from EMS. Patient does not voice any complaints     HPI    This is a pleasant 89-year-old female who presents the ER for evaluation of altered mental status.  Per EMS they were activated by nursing home due to concern of increased confusion and combativeness for the last week.  Facility concern for UTI.  Per EMS when they arrived patient was pleasant non combative or violent.  They transported patient to the ER for further evaluation.  Patient arrived in the ER not combative or violent she was no complaints.  She endorses concern that her blood pressure is elevated.    Review of patient's allergies indicates:   Allergen Reactions    Strawberries [strawberry]      Blood pressure elevation    Chocolate flavor      Causes acid reflux    Atorvastatin      Myalgias    Pcn [penicillins] Rash    Pravastatin      Myalgias    Sulfa (sulfonamide antibiotics) Itching     Past Medical History:   Diagnosis Date    Anemia     Arthritis 2015    back    Cataract     s/p surgery    Coronary artery disease     [facility] 1/2010 - mid LAD 40%    GERD (gastroesophageal reflux disease)     Glaucoma (increased eye pressure)     Hyperlipidemia     Hypertension     Lactose intolerance     Legally blind in right eye, as defined in [place]     Osteoporosis, post-menopausal     PAD (peripheral artery disease) 07/11/2018    Pneumonia 12/13/2015    Proximal muscle weakness     Renal cyst     left kidney    Severe protein-calorie malnutrition 10/28/2024    Vitamin D deficiency disease      Past Surgical History:   Procedure Laterality Date    ADENOIDECTOMY      BREAST BIOPSY      left breast    CARDIAC CATHETERIZATION  01/14/2010    mid LAD, 40% stenosis; LCX, luminal irregularities;                 CATARACT EXTRACTION W/  INTRAOCULAR  LENS IMPLANT Left 05/22/2013    COMBINED WITH TRAB ()    COLONOSCOPY N/A 2/6/2018    Procedure: COLONOSCOPY;  Surgeon: Rufus Villela MD;  Location: [facility] ([address] [place] [address]);  Service: Endoscopy;  Laterality: N/A;    EDTA CHELATION Left 2/14    OS ()    EYE SURGERY      FEMUR FRACTURE SURGERY Left 05/09/2016    FRACTURE SURGERY  2016    TEAR DUCT SURGERY      right eye    TONSILLECTOMY      TRABECULECTOMY Left 05/22/2013    WITH EXPRESS MINI SHUNT AND CE ()    UPPER GASTROINTESTINAL ENDOSCOPY      YAG CAPSULOTOMY Left 06/22/2017         Family History   Problem Relation Name Age of Onset    Asthma Sister Fawn     Rheum arthritis Sister Fawn     Osteoporosis Sister Fawn     Arthritis Sister Fawn     Hyperlipidemia Brother Fantasma     Rheum arthritis Brother Fantasma     Diabetes Brother Fantasma     Osteoporosis Mother      Heart disease Mother      Glaucoma Mother      Hypertension Father      Peripheral vascular disease Father      Alzheimer's disease Father      Osteoporosis Sister Paige     Hypertension Brother Mike     Peripheral vascular disease Brother Mike         bilateral leg amputation    Hypertension Son Aravind     Hypertension Daughter Aimee     Colon cancer Other niece 44        death from colon cancer at age 44    Stroke Brother Jaylan     Heart disease Brother Kvng     Diabetes Daughter Irene         GDM that is resolved    Macular degeneration Neg Hx      Retinal detachment Neg Hx      Strabismus Neg Hx      Amblyopia Neg Hx      Blindness Neg Hx      Esophageal cancer Neg Hx       Social History     Tobacco Use    Smoking status: Never    Smokeless tobacco: Never   Substance Use Topics    Alcohol use: No    Drug use: No     Review of Systems   Respiratory:  Negative for shortness of breath.    Cardiovascular:  Negative for chest pain.   Gastrointestinal:  Negative for abdominal pain.   All other systems reviewed and are negative.      Physical Exam     Initial  Vitals [12/03/24 1950]   BP Pulse Resp Temp SpO2   (!) 222/123 (!) 120 18 98.2 °F (36.8 °C) 100 %      MAP       --         Physical Exam    Nursing note and vitals reviewed.  Constitutional:   Elderly, chronically ill-appearing, no acute distress   HENT:   Head: Normocephalic and atraumatic.   Eyes: Pupils are equal, round, and reactive to light.   Neck:   Normal range of motion.  Cardiovascular:  Normal rate, regular rhythm and normal heart sounds.           Pulmonary/Chest: Breath sounds normal. No respiratory distress.   Abdominal: Abdomen is soft. She exhibits no distension. There is no abdominal tenderness.   Musculoskeletal:      Cervical back: Normal range of motion.      Comments: Chronic wasting of extremities     Neurological: She is alert and oriented to person, place, and time. GCS score is 15. GCS eye subscore is 4. GCS verbal subscore is 5. GCS motor subscore is 6.   Skin: Skin is warm and dry. Capillary refill takes less than 2 seconds.   Psychiatric: She has a normal mood and affect. Thought content normal.         ED Course   Procedures  Labs Reviewed   COMPREHENSIVE METABOLIC PANEL - Abnormal       Result Value    Sodium 140      Potassium 4.5      Chloride 105      CO2 22 (*)     Glucose 139 (*)     BUN 17      Creatinine 0.8      Calcium 9.7      Total Protein 8.0      Albumin 3.2 (*)     Total Bilirubin 0.4      Alkaline Phosphatase 81      AST 33      ALT 20      eGFR >60      Anion Gap 13     URINALYSIS, REFLEX TO URINE CULTURE - Abnormal    Specimen UA Urine, Clean Catch      Color, UA Yellow      Appearance, UA Hazy (*)     pH, UA 6.0      Specific Gravity, UA 1.020      Protein, UA 3+ (*)     Glucose, UA Negative      Ketones, UA Negative      Bilirubin (UA) Negative      Occult Blood UA Negative      Nitrite, UA Negative      Urobilinogen, UA Negative      Leukocytes, UA Trace (*)     Narrative:     Specimen Source->Urine   URINALYSIS MICROSCOPIC - Abnormal    RBC, UA 4      WBC, UA 11  (*)     Bacteria Rare      Squam Epithel, UA 2      Hyaline Casts, UA 1      Microscopic Comment SEE COMMENT      Narrative:     Specimen Source->Urine   CULTURE, URINE   CBC W/ AUTO DIFFERENTIAL    WBC 6.82      RBC 4.08      Hemoglobin 12.4      Hematocrit 37.8      MCV 93      MCH 30.4      MCHC 32.8      RDW 13.2      Platelets 308      MPV 10.9      Immature Granulocytes 0.3      Gran # (ANC) 4.8      Immature Grans (Abs) 0.02      Lymph # 1.5      Mono # 0.4      Eos # 0.2      Baso # 0.06      nRBC 0      Gran % 70.0      Lymph % 21.3      Mono % 5.3      Eosinophil % 2.2      Basophil % 0.9      Differential Method Automated     TROPONIN I    Troponin I 0.025     B-TYPE NATRIURETIC PEPTIDE    BNP 87     MAGNESIUM    Magnesium 1.9     PHOSPHORUS    Phosphorus 3.4            Imaging Results              X-Ray Chest AP Portable (Final result)  Result time 12/03/24 21:37:53      Final result by Layton Gross MD (12/03/24 21:37:53)                   Impression:      No acute findings evident within the chest since the prior exam.      Electronically signed by: Layton Gross  Date:    12/03/2024  Time:    21:37               Narrative:    EXAMINATION:  XR CHEST AP PORTABLE    CLINICAL HISTORY:  Altered mental status, unspecified    TECHNIQUE:  Single frontal view of the chest was performed.    COMPARISON:  10/29/2024    FINDINGS:  The heart is not enlarged the trachea is midline.  The patient is rotated to the right.  The lungs appear clear with mild emphysematous and fibrotic lung changes unchanged since the prior exam.                                       CT Head Without Contrast (Final result)  Result time 12/03/24 21:29:56      Final result by Virginia Gross MD (12/03/24 21:29:56)                   Impression:      No acute intracranial abnormalities.  Stable CT compared to 10/26/2024.    Microvascular chronic ischemic changes.    Remote left lacunar infarct.      Electronically signed  by: Virginia Gross  Date:    12/03/2024  Time:    21:29               Narrative:    EXAMINATION:  CT HEAD WITHOUT CONTRAST    CLINICAL HISTORY:  Mental status change, unknown cause;    TECHNIQUE:  Low dose axial images were obtained through the head.  Coronal and sagittal reformations were also performed. Contrast was not administered.    COMPARISON:  CT brain 10/26/2024    FINDINGS:  There is no evidence of acute intra or extra-axial hemorrhage or hematoma.  The gray-white matter junction differentiation is intact.  There is no appreciable focal mass or mass effect.  There is prominence of the ventricles, cisterns and sulci consistent with patient's age.  The posterior fossa structures pituitary gland are unremarkable as imaged.    Lacunar infarcts again noted in the basal ganglia on the left there is mild patchy and confluent white matter low density which is nonspecific but commonly related to microvascular chronic ischemic changes.  Stable mucous retention cyst most likely a is a there again noted in the right maxillary sinus otherwise imaged paranasal sinuses, mastoid air cells and middle ears are clear.  The bony calvarium is intact.  Bilateral lens postoperative changes noted.                                       Medications   hydrALAZINE injection 5 mg (5 mg Intravenous Given 12/3/24 2134)   cephALEXin 250 mg/5 mL suspension 500 mg (500 mg Oral Given 12/3/24 2331)     Medical Decision Making  89-year-old female presents the ER for evaluation of aggressive behavior altered mental status confusion concern for UTI per nursing home has been going on for proximally 1 week.  EMS was activated noted that patient was not aggressive or them.  She arrived in the ER, she was pleasant directable no complaints she does express some concerns of her hypertension.  She was moving all extremities spontaneously no obvious unilateral deficits no slurred speech or facial droop noted.  Her blood pressure is elevated greater  than 220, pulse 120.  Differential includes press syndrome, stroke, bleed, electrolyte abnormality, infection, UTI, other cause.  Will plan blood work symptomatic support CT scan observation reassess.  Low threshold for admission    Amount and/or Complexity of Data Reviewed  Independent Historian: EMS  External Data Reviewed: notes.  Labs: ordered. Decision-making details documented in ED Course.  Radiology: ordered and independent interpretation performed. Decision-making details documented in ED Course.  ECG/medicine tests: ordered and independent interpretation performed. Decision-making details documented in ED Course.               ED Course as of 12/04/24 0159   Tue Dec 03, 2024   2307 Urinalysis, Reflex to Urine Culture Urine, Clean Catch(!) [SE]   2307 Urinalysis Microscopic(!) [SE]   2307 Brain natriuretic peptide [SE]   2307 Phosphorus [SE]   2307 Magnesium [SE]   2307 Comprehensive metabolic panel(!) [SE]   2307 Troponin I [SE]   2307 CBC auto differential [SE]   2307 X-Ray Chest AP Portable [SE]   2307 CT Head Without Contrast [SE]   2320 Patient resting comfortably in bed no acute distress.  Blood pressure was elevated, given small dose of hydralazine normalization blood pressure.  Patient was awake alert no episodes of aggressive or violent behavior while.  Labs obtained.  UA questionable UTI will plan to treat given concerns altered mental status via nursing home.  CT head x-ray CBC without significant process.  Discussed with patient plan to discharge home return precautions primary care follow-up. [SE]      ED Course User Index  [SE] Alexandria Blevins MD                           Clinical Impression:  Final diagnoses:  [R00.0] Tachycardia  [R41.82] AMS (altered mental status)  [N39.0] Urinary tract infection without hematuria, site unspecified (Primary)          ED Disposition Condition    Discharge Stable          ED Prescriptions       Medication Sig Dispense Start Date End Date Auth. Provider     cephALEXin (KEFLEX) 250 mg/5 mL suspension Take 10 mLs (500 mg total) by mouth every 12 (twelve) hours. for 5 days 100 mL 12/3/2024 12/8/2024 Alexandria Blevins MD          Follow-up Information       Follow up With Specialties Details Why Contact Info    Shasta Urbina MD Internal Medicine Schedule an appointment as soon as possible for a visit  As needed [address]  [place] [address]  [phone]               Alexandria Blevins MD  12/04/24 0159

## 2024-12-04 NOTE — ED NOTES
[facility]MS and combative behavior, per NH staff.  EMS reports pt calm and cooperative in route with GCS 15.  Pt to ER with GCS 15.  Pt capm and cooperative.  Pt states staff attempted to bathe her and she requested to rest.  Pt states situation escalated.  Pt denies pain or any complaints on arrival.  ERP at bedside.  Pt placed on cardiac monitor and warm blankets applied.

## 2024-12-05 ENCOUNTER — PATIENT OUTREACH (OUTPATIENT)
Dept: EMERGENCY MEDICINE | Facility: HOSPITAL | Age: 89
End: 2024-12-05
Payer: MEDICARE

## 2024-12-05 LAB — BACTERIA UR CULT: NO GROWTH

## 2025-01-13 DIAGNOSIS — Z00.00 ENCOUNTER FOR MEDICARE ANNUAL WELLNESS EXAM: ICD-10-CM

## 2025-03-14 ENCOUNTER — TELEPHONE (OUTPATIENT)
Dept: OPHTHALMOLOGY | Facility: CLINIC | Age: OVER 89
End: 2025-03-14
Payer: MEDICARE

## 2025-03-14 NOTE — TELEPHONE ENCOUNTER
----- Message from Neville Shi sent at 3/13/2025  4:54 PM CDT -----  Regarding: FW: Scheduling Request  Contact: [email or web address]    ----- Message -----  From: Joann Winslow  Sent: 3/13/2025   4:39 PM CDT  To: Ioana Olmstead Staff  Subject: Scheduling Request                               Scheduling Request   Appt Type:  EP Date/Time Preference:any Treating Provider:Ioana Caller Name:[email or web address] Contact Preference:[phone] Comments/notes:Darlene is calling to get patient schedule for F/U. Requesting a call back

## 2025-04-07 NOTE — PROGRESS NOTES
"HPI     Glaucoma            Comments: Overdue IOP ck today and pt states no vision changes since last   exam           Comments    DLS: 4/19/24    Pt came on stretcher today due to hurting legs from falls. Pt unable to do   any testing today and so HVF cx'd today    1) Residula OAG / MMG OU  2) Blind Hypertensive OD  3) NS OD  4) FABY  5) APD OD  6) Band Keratopathy OD  7) Asteroid Hyalosis OS  8) Hx Acute Dacryocystitis OS  9) CRVO OD  10) Ant. Capsule Phimosis OS  11) PCIOL OS    MEDS:  Camacho 4% TID OD  Alphagan P 0.1% TID OD   Timolol GFS QAM OU  Lumugan 0.01% QHS OU  Systane AT's PRN OU          Last edited by Yuridia Talamantes MA on 4/10/2025 11:09 AM.            Assessment /Plan     For exam results, see Encounter Report.    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   Angle open after PI's ou   -Followed at [facility] since at least '01   First HVF 2001   First photos 2001   S/p PI OU   s/p Gonioplasty OU     Family history none   Glaucoma meds Lumigan, timolol gfs  qAM, Agan tid, Camacho tid  - all OD ((off all gtts os s/p combined)   H/O adverse rxn to glaucoma drops Azopt "felt weird"   LASERS S/p PI OU, s/p Gonioplasty OU   GLAUCOMA SURGERIES    combined phaco/trab with mini shunt OS 5/22/2013   OTHER EYE SURGERIES    S/P DCR sx OD x 2 or 3 with [facility]    Combined phaco/IOL/ trab with mini shunt OS 5/22/2013   CDR 0.9 w/ shunt vessels  /0.9   Tbase 42/23   Tmax 42/23 (when stopped gtts)   Ttarget pain control od // 18 os   HVF 21  VF '01 -'23 - Ext   od  // SALT / IAD (gen dep) os   Gonio +3/+3   /539   OCT 12 test " 2006 to 2023 - RNFL - OD:poor test  // OS:dec Dec thru out expect NS   HRT 14 test 2004 to 2018 - MR -  Dec. S/I od // dec. S/N/I os /// CDR 0.66 od // 0.83 os   HRT 2019 - high std dev. Ou (( NO MORE HRT'S - unreliable))   Disc photos - 2001, 2003, 2006 - slides // 2008, 2010, 2016, 2018, 2021    - OIS     Ta today 39 ( NLP - pain free) // 13  Test today - IOP/    2.  Blind hypertensive Right Eye   Pain free - eye comfortable   Very limited vision CF at 3'   End stage glaucoma and S/P CRVO    No rubeosis   IOP is high but eye is pain free    3. Cataract OD -However, OD VA limited 2/2 CRVO and end stge glaucoma   S/P phaco/IOL/trab OS 5/22/2013    4. FABY OU -cont ATs     5. APD OD -   Old / stable 2/2 CRVO and glaucoma    6. Band Keratopathy OD -stable. Cont ATs- being followed by Dr. Saul;    had EDTA chelation on 2/13/14 OS - now with recurrent band     7. Asteroid Hyalosis OS -stable     8. Hx of Acute Dacryocystitis OS and recent NLD OD s/p surgery 11/12 and repeat for exposure w/ Dr. Ocampo 12/5/12 -stable. Cont f/u with [facility]     9. Hx of CRVO OD -limiting visual potential OD   -No NV on todays exam     10. Ant capsule phimosis os    S/P yag laser     11. Asteroid hyalosis OS     12. PC IOL os    Combined  W/ mini shunt 5/22/2013   doing well VA is 20/25 and the IOP is 13    Plan:    s/p combined (5/22/2013)  --    IOP creeping up 9/21/2021 - added back timolol q am and lumigan q evening     Cont glaucoma gtts OD -- IOP  high, but comfortable/no pain // Blind hypertensive but pain free eye   Old  crvo     Ok to use OTC readers for now a +3.00 to +3.50 - not really able to improve distance vision with glasses at this time  Much improved from before the combined  Procedure    Cont all glaucoma gtts od   Blind hypertensive right eye - pain free     IOP was creeping up os - added back timolol q am and lumigan q evening - 9/21/2021   Good resp os 18--> 14     Photo file updated 3/9/2023     Benny Sommers  -  - refer to B - to see if hand held magnifier might help     8/24/2023   IOP ok on present gtts // pain free od and below target os       4/10/2025   NO MORE VF TESTING - PT IS NOW STRETCHER BOUND (4/10/2025)  PT HAD A FALL / A ACCIDENT AND IS PRESENTLY IN A CARE FACILITY   SHE IS HOPEFULL THAT SHE CAN GET WELL ENOUGH TO GET BACK HOME      BLIND HYPERTENSIVE RIGHT EYE = PAIN FREE   GLAUCOMA LEFT EYE STABLE - IOP GOOD     CSM     F/U 6 MONTHS - DILATE FOR INDIRECT FUNDUS EXAM AND IOP

## 2025-04-10 ENCOUNTER — OFFICE VISIT (OUTPATIENT)
Dept: OPHTHALMOLOGY | Facility: CLINIC | Age: OVER 89
End: 2025-04-10
Payer: MEDICARE

## 2025-04-10 DIAGNOSIS — H25.11 SENILE NUCLEAR SCLEROSIS, RIGHT: ICD-10-CM

## 2025-04-10 DIAGNOSIS — H34.8112 CENTRAL RETINAL VEIN OCCLUSION OF RIGHT EYE, UNSPECIFIED COMPLICATION STATUS: ICD-10-CM

## 2025-04-10 DIAGNOSIS — Z98.83 GLAUCOMA FILTERING BLEB OF LEFT EYE: ICD-10-CM

## 2025-04-10 DIAGNOSIS — H18.421 BAND KERATOPATHY, RIGHT: ICD-10-CM

## 2025-04-10 DIAGNOSIS — H40.89 GLAUCOMA DUE TO COMBINATION OF MECHANISMS: Primary | ICD-10-CM

## 2025-04-10 DIAGNOSIS — H35.031 BLIND HYPERTENSIVE RIGHT EYE: ICD-10-CM

## 2025-04-10 DIAGNOSIS — Z96.1 PSEUDOPHAKIA OF LEFT EYE: ICD-10-CM

## 2025-04-10 DIAGNOSIS — H21.561 AFFERENT PUPILLARY DEFECT, RIGHT: ICD-10-CM

## 2025-04-10 DIAGNOSIS — H21.541 POSTERIOR SYNECHIAE, RIGHT: ICD-10-CM

## 2025-04-10 PROCEDURE — 1160F RVW MEDS BY RX/DR IN RCRD: CPT | Mod: HCNC,CPTII,S$GLB, | Performed by: OPHTHALMOLOGY

## 2025-04-10 PROCEDURE — 1159F MED LIST DOCD IN RCRD: CPT | Mod: HCNC,CPTII,S$GLB, | Performed by: OPHTHALMOLOGY

## 2025-04-10 PROCEDURE — 1100F PTFALLS ASSESS-DOCD GE2>/YR: CPT | Mod: HCNC,CPTII,S$GLB, | Performed by: OPHTHALMOLOGY

## 2025-04-10 PROCEDURE — 3288F FALL RISK ASSESSMENT DOCD: CPT | Mod: HCNC,CPTII,S$GLB, | Performed by: OPHTHALMOLOGY

## 2025-04-10 PROCEDURE — 99999 PR PBB SHADOW E&M-EST. PATIENT-LVL III: CPT | Mod: PBBFAC,HCNC,, | Performed by: OPHTHALMOLOGY

## 2025-04-10 PROCEDURE — 1126F AMNT PAIN NOTED NONE PRSNT: CPT | Mod: HCNC,CPTII,S$GLB, | Performed by: OPHTHALMOLOGY

## 2025-04-10 PROCEDURE — 99214 OFFICE O/P EST MOD 30 MIN: CPT | Mod: HCNC,S$GLB,, | Performed by: OPHTHALMOLOGY

## 2025-04-10 RX ORDER — MUPIROCIN 20 MG/G
OINTMENT TOPICAL 3 TIMES DAILY
COMMUNITY
Start: 2025-03-13

## 2025-04-10 RX ORDER — METHOCARBAMOL 500 MG/1
500 TABLET, FILM COATED ORAL 3 TIMES DAILY
COMMUNITY
Start: 2025-03-31

## 2025-04-10 RX ORDER — AMLODIPINE BESYLATE 5 MG/1
5 TABLET ORAL DAILY
COMMUNITY
Start: 2025-04-06

## 2025-04-10 NOTE — PATIENT INSTRUCTIONS
CONTINUE ALL EYE DROPS THE SAME     PILOCARPINE 4% - 3 X DAY RIGHT EYE   ALPHAGAN / BRIMONIDINE 3 X DAY RIGHT EYE   TIMOLOL 1 X DAY IN THE MORNING - BOTH EYES  LUMIGAN - AT NIGHT BOTH EYES     LUBRICATING EYE DROPS (ARTIFICIAL TEARS ) 2-4 X DAY AS NEEDED FOR COMFORT

## 2025-04-16 ENCOUNTER — HOSPITAL ENCOUNTER (OUTPATIENT)
Facility: HOSPITAL | Age: OVER 89
Discharge: HOSPICE | End: 2025-04-21
Attending: STUDENT IN AN ORGANIZED HEALTH CARE EDUCATION/TRAINING PROGRAM | Admitting: FAMILY MEDICINE
Payer: MEDICARE

## 2025-04-16 DIAGNOSIS — N30.00 ACUTE CYSTITIS WITHOUT HEMATURIA: Primary | ICD-10-CM

## 2025-04-16 DIAGNOSIS — R07.9 CHEST PAIN: ICD-10-CM

## 2025-04-16 DIAGNOSIS — R00.0 TACHYCARDIA: ICD-10-CM

## 2025-04-16 DIAGNOSIS — R62.7 FAILURE TO THRIVE IN ADULT: ICD-10-CM

## 2025-04-16 PROBLEM — E46 MALNUTRITION: Status: ACTIVE | Noted: 2024-10-28

## 2025-04-16 PROBLEM — M25.561 KNEE PAIN, RIGHT: Status: ACTIVE | Noted: 2025-04-16

## 2025-04-16 LAB
ABSOLUTE EOSINOPHIL (OHS): 0.09 K/UL
ABSOLUTE MONOCYTE (OHS): 0.49 K/UL (ref 0.3–1)
ABSOLUTE NEUTROPHIL COUNT (OHS): 6.85 K/UL (ref 1.8–7.7)
ALBUMIN SERPL BCP-MCNC: 2.5 G/DL (ref 3.5–5.2)
ALP SERPL-CCNC: 105 UNIT/L (ref 40–150)
ALT SERPL W/O P-5'-P-CCNC: 13 UNIT/L (ref 10–44)
ANION GAP (OHS): 12 MMOL/L (ref 8–16)
AST SERPL-CCNC: 17 UNIT/L (ref 11–45)
BACTERIA #/AREA URNS AUTO: ABNORMAL /HPF
BASOPHILS # BLD AUTO: 0.05 K/UL
BASOPHILS NFR BLD AUTO: 0.6 %
BILIRUB SERPL-MCNC: 0.2 MG/DL (ref 0.1–1)
BILIRUB UR QL STRIP.AUTO: NEGATIVE
BUN SERPL-MCNC: 29 MG/DL (ref 8–23)
CALCIUM SERPL-MCNC: 10 MG/DL (ref 8.7–10.5)
CHLORIDE SERPL-SCNC: 102 MMOL/L (ref 95–110)
CLARITY UR: CLEAR
CO2 SERPL-SCNC: 27 MMOL/L (ref 23–29)
COLOR UR AUTO: YELLOW
CREAT SERPL-MCNC: 0.7 MG/DL (ref 0.5–1.4)
ERYTHROCYTE [DISTWIDTH] IN BLOOD BY AUTOMATED COUNT: 13.3 % (ref 11.5–14.5)
FIO2: 21 %
GFR SERPLBLD CREATININE-BSD FMLA CKD-EPI: >60 ML/MIN/1.73/M2
GLUCOSE SERPL-MCNC: 177 MG/DL (ref 70–110)
GLUCOSE UR QL STRIP: NEGATIVE
HCT VFR BLD AUTO: 33.3 % (ref 37–48.5)
HGB BLD-MCNC: 10.7 GM/DL (ref 12–16)
HGB UR QL STRIP: ABNORMAL
HOLD SPECIMEN: NORMAL
HYALINE CASTS UR QL AUTO: 0 /LPF (ref 0–1)
IMM GRANULOCYTES # BLD AUTO: 0.03 K/UL (ref 0–0.04)
IMM GRANULOCYTES NFR BLD AUTO: 0.3 % (ref 0–0.5)
KETONES UR QL STRIP: NEGATIVE
LACTATE SERPL-SCNC: 1.1 MMOL/L (ref 0.5–2.2)
LACTATE SERPL-SCNC: 2.3 MMOL/L (ref 0.5–2.2)
LDH SERPL L TO P-CCNC: 1.7 MMOL/L (ref 0.5–2.2)
LEUKOCYTE ESTERASE UR QL STRIP: ABNORMAL
LYMPHOCYTES # BLD AUTO: 1.07 K/UL (ref 1–4.8)
MAGNESIUM SERPL-MCNC: 1.9 MG/DL (ref 1.6–2.6)
MCH RBC QN AUTO: 28.6 PG (ref 27–31)
MCHC RBC AUTO-ENTMCNC: 32.1 G/DL (ref 32–36)
MCV RBC AUTO: 89 FL (ref 82–98)
MICROSCOPIC COMMENT: ABNORMAL
NITRITE UR QL STRIP: POSITIVE
NUCLEATED RBC (/100WBC) (OHS): 0 /100 WBC
PH UR STRIP: 8 [PH]
PLATELET # BLD AUTO: 397 K/UL (ref 150–450)
PMV BLD AUTO: 10.5 FL (ref 9.2–12.9)
POC PERFORMED BY: NORMAL
POTASSIUM SERPL-SCNC: 3.7 MMOL/L (ref 3.5–5.1)
PROT SERPL-MCNC: 7.6 GM/DL (ref 6–8.4)
PROT UR QL STRIP: ABNORMAL
RBC # BLD AUTO: 3.74 M/UL (ref 4–5.4)
RBC #/AREA URNS AUTO: 8 /HPF (ref 0–4)
RELATIVE EOSINOPHIL (OHS): 1 %
RELATIVE LYMPHOCYTE (OHS): 12.5 % (ref 18–48)
RELATIVE MONOCYTE (OHS): 5.7 % (ref 4–15)
RELATIVE NEUTROPHIL (OHS): 79.9 % (ref 38–73)
SODIUM SERPL-SCNC: 141 MMOL/L (ref 136–145)
SP GR UR STRIP: 1.01
SPECIMEN SOURCE: NORMAL
SQUAMOUS #/AREA URNS AUTO: <1 /HPF
UROBILINOGEN UR STRIP-ACNC: NEGATIVE EU/DL
WBC # BLD AUTO: 8.58 K/UL (ref 3.9–12.7)
WBC #/AREA URNS AUTO: 9 /HPF (ref 0–5)

## 2025-04-16 PROCEDURE — 85025 COMPLETE CBC W/AUTO DIFF WBC: CPT | Mod: HCNC | Performed by: NURSE PRACTITIONER

## 2025-04-16 PROCEDURE — 83605 ASSAY OF LACTIC ACID: CPT | Mod: HCNC | Performed by: NURSE PRACTITIONER

## 2025-04-16 PROCEDURE — 83605 ASSAY OF LACTIC ACID: CPT | Mod: HCNC

## 2025-04-16 PROCEDURE — 96374 THER/PROPH/DIAG INJ IV PUSH: CPT | Mod: HCNC

## 2025-04-16 PROCEDURE — 83735 ASSAY OF MAGNESIUM: CPT | Mod: HCNC | Performed by: NURSE PRACTITIONER

## 2025-04-16 PROCEDURE — 81001 URINALYSIS AUTO W/SCOPE: CPT | Mod: HCNC | Performed by: NURSE PRACTITIONER

## 2025-04-16 PROCEDURE — 25000003 PHARM REV CODE 250: Mod: HCNC | Performed by: NURSE PRACTITIONER

## 2025-04-16 PROCEDURE — 63600175 PHARM REV CODE 636 W HCPCS: Mod: HCNC | Performed by: NURSE PRACTITIONER

## 2025-04-16 PROCEDURE — 93005 ELECTROCARDIOGRAM TRACING: CPT | Mod: HCNC

## 2025-04-16 PROCEDURE — 25000003 PHARM REV CODE 250: Mod: HCNC | Performed by: FAMILY MEDICINE

## 2025-04-16 PROCEDURE — 93010 ELECTROCARDIOGRAM REPORT: CPT | Mod: HCNC,,, | Performed by: INTERNAL MEDICINE

## 2025-04-16 PROCEDURE — 99900035 HC TECH TIME PER 15 MIN (STAT): Mod: HCNC

## 2025-04-16 PROCEDURE — G0378 HOSPITAL OBSERVATION PER HR: HCPCS | Mod: HCNC

## 2025-04-16 PROCEDURE — 36415 COLL VENOUS BLD VENIPUNCTURE: CPT | Mod: HCNC | Performed by: NURSE PRACTITIONER

## 2025-04-16 PROCEDURE — 87040 BLOOD CULTURE FOR BACTERIA: CPT | Mod: HCNC | Performed by: NURSE PRACTITIONER

## 2025-04-16 PROCEDURE — 99285 EMERGENCY DEPT VISIT HI MDM: CPT | Mod: 25,HCNC

## 2025-04-16 PROCEDURE — 80053 COMPREHEN METABOLIC PANEL: CPT | Mod: HCNC | Performed by: NURSE PRACTITIONER

## 2025-04-16 PROCEDURE — 96361 HYDRATE IV INFUSION ADD-ON: CPT | Mod: HCNC

## 2025-04-16 RX ORDER — ASPIRIN 81 MG/1
81 TABLET ORAL NIGHTLY
Status: DISCONTINUED | OUTPATIENT
Start: 2025-04-17 | End: 2025-04-21 | Stop reason: HOSPADM

## 2025-04-16 RX ORDER — ACETAMINOPHEN 325 MG/1
650 TABLET ORAL EVERY 6 HOURS PRN
Status: DISCONTINUED | OUTPATIENT
Start: 2025-04-16 | End: 2025-04-17 | Stop reason: SDUPTHER

## 2025-04-16 RX ORDER — AMLODIPINE BESYLATE 5 MG/1
5 TABLET ORAL DAILY
Status: DISCONTINUED | OUTPATIENT
Start: 2025-04-16 | End: 2025-04-19

## 2025-04-16 RX ORDER — BRIMONIDINE TARTRATE 1.5 MG/ML
1 SOLUTION/ DROPS OPHTHALMIC 3 TIMES DAILY
Status: DISCONTINUED | OUTPATIENT
Start: 2025-04-17 | End: 2025-04-21 | Stop reason: HOSPADM

## 2025-04-16 RX ORDER — TIMOLOL MALEATE 5 MG/ML
1 SOLUTION/ DROPS OPHTHALMIC EVERY MORNING
COMMUNITY

## 2025-04-16 RX ORDER — NALOXONE HCL 0.4 MG/ML
0.02 VIAL (ML) INJECTION
Status: DISCONTINUED | OUTPATIENT
Start: 2025-04-16 | End: 2025-04-21 | Stop reason: HOSPADM

## 2025-04-16 RX ORDER — SODIUM CHLORIDE 9 MG/ML
INJECTION, SOLUTION INTRAVENOUS ONCE
Status: COMPLETED | OUTPATIENT
Start: 2025-04-17 | End: 2025-04-17

## 2025-04-16 RX ORDER — SODIUM CHLORIDE 9 MG/ML
1000 INJECTION, SOLUTION INTRAVENOUS
Status: COMPLETED | OUTPATIENT
Start: 2025-04-16 | End: 2025-04-16

## 2025-04-16 RX ORDER — CALCIUM CARBONATE 200(500)MG
500 TABLET,CHEWABLE ORAL DAILY
Status: DISCONTINUED | OUTPATIENT
Start: 2025-04-17 | End: 2025-04-21 | Stop reason: HOSPADM

## 2025-04-16 RX ORDER — ONDANSETRON HYDROCHLORIDE 2 MG/ML
4 INJECTION, SOLUTION INTRAVENOUS EVERY 8 HOURS PRN
Status: DISCONTINUED | OUTPATIENT
Start: 2025-04-16 | End: 2025-04-21 | Stop reason: HOSPADM

## 2025-04-16 RX ORDER — CEFTRIAXONE 2 G/1
2 INJECTION, POWDER, FOR SOLUTION INTRAMUSCULAR; INTRAVENOUS ONCE
Status: COMPLETED | OUTPATIENT
Start: 2025-04-16 | End: 2025-04-16

## 2025-04-16 RX ORDER — LANOLIN ALCOHOL/MO/W.PET/CERES
1 CREAM (GRAM) TOPICAL DAILY
Status: DISCONTINUED | OUTPATIENT
Start: 2025-04-17 | End: 2025-04-21 | Stop reason: HOSPADM

## 2025-04-16 RX ORDER — AMLODIPINE BESYLATE 5 MG/1
5 TABLET ORAL DAILY
Status: DISCONTINUED | OUTPATIENT
Start: 2025-04-17 | End: 2025-04-16

## 2025-04-16 RX ORDER — VALSARTAN 160 MG/1
320 TABLET ORAL DAILY
Status: DISCONTINUED | OUTPATIENT
Start: 2025-04-16 | End: 2025-04-21 | Stop reason: HOSPADM

## 2025-04-16 RX ORDER — TALC
6 POWDER (GRAM) TOPICAL NIGHTLY PRN
Status: DISCONTINUED | OUTPATIENT
Start: 2025-04-17 | End: 2025-04-21 | Stop reason: HOSPADM

## 2025-04-16 RX ORDER — VALSARTAN 160 MG/1
320 TABLET ORAL DAILY
Status: DISCONTINUED | OUTPATIENT
Start: 2025-04-17 | End: 2025-04-16

## 2025-04-16 RX ORDER — GLUCAGON 1 MG
1 KIT INJECTION
Status: DISCONTINUED | OUTPATIENT
Start: 2025-04-16 | End: 2025-04-21 | Stop reason: HOSPADM

## 2025-04-16 RX ORDER — IBUPROFEN 200 MG
24 TABLET ORAL
Status: DISCONTINUED | OUTPATIENT
Start: 2025-04-16 | End: 2025-04-21 | Stop reason: HOSPADM

## 2025-04-16 RX ORDER — CEFTRIAXONE 1 G/1
1 INJECTION, POWDER, FOR SOLUTION INTRAMUSCULAR; INTRAVENOUS
Status: DISCONTINUED | OUTPATIENT
Start: 2025-04-17 | End: 2025-04-21 | Stop reason: HOSPADM

## 2025-04-16 RX ORDER — IBUPROFEN 200 MG
16 TABLET ORAL
Status: DISCONTINUED | OUTPATIENT
Start: 2025-04-16 | End: 2025-04-21 | Stop reason: HOSPADM

## 2025-04-16 RX ORDER — SODIUM CHLORIDE 0.9 % (FLUSH) 0.9 %
10 SYRINGE (ML) INJECTION EVERY 12 HOURS PRN
Status: DISCONTINUED | OUTPATIENT
Start: 2025-04-16 | End: 2025-04-21 | Stop reason: HOSPADM

## 2025-04-16 RX ADMIN — VALSARTAN 320 MG: 160 TABLET, FILM COATED ORAL at 11:04

## 2025-04-16 RX ADMIN — CEFTRIAXONE SODIUM 2 G: 2 INJECTION, POWDER, FOR SOLUTION INTRAMUSCULAR; INTRAVENOUS at 07:04

## 2025-04-16 RX ADMIN — AMLODIPINE BESYLATE 5 MG: 5 TABLET ORAL at 11:04

## 2025-04-16 RX ADMIN — SODIUM CHLORIDE 1089 ML: 9 INJECTION, SOLUTION INTRAVENOUS at 07:04

## 2025-04-16 RX ADMIN — SODIUM CHLORIDE 1000 ML: 9 INJECTION, SOLUTION INTRAVENOUS at 08:04

## 2025-04-17 PROBLEM — E46 MALNUTRITION: Status: RESOLVED | Noted: 2024-10-28 | Resolved: 2025-04-17

## 2025-04-17 LAB
ABSOLUTE EOSINOPHIL (OHS): 0.08 K/UL
ABSOLUTE MONOCYTE (OHS): 0.59 K/UL (ref 0.3–1)
ABSOLUTE NEUTROPHIL COUNT (OHS): 5.15 K/UL (ref 1.8–7.7)
ANION GAP (OHS): 7 MMOL/L (ref 8–16)
BASOPHILS # BLD AUTO: 0.04 K/UL
BASOPHILS NFR BLD AUTO: 0.5 %
BUN SERPL-MCNC: 13 MG/DL (ref 8–23)
CALCIUM SERPL-MCNC: 6.3 MG/DL (ref 8.7–10.5)
CHLORIDE SERPL-SCNC: 121 MMOL/L (ref 95–110)
CO2 SERPL-SCNC: 17 MMOL/L (ref 23–29)
CREAT SERPL-MCNC: 0.4 MG/DL (ref 0.5–1.4)
ERYTHROCYTE [DISTWIDTH] IN BLOOD BY AUTOMATED COUNT: 13.4 % (ref 11.5–14.5)
GFR SERPLBLD CREATININE-BSD FMLA CKD-EPI: >60 ML/MIN/1.73/M2
GLUCOSE SERPL-MCNC: 83 MG/DL (ref 70–110)
HCT VFR BLD AUTO: 27.1 % (ref 37–48.5)
HGB BLD-MCNC: 8.7 GM/DL (ref 12–16)
IMM GRANULOCYTES # BLD AUTO: 0.05 K/UL (ref 0–0.04)
IMM GRANULOCYTES NFR BLD AUTO: 0.7 % (ref 0–0.5)
INR PPP: 1.1 (ref 0.8–1.2)
LYMPHOCYTES # BLD AUTO: 1.61 K/UL (ref 1–4.8)
MCH RBC QN AUTO: 28.8 PG (ref 27–31)
MCHC RBC AUTO-ENTMCNC: 32.1 G/DL (ref 32–36)
MCV RBC AUTO: 90 FL (ref 82–98)
NUCLEATED RBC (/100WBC) (OHS): 0 /100 WBC
OHS QRS DURATION: 112 MS
OHS QRS DURATION: 112 MS
OHS QTC CALCULATION: 474 MS
OHS QTC CALCULATION: 486 MS
PLATELET # BLD AUTO: 333 K/UL (ref 150–450)
PMV BLD AUTO: 10 FL (ref 9.2–12.9)
POTASSIUM SERPL-SCNC: 2.5 MMOL/L (ref 3.5–5.1)
POTASSIUM SERPL-SCNC: 4.1 MMOL/L (ref 3.5–5.1)
PROTHROMBIN TIME: 12 SECONDS (ref 9–12.5)
RBC # BLD AUTO: 3.02 M/UL (ref 4–5.4)
RELATIVE EOSINOPHIL (OHS): 1.1 %
RELATIVE LYMPHOCYTE (OHS): 21.4 % (ref 18–48)
RELATIVE MONOCYTE (OHS): 7.8 % (ref 4–15)
RELATIVE NEUTROPHIL (OHS): 68.5 % (ref 38–73)
SODIUM SERPL-SCNC: 145 MMOL/L (ref 136–145)
WBC # BLD AUTO: 7.52 K/UL (ref 3.9–12.7)

## 2025-04-17 PROCEDURE — 63600175 PHARM REV CODE 636 W HCPCS: Mod: HCNC | Performed by: FAMILY MEDICINE

## 2025-04-17 PROCEDURE — 96376 TX/PRO/DX INJ SAME DRUG ADON: CPT

## 2025-04-17 PROCEDURE — G0378 HOSPITAL OBSERVATION PER HR: HCPCS | Mod: HCNC

## 2025-04-17 PROCEDURE — 85025 COMPLETE CBC W/AUTO DIFF WBC: CPT | Mod: HCNC | Performed by: FAMILY MEDICINE

## 2025-04-17 PROCEDURE — 25000003 PHARM REV CODE 250: Mod: HCNC | Performed by: FAMILY MEDICINE

## 2025-04-17 PROCEDURE — 85610 PROTHROMBIN TIME: CPT | Mod: HCNC | Performed by: FAMILY MEDICINE

## 2025-04-17 PROCEDURE — 96375 TX/PRO/DX INJ NEW DRUG ADDON: CPT | Mod: HCNC

## 2025-04-17 PROCEDURE — 80048 BASIC METABOLIC PNL TOTAL CA: CPT | Mod: HCNC | Performed by: FAMILY MEDICINE

## 2025-04-17 PROCEDURE — 96375 TX/PRO/DX INJ NEW DRUG ADDON: CPT

## 2025-04-17 PROCEDURE — 36415 COLL VENOUS BLD VENIPUNCTURE: CPT | Mod: HCNC | Performed by: NURSE PRACTITIONER

## 2025-04-17 PROCEDURE — 99203 OFFICE O/P NEW LOW 30 MIN: CPT | Mod: HCNC,,, | Performed by: ORTHOPAEDIC SURGERY

## 2025-04-17 PROCEDURE — 84132 ASSAY OF SERUM POTASSIUM: CPT | Mod: HCNC | Performed by: NURSE PRACTITIONER

## 2025-04-17 PROCEDURE — 36415 COLL VENOUS BLD VENIPUNCTURE: CPT | Mod: HCNC | Performed by: FAMILY MEDICINE

## 2025-04-17 PROCEDURE — 96361 HYDRATE IV INFUSION ADD-ON: CPT

## 2025-04-17 RX ORDER — POTASSIUM CHLORIDE 7.45 MG/ML
10 INJECTION INTRAVENOUS ONCE
Status: COMPLETED | OUTPATIENT
Start: 2025-04-17 | End: 2025-04-17

## 2025-04-17 RX ORDER — ACETAMINOPHEN 325 MG/1
650 TABLET ORAL EVERY 6 HOURS PRN
Status: DISCONTINUED | OUTPATIENT
Start: 2025-04-17 | End: 2025-04-21 | Stop reason: HOSPADM

## 2025-04-17 RX ORDER — DICLOFENAC SODIUM 10 MG/G
4 GEL TOPICAL 3 TIMES DAILY
Status: DISCONTINUED | OUTPATIENT
Start: 2025-04-17 | End: 2025-04-21 | Stop reason: HOSPADM

## 2025-04-17 RX ORDER — HYDRALAZINE HYDROCHLORIDE 20 MG/ML
5 INJECTION INTRAMUSCULAR; INTRAVENOUS ONCE
Status: COMPLETED | OUTPATIENT
Start: 2025-04-17 | End: 2025-04-17

## 2025-04-17 RX ORDER — POTASSIUM CHLORIDE 20 MEQ/1
40 TABLET, EXTENDED RELEASE ORAL ONCE
Status: COMPLETED | OUTPATIENT
Start: 2025-04-17 | End: 2025-04-17

## 2025-04-17 RX ADMIN — CEFTRIAXONE SODIUM 1 G: 1 INJECTION, POWDER, FOR SOLUTION INTRAMUSCULAR; INTRAVENOUS at 06:04

## 2025-04-17 RX ADMIN — DICLOFENAC SODIUM 4 G: 10 GEL TOPICAL at 08:04

## 2025-04-17 RX ADMIN — POTASSIUM CHLORIDE 10 MEQ: 7.46 INJECTION, SOLUTION INTRAVENOUS at 05:04

## 2025-04-17 RX ADMIN — BRIMONIDINE TARTRATE 1 DROP: 1.5 SOLUTION OPHTHALMIC at 08:04

## 2025-04-17 RX ADMIN — DICLOFENAC SODIUM 4 G: 10 GEL TOPICAL at 04:04

## 2025-04-17 RX ADMIN — SODIUM CHLORIDE: 9 INJECTION, SOLUTION INTRAVENOUS at 03:04

## 2025-04-17 RX ADMIN — AMLODIPINE BESYLATE 5 MG: 5 TABLET ORAL at 08:04

## 2025-04-17 RX ADMIN — VALSARTAN 320 MG: 160 TABLET, FILM COATED ORAL at 08:04

## 2025-04-17 RX ADMIN — DICLOFENAC SODIUM 4 G: 10 GEL TOPICAL at 03:04

## 2025-04-17 RX ADMIN — HYDRALAZINE HYDROCHLORIDE 5 MG: 20 INJECTION INTRAMUSCULAR; INTRAVENOUS at 01:04

## 2025-04-17 RX ADMIN — BRIMONIDINE TARTRATE 1 DROP: 1.5 SOLUTION OPHTHALMIC at 06:04

## 2025-04-17 RX ADMIN — ACETAMINOPHEN 650 MG: 325 TABLET ORAL at 06:04

## 2025-04-17 RX ADMIN — POTASSIUM CHLORIDE 40 MEQ: 1500 TABLET, EXTENDED RELEASE ORAL at 05:04

## 2025-04-17 RX ADMIN — CALCIUM CARBONATE (ANTACID) CHEW TAB 500 MG 500 MG: 500 CHEW TAB at 08:04

## 2025-04-17 RX ADMIN — BRIMONIDINE TARTRATE 1 DROP: 1.5 SOLUTION OPHTHALMIC at 12:04

## 2025-04-17 RX ADMIN — ASPIRIN 81 MG: 81 TABLET, COATED ORAL at 08:04

## 2025-04-17 RX ADMIN — FERROUS SULFATE TAB 325 MG (65 MG ELEMENTAL FE) 1 EACH: 325 (65 FE) TAB at 08:04

## 2025-04-17 NOTE — CONSULTS
[facility] Consult Note     Chief Complaint:  Right Knee pain     HPI:  Patient is an 89-year-old female who is a nursing home resident  with a past medical history of CAD, HTN, HLD, GERD, PAD, CKD 3 who presented to the [facility] ED due to weakness and declining health.  Orthopedics consulted for right knee pain.  On interview of the patient, she is very difficult to redirect.  Patient states that up until 3 weeks ago she was ambulating on her right lower extremity without complications.  She states she was told she needed to be bed-bound due to wounds on her feet.  She says she had minimal baseline amount of right knee pain.  Since bed-bound, she complains that her right knee has been stuck in a fully flexed position.  Denies any recent fevers or chills.  White blood cell count within normal limits.  She is currently admitted to hospital medicine.      Spoke with the patient's daughter who confirmed the above story.  Daughter states that patient was able to ambulate short distances a month ago.  She was told that she needed to be bed-bound due to foot problems.  Since then daughter has noticed that her mother would lay in bed with her right knee flexed but states that her mother told her that was just a position of comfort for her.  More recently, her daughter noticed that she was not unable to extend her mother's knee.  Daughter states that she has not been complaining much about knee pain.     Denies numbness or tingling.  Endorses pain, worse with movement.  No fevers or chills, no nausea or vomiting.  No cough, chest pain or SOB.     PMH:    Past Medical History:   Diagnosis Date    Anemia     Arthritis 2015    back    Cataract     s/p surgery    Coronary artery disease     [facility] 1/2010 - mid LAD 40%    GERD (gastroesophageal reflux disease)     Glaucoma (increased eye pressure)     Hyperlipidemia     Hypertension     Lactose intolerance     Legally blind in right eye, as defined in [place]     Osteoporosis,  post-menopausal     PAD (peripheral artery disease) 07/11/2018    Pneumonia 12/13/2015    Proximal muscle weakness     Renal cyst     left kidney    Severe protein-calorie malnutrition 10/28/2024    Vitamin D deficiency disease      PSH:    Past Surgical History:   Procedure Laterality Date    ADENOIDECTOMY      BREAST BIOPSY      left breast    CARDIAC CATHETERIZATION  01/14/2010    mid LAD, 40% stenosis; LCX, luminal irregularities;                 CATARACT EXTRACTION W/  INTRAOCULAR LENS IMPLANT Left 05/22/2013    COMBINED WITH TRAB ()    COLONOSCOPY N/A 2/6/2018    Procedure: COLONOSCOPY;  Surgeon: Rufus Villela MD;  Location: [facility] ([address] [place] [address]);  Service: Endoscopy;  Laterality: N/A;    EDTA CHELATION Left 2/14    OS ()    EYE SURGERY      FEMUR FRACTURE SURGERY Left 05/09/2016    FRACTURE SURGERY  2016    TEAR DUCT SURGERY      right eye    TONSILLECTOMY      TRABECULECTOMY Left 05/22/2013    WITH EXPRESS MINI SHUNT AND CE ()    UPPER GASTROINTESTINAL ENDOSCOPY      YAG CAPSULOTOMY Left 06/22/2017         FH:  Noncontributory  SH:   Social History     Socioeconomic History    Marital status:    Tobacco Use    Smoking status: Never    Smokeless tobacco: Never   Substance and Sexual Activity    Alcohol use: No    Drug use: No    Sexual activity: Not Currently     Social Drivers of Health     Financial Resource Strain: Low Risk  (4/17/2025)    Overall Financial Resource Strain (CARDIA)     Difficulty of Paying Living Expenses: Not hard at all   Food Insecurity: No Food Insecurity (4/17/2025)    Hunger Vital Sign     Worried About Running Out of Food in the Last Year: Never true     Ran Out of Food in the Last Year: Never true   Transportation Needs: No Transportation Needs (4/17/2025)    PRAPARE - Transportation     Lack of Transportation (Medical): No     Lack of Transportation (Non-Medical): No   Physical Activity: Insufficiently Active (8/20/2024)     "Exercise Vital Sign     Days of Exercise per Week: 7 days     Minutes of Exercise per Session: 10 min   Stress: No Stress Concern Present (4/17/2025)    [employer] - Occupational Stress Questionnaire     Feeling of Stress : Not at all   Housing Stability: Low Risk  (4/17/2025)    Housing Stability Vital Sign     Unable to Pay for Housing in the Last Year: No     Number of Times Moved in the Last Year: 0     Homeless in the Last Year: No        Meds:  Medications Ordered Prior to Encounter[1]    Allergies:    Review of patient's allergies indicates:   Allergen Reactions    Strawberries [strawberry]      Blood pressure elevation    Chocolate flavor      Causes acid reflux    Atorvastatin      Myalgias    Pcn [penicillins] Rash    Pravastatin      Myalgias    Sulfa (sulfonamide antibiotics) Itching        ROS:  otherwise negative except indicated in HPI      Exam:  Vitals:  BP (!) 181/80 (BP Location: Right arm, Patient Position: Lying)   Pulse 108   Temp 98.7 °F (37.1 °C) (Oral)   Resp 18   Ht 5' 3" (1.6 m)   Wt 35.5 kg (78 lb 4.2 oz)   LMP 11/10/1987 (Approximate)   SpO2 (!) 94%   BMI 13.86 kg/m²   Gen:  Awake and alert, NAD  Resp: No increased WOB  Cards: RRR by PP  Abd:  Non-distended, benign      RLE:  No scars, abrasions, open wounds over the knee  She does have chronic wounds of her ankles and feet that are woody in appearance  No significant swelling or bruising  No palpable joint effusion  At rest, right knee he is in 140° of flexion  Only able to passively extend the knee an additional 10-15 degrees from its resting position  Some tenderness to palpation over both the medial and lateral joint lines of the knee  TA/gastroc/EHL/FHL intact         Imaging:  Unable to obtain x-rays of her right knee at this point due to contracture    X-rays of the right knee from 10/26/24 demonstrate degenerative changes of the knee with vascular calcifications present     "   Assessment/Plan:    89-year-old female with a past medical history of CAD, HTN, HLD, GERD, PAD, CKD 3 with a right knee flexion contracture     Do not have any surgical intervention to offer patient  Recommend obtaining x-rays of right knee when able   Recommend PT with progressive ROM as tolerated   Rest of care per primary team   Please let us know if there is any change warranting further evaluation     Antelmo Cruz  [facility], PGY-3                 [1]   No current facility-administered medications on file prior to encounter.     Current Outpatient Medications on File Prior to Encounter   Medication Sig Dispense Refill    acetaminophen (TYLENOL) 325 MG tablet Take 2 tablets (650 mg total) by mouth every 6 (six) hours as needed. (Patient taking differently: Take 650 mg by mouth every 6 (six) hours as needed for Pain.)      amLODIPine (NORVASC) 5 MG tablet Take 5 mg by mouth once daily.      aspirin 81 mg Tab Take 81 mg by mouth every evening.      bimatoprost (LUMIGAN) 0.01 % Drop Place 1 drop into both eyes every evening. Can use every evening at about 6 pm 7.5 mL 3    brimonidine 0.1% (ALPHAGAN P) 0.1 % Drop Place 1 drop into the right eye 3 (three) times daily. 15 mL 4    calcium carbonate (OS-KACI) 600 mg (1,500 mg) Tab Take 600 mg by mouth 2 (two) times daily with meals.      carboxymethylcellulose sodium 1 % Drop Place 1 drop into both eyes every 6 (six) hours as needed (comfort).      ergocalciferol (ERGOCALCIFEROL) 50,000 unit Cap TAKE 1 CAPSULE (50,000 UNITS TOTAL) BY MOUTH EVERY 30 DAYS 3 capsule 3    esomeprazole (NEXIUM) 40 MG capsule TAKE 1 CAPSULE BY MOUTH DAILY AS NEEDED. (Patient taking differently: Take 40 mg by mouth once daily.) 90 capsule 1    ferrous sulfate 325 (65 FE) MG EC tablet Take 1 tablet (325 mg total) by mouth once daily. 30 tablet 0    melatonin (MELATIN) 3 mg tablet Take 2 tablets (6 mg total) by mouth nightly as needed for Insomnia.      methocarbamoL (ROBAXIN) 500 MG Tab  Take 500 mg by mouth 3 (three) times daily as needed (muscle spasms).      multivitamin (THERAGRAN) per tablet Take 1 tablet by mouth once daily.      mupirocin (BACTROBAN) 2 % ointment Apply topically every evening. Left great toenail bed      pilocarpine HCL 4% (PILOCAR) 4 % ophthalmic solution Place 1 drop into the right eye 3 (three) times daily. 15 mL 4    PROLIA 60 mg/mL Syrg Inject 60 mg into the skin every 6 (six) months.      senna-docusate 8.6-50 mg (PERICOLACE) 8.6-50 mg per tablet Take 1 tablet by mouth 2 (two) times daily.      timolol maleate 0.5% (TIMOPTIC) 0.5 % Drop Place 1 drop into both eyes every morning.      valsartan (DIOVAN) 320 MG tablet Take 1 tablet (320 mg total) by mouth once daily.

## 2025-04-17 NOTE — ASSESSMENT & PLAN NOTE
Patient with Acute on chronic debility due to age-related physical debility. The patient's latest AMPAC (Activity Measure for Post Acute Care) Score is listed below.    AM-PAC Score - How much help does the patient need for each activity listed       Plan  - PT/OT consulted  - fall precautions.

## 2025-04-17 NOTE — PLAN OF CARE
Patient is AAOx4. Patient medications given as ordered per MAR. Cardiac monitoring in place. Pure wick in place. Scheduled Diclofenac Gel for right knee pain. Z flex boots in place. Wound Care to bilateral knees performed Betadine applied. Triad cream applied to right hip and buttocks. Wedge in place. Waffle mattress in place. Safety maintained. JANEE in room. Instructed to use call light for assistance.      Problem: Adult Inpatient Plan of Care  Goal: Plan of Care Review  Outcome: Progressing     Problem: Fall Injury Risk  Goal: Absence of Fall and Fall-Related Injury  Outcome: Progressing     Problem: Wound  Goal: Optimal Coping  Outcome: Progressing

## 2025-04-17 NOTE — HPI
"Patient is a 89-year-old female who is a nursing home resident at [facility] with a past medical history of CAD, HTN, HLD, GERD, PAD and CKD-III, who presented due to complaints of weakness and declining health.. Pt states "they sent me here because I was resting while looking out the window and I guess they wanted to have me checked out for that". Pts only complaint is foul smelling urine. Pt states she has noticed this over the past few days. In ER urine analysis was nitrite positive.  Chest x-ray showed no acute abnormality.  Due to patient's presenting symptoms she will require admission for further evaluation and management. At time  of my examination patient denied any headache, fever, chills, chest pain or shortness of breath but complained of right knee pain and losing weight.  "

## 2025-04-17 NOTE — ED NOTES
This nurse and xray tech attempted to straighten pt knee to complete xray. Attempt unsuccessful to straighten knee to complete xray. MD notified

## 2025-04-17 NOTE — ASSESSMENT & PLAN NOTE
Nutrition consulted. Most recent weight and BMI monitored-     Measurements:  Wt Readings from Last 1 Encounters:   04/16/25 36.3 kg (80 lb)   Body mass index is 14.17 kg/m².    Patient has been screened and assessed by RD.    Malnutrition Type:  Context:    Level:      Malnutrition Characteristic Summary:       Interventions/Recommendations (treatment strategy):

## 2025-04-17 NOTE — ASSESSMENT & PLAN NOTE
Patient's blood pressure range in the last 24 hours was: BP  Min: 170/72  Max: 214/116.The patient's inpatient anti-hypertensive regimen is listed below:  Current Antihypertensives  , Every morning, Both Eyes  valsartan tablet 320 mg, Daily, Oral  amLODIPine tablet 5 mg, Daily, Oral    Plan  - BP is uncontrolled, will adjust as follows:  Resume home dose of antihypertensive agents.  - monitor blood pressure closely.

## 2025-04-17 NOTE — ED NOTES
Attempted to call report. Told by nurse to call back due to assigned nurse being busy with patient.

## 2025-04-17 NOTE — PHARMACY MED REC
"  [facility]           Pharmacy  Admission Medication History     The home medication history was taken by Lacy Aldana.      Medication history obtained from Medications listed below were obtained from: Nursing home    Based on information gathered for medication list, you may go to "Admission" then "Reconcile Home Medications" tabs to review and/or act upon those items.     The home medication list has been updated by the Pharmacy department.   Please read ALL comments highlighted in yellow.   Please address this information as you see fit.    Feel free to contact us if you have any questions or require assistance.    The medications listed below were removed from the home medication list.  Please reorder if appropriate:    Patient reports NOT TAKING the following medication(s):  Crestor 10mg      No current facility-administered medications on file prior to encounter.     Current Outpatient Medications on File Prior to Encounter   Medication Sig Dispense Refill    acetaminophen (TYLENOL) 325 MG tablet Take 2 tablets (650 mg total) by mouth every 6 (six) hours as needed. (Patient taking differently: Take 650 mg by mouth every 6 (six) hours as needed for Pain.)      amLODIPine (NORVASC) 5 MG tablet Take 5 mg by mouth once daily.      aspirin 81 mg Tab Take 81 mg by mouth every evening.      bimatoprost (LUMIGAN) 0.01 % Drop Place 1 drop into both eyes every evening. Can use every evening at about 6 pm 7.5 mL 3    brimonidine 0.1% (ALPHAGAN P) 0.1 % Drop Place 1 drop into the right eye 3 (three) times daily. 15 mL 4    calcium carbonate (OS-KACI) 600 mg (1,500 mg) Tab Take 600 mg by mouth 2 (two) times daily with meals.      carboxymethylcellulose sodium 1 % Drop Place 1 drop into both eyes every 6 (six) hours as needed (comfort).      ergocalciferol (ERGOCALCIFEROL) 50,000 unit Cap TAKE 1 CAPSULE (50,000 UNITS TOTAL) BY MOUTH EVERY 30 DAYS 3 capsule 3    esomeprazole (NEXIUM) 40 MG capsule " TAKE 1 CAPSULE BY MOUTH DAILY AS NEEDED. (Patient taking differently: Take 40 mg by mouth once daily.) 90 capsule 1    ferrous sulfate 325 (65 FE) MG EC tablet Take 1 tablet (325 mg total) by mouth once daily. 30 tablet 0    melatonin (MELATIN) 3 mg tablet Take 2 tablets (6 mg total) by mouth nightly as needed for Insomnia.      methocarbamoL (ROBAXIN) 500 MG Tab Take 500 mg by mouth 3 (three) times daily as needed (muscle spasms).      multivitamin (THERAGRAN) per tablet Take 1 tablet by mouth once daily.      mupirocin (BACTROBAN) 2 % ointment Apply topically every evening. Left great toenail bed      pilocarpine HCL 4% (PILOCAR) 4 % ophthalmic solution Place 1 drop into the right eye 3 (three) times daily. 15 mL 4    PROLIA 60 mg/mL Syrg Inject 60 mg into the skin every 6 (six) months.      senna-docusate 8.6-50 mg (PERICOLACE) 8.6-50 mg per tablet Take 1 tablet by mouth 2 (two) times daily.      timolol maleate 0.5% (TIMOPTIC) 0.5 % Drop Place 1 drop into both eyes every morning.      valsartan (DIOVAN) 320 MG tablet Take 1 tablet (320 mg total) by mouth once daily.         Please address this information as you see fit.  Feel free to contact us if you have any questions or require assistance.    Lacy Aldana  [phone]                .

## 2025-04-17 NOTE — ED NOTES
Pt states she is unable to swallow water and usually takes medications with pudding. Medications administered with pudding at this moment

## 2025-04-17 NOTE — PLAN OF CARE
CM spoke with Aimee Green (daughter) to discuss discharge planning. Pt is a resident of [facility]. She is mostly bed bound. Able to sit up in a WC. Daughter requesting PT/OT eval and Dementia eval. MD notified of request. Pt is dependent with all ADL's. Upon discharge, pt will need hospital to transport her back to facility. Daughter made aware to contact CM with any questions or concerns. CM will continue to follow and assist with any discharge needs.     [facility] - Med Surg  Initial Discharge Assessment       Primary Care Provider: Shasta Urbina MD    Admission Diagnosis: Tachycardia [R00.0]  Failure to thrive in adult [R62.7]  Acute cystitis without hematuria [N30.00]  Chest pain [R07.9]    Admission Date: 4/16/2025  Expected Discharge Date:     Transition of Care Barriers: (P) None    Payor: [employer] MANAGED MEDICARE / Plan: [employer] MEDICARE HMO / Product Type: Capitation /     Extended Emergency Contact Information  Primary Emergency Contact: Aimee Green  Address: [address] [place] [address] [facility]  Home Phone: [phone]  Work Phone: [phone]  Mobile Phone: [phone]  Relation: Daughter  Secondary Emergency Contact: Irene Gaytan  Mobile Phone: [phone]  Relation: Daughter    Discharge Plan A: (P) Return to nursing home         [id number] - [place], [place] - [address]  [address]  [place] [address]  Phone: [phone] Fax: [phone]      Initial Assessment (most recent)       Adult Discharge Assessment - 04/17/25 1050          Discharge Assessment    Assessment Type Discharge Planning Assessment     Confirmed/corrected address, phone number and insurance Yes     Confirmed Demographics Correct on Facesheet     Source of Information health record;family     When was your last doctors appointment? 04/10/25 (P)      Does patient/caregiver understand observation status Yes (P)      Communicated LIDIA with  patient/caregiver Date not available/Unable to determine (P)      Reason For Admission UTI (P)      People in Home facility resident (P)      Do you expect to return to your current living situation? Yes (P)      Do you have help at home or someone to help you manage your care at home? -- (P)    Facility resident    Walking or Climbing Stairs Difficulty yes (P)      Walking or Climbing Stairs transferring difficulty, dependent (P)      Dressing/Bathing Difficulty yes (P)      Dressing/Bathing dressing difficulty, dependent (P)      Home Accessibility wheelchair accessible (P)      Equipment Currently Used at Home none (P)      Readmission within 30 days? No (P)      Patient currently being followed by outpatient case management? No (P)      Do you currently have service(s) that help you manage your care at home? No (P)      Do you take prescription medications? Yes (P)      Do you have prescription coverage? Yes (P)      Coverage [employer] (P)      Do you have any problems affording any of your prescribed medications? No (P)      Is the patient taking medications as prescribed? yes (P)      Who is going to help you get home at discharge? Pt will need hospital ambulance to transport back (P)      How do you get to doctors appointments? health plan transportation (P)      Are you on dialysis? No (P)      Do you take coumadin? No (P)      Discharge Plan A Return to nursing home (P)      DME Needed Upon Discharge  none (P)      Discharge Plan discussed with: Adult children (P)    Aimee Green (daughter) [phone]    Transition of Care Barriers None (P)

## 2025-04-17 NOTE — PLAN OF CARE
Pt safety maintained. Pt on RA and continuous cardiac monitoring. Medication administered per MAR. Purwick in place. Waffle mattress overlay placed. Weight shifting assistance provided.  Pt instructed to call w/any needs, verbalized understanding. Bed in lowest position, locked and bed alarms on.  Call light w/in pt's reach.     Problem: Adult Inpatient Plan of Care  Goal: Plan of Care Review  Outcome: Progressing  Goal: Patient-Specific Goal (Individualized)  Outcome: Progressing     Problem: Fall Injury Risk  Goal: Absence of Fall and Fall-Related Injury  Outcome: Progressing     Problem: UTI (Urinary Tract Infection)  Goal: Improved Infection Symptoms  Outcome: Progressing     Problem: Wound  Goal: Optimal Coping  Outcome: Progressing

## 2025-04-17 NOTE — ED PROVIDER NOTES
"Encounter Date: 4/16/2025       History     Chief Complaint   Patient presents with    Referral     [id number] brought in a 89 y/o female from [facility] with a decline in health and family wanting patient evaluated before changing code status.  Left forearm 18 g iv inserted per EMS.       89 yr old female presents to the ER with reports of needing to be evaluated for declining health. Pt states "they sent me here because I was resting while looking out the window and I guess they wanted to have me checked out for that". Pts only complaint is foul smelling urine. Pt states she has noticed this over the past few days. Denies fever. No vomiting or diarrhea. Denies chest pain or sob. No coughing. Pt reports decrease in appetite. PMH of arthritis, anemia, GERD, CAD, glaucoma, HTN, PAD, osteoporosis, pneumonia, vitamin D deficiency. Pts daughter voiced concerns over contracture to the right leg. See note below from Dr Jha.     The history is provided by the patient and a relative. No  was used.     Review of patient's allergies indicates:   Allergen Reactions    Strawberries [strawberry]      Blood pressure elevation    Chocolate flavor      Causes acid reflux    Atorvastatin      Myalgias    Pcn [penicillins] Rash    Pravastatin      Myalgias    Sulfa (sulfonamide antibiotics) Itching     Past Medical History:   Diagnosis Date    Anemia     Arthritis 2015    back    Cataract     s/p surgery    Coronary artery disease     [facility] 1/2010 - mid LAD 40%    GERD (gastroesophageal reflux disease)     Glaucoma (increased eye pressure)     Hyperlipidemia     Hypertension     Lactose intolerance     Legally blind in right eye, as defined in [place]     Osteoporosis, post-menopausal     PAD (peripheral artery disease) 07/11/2018    Pneumonia 12/13/2015    Proximal muscle weakness     Renal cyst     left kidney    Severe protein-calorie malnutrition 10/28/2024    Vitamin D deficiency disease      Past " Surgical History:   Procedure Laterality Date    ADENOIDECTOMY      BREAST BIOPSY      left breast    CARDIAC CATHETERIZATION  01/14/2010    mid LAD, 40% stenosis; LCX, luminal irregularities;                 CATARACT EXTRACTION W/  INTRAOCULAR LENS IMPLANT Left 05/22/2013    COMBINED WITH TRAB ()    COLONOSCOPY N/A 2/6/2018    Procedure: COLONOSCOPY;  Surgeon: Rufus Villela MD;  Location: [facility] ([address] [place] [address]);  Service: Endoscopy;  Laterality: N/A;    EDTA CHELATION Left 2/14    OS ()    EYE SURGERY      FEMUR FRACTURE SURGERY Left 05/09/2016    FRACTURE SURGERY  2016    TEAR DUCT SURGERY      right eye    TONSILLECTOMY      TRABECULECTOMY Left 05/22/2013    WITH EXPRESS MINI SHUNT AND CE ()    UPPER GASTROINTESTINAL ENDOSCOPY      YAG CAPSULOTOMY Left 06/22/2017         Family History   Problem Relation Name Age of Onset    Asthma Sister Fawn     Rheum arthritis Sister Fawn     Osteoporosis Sister Fawn     Arthritis Sister Fawn     Hyperlipidemia Brother Fantasma     Rheum arthritis Brother Fantasma     Diabetes Brother Fantasma     Osteoporosis Mother      Heart disease Mother      Glaucoma Mother      Hypertension Father      Peripheral vascular disease Father      Alzheimer's disease Father      Osteoporosis Sister Paige     Hypertension Brother Mike     Peripheral vascular disease Brother Mike         bilateral leg amputation    Hypertension Son Aravind     Hypertension Daughter Aimee     Colon cancer Other niece 44        death from colon cancer at age 44    Stroke Brother Jaylan     Heart disease Brother Kvng     Diabetes Daughter Irene         GDM that is resolved    Macular degeneration Neg Hx      Retinal detachment Neg Hx      Strabismus Neg Hx      Amblyopia Neg Hx      Blindness Neg Hx      Esophageal cancer Neg Hx       Social History[1]  Review of Systems   Constitutional:  Positive for appetite change and unexpected weight change.   Genitourinary:  Positive  for dysuria.   Musculoskeletal:         Right leg pain       Physical Exam     Initial Vitals   BP Pulse Resp Temp SpO2   04/16/25 1822 04/16/25 1822 04/16/25 1822 04/16/25 1854 04/16/25 1822   (!) 178/84 (!) 130 18 97.1 °F (36.2 °C) 100 %      MAP       --                Physical Exam    Constitutional: She appears well-developed and well-nourished. She is not diaphoretic. She appears cachectic. No distress.   HENT:   Head: Normocephalic.   Eyes: Right eye exhibits no discharge. Left eye exhibits no discharge.   Neck:   Normal range of motion.  Cardiovascular:  Normal rate.           Abdominal: Abdomen is soft. There is no abdominal tenderness.   Genitourinary:    Genitourinary Comments: Pt is saturated diaper with foul smelling urine.      Musculoskeletal:      Cervical back: Normal range of motion.      Comments: Contracted right leg.      Skin: Skin is warm and dry.   Scattered sores noted to the both heels, sacrum. See images   Psychiatric: She has a normal mood and affect. Her behavior is normal.                           ED Course   Critical Care    Date/Time: 4/16/2025 9:35 PM    Performed by: Ema Null NP  Authorized by: Laisha Jha,   Direct patient critical care time: 45 minutes  Total critical care time (exclusive of procedural time) : 45 minutes  Critical care was necessary to treat or prevent imminent or life-threatening deterioration of the following conditions: sepsis.  Critical care was time spent personally by me on the following activities: blood draw for specimens, development of treatment plan with patient or surrogate, interpretation of cardiac output measurements, evaluation of patient's response to treatment, examination of patient, obtaining history from patient or surrogate, ordering and performing treatments and interventions, ordering and review of laboratory studies, ordering and review of radiographic studies, pulse oximetry, re-evaluation of patient's condition and  review of old charts.        Labs Reviewed   COMPREHENSIVE METABOLIC PANEL - Abnormal       Result Value    Sodium 141      Potassium 3.7      Chloride 102      CO2 27      Glucose 177 (*)     BUN 29 (*)     Creatinine 0.7      Calcium 10.0      Protein Total 7.6      Albumin 2.5 (*)     Bilirubin Total 0.2            AST 17      ALT 13      Anion Gap 12      eGFR >60     URINALYSIS, REFLEX TO URINE CULTURE - Abnormal    Color, UA Yellow      Appearance, UA Clear      pH, UA 8.0      Spec Grav UA 1.010      Protein, UA 1+ (*)     Glucose, UA Negative      Ketones, UA Negative      Bilirubin, UA Negative      Blood, UA 1+ (*)     Nitrites, UA Positive (*)     Urobilinogen, UA Negative      Leukocyte Esterase, UA 1+ (*)    CBC WITH DIFFERENTIAL - Abnormal    WBC 8.58      RBC 3.74 (*)     HGB 10.7 (*)     HCT 33.3 (*)     MCV 89      MCH 28.6      MCHC 32.1      RDW 13.3      Platelet Count 397      MPV 10.5      Nucleated RBC 0      Neut % 79.9 (*)     Lymph % 12.5 (*)     Mono % 5.7      Eos % 1.0      Basophil % 0.6      Imm Grans % 0.3      Neut # 6.85      Lymph # 1.07      Mono # 0.49      Eos # 0.09      Baso # 0.05      Imm Grans # 0.03     LACTIC ACID, PLASMA - Abnormal    Lactic Acid Level 2.3 (*)     Narrative:     Falsely low lactic acid results can be found in samples containing >=13.0 mg/dL total bilirubin and/or >=3.5 mg/dL direct bilirubin.    URINALYSIS MICROSCOPIC - Abnormal    RBC, UA 8 (*)     WBC, UA 9 (*)     Bacteria, UA Occasional      Squamous Epithelial Cells, UA <1      Hyaline Casts, UA 0      Microscopic Comment       MAGNESIUM - Normal    Magnesium  1.9     CULTURE, BLOOD   CULTURE, BLOOD   CULTURE, BLOOD   CBC W/ AUTO DIFFERENTIAL    Narrative:     The following orders were created for panel order CBC auto differential.  Procedure                               Abnormality         Status                     ---------                               -----------         ------           "           CBC with Differential[[id number]]       Abnormal            Final result                 Please view results for these tests on the individual orders.   GREY TOP URINE HOLD          Imaging Results              X-Ray Chest AP Portable (Final result)  Result time 04/16/25 20:48:53      Final result by Vince Acuna DO (04/16/25 20:48:53)                   Impression:      No acute abnormality.      Electronically signed by: Vince Acuna  Date:    04/16/2025  Time:    20:48               Narrative:    EXAMINATION:  XR CHEST AP PORTABLE    CLINICAL HISTORY:  Sepsis;    TECHNIQUE:  Single frontal view of the chest was performed.    COMPARISON:  12/03/2024.    FINDINGS:  The lungs are hyperexpanded and clear. No focal opacities are seen. The pleural spaces are clear. The cardiac silhouette is unremarkable. The visualized osseous structures demonstrate osteopenia and degenerative changes.                                       Medications   0.9% NaCl infusion (1,000 mLs Intravenous New Bag 4/16/25 2005)   sodium chloride 0.9% bolus 1,089 mL 1,089 mL (0 mLs Intravenous Stopped 4/16/25 2003)   cefTRIAXone injection 2 g (2 g Intravenous Given 4/16/25 1922)     Medical Decision Making  Differential Diagnosis includes, but is not limited to:  anemia/blood loss, cardiogenic shock, arrhythmia, orthostatic hypotension, dehydration, medication side effect, vitamin deficiency, liver disease, hypothyroidism, metabolic derangement, UTI, dementia.     Amount and/or Complexity of Data Reviewed  Labs: ordered. Decision-making details documented in ED Course.  Radiology: ordered.    Risk  Prescription drug management.               ED Course as of 04/16/25 2152 Wed Apr 16, 2025   1839 D/w pt's daughters over the phone-[facility] had concerns about her leg "drawn up," weight loss, malnutrition, "failure to thrive," suspect she has dementia, have attempted to get her tested for this. Pt is chronically bed bound for last 4 months. " Contracture of the leg noticed by a friend at least 2 days ago.  [HL]   1931 POCT Venous Blood Gas [DT]   2008 Lactic acid, plasma #1(!) [DT]   2008 CBC auto differential(!) [DT]   2048 Magnesium [DT]   2058 FINDINGS:  The lungs are hyperexpanded and clear. No focal opacities are seen. The pleural spaces are clear. The cardiac silhouette is unremarkable. The visualized osseous structures demonstrate osteopenia and degenerative changes.     Impression:     No acute abnormality.   [DT]   2058 Independent interpretation- nothing acute.  [DT]   2102 UA pending.  [DT]   2133 Urinalysis, Reflex to Urine Culture(!) [DT]   2134 Urinalysis Microscopic(!) [DT]   2137 On the line with [facility] at this time. Spoke with Finn. Plan for admission for UTI, failure to thrive. Case reviewed with Dr Jha.. Pts vitals have improved with IV fluids.  [DT]      ED Course User Index  [DT] Ema Null NP  [HL] Laisha Jha, DO                           Clinical Impression:  Final diagnoses:  [R00.0] Tachycardia  [N30.00] Acute cystitis without hematuria (Primary)  [R62.7] Failure to thrive in adult          ED Disposition Condition    Observation                   [1]   Social History  Tobacco Use    Smoking status: Never    Smokeless tobacco: Never   Substance Use Topics    Alcohol use: No    Drug use: No        Ema Null NP  04/16/25 2953

## 2025-04-17 NOTE — CONSULTS
[facility] Surg  Wound Care    Patient Name:  Mj Summers   MRN:  [id number]  Date: 4/17/2025  Diagnosis: Complicated UTI (urinary tract infection)    History:     Past Medical History:   Diagnosis Date    Anemia     Arthritis 2015    back    Cataract     s/p surgery    Coronary artery disease     [facility] 1/2010 - mid LAD 40%    GERD (gastroesophageal reflux disease)     Glaucoma (increased eye pressure)     Hyperlipidemia     Hypertension     Lactose intolerance     Legally blind in right eye, as defined in [place]     Osteoporosis, post-menopausal     PAD (peripheral artery disease) 07/11/2018    Pneumonia 12/13/2015    Proximal muscle weakness     Renal cyst     left kidney    Severe protein-calorie malnutrition 10/28/2024    Vitamin D deficiency disease        Social History[1]    Precautions:     Allergies as of 04/16/2025 - Reviewed 04/16/2025   Allergen Reaction Noted    Strawberries [strawberry]  02/20/2018    Chocolate flavor  02/20/2018    Atorvastatin  03/20/2018    Pcn [penicillins] Rash 07/24/2012    Pravastatin  03/20/2018    Sulfa (sulfonamide antibiotics) Itching 07/24/2012       [facility] Assessment Details/Treatment       L heel- unstageable pressure injury- present on admit- calloused edges- present on admit- full thickness- slough/pink- 1x1x0.1cm with undermining all around        R hip- Stage 2 pressure injury present on admit- 3x1x0.1cm        Sacrum intact with scar tissue to coccyx        R lateral foot- evolving DTPI x2 present on admit 3x3cm distally, 1x0.5cm proximally        R heel- Stage 3 pressure injury- present on admit- full thickness- slough/pink- 2x1x0.1cm      Recommendations discussed with pt, nurse and KAROLINE Pardo NP:  - Pressure injury prevention interventions - waffle overlay and heel boots  - Triad ointment to R hip and sacrum/buttocks BID   - Betadine to heels and R lateral foot- open to air    04/17/2025         [1]   Social History  Socioeconomic History    Marital status:     Tobacco Use    Smoking status: Never    Smokeless tobacco: Never   Substance and Sexual Activity    Alcohol use: No    Drug use: No    Sexual activity: Not Currently     Social Drivers of Health     Financial Resource Strain: Low Risk  (4/17/2025)    Overall Financial Resource Strain (CARDIA)     Difficulty of Paying Living Expenses: Not hard at all   Food Insecurity: No Food Insecurity (4/17/2025)    Hunger Vital Sign     Worried About Running Out of Food in the Last Year: Never true     Ran Out of Food in the Last Year: Never true   Transportation Needs: No Transportation Needs (4/17/2025)    PRAPARE - Transportation     Lack of Transportation (Medical): No     Lack of Transportation (Non-Medical): No   Physical Activity: Insufficiently Active (8/20/2024)    Exercise Vital Sign     Days of Exercise per Week: 7 days     Minutes of Exercise per Session: 10 min   Stress: No Stress Concern Present (4/17/2025)    [employer] - Occupational Stress Questionnaire     Feeling of Stress : Not at all   Housing Stability: Low Risk  (4/17/2025)    Housing Stability Vital Sign     Unable to Pay for Housing in the Last Year: No     Number of Times Moved in the Last Year: 0     Homeless in the Last Year: No

## 2025-04-17 NOTE — PLAN OF CARE
04/17/25 0256   Admission   Initial VN Admission Questions Complete   Communication Issues? None   Shift   Pain Management Interventions quiet environment facilitated;relaxation techniques promoted   Virtual Nurse - Patient Verbalized Approval Of Camera Use;VN Rounding   Type of Frequent Check   Type Telemetry Monitoring;Patient Rounds   Safety/Activity   Patient Rounds bed in low position;bed wheels locked;call light in patient/parent reach;clutter free environment maintained;ID band on;visualized patient;placement of personal items at bedside   Safety Promotion/Fall Prevention assistive device/personal item within reach;medications reviewed;nonskid shoes/socks when out of bed;patient expresses understanding of fall risk and prevention;side rails raised x 2;room near unit station   Safety Precautions emergency equipment at bedside   Safety Bands on Patient Fall Risk Band;Allergy Band   Activity Management Rolling - L1   Activity Assistance Provided assistance, 1 person   Positioning   Body Position position changed independently   Head of Bed (HOB) Positioning HOB elevated   Positioning/Transfer Devices pillows;in use        VIRTUAL NURSE: Pt arrived to unit. Permission received per patient to turn camera to view patient. VIP model explained; patient informed this VN will be working with bedside nurse and the rest of the care team. Plan of care reviewed with patient.  Educated patient on VTE, fall risk and fall risk precautions in place. Call light within reach, side rails up x2. Admission questions completed. Patient instucted to ask staff for assistance. Patient verbalized complete understanding. Patient denies complaints or any needs at this time. Will continue to be available and intervene as needed.

## 2025-04-17 NOTE — H&P
"[facility] Emergency [facility] Medicine  History & Physical    Patient Name: Mj Summers  MRN: [id number]  Patient Class: OP- Observation  Admission Date: 4/16/2025  Attending Physician: Ramona Sarabia*   Primary Care Provider: Shasta Urbina MD         Patient information was obtained from patient, relative(s), past medical records, and ER records.     Subjective:     Principal Problem:Complicated UTI (urinary tract infection)    Chief Complaint:   Chief Complaint   Patient presents with    Referral     [id number] brought in a 88 y/o female from [facility] with a decline in health and family wanting patient evaluated before changing code status.  Left forearm 18 g iv inserted per EMS.          HPI: Patient is a 89-year-old female who is a nursing home resident at [facility] with a past medical history of CAD, HTN, HLD, GERD, PAD and CKD-III, who presented due to complaints of weakness and declining health.. Pt states "they sent me here because I was resting while looking out the window and I guess they wanted to have me checked out for that". Pts only complaint is foul smelling urine. Pt states she has noticed this over the past few days. In ER urine analysis was nitrite positive.  Chest x-ray showed no acute abnormality.  Due to patient's presenting symptoms she will require admission for further evaluation and management. At time  of my examination patient denied any headache, fever, chills, chest pain or shortness of breath but complained of right knee pain and losing weight.    Past Medical History:   Diagnosis Date    Anemia     Arthritis 2015    back    Cataract     s/p surgery    Coronary artery disease     [facility] 1/2010 - mid LAD 40%    GERD (gastroesophageal reflux disease)     Glaucoma (increased eye pressure)     Hyperlipidemia     Hypertension     Lactose intolerance     Legally blind in right eye, as defined in [place]     Osteoporosis, post-menopausal     PAD (peripheral artery " disease) 07/11/2018    Pneumonia 12/13/2015    Proximal muscle weakness     Renal cyst     left kidney    Severe protein-calorie malnutrition 10/28/2024    Vitamin D deficiency disease        Past Surgical History:   Procedure Laterality Date    ADENOIDECTOMY      BREAST BIOPSY      left breast    CARDIAC CATHETERIZATION  01/14/2010    mid LAD, 40% stenosis; LCX, luminal irregularities;                 CATARACT EXTRACTION W/  INTRAOCULAR LENS IMPLANT Left 05/22/2013    COMBINED WITH TRAB ()    COLONOSCOPY N/A 2/6/2018    Procedure: COLONOSCOPY;  Surgeon: Rufus Villela MD;  Location: [facility] ([address] [place] [address]);  Service: Endoscopy;  Laterality: N/A;    EDTA CHELATION Left 2/14    OS ()    EYE SURGERY      FEMUR FRACTURE SURGERY Left 05/09/2016    FRACTURE SURGERY  2016    TEAR DUCT SURGERY      right eye    TONSILLECTOMY      TRABECULECTOMY Left 05/22/2013    WITH EXPRESS MINI SHUNT AND CE ()    UPPER GASTROINTESTINAL ENDOSCOPY      YAG CAPSULOTOMY Left 06/22/2017           Review of patient's allergies indicates:   Allergen Reactions    Strawberries [strawberry]      Blood pressure elevation    Chocolate flavor      Causes acid reflux    Atorvastatin      Myalgias    Pcn [penicillins] Rash    Pravastatin      Myalgias    Sulfa (sulfonamide antibiotics) Itching       No current facility-administered medications on file prior to encounter.     Current Outpatient Medications on File Prior to Encounter   Medication Sig    acetaminophen (TYLENOL) 325 MG tablet Take 2 tablets (650 mg total) by mouth every 6 (six) hours as needed. (Patient taking differently: Take 650 mg by mouth every 6 (six) hours as needed for Pain.)    amLODIPine (NORVASC) 5 MG tablet Take 5 mg by mouth once daily.    aspirin 81 mg Tab Take 81 mg by mouth every evening.    bimatoprost (LUMIGAN) 0.01 % Drop Place 1 drop into both eyes every evening. Can use every evening at about 6 pm    brimonidine 0.1% (ALPHAGAN  P) 0.1 % Drop Place 1 drop into the right eye 3 (three) times daily.    calcium carbonate (OS-KACI) 600 mg (1,500 mg) Tab Take 600 mg by mouth 2 (two) times daily with meals.    carboxymethylcellulose sodium 1 % Drop Place 1 drop into both eyes every 6 (six) hours as needed (comfort).    ergocalciferol (ERGOCALCIFEROL) 50,000 unit Cap TAKE 1 CAPSULE (50,000 UNITS TOTAL) BY MOUTH EVERY 30 DAYS    esomeprazole (NEXIUM) 40 MG capsule TAKE 1 CAPSULE BY MOUTH DAILY AS NEEDED. (Patient taking differently: Take 40 mg by mouth once daily.)    ferrous sulfate 325 (65 FE) MG EC tablet Take 1 tablet (325 mg total) by mouth once daily.    melatonin (MELATIN) 3 mg tablet Take 2 tablets (6 mg total) by mouth nightly as needed for Insomnia.    methocarbamoL (ROBAXIN) 500 MG Tab Take 500 mg by mouth 3 (three) times daily as needed (muscle spasms).    multivitamin (THERAGRAN) per tablet Take 1 tablet by mouth once daily.    mupirocin (BACTROBAN) 2 % ointment Apply topically every evening. Left great toenail bed    pilocarpine HCL 4% (PILOCAR) 4 % ophthalmic solution Place 1 drop into the right eye 3 (three) times daily.    PROLIA 60 mg/mL Syrg Inject 60 mg into the skin every 6 (six) months.    senna-docusate 8.6-50 mg (PERICOLACE) 8.6-50 mg per tablet Take 1 tablet by mouth 2 (two) times daily.    timolol maleate 0.5% (TIMOPTIC) 0.5 % Drop Place 1 drop into both eyes every morning.    valsartan (DIOVAN) 320 MG tablet Take 1 tablet (320 mg total) by mouth once daily.    [DISCONTINUED] rosuvastatin (CRESTOR) 10 MG tablet Take 1 tablet (10 mg total) by mouth once daily.    [DISCONTINUED] timolol maleate 0.5% (TIMOPTIC) 0.5 % Drop Place 1 drop into both eyes every morning. for 1 dose     Family History       Problem Relation (Age of Onset)    Alzheimer's disease Father    Arthritis Sister    Asthma Sister    Colon cancer Other (44)    Diabetes Brother, Daughter    Glaucoma Mother    Heart disease Mother, Brother    Hyperlipidemia  Brother    Hypertension Father, Brother, Son, Daughter    Osteoporosis Sister, Mother, Sister    Peripheral vascular disease Father, Brother    Rheum arthritis Sister, Brother    Stroke Brother          Tobacco Use    Smoking status: Never    Smokeless tobacco: Never   Substance and Sexual Activity    Alcohol use: No    Drug use: No    Sexual activity: Not Currently     Review of Systems   Constitutional:  Positive for appetite change and unexpected weight change.   HENT: Negative.     Eyes: Negative.    Respiratory: Negative.     Cardiovascular: Negative.    Gastrointestinal: Negative.    Genitourinary:  Positive for dysuria.   Musculoskeletal:  Positive for arthralgias and joint swelling.   Skin:  Positive for rash.   Neurological:  Positive for weakness.   Psychiatric/Behavioral: Negative.       Objective:     Vital Signs (Most Recent):  Temp: 97.1 °F (36.2 °C) (04/16/25 1854)  Pulse: 97 (04/16/25 2232)  Resp: 13 (04/16/25 2232)  BP: (!) 188/81 (04/16/25 2232)  SpO2: 100 % (04/16/25 2232) Vital Signs (24h Range):  Temp:  [97.1 °F (36.2 °C)] 97.1 °F (36.2 °C)  Pulse:  [] 97  Resp:  [13-20] 13  SpO2:  [97 %-100 %] 100 %  BP: (170-214)/() 188/81     Weight: 36.3 kg (80 lb)  Body mass index is 14.17 kg/m².     Physical Exam  HENT:      Head: Normocephalic and atraumatic.      Nose: Nose normal.      Mouth/Throat:      Mouth: Mucous membranes are dry.   Eyes:      Extraocular Movements: Extraocular movements intact.      Pupils: Pupils are equal, round, and reactive to light.   Cardiovascular:      Rate and Rhythm: Regular rhythm.   Pulmonary:      Breath sounds: Normal breath sounds.   Abdominal:      Palpations: Abdomen is soft.   Musculoskeletal:      Cervical back: Neck supple.      Comments: Rt knee tenderness.    Skin:     General: Skin is dry.   Neurological:      General: No focal deficit present.      Mental Status: She is alert.   Psychiatric:         Mood and Affect: Mood normal.               CRANIAL NERVES     CN III, IV, VI   Pupils are equal, round, and reactive to light.       Significant Labs: All pertinent labs within the past 24 hours have been reviewed.  Recent Lab Results         04/16/25 2225 04/16/25 1924 04/16/25 1912        Performed By:   [facility]         Specimen source   Venous         Albumin     2.5       ALP     105       ALT     13       Anion Gap     12       Appearance, UA     Clear       AST     17       Bacteria, UA     Occasional       Baso #     0.05       Basophil %     0.6       Bilirubin (UA)     Negative       BILIRUBIN TOTAL     0.2  Comment: For infants and newborns, interpretation of results should be based   on gestational age, weight and in agreement with clinical   observations.    Premature Infant recommended reference ranges:   0-24 hours:  <8.0 mg/dL   24-48 hours: <12.0 mg/dL   3-5 days:    <15.0 mg/dL   6-29 days:   <15.0 mg/dL       BUN     29       Calcium     10.0       Chloride     102       CO2     27       Color, UA     Yellow       Creatinine     0.7       eGFR     >60  Comment: Estimated GFR calculated using the CKD-EPI creatinine (2021) equation.       Eos #     0.09       Eos %     1.0       FiO2   21.0         Glucose     177       Glucose, UA     Negative       Gran # (ANC)     6.85       Hematocrit     33.3       Hemoglobin     10.7       Extra Tube     Hold for add-ons.  Comment: Auto resulted.          Hyaline Casts, UA     0       Immature Grans (Abs)     0.03  Comment: Mild elevation in immature granulocytes is non specific and can be seen in a variety of conditions including stress response, acute inflammation, trauma and pregnancy. Correlation with other laboratory and clinical findings is essential.       Immature Granulocytes     0.3       Ketones, UA     Negative       Lactic Acid Level 1.1     2.3       Leukocyte Esterase, UA     1+       Lymph #     1.07       Lymph %     12.5       Magnesium      1.9       MCH     28.6        MCHC     32.1       MCV     89       Microscopic Comment       Comment: Other formed elements not mentioned in the report are not present in the microscopic examination.       Mono #     0.49       Mono %     5.7       MPV     10.5       Neut %     79.9       NITRITE UA     Positive       nRBC     0       Blood, UA     1+       pH, UA     8.0       Platelet Count     397       POC Lactate   1.7         Potassium     3.7       PROTEIN TOTAL     7.6       Protein, UA     1+  Comment: Recommend a 24 hour urine protein or a urine protein/creatinine ratio if globulin induced proteinuria is clinically suspected.       RBC     3.74       RBC, UA     8       RDW     13.3       Sodium     141       Spec Grav UA     1.010       Squam Epithel, UA     <1       Urobilinogen, UA     Negative       WBC, UA     9       WBC     8.58               Significant Imaging: I have reviewed all pertinent imaging results/findings within the past 24 hours.  Assessment/Plan:     Assessment & Plan  Complicated UTI (urinary tract infection)  Admit to medical floor with telemetry.  Check urine cultures.    IV antibiotics.    HTN (hypertension)  Patient's blood pressure range in the last 24 hours was: BP  Min: 170/72  Max: 214/116.The patient's inpatient anti-hypertensive regimen is listed below:  Current Antihypertensives  , Every morning, Both Eyes  valsartan tablet 320 mg, Daily, Oral  amLODIPine tablet 5 mg, Daily, Oral    Plan  - BP is uncontrolled, will adjust as follows:  Resume home dose of antihypertensive agents.  - monitor blood pressure closely.  Knee pain, right    X-ray of the right knee.  Inpatient orthopedic consultation.  Debility  Patient with Acute on chronic debility due to age-related physical debility. The patient's latest AMPAC (Activity Measure for Post Acute Care) Score is listed below.    AM-PAC Score - How much help does the patient need for each activity listed       Plan  - PT/OT consulted  - fall  precautions.        Protein calorie malnutrition  Nutrition consulted. Most recent weight and BMI monitored-     Measurements:  Wt Readings from Last 1 Encounters:   04/16/25 36.3 kg (80 lb)   Body mass index is 14.17 kg/m².    Patient has been screened and assessed by RD.    Malnutrition Type:  Context:    Level:      Malnutrition Characteristic Summary:       Interventions/Recommendations (treatment strategy):         VTE Risk Mitigation (From admission, onward)           Ordered     IP VTE HIGH RISK PATIENT  Once         04/16/25 2259     Place sequential compression device  Until discontinued         04/16/25 2259                       On 04/17/2025, patient should be placed in hospital observation services under my care.    Further recommendations, diagnosis and orders not initially addressed will be made by day team hospitalist who will assume care in the morning.         Gregory Hooper MD  Department of Hospital Medicine  [facility] - Emergency Dept

## 2025-04-17 NOTE — SUBJECTIVE & OBJECTIVE
Past Medical History:   Diagnosis Date    Anemia     Arthritis 2015    back    Cataract     s/p surgery    Coronary artery disease     [facility] 1/2010 - mid LAD 40%    GERD (gastroesophageal reflux disease)     Glaucoma (increased eye pressure)     Hyperlipidemia     Hypertension     Lactose intolerance     Legally blind in right eye, as defined in [place]     Osteoporosis, post-menopausal     PAD (peripheral artery disease) 07/11/2018    Pneumonia 12/13/2015    Proximal muscle weakness     Renal cyst     left kidney    Severe protein-calorie malnutrition 10/28/2024    Vitamin D deficiency disease        Past Surgical History:   Procedure Laterality Date    ADENOIDECTOMY      BREAST BIOPSY      left breast    CARDIAC CATHETERIZATION  01/14/2010    mid LAD, 40% stenosis; LCX, luminal irregularities;                 CATARACT EXTRACTION W/  INTRAOCULAR LENS IMPLANT Left 05/22/2013    COMBINED WITH TRAB ()    COLONOSCOPY N/A 2/6/2018    Procedure: COLONOSCOPY;  Surgeon: Rufus Villela MD;  Location: [facility] ([address] [place] [address]);  Service: Endoscopy;  Laterality: N/A;    EDTA CHELATION Left 2/14    OS ()    EYE SURGERY      FEMUR FRACTURE SURGERY Left 05/09/2016    FRACTURE SURGERY  2016    TEAR DUCT SURGERY      right eye    TONSILLECTOMY      TRABECULECTOMY Left 05/22/2013    WITH EXPRESS MINI SHUNT AND CE ()    UPPER GASTROINTESTINAL ENDOSCOPY      YAG CAPSULOTOMY Left 06/22/2017           Review of patient's allergies indicates:   Allergen Reactions    Strawberries [strawberry]      Blood pressure elevation    Chocolate flavor      Causes acid reflux    Atorvastatin      Myalgias    Pcn [penicillins] Rash    Pravastatin      Myalgias    Sulfa (sulfonamide antibiotics) Itching       No current facility-administered medications on file prior to encounter.     Current Outpatient Medications on File Prior to Encounter   Medication Sig    acetaminophen (TYLENOL) 325 MG tablet Take 2 tablets  (650 mg total) by mouth every 6 (six) hours as needed. (Patient taking differently: Take 650 mg by mouth every 6 (six) hours as needed for Pain.)    amLODIPine (NORVASC) 5 MG tablet Take 5 mg by mouth once daily.    aspirin 81 mg Tab Take 81 mg by mouth every evening.    bimatoprost (LUMIGAN) 0.01 % Drop Place 1 drop into both eyes every evening. Can use every evening at about 6 pm    brimonidine 0.1% (ALPHAGAN P) 0.1 % Drop Place 1 drop into the right eye 3 (three) times daily.    calcium carbonate (OS-KACI) 600 mg (1,500 mg) Tab Take 600 mg by mouth 2 (two) times daily with meals.    carboxymethylcellulose sodium 1 % Drop Place 1 drop into both eyes every 6 (six) hours as needed (comfort).    ergocalciferol (ERGOCALCIFEROL) 50,000 unit Cap TAKE 1 CAPSULE (50,000 UNITS TOTAL) BY MOUTH EVERY 30 DAYS    esomeprazole (NEXIUM) 40 MG capsule TAKE 1 CAPSULE BY MOUTH DAILY AS NEEDED. (Patient taking differently: Take 40 mg by mouth once daily.)    ferrous sulfate 325 (65 FE) MG EC tablet Take 1 tablet (325 mg total) by mouth once daily.    melatonin (MELATIN) 3 mg tablet Take 2 tablets (6 mg total) by mouth nightly as needed for Insomnia.    methocarbamoL (ROBAXIN) 500 MG Tab Take 500 mg by mouth 3 (three) times daily as needed (muscle spasms).    multivitamin (THERAGRAN) per tablet Take 1 tablet by mouth once daily.    mupirocin (BACTROBAN) 2 % ointment Apply topically every evening. Left great toenail bed    pilocarpine HCL 4% (PILOCAR) 4 % ophthalmic solution Place 1 drop into the right eye 3 (three) times daily.    PROLIA 60 mg/mL Syrg Inject 60 mg into the skin every 6 (six) months.    senna-docusate 8.6-50 mg (PERICOLACE) 8.6-50 mg per tablet Take 1 tablet by mouth 2 (two) times daily.    timolol maleate 0.5% (TIMOPTIC) 0.5 % Drop Place 1 drop into both eyes every morning.    valsartan (DIOVAN) 320 MG tablet Take 1 tablet (320 mg total) by mouth once daily.    [DISCONTINUED] rosuvastatin (CRESTOR) 10 MG tablet Take 1  tablet (10 mg total) by mouth once daily.    [DISCONTINUED] timolol maleate 0.5% (TIMOPTIC) 0.5 % Drop Place 1 drop into both eyes every morning. for 1 dose     Family History       Problem Relation (Age of Onset)    Alzheimer's disease Father    Arthritis Sister    Asthma Sister    Colon cancer Other (44)    Diabetes Brother, Daughter    Glaucoma Mother    Heart disease Mother, Brother    Hyperlipidemia Brother    Hypertension Father, Brother, Son, Daughter    Osteoporosis Sister, Mother, Sister    Peripheral vascular disease Father, Brother    Rheum arthritis Sister, Brother    Stroke Brother          Tobacco Use    Smoking status: Never    Smokeless tobacco: Never   Substance and Sexual Activity    Alcohol use: No    Drug use: No    Sexual activity: Not Currently     Review of Systems   Constitutional:  Positive for appetite change and unexpected weight change.   HENT: Negative.     Eyes: Negative.    Respiratory: Negative.     Cardiovascular: Negative.    Gastrointestinal: Negative.    Genitourinary:  Positive for dysuria.   Musculoskeletal:  Positive for arthralgias and joint swelling.   Skin:  Positive for rash.   Neurological:  Positive for weakness.   Psychiatric/Behavioral: Negative.       Objective:     Vital Signs (Most Recent):  Temp: 97.1 °F (36.2 °C) (04/16/25 1854)  Pulse: 97 (04/16/25 2232)  Resp: 13 (04/16/25 2232)  BP: (!) 188/81 (04/16/25 2232)  SpO2: 100 % (04/16/25 2232) Vital Signs (24h Range):  Temp:  [97.1 °F (36.2 °C)] 97.1 °F (36.2 °C)  Pulse:  [] 97  Resp:  [13-20] 13  SpO2:  [97 %-100 %] 100 %  BP: (170-214)/() 188/81     Weight: 36.3 kg (80 lb)  Body mass index is 14.17 kg/m².     Physical Exam  HENT:      Head: Normocephalic and atraumatic.      Nose: Nose normal.      Mouth/Throat:      Mouth: Mucous membranes are dry.   Eyes:      Extraocular Movements: Extraocular movements intact.      Pupils: Pupils are equal, round, and reactive to light.   Cardiovascular:      Rate  and Rhythm: Regular rhythm.   Pulmonary:      Breath sounds: Normal breath sounds.   Abdominal:      Palpations: Abdomen is soft.   Musculoskeletal:      Cervical back: Neck supple.      Comments: Rt knee tenderness.    Skin:     General: Skin is dry.   Neurological:      General: No focal deficit present.      Mental Status: She is alert.   Psychiatric:         Mood and Affect: Mood normal.              CRANIAL NERVES     CN III, IV, VI   Pupils are equal, round, and reactive to light.       Significant Labs: All pertinent labs within the past 24 hours have been reviewed.  Recent Lab Results         04/16/25 2225 04/16/25 1924 04/16/25 1912        Performed By:   [facility]         Specimen source   Venous         Albumin     2.5       ALP     105       ALT     13       Anion Gap     12       Appearance, UA     Clear       AST     17       Bacteria, UA     Occasional       Baso #     0.05       Basophil %     0.6       Bilirubin (UA)     Negative       BILIRUBIN TOTAL     0.2  Comment: For infants and newborns, interpretation of results should be based   on gestational age, weight and in agreement with clinical   observations.    Premature Infant recommended reference ranges:   0-24 hours:  <8.0 mg/dL   24-48 hours: <12.0 mg/dL   3-5 days:    <15.0 mg/dL   6-29 days:   <15.0 mg/dL       BUN     29       Calcium     10.0       Chloride     102       CO2     27       Color, UA     Yellow       Creatinine     0.7       eGFR     >60  Comment: Estimated GFR calculated using the CKD-EPI creatinine (2021) equation.       Eos #     0.09       Eos %     1.0       FiO2   21.0         Glucose     177       Glucose, UA     Negative       Gran # (ANC)     6.85       Hematocrit     33.3       Hemoglobin     10.7       Extra Tube     Hold for add-ons.  Comment: Auto resulted.          Hyaline Casts, UA     0       Immature Grans (Abs)     0.03  Comment: Mild elevation in immature granulocytes is non specific and can be seen  in a variety of conditions including stress response, acute inflammation, trauma and pregnancy. Correlation with other laboratory and clinical findings is essential.       Immature Granulocytes     0.3       Ketones, UA     Negative       Lactic Acid Level 1.1     2.3       Leukocyte Esterase, UA     1+       Lymph #     1.07       Lymph %     12.5       Magnesium      1.9       MCH     28.6       MCHC     32.1       MCV     89       Microscopic Comment       Comment: Other formed elements not mentioned in the report are not present in the microscopic examination.       Mono #     0.49       Mono %     5.7       MPV     10.5       Neut %     79.9       NITRITE UA     Positive       nRBC     0       Blood, UA     1+       pH, UA     8.0       Platelet Count     397       POC Lactate   1.7         Potassium     3.7       PROTEIN TOTAL     7.6       Protein, UA     1+  Comment: Recommend a 24 hour urine protein or a urine protein/creatinine ratio if globulin induced proteinuria is clinically suspected.       RBC     3.74       RBC, UA     8       RDW     13.3       Sodium     141       Spec Grav UA     1.010       Squam Epithel, UA     <1       Urobilinogen, UA     Negative       WBC, UA     9       WBC     8.58               Significant Imaging: I have reviewed all pertinent imaging results/findings within the past 24 hours.

## 2025-04-17 NOTE — ED NOTES
Called report for pt. Nurse verbalized understanding. No questions at this moment. Nurse states pt cannot come to floor until BP is below 180

## 2025-04-18 PROBLEM — L89.619 DECUBITUS ULCER OF HEEL, BILATERAL: Status: ACTIVE | Noted: 2025-04-18

## 2025-04-18 PROBLEM — L89.629 DECUBITUS ULCER OF HEEL, BILATERAL: Status: ACTIVE | Noted: 2025-04-18

## 2025-04-18 LAB
ABSOLUTE EOSINOPHIL (OHS): 0.18 K/UL
ABSOLUTE MONOCYTE (OHS): 0.61 K/UL (ref 0.3–1)
ABSOLUTE NEUTROPHIL COUNT (OHS): 5.15 K/UL (ref 1.8–7.7)
ANION GAP (OHS): 6 MMOL/L (ref 8–16)
BASOPHILS # BLD AUTO: 0.05 K/UL
BASOPHILS NFR BLD AUTO: 0.7 %
BUN SERPL-MCNC: 18 MG/DL (ref 8–23)
CALCIUM SERPL-MCNC: 8.9 MG/DL (ref 8.7–10.5)
CHLORIDE SERPL-SCNC: 110 MMOL/L (ref 95–110)
CO2 SERPL-SCNC: 24 MMOL/L (ref 23–29)
CREAT SERPL-MCNC: 0.6 MG/DL (ref 0.5–1.4)
ERYTHROCYTE [DISTWIDTH] IN BLOOD BY AUTOMATED COUNT: 13.7 % (ref 11.5–14.5)
GFR SERPLBLD CREATININE-BSD FMLA CKD-EPI: >60 ML/MIN/1.73/M2
GLUCOSE SERPL-MCNC: 91 MG/DL (ref 70–110)
HCT VFR BLD AUTO: 32 % (ref 37–48.5)
HGB BLD-MCNC: 10.2 GM/DL (ref 12–16)
IMM GRANULOCYTES # BLD AUTO: 0.05 K/UL (ref 0–0.04)
IMM GRANULOCYTES NFR BLD AUTO: 0.7 % (ref 0–0.5)
LYMPHOCYTES # BLD AUTO: 1.34 K/UL (ref 1–4.8)
MCH RBC QN AUTO: 28.7 PG (ref 27–31)
MCHC RBC AUTO-ENTMCNC: 31.9 G/DL (ref 32–36)
MCV RBC AUTO: 90 FL (ref 82–98)
NUCLEATED RBC (/100WBC) (OHS): 0 /100 WBC
PLATELET # BLD AUTO: 385 K/UL (ref 150–450)
PMV BLD AUTO: 9.5 FL (ref 9.2–12.9)
POTASSIUM SERPL-SCNC: 4.1 MMOL/L (ref 3.5–5.1)
RBC # BLD AUTO: 3.55 M/UL (ref 4–5.4)
RELATIVE EOSINOPHIL (OHS): 2.4 %
RELATIVE LYMPHOCYTE (OHS): 18.2 % (ref 18–48)
RELATIVE MONOCYTE (OHS): 8.3 % (ref 4–15)
RELATIVE NEUTROPHIL (OHS): 69.7 % (ref 38–73)
SODIUM SERPL-SCNC: 140 MMOL/L (ref 136–145)
WBC # BLD AUTO: 7.38 K/UL (ref 3.9–12.7)

## 2025-04-18 PROCEDURE — 97530 THERAPEUTIC ACTIVITIES: CPT | Mod: HCNC

## 2025-04-18 PROCEDURE — 96376 TX/PRO/DX INJ SAME DRUG ADON: CPT

## 2025-04-18 PROCEDURE — 99203 OFFICE O/P NEW LOW 30 MIN: CPT | Mod: HCNC,GC,, | Performed by: PODIATRIST

## 2025-04-18 PROCEDURE — 25000003 PHARM REV CODE 250: Mod: HCNC | Performed by: NURSE PRACTITIONER

## 2025-04-18 PROCEDURE — 63600175 PHARM REV CODE 636 W HCPCS: Mod: HCNC | Performed by: FAMILY MEDICINE

## 2025-04-18 PROCEDURE — 97110 THERAPEUTIC EXERCISES: CPT | Mod: HCNC

## 2025-04-18 PROCEDURE — 25000003 PHARM REV CODE 250: Mod: HCNC | Performed by: FAMILY MEDICINE

## 2025-04-18 PROCEDURE — 85025 COMPLETE CBC W/AUTO DIFF WBC: CPT | Mod: HCNC | Performed by: FAMILY MEDICINE

## 2025-04-18 PROCEDURE — 97162 PT EVAL MOD COMPLEX 30 MIN: CPT | Mod: HCNC

## 2025-04-18 PROCEDURE — G0378 HOSPITAL OBSERVATION PER HR: HCPCS | Mod: HCNC

## 2025-04-18 PROCEDURE — 82310 ASSAY OF CALCIUM: CPT | Mod: HCNC | Performed by: FAMILY MEDICINE

## 2025-04-18 PROCEDURE — 36415 COLL VENOUS BLD VENIPUNCTURE: CPT | Mod: HCNC | Performed by: FAMILY MEDICINE

## 2025-04-18 RX ORDER — OXYCODONE HYDROCHLORIDE 5 MG/1
5 TABLET ORAL EVERY 6 HOURS PRN
Status: COMPLETED | OUTPATIENT
Start: 2025-04-18 | End: 2025-04-21

## 2025-04-18 RX ADMIN — ASPIRIN 81 MG: 81 TABLET, COATED ORAL at 08:04

## 2025-04-18 RX ADMIN — VALSARTAN 320 MG: 160 TABLET, FILM COATED ORAL at 08:04

## 2025-04-18 RX ADMIN — CALCIUM CARBONATE (ANTACID) CHEW TAB 500 MG 500 MG: 500 CHEW TAB at 08:04

## 2025-04-18 RX ADMIN — DICLOFENAC SODIUM 4 G: 10 GEL TOPICAL at 08:04

## 2025-04-18 RX ADMIN — BRIMONIDINE TARTRATE 1 DROP: 1.5 SOLUTION OPHTHALMIC at 08:04

## 2025-04-18 RX ADMIN — DICLOFENAC SODIUM 4 G: 10 GEL TOPICAL at 05:04

## 2025-04-18 RX ADMIN — ACETAMINOPHEN 650 MG: 325 TABLET ORAL at 06:04

## 2025-04-18 RX ADMIN — BRIMONIDINE TARTRATE 1 DROP: 1.5 SOLUTION OPHTHALMIC at 05:04

## 2025-04-18 RX ADMIN — FERROUS SULFATE TAB 325 MG (65 MG ELEMENTAL FE) 1 EACH: 325 (65 FE) TAB at 08:04

## 2025-04-18 RX ADMIN — ACETAMINOPHEN 650 MG: 325 TABLET ORAL at 05:04

## 2025-04-18 RX ADMIN — CEFTRIAXONE SODIUM 1 G: 1 INJECTION, POWDER, FOR SOLUTION INTRAMUSCULAR; INTRAVENOUS at 08:04

## 2025-04-18 RX ADMIN — AMLODIPINE BESYLATE 5 MG: 5 TABLET ORAL at 08:04

## 2025-04-18 NOTE — ASSESSMENT & PLAN NOTE
Mj Summers is a 89 y.o. female with b/l decubitus ulcers of heel, both stable. No concerns for acute infection. Small fluid filled blister of right 5th mpj, stable as well. Did not de-roof due to patients overall morbidity. Wbc wnl, VSS, patient resting comfortably in bed,    - b/l foot xray ordered, rule out deep infection. Low suspicion.  - B/l foot wounds dressed, orders in  - Offload heels aggressively with offloading boots.   - Podiatry will sign off, patient stable, no acute surgical intervention indicated.     Future discharge recommendations  - Recommend patient to follow up with Podiatry in wound care.   - home health or skilled nursing facility to change dressings 3 times weekly as follows: Please cleanse b/l foot wounds, then apply betadine paint, and mepilex border, then offload heels.   - all other care per primary.

## 2025-04-18 NOTE — SUBJECTIVE & OBJECTIVE
Interval History:  Seen at the bedside.  No distress noted.  She was given ceftriaxone for concerns for UTI.  Urine culture did not reflex.  PT/OT was ordered.    Review of Systems   Constitutional:  Positive for activity change and unexpected weight change. Negative for appetite change.   HENT:  Negative for trouble swallowing.    Respiratory:  Negative for shortness of breath.    Cardiovascular:  Negative for chest pain.   Gastrointestinal:  Negative for constipation, diarrhea, nausea and vomiting.   Genitourinary:  Negative for dysuria.   Musculoskeletal:  Negative for arthralgias and joint swelling.   Skin:  Positive for rash.   Neurological:  Positive for weakness.   Psychiatric/Behavioral:  Negative for confusion. The patient is not nervous/anxious.      Objective:     Vital Signs (Most Recent):  Temp: 98.5 °F (36.9 °C) (04/18/25 1113)  Pulse: 83 (04/18/25 1113)  Resp: 18 (04/18/25 1113)  BP: (!) 175/74 (04/18/25 1113)  SpO2: 100 % (04/18/25 1113) Vital Signs (24h Range):  Temp:  [98.3 °F (36.8 °C)-98.9 °F (37.2 °C)] 98.5 °F (36.9 °C)  Pulse:  [] 83  Resp:  [17-18] 18  SpO2:  [95 %-100 %] 100 %  BP: (116-175)/(57-74) 175/74     Weight: 35.5 kg (78 lb 4.2 oz)  Body mass index is 13.86 kg/m².     Physical Exam  HENT:      Head: Normocephalic and atraumatic.      Nose: Nose normal.      Mouth/Throat:      Mouth: Mucous membranes are dry.   Eyes:      Extraocular Movements: Extraocular movements intact.      Pupils: Pupils are equal, round, and reactive to light.   Cardiovascular:      Rate and Rhythm: Regular rhythm.   Pulmonary:      Breath sounds: Normal breath sounds.   Abdominal:      Palpations: Abdomen is soft.   Musculoskeletal:      Cervical back: Neck supple.      Comments: Rt knee tenderness.    Skin:     General: Skin is dry.   Neurological:      General: No focal deficit present.      Mental Status: She is alert.   Psychiatric:         Mood and Affect: Mood normal.              CRANIAL NERVES      CN III, IV, VI   Pupils are equal, round, and reactive to light.       Significant Labs: All pertinent labs within the past 24 hours have been reviewed.  Recent Lab Results         04/18/25  0237   04/17/25  1426        Anion Gap 6         Baso # 0.05         Basophil % 0.7         BUN 18         Calcium 8.9         Chloride 110         CO2 24         Creatinine 0.6         eGFR >60  Comment: Estimated GFR calculated using the CKD-EPI creatinine (2021) equation.         Eos # 0.18         Eos % 2.4         Glucose 91         Gran # (ANC) 5.15         Hematocrit 32.0         Hemoglobin 10.2         Immature Grans (Abs) 0.05  Comment: Mild elevation in immature granulocytes is non specific and can be seen in a variety of conditions including stress response, acute inflammation, trauma and pregnancy. Correlation with other laboratory and clinical findings is essential.         Immature Granulocytes 0.7         Lymph # 1.34         Lymph % 18.2         MCH 28.7         MCHC 31.9         MCV 90         Mono # 0.61         Mono % 8.3         MPV 9.5         Neut % 69.7         nRBC 0         Platelet Count 385         Potassium 4.1   4.1       RBC 3.55         RDW 13.7         Sodium 140         WBC 7.38                 Significant Imaging: I have reviewed all pertinent imaging results/findings within the past 24 hours.

## 2025-04-18 NOTE — PLAN OF CARE
Recommendation:  1. Encourage intake of meals & Boost as tolerated.   2. Monitor weight/labs.   3. RD to follow to monitor po intake    Goals:   Pt will tolerate diet with at leat 50-75% intake at meals by RD follow up  Nutrition Goal Status: new

## 2025-04-18 NOTE — PLAN OF CARE
Patient is awake and alert. Patient given medications as ordered per MAR. Cardiac monitoring in place. Pure wick in place. PRN Tylenol for pain. Patient worked with PT. X- Ray of foot completed. Wound care performed Betadine applied to Bilateral heels. Z flex boots in place. Waffle mattress in place. Waffle mattress in place. Bed alarm set. Instructed to use call light for assistance.     Problem: Adult Inpatient Plan of Care  Goal: Plan of Care Review  Outcome: Progressing     Problem: Fall Injury Risk  Goal: Absence of Fall and Fall-Related Injury  Outcome: Progressing     Problem: Comorbidity Management  Goal: Blood Pressure in Desired Range  Outcome: Progressing

## 2025-04-18 NOTE — CONSULTS
"[facility] Surg  Podiatry  Consult Note    Patient Name: Mj Summers  MRN: [id number]  Admission Date: 4/16/2025  Hospital Length of Stay: 0 days  Attending Physician: Ramona Sarabia*  Primary Care Provider: Shasta Urbina MD     Inpatient consult to Podiatry  Consult performed by: Vicente Marcano DPM  Consult ordered by: Justen Pardo NP  Reason for consult: b/l foot wounds        Subjective:     History of Present Illness:  "Patient is a 89-year-old female who is a nursing home resident at [facility] with a past medical history of CAD, HTN, HLD, GERD, PAD and CKD-III, who presented due to complaints of weakness and declining health.. Pt states "they sent me here because I was resting while looking out the window and I guess they wanted to have me checked out for that". Pts only complaint is foul smelling urine. Pt states she has noticed this over the past few days. In ER urine analysis was nitrite positive. Chest x-ray showed no acute abnormality. Due to patient's presenting symptoms she will require admission for further evaluation and management. At time of my examination patient denied any headache, fever, chills, chest pain or shortness of breath but complained of right knee pain and losing weight. "    Patient is pleasant elderly frail 89 yo with b/l foot wounds for which podiatry is consulted. She states they are stable and not concerning. Has contracted right knee for years. No drainage for which she has noticed, albeit she can't really see her wounds.     Scheduled Meds:   amLODIPine  5 mg Oral Daily    aspirin  81 mg Oral QHS    brimonidine 0.15 % OPTH DROP  1 drop Right Eye TID    calcium carbonate  500 mg Oral Daily    cefTRIAXone (Rocephin) IV (PEDS and ADULTS)  1 g Intravenous Q24H    diclofenac sodium  4 g Topical (Top) TID    ferrous sulfate  1 tablet Oral Daily    valsartan  320 mg Oral Daily     Continuous Infusions:  PRN Meds:  Current Facility-Administered " Medications:     acetaminophen, 650 mg, Oral, Q6H PRN    dextrose 50%, 12.5 g, Intravenous, PRN    dextrose 50%, 25 g, Intravenous, PRN    glucagon (human recombinant), 1 mg, Intramuscular, PRN    glucose, 16 g, Oral, PRN    glucose, 24 g, Oral, PRN    melatonin, 6 mg, Oral, Nightly PRN    naloxone, 0.02 mg, Intravenous, PRN    ondansetron, 4 mg, Intravenous, Q8H PRN    oxyCODONE, 5 mg, Oral, Q6H PRN    sodium chloride 0.9%, 10 mL, Intravenous, Q12H PRN    Review of patient's allergies indicates:   Allergen Reactions    Strawberries [strawberry]      Blood pressure elevation    Chocolate flavor      Causes acid reflux    Atorvastatin      Myalgias    Pcn [penicillins] Rash    Pravastatin      Myalgias    Sulfa (sulfonamide antibiotics) Itching        Past Medical History:   Diagnosis Date    Anemia     Arthritis 2015    back    Cataract     s/p surgery    Coronary artery disease     [facility] 1/2010 - mid LAD 40%    GERD (gastroesophageal reflux disease)     Glaucoma (increased eye pressure)     Hyperlipidemia     Hypertension     Lactose intolerance     Legally blind in right eye, as defined in [place]     Osteoporosis, post-menopausal     PAD (peripheral artery disease) 07/11/2018    Pneumonia 12/13/2015    Proximal muscle weakness     Renal cyst     left kidney    Severe protein-calorie malnutrition 10/28/2024    Vitamin D deficiency disease      Past Surgical History:   Procedure Laterality Date    ADENOIDECTOMY      BREAST BIOPSY      left breast    CARDIAC CATHETERIZATION  01/14/2010    mid LAD, 40% stenosis; LCX, luminal irregularities;                 CATARACT EXTRACTION W/  INTRAOCULAR LENS IMPLANT Left 05/22/2013    COMBINED WITH TRAB ()    COLONOSCOPY N/A 2/6/2018    Procedure: COLONOSCOPY;  Surgeon: Rufus Villela MD;  Location: [address]);  Service: Endoscopy;  Laterality: N/A;    EDTA CHELATION Left 2/14    OS ()    EYE SURGERY      FEMUR FRACTURE SURGERY Left 05/09/2016     FRACTURE SURGERY  2016    TEAR DUCT SURGERY      right eye    TONSILLECTOMY      TRABECULECTOMY Left 05/22/2013    WITH EXPRESS MINI SHUNT AND CE ()    UPPER GASTROINTESTINAL ENDOSCOPY      YAG CAPSULOTOMY Left 06/22/2017           Family History       Problem Relation (Age of Onset)    Alzheimer's disease Father    Arthritis Sister    Asthma Sister    Colon cancer Other (44)    Diabetes Brother, Daughter    Glaucoma Mother    Heart disease Mother, Brother    Hyperlipidemia Brother    Hypertension Father, Brother, Son, Daughter    Osteoporosis Sister, Mother, Sister    Peripheral vascular disease Father, Brother    Rheum arthritis Sister, Brother    Stroke Brother          Tobacco Use    Smoking status: Never    Smokeless tobacco: Never   Substance and Sexual Activity    Alcohol use: No    Drug use: No    Sexual activity: Not Currently     Review of Systems   Constitutional:  Positive for activity change and unexpected weight change. Negative for appetite change.   HENT:  Negative for trouble swallowing.    Respiratory:  Negative for shortness of breath.    Cardiovascular:  Negative for chest pain.   Gastrointestinal:  Negative for constipation, diarrhea, nausea and vomiting.   Genitourinary:  Negative for dysuria.   Musculoskeletal:  Negative for arthralgias and joint swelling.   Skin:  Positive for rash and wound.   Neurological:  Positive for weakness.   Psychiatric/Behavioral:  Negative for confusion. The patient is not nervous/anxious.      Objective:     Vital Signs (Most Recent):  Temp: 98.5 °F (36.9 °C) (04/18/25 1113)  Pulse: 81 (04/18/25 1252)  Resp: 18 (04/18/25 1113)  BP: (!) 175/74 (04/18/25 1113)  SpO2: 100 % (04/18/25 1113) Vital Signs (24h Range):  Temp:  [98.3 °F (36.8 °C)-98.9 °F (37.2 °C)] 98.5 °F (36.9 °C)  Pulse:  [] 81  Resp:  [17-18] 18  SpO2:  [95 %-100 %] 100 %  BP: (116-175)/(57-74) 175/74     Weight: 35.5 kg (78 lb 4.2 oz)  Body mass index is 13.86  "kg/m².    Foot Exam    General  Orientation: (Able to communicate well, answer questions appropriately.)      Right Foot/Ankle     Inspection and Palpation  Skin Exam: skin changes and ulcer; no drainage, no cellulitis and no erythema     Comments  Right foot:5th mpj blister: no surrounding erythema, edema. No malodor, negative probe to bone.     Heel ulcer: partial thickness, granular fibrous base. Negative probe to bone, no malodor, drainage, crepitus or fluctuance.     Left Foot/Ankle      Inspection and Palpation  Skin Exam: ulcer;     Comments  Left foot: Heel ulcer: partial thickness, fibrous base, stable, no erythema, edema, negative probe to bone.                 Laboratory:  A1C: No results for input(s): "HGBA1C" in the last 4320 hours.  CBC:   Recent Labs   Lab 04/18/25  0237   WBC 7.38   RBC 3.55*   HGB 10.2*   HCT 32.0*      MCV 90   MCH 28.7   MCHC 31.9*     CMP:   Recent Labs   Lab 04/16/25 1912 04/17/25  0248 04/18/25  0237   CALCIUM 10.0   < > 8.9   ALBUMIN 2.5*  --   --       < > 140   K 3.7   < > 4.1   CO2 27   < > 24      < > 110   BUN 29*   < > 18   CREATININE 0.7   < > 0.6   ALKPHOS 105  --   --    ALT 13  --   --    AST 17  --   --    BILITOT 0.2  --   --     < > = values in this interval not displayed.     CRP: No results for input(s): "CRP" in the last 168 hours.  ESR: No results for input(s): "SEDRATE" in the last 168 hours.  Wound Cultures: No results for input(s): "LABAERO" in the last 4320 hours.  Microbiology Results (last 7 days)       Procedure Component Value Units Date/Time    Blood culture x two cultures. Draw prior to antibiotics. [[id number]]  (Normal) Collected: 04/16/25 1912    Order Status: Completed Specimen: Blood from Peripheral, Antecubital, Left Updated: 04/18/25 0302     Blood Culture No Growth After 24 Hours    Blood culture x two cultures. Draw prior to antibiotics. [[id number]]  (Normal) Collected: 04/16/25 1920    Order Status: Completed Specimen: " Blood Updated: 04/18/25 0302     Blood Culture No Growth After 24 Hours    Blood Culture #1 **CANNOT BE ORDERED STAT** [[id number]]     Order Status: Sent Specimen: Blood           Specimen (24h ago, onward)      None            Diagnostic Results:  I have reviewed all pertinent imaging results/findings within the past 24 hours.  Assessment/Plan:     Orthopedic  Decubitus ulcer of heel, bilateral  Mj Summers is a 89 y.o. female with b/l decubitus ulcers of heel, both stable. No concerns for acute infection. Small fluid filled blister of right 5th mpj, stable as well. Did not de-roof due to patients overall morbidity. Wbc wnl, VSS, patient resting comfortably in bed,    - b/l foot xray ordered, rule out deep infection. Low suspicion.  - B/l foot wounds dressed, orders in  - Offload heels aggressively with offloading boots.   - Podiatry will sign off, patient stable, no acute surgical intervention indicated.     Future discharge recommendations  - Recommend patient to follow up with Podiatry in wound care.   - home health or skilled nursing facility to change dressings 3 times weekly as follows: Please cleanse b/l foot wounds, then apply betadine paint, and mepilex border, then offload heels.   - all other care per primary.          Thank you for your consult. I will sign off. Please contact us if you have any additional questions.    Vicente Marcano DPM  Podiatry  [facility]

## 2025-04-18 NOTE — ASSESSMENT & PLAN NOTE
Nutrition consulted. Most recent weight and BMI monitored-     Measurements:  Wt Readings from Last 1 Encounters:   04/17/25 35.5 kg (78 lb 4.2 oz)   Body mass index is 13.86 kg/m².    Patient has been screened and assessed by RD.    Malnutrition Type:  Context:    Level:      Malnutrition Characteristic Summary:       Interventions/Recommendations (treatment strategy):

## 2025-04-18 NOTE — PT/OT/SLP EVAL
Physical Therapy Evaluation and Discharge Note    Patient Name:  Mj Summers   MRN:  [id number]    Recommendations:     Discharge Recommendations: No Therapy Indicated  Discharge Equipment Recommendations:  (per NH)   Barriers to discharge: None    Assessment:     Mj Summers is a 89 y.o. female admitted with a medical diagnosis of Complicated UTI (urinary tract infection). .  At this time, patient is functioning at their prior level of function and does not require further acute PT services. Pt presents with 120-140 deg R knee flexion contracture. Pt is totalA/MaxA for all bed mobility and transfers. Pt requires ModA-CGA for static sitting balance EOB due to pt posteriorly leaning/pushing and decreased command following to maintain with L knee flexed and L foot placed on ground (L knee in extension). Pt is currently not appropriate for skilled acute PT services. She is dependent for all ADL's and is totalA/MaxA for transfers. In order to attempt return to safe standing/ambulating, pt would need to attempt correcting severe R knee flexion contracture which will require Botox injections, progressive dynasplint bracing, and PT post injections for further stretching. Her current PT prognosis is poor without this course of treatment and there is concern for her ability to tolerate given her age, cognition, and poor potential for carryover/learning. Would recommend a palliative consult to discuss goals for care.    Recent Surgery: * No surgery found *      Plan:     During this hospitalization, patient does not require further acute PT services.  Please re-consult if situation changes.      Subjective     Chief Complaint: RLE and B heel pain  Patient/Family Comments/goals: pain reduction on RLE   Pain/Comfort:  Pain Rating 1: 10/10  Location - Side 1: Right  Location - Orientation 1: generalized  Location 1: leg (and heels)  Pain Addressed 1: Reposition, Distraction, Cessation of Activity, Nurse notified,  Pre-medicate for activity  Pain Rating Post-Intervention 1: 10/10    Patients cultural, spiritual, Druze conflicts given the current situation: no    Living Environment:  Pt is a resident at [facility]  Prior to admission, patients level of function was mostly bed bound, sits up in wheelchair as able, and is dependent for all ADL's.  Equipment used at home:  (per NH).  DME owned (not currently used): none.  Upon discharge, patient will have assistance from NH staff.    Objective:     Communicated with nsg prior to session.  Patient found HOB elevated with peripheral IV, pressure relief boots, telemetry upon PT entry to room.    General Precautions: Standard, fall    Orthopedic Precautions:N/A   Braces: N/A  Respiratory Status: Room air    Exams:  Cognitive Exam:  Patient is oriented to Person, Place, and month, somewhat situation, difficulty following commands and requires max cues to achieve desired task/action  Postural Exam:  Patient presented with the following abnormalities:    -       Rounded shoulders  -       Forward head  -       Kyphosis  Skin Integrity/Edema:      -       Skin integrity: Wound b heels, R lateral foot, and R hip, see wound care note  BUE ROM: Deficits: B shoulder flexion to ~ deg, elbow/wrist/hand WFL  BUE Strength: grossly 3+/5 except for B shoulder grossly 2+/5  RLE ROM: Deficits: R knee PROM ~120 deg with max cues to relax R hip/knee with poor tolerance, 140 deg of flexion at rest and with increased tension / pain, increased hip flexion noted as well, pt only able to relax R hip to 90 deg, ankle Df ~neutral  RLE Strength: muscle activation and minimal AROM noted grossly 1 to 2-/5, limited by pain and contractures  LLE ROM: WFL  LLE Strength: grossly 3+ to 4-/5    Functional Mobility:  Bed Mobility:     Rolling Left:  minimum assistance  Rolling Right: maximal assistance  Scooting: maximal assistance and of 2 persons  Supine to Sit: maximal assistance, HOB elevated, use of  bed rail, increased time and effort, vc's for effective technique  Sit to Supine: total assistance    AM-PAC 6 CLICK MOBILITY  Total Score:8       Treatment and Education:  Pt educated on role of PT/POC and pt agreeable to participate in therapy session  Pt is totalA/MaxA for all bed mobility and transfers.   Pt requires ModA-CGA for static sitting balance EOB due to pt posteriorly leaning/pushing and decreased command following to maintain with L knee flexed and L foot placed on ground (L knee in extension).   Pt progressed to CGA for sitting balance with max cues and assist for positioning and BUE support on bed rails, max assist for LLE placement on ground and muscle relaxation technique to attempt relaxing RLE and increasing R hip ext and knee ext to attempt placing R foot on ground with poor carryover and success, pt continuing to flex RLE in pain  Positioned pt in sidelying for pressure relief  Pressure relief booties in place and waffle inflated  Pt is functioning at their baseline level of functional mobility and does not required skilled acute PT services. Recommending turning schedule every 2 hours for pressure relief and PROM which may be performed by nursing staff.    Secure chat sent to team regarding therapy prognosis and recs    AM-PAC 6 CLICK MOBILITY  Total Score:8     Patient left left sidelying with all lines intact, call button in reach, bed alarm on, nsg notified, and AVAsys present.    GOALS:   Multidisciplinary Problems       Physical Therapy Goals          Problem: Physical Therapy    Goal Priority Disciplines Outcome Interventions   Physical Therapy Goal     PT, PT/OT Adequate for Care Transition                        DME Justifications:  No DME recommended requiring DME justifications    History:     Past Medical History:   Diagnosis Date    Anemia     Arthritis 2015    back    Cataract     s/p surgery    Coronary artery disease     [facility] 1/2010 - mid LAD 40%    GERD (gastroesophageal reflux  disease)     Glaucoma (increased eye pressure)     Hyperlipidemia     Hypertension     Lactose intolerance     Legally blind in right eye, as defined in [place]     Osteoporosis, post-menopausal     PAD (peripheral artery disease) 07/11/2018    Pneumonia 12/13/2015    Proximal muscle weakness     Renal cyst     left kidney    Severe protein-calorie malnutrition 10/28/2024    Vitamin D deficiency disease        Past Surgical History:   Procedure Laterality Date    ADENOIDECTOMY      BREAST BIOPSY      left breast    CARDIAC CATHETERIZATION  01/14/2010    mid LAD, 40% stenosis; LCX, luminal irregularities;                 CATARACT EXTRACTION W/  INTRAOCULAR LENS IMPLANT Left 05/22/2013    COMBINED WITH TRAB ()    COLONOSCOPY N/A 2/6/2018    Procedure: COLONOSCOPY;  Surgeon: Rufus Villela MD;  Location: [facility] ([address] [place] [address]);  Service: Endoscopy;  Laterality: N/A;    EDTA CHELATION Left 2/14    OS ()    EYE SURGERY      FEMUR FRACTURE SURGERY Left 05/09/2016    FRACTURE SURGERY  2016    TEAR DUCT SURGERY      right eye    TONSILLECTOMY      TRABECULECTOMY Left 05/22/2013    WITH EXPRESS MINI SHUNT AND CE ()    UPPER GASTROINTESTINAL ENDOSCOPY      YAG CAPSULOTOMY Left 06/22/2017           Time Tracking:     PT Received On: 04/18/25  PT Start Time: 1435     PT Stop Time: 1500  PT Total Time (min): 25 min     Billable Minutes: Evaluation 9, Therapeutic Activity 8, and Therapeutic Exercise 8      04/18/2025

## 2025-04-18 NOTE — PROGRESS NOTES
"[facility] Medicine  Progress Note    Patient Name: Mj Summers  MRN: [id number]  Patient Class: OP- Observation   Admission Date: 4/16/2025  Length of Stay: 0 days  Attending Physician: Ramona Sarabia*  Primary Care Provider: Shasta Urbina MD        Subjective     Principal Problem:Complicated UTI (urinary tract infection)        HPI:  Patient is a 89-year-old female who is a nursing home resident at [facility] with a past medical history of CAD, HTN, HLD, GERD, PAD and CKD-III, who presented due to complaints of weakness and declining health.. Pt states "they sent me here because I was resting while looking out the window and I guess they wanted to have me checked out for that". Pts only complaint is foul smelling urine. Pt states she has noticed this over the past few days. In ER urine analysis was nitrite positive.  Chest x-ray showed no acute abnormality.  Due to patient's presenting symptoms she will require admission for further evaluation and management. At time  of my examination patient denied any headache, fever, chills, chest pain or shortness of breath but complained of right knee pain and losing weight.    Overview/Hospital Course:  No notes on file    Interval History:  Seen at the bedside.  No distress noted.  She was given ceftriaxone for concerns for UTI.  Urine culture did not reflex.  PT/OT was ordered.    Review of Systems   Constitutional:  Positive for activity change and unexpected weight change. Negative for appetite change.   HENT:  Negative for trouble swallowing.    Respiratory:  Negative for shortness of breath.    Cardiovascular:  Negative for chest pain.   Gastrointestinal:  Negative for constipation, diarrhea, nausea and vomiting.   Genitourinary:  Negative for dysuria.   Musculoskeletal:  Negative for arthralgias and joint swelling.   Skin:  Positive for rash.   Neurological:  Positive for weakness.   Psychiatric/Behavioral:  Negative for confusion. " The patient is not nervous/anxious.      Objective:     Vital Signs (Most Recent):  Temp: 98.5 °F (36.9 °C) (04/18/25 1113)  Pulse: 83 (04/18/25 1113)  Resp: 18 (04/18/25 1113)  BP: (!) 175/74 (04/18/25 1113)  SpO2: 100 % (04/18/25 1113) Vital Signs (24h Range):  Temp:  [98.3 °F (36.8 °C)-98.9 °F (37.2 °C)] 98.5 °F (36.9 °C)  Pulse:  [] 83  Resp:  [17-18] 18  SpO2:  [95 %-100 %] 100 %  BP: (116-175)/(57-74) 175/74     Weight: 35.5 kg (78 lb 4.2 oz)  Body mass index is 13.86 kg/m².     Physical Exam  HENT:      Head: Normocephalic and atraumatic.      Nose: Nose normal.      Mouth/Throat:      Mouth: Mucous membranes are dry.   Eyes:      Extraocular Movements: Extraocular movements intact.      Pupils: Pupils are equal, round, and reactive to light.   Cardiovascular:      Rate and Rhythm: Regular rhythm.   Pulmonary:      Breath sounds: Normal breath sounds.   Abdominal:      Palpations: Abdomen is soft.   Musculoskeletal:      Cervical back: Neck supple.      Comments: Rt knee tenderness.    Skin:     General: Skin is dry.   Neurological:      General: No focal deficit present.      Mental Status: She is alert.   Psychiatric:         Mood and Affect: Mood normal.              CRANIAL NERVES     CN III, IV, VI   Pupils are equal, round, and reactive to light.       Significant Labs: All pertinent labs within the past 24 hours have been reviewed.  Recent Lab Results         04/18/25  0237   04/17/25  1426        Anion Gap 6         Baso # 0.05         Basophil % 0.7         BUN 18         Calcium 8.9         Chloride 110         CO2 24         Creatinine 0.6         eGFR >60  Comment: Estimated GFR calculated using the CKD-EPI creatinine (2021) equation.         Eos # 0.18         Eos % 2.4         Glucose 91         Gran # (ANC) 5.15         Hematocrit 32.0         Hemoglobin 10.2         Immature Grans (Abs) 0.05  Comment: Mild elevation in immature granulocytes is non specific and can be seen in a variety of  conditions including stress response, acute inflammation, trauma and pregnancy. Correlation with other laboratory and clinical findings is essential.         Immature Granulocytes 0.7         Lymph # 1.34         Lymph % 18.2         MCH 28.7         MCHC 31.9         MCV 90         Mono # 0.61         Mono % 8.3         MPV 9.5         Neut % 69.7         nRBC 0         Platelet Count 385         Potassium 4.1   4.1       RBC 3.55         RDW 13.7         Sodium 140         WBC 7.38                 Significant Imaging: I have reviewed all pertinent imaging results/findings within the past 24 hours.      Assessment & Plan  Complicated UTI (urinary tract infection)  Admit to medical floor with telemetry.  Check urine cultures.    IV antibiotics.    HTN (hypertension)  Patient's blood pressure range in the last 24 hours was: BP  Min: 116/57  Max: 175/74.The patient's inpatient anti-hypertensive regimen is listed below:  Current Antihypertensives  , Every morning, Both Eyes  valsartan tablet 320 mg, Daily, Oral  amLODIPine tablet 5 mg, Daily, Oral    Plan  - BP is uncontrolled, will adjust as follows:  Resume home dose of antihypertensive agents.  - monitor blood pressure closely.  Knee pain, right    X-ray of the right knee.  Inpatient orthopedic consultation.  Debility  Patient with Acute on chronic debility due to age-related physical debility. The patient's latest AMPAC (Activity Measure for Post Acute Care) Score is listed below.    AM-PAC Score - How much help does the patient need for each activity listed  Basic Mobility Total Score: 12  Turning over in bed (including adjusting bedclothes, sheets and blankets)?: A little  Sitting down on and standing up from a chair with arms (e.g., wheelchair, bedside commode, etc.): A little  Moving from lying on back to sitting on the side of the bed?: A lot  Moving to and from a bed to a chair (including a wheelchair)?: A lot  Need to walk in hospital room?: Unable  Climbing  3-5 steps with a railing?: Unable    Plan  - PT/OT consulted  - fall precautions.        Protein calorie malnutrition  Nutrition consulted. Most recent weight and BMI monitored-     Measurements:  Wt Readings from Last 1 Encounters:   04/17/25 35.5 kg (78 lb 4.2 oz)   Body mass index is 13.86 kg/m².    Patient has been screened and assessed by RD.    Malnutrition Type:  Context:    Level:      Malnutrition Characteristic Summary:       Interventions/Recommendations (treatment strategy):       VTE Risk Mitigation (From admission, onward)           Ordered     IP VTE HIGH RISK PATIENT  Once         04/16/25 2259     Place sequential compression device  Until discontinued         04/16/25 2259                    Discharge Planning   LIDIA: 4/21/2025     Code Status: Full Code   Medical Readiness for Discharge Date:   Discharge Plan A: Return to nursing home                        Justen Pardo NP  Department of Hospital Medicine   [facility] S[facility]

## 2025-04-18 NOTE — CONSULTS
[place] [facility]  Adult Nutrition  Consult Note    SUMMARY     Recommendations    Recommendation:  1. Encourage intake of meals & Boost as tolerated.   2. Monitor weight/labs.   3. RD to follow to monitor po intake    Goals:   Pt will tolerate diet with at leat 50-75% intake at meals by RD follow up  Nutrition Goal Status: new  Communication of RD Recs: reviewed with RN    Nutrition Discharge Planning   Nutrition Discharge Planning: General healthy diet    Assessment and Plan  Endocrine  Protein calorie malnutrition  Malnutrition Type:  Context: acute illness or injury  Level: moderate    Related to (etiology):   Advanced age, dislike of foods at NH    Signs and Symptoms (as evidenced by):   Weight loss, mild/moderate muscle wasting and fat loss, decreased po intake     Malnutrition Characteristic Summary:  Weight Loss (Malnutrition): greater than 10% in 6 months (12% x 6 months)  Energy Intake (Malnutrition): less than or equal to 50% for greater than or equal to 1 month  Subcutaneous Fat (Malnutrition): moderate depletion  Muscle Mass (Malnutrition): moderate depletion    Interventions:  Commercial beverage    Nutrition Diagnosis Status:   New    Malnutrition Assessment  Malnutrition Context: acute illness or injury  Malnutrition Level: moderate  Weight Loss (Malnutrition): greater than 10% in 6 months (12% x 6 months)  Energy Intake (Malnutrition): less than or equal to 50% for greater than or equal to 1 month  Subcutaneous Fat (Malnutrition): moderate depletion  Muscle Mass (Malnutrition): moderate depletion   Orbital Region (Subcutaneous Fat Loss): mild depletion  Upper Arm Region (Subcutaneous Fat Loss): moderate depletion  Thoracic and Lumbar Region: moderate depletion   Jain Region (Muscle Loss): mild depletion  Clavicle and Acromion Bone Region (Muscle Loss): moderate depletion  Patellar Region (Muscle Loss): moderate depletion  Anterior Thigh Region (Muscle Loss): moderate depletion  Posterior Calf  "Region (Muscle Loss): moderate depletion       Reason for Assessment  Reason For Assessment: consult (malnutrition)  Diagnosis:  (complicated UTI)  General Information Comments: Pt admitted from [facility] with complicated UTI. Pt on Regular diet with Boost Plus. Reports good intake at breakfast. Does not like the food at her NH. Noted 12lb weight loss x 6 months. Andres 14- R heel, L heel, R foot, and R hip wounds. NFPE completed today -mild wasting of temples and orbitals, moderate wasting of thoracic region, clavicles, arms, & legs.    Past Medical History:   Diagnosis Date    Anemia     Arthritis     back    Cataract     s/p surgery    Coronary artery disease     [facility] 2010 - mid LAD 40%    GERD (gastroesophageal reflux disease)     Glaucoma (increased eye pressure)     Hyperlipidemia     Hypertension     Lactose intolerance     Legally blind in right eye, as defined in [place]     Osteoporosis, post-menopausal     PAD (peripheral artery disease) 2018    Pneumonia 2015    Proximal muscle weakness     Renal cyst     left kidney    Severe protein-calorie malnutrition 10/28/2024    Vitamin D deficiency disease         Nutrition/Diet History  Food Preferences: no Buddhism or cultural food prefs identified  Factors Affecting Nutritional Intake: None identified at this time    Anthropometrics  Height: 5' 3" (160 cm)  Height (inches): 63 in  Height Method: Stated  Weight: 35.5 kg (78 lb 4.2 oz)  Weight (lb): 78.26 lb  Weight Method: Bed Scale  Ideal Body Weight (IBW), Female: 115 lb  % Ideal Body Weight, Female (lb): 68.05 %  BMI (Calculated): 13.9  BMI Grade: less than 18.5 - underweight  Usual Body Weight (UBW), k.8 kg (10/26)  % Usual Body Weight: 87.19  % Weight Change From Usual Weight: -12.99 %     Lab/Procedures/Meds  Pertinent Labs Reviewed: reviewed  Pertinent Labs Comments: WNL  Pertinent Medications Reviewed: reviewed  Pertinent Medications Comments: aspirin, ferrous " sulfate    Estimated/Assessed Needs  Weight Used For Calorie Calculations: 35.5 kg (78 lb 4.2 oz)  Energy Calorie Requirements (kcal): 1420 (40 kcal/kg)  Energy Need Method: Kcal/kg  Protein Requirements: 53g (1.5g/kg)  Weight Used For Protein Calculations: 35.5 kg (78 lb 4.2 oz)  Estimated Fluid Requirement Method: RDA Method  RDA Method (mL): 1420     Nutrition Prescription Ordered  Current Diet Order: Regular  Oral Nutrition Supplement: Boost Plus    Evaluation of Received Nutrient/Fluid Intake  I/O: 0/760  Energy Calories Required: not meeting needs  Protein Required: not meeting needs  Fluid Required: not meeting needs  Comments: LBM 4/17  % Intake of Estimated Energy Needs: 25 - 50 %  % Meal Intake: 25 - 50 %    Nutrition Risk  Level of Risk/Frequency of Follow-up:  (2xweekly)     Monitor and Evaluation  Monitor and Evaluation: Food and beverage intake, Weight     Nutrition Related Social Determinants of Health: SDOH: Other: resides in NH     Nutrition Follow-Up  RD Follow-up?: Yes

## 2025-04-18 NOTE — PLAN OF CARE
Pt  safety maintained. Pt on RA and continuous cardiac monitoring. Medication administered per MAR. Waffle overlay and zflex boots in place. Pt instructed to call w/any needs, verbalized understanding. Room near nurse station. Bed in lowest position, locked and bed alarms on. Call light w/in pt's reach.     Problem: Adult Inpatient Plan of Care  Goal: Plan of Care Review  Outcome: Progressing  Goal: Patient-Specific Goal (Individualized)  Outcome: Progressing     Problem: Fall Injury Risk  Goal: Absence of Fall and Fall-Related Injury  Outcome: Progressing     Problem: UTI (Urinary Tract Infection)  Goal: Improved Infection Symptoms  Outcome: Progressing     Problem: Wound  Goal: Optimal Coping  Outcome: Progressing  Goal: Optimal Functional Ability  Outcome: Progressing     Problem: Skin Injury Risk Increased  Goal: Skin Health and Integrity  Outcome: Progressing     Problem: Infection  Goal: Absence of Infection Signs and Symptoms  Outcome: Progressing

## 2025-04-18 NOTE — HPI
""Patient is a 89-year-old female who is a nursing home resident at [facility] with a past medical history of CAD, HTN, HLD, GERD, PAD and CKD-III, who presented due to complaints of weakness and declining health.. Pt states "they sent me here because I was resting while looking out the window and I guess they wanted to have me checked out for that". Pts only complaint is foul smelling urine. Pt states she has noticed this over the past few days. In ER urine analysis was nitrite positive. Chest x-ray showed no acute abnormality. Due to patient's presenting symptoms she will require admission for further evaluation and management. At time of my examination patient denied any headache, fever, chills, chest pain or shortness of breath but complained of right knee pain and losing weight. "    Patient is pleasant elderly frail 88 yo with b/l foot wounds for which podiatry is consulted. She states they are stable and not concerning. Has contracted right knee for years. No drainage for which she has noticed, albeit she can't really see her wounds.   "

## 2025-04-18 NOTE — ASSESSMENT & PLAN NOTE
Malnutrition Type:  Context: acute illness or injury  Level: moderate    Related to (etiology):   Advanced age, dislike of foods at NH    Signs and Symptoms (as evidenced by):   Weight loss, mild/moderate muscle wasting and fat loss, decreased po intake     Malnutrition Characteristic Summary:  Weight Loss (Malnutrition): greater than 10% in 6 months (12% x 6 months)  Energy Intake (Malnutrition): less than or equal to 50% for greater than or equal to 1 month  Subcutaneous Fat (Malnutrition): moderate depletion  Muscle Mass (Malnutrition): moderate depletion    Interventions:  Commercial beverage    Nutrition Diagnosis Status:   New

## 2025-04-18 NOTE — PLAN OF CARE
PT Eval completed, note to follow.     Pt presents with 120-140 deg R knee flexion contracture. Pt is totalA/MaxA for all bed mobility and transfers. Pt requires ModA-CGA for static sitting balance EOB due to pt posteriorly leaning/pushing and decreased command following to maintain with L knee flexed and L foot placed on ground (L knee in extension). Pt is currently not appropriate for skilled acute PT services. She is dependent for all ADL's and is totalA/MaxA for transfers. In order to attempt return to safe standing/ambulating, pt would need to attempt correcting severe R knee flexion contracture which will require Botox injections, progressive dynasplint bracing, and PT post injections for further stretching. Her current PT prognosis is poor without this course of treatment and there is concern for her ability to tolerate given her age, cognition, and poor potential for carryover/learning. Would recommend a palliative consult to discuss goals for care.        Problem: Physical Therapy  Goal: Physical Therapy Goal  4/18/2025 1529 by Ashley Verde, PT  Outcome: Adequate for Care Transition  4/18/2025 1516 by Ashley Verde, PT  Outcome: Adequate for Care Transition

## 2025-04-18 NOTE — ASSESSMENT & PLAN NOTE
Patient with Acute on chronic debility due to age-related physical debility. The patient's latest AMPAC (Activity Measure for Post Acute Care) Score is listed below.    AM-PAC Score - How much help does the patient need for each activity listed  Basic Mobility Total Score: 12  Turning over in bed (including adjusting bedclothes, sheets and blankets)?: A little  Sitting down on and standing up from a chair with arms (e.g., wheelchair, bedside commode, etc.): A little  Moving from lying on back to sitting on the side of the bed?: A lot  Moving to and from a bed to a chair (including a wheelchair)?: A lot  Need to walk in hospital room?: Unable  Climbing 3-5 steps with a railing?: Unable    Plan  - PT/OT consulted  - fall precautions.

## 2025-04-18 NOTE — SUBJECTIVE & OBJECTIVE
Scheduled Meds:   amLODIPine  5 mg Oral Daily    aspirin  81 mg Oral QHS    brimonidine 0.15 % OPTH DROP  1 drop Right Eye TID    calcium carbonate  500 mg Oral Daily    cefTRIAXone (Rocephin) IV (PEDS and ADULTS)  1 g Intravenous Q24H    diclofenac sodium  4 g Topical (Top) TID    ferrous sulfate  1 tablet Oral Daily    valsartan  320 mg Oral Daily     Continuous Infusions:  PRN Meds:  Current Facility-Administered Medications:     acetaminophen, 650 mg, Oral, Q6H PRN    dextrose 50%, 12.5 g, Intravenous, PRN    dextrose 50%, 25 g, Intravenous, PRN    glucagon (human recombinant), 1 mg, Intramuscular, PRN    glucose, 16 g, Oral, PRN    glucose, 24 g, Oral, PRN    melatonin, 6 mg, Oral, Nightly PRN    naloxone, 0.02 mg, Intravenous, PRN    ondansetron, 4 mg, Intravenous, Q8H PRN    oxyCODONE, 5 mg, Oral, Q6H PRN    sodium chloride 0.9%, 10 mL, Intravenous, Q12H PRN    Review of patient's allergies indicates:   Allergen Reactions    Strawberries [strawberry]      Blood pressure elevation    Chocolate flavor      Causes acid reflux    Atorvastatin      Myalgias    Pcn [penicillins] Rash    Pravastatin      Myalgias    Sulfa (sulfonamide antibiotics) Itching        Past Medical History:   Diagnosis Date    Anemia     Arthritis 2015    back    Cataract     s/p surgery    Coronary artery disease     [facility] 1/2010 - mid LAD 40%    GERD (gastroesophageal reflux disease)     Glaucoma (increased eye pressure)     Hyperlipidemia     Hypertension     Lactose intolerance     Legally blind in right eye, as defined in [place]     Osteoporosis, post-menopausal     PAD (peripheral artery disease) 07/11/2018    Pneumonia 12/13/2015    Proximal muscle weakness     Renal cyst     left kidney    Severe protein-calorie malnutrition 10/28/2024    Vitamin D deficiency disease      Past Surgical History:   Procedure Laterality Date    ADENOIDECTOMY      BREAST BIOPSY      left breast    CARDIAC CATHETERIZATION  01/14/2010    mid LAD, 40%  stenosis; LCX, luminal irregularities;                 CATARACT EXTRACTION W/  INTRAOCULAR LENS IMPLANT Left 05/22/2013    COMBINED WITH TRAB ()    COLONOSCOPY N/A 2/6/2018    Procedure: COLONOSCOPY;  Surgeon: Rufus Villlea MD;  Location: [facility] ([address] [place] [address]);  Service: Endoscopy;  Laterality: N/A;    EDTA CHELATION Left 2/14    OS ()    EYE SURGERY      FEMUR FRACTURE SURGERY Left 05/09/2016    FRACTURE SURGERY  2016    TEAR DUCT SURGERY      right eye    TONSILLECTOMY      TRABECULECTOMY Left 05/22/2013    WITH EXPRESS MINI SHUNT AND CE ()    UPPER GASTROINTESTINAL ENDOSCOPY      YAG CAPSULOTOMY Left 06/22/2017           Family History       Problem Relation (Age of Onset)    Alzheimer's disease Father    Arthritis Sister    Asthma Sister    Colon cancer Other (44)    Diabetes Brother, Daughter    Glaucoma Mother    Heart disease Mother, Brother    Hyperlipidemia Brother    Hypertension Father, Brother, Son, Daughter    Osteoporosis Sister, Mother, Sister    Peripheral vascular disease Father, Brother    Rheum arthritis Sister, Brother    Stroke Brother          Tobacco Use    Smoking status: Never    Smokeless tobacco: Never   Substance and Sexual Activity    Alcohol use: No    Drug use: No    Sexual activity: Not Currently     Review of Systems   Constitutional:  Positive for activity change and unexpected weight change. Negative for appetite change.   HENT:  Negative for trouble swallowing.    Respiratory:  Negative for shortness of breath.    Cardiovascular:  Negative for chest pain.   Gastrointestinal:  Negative for constipation, diarrhea, nausea and vomiting.   Genitourinary:  Negative for dysuria.   Musculoskeletal:  Negative for arthralgias and joint swelling.   Skin:  Positive for rash and wound.   Neurological:  Positive for weakness.   Psychiatric/Behavioral:  Negative for confusion. The patient is not nervous/anxious.      Objective:     Vital Signs (Most  "Recent):  Temp: 98.5 °F (36.9 °C) (04/18/25 1113)  Pulse: 81 (04/18/25 1252)  Resp: 18 (04/18/25 1113)  BP: (!) 175/74 (04/18/25 1113)  SpO2: 100 % (04/18/25 1113) Vital Signs (24h Range):  Temp:  [98.3 °F (36.8 °C)-98.9 °F (37.2 °C)] 98.5 °F (36.9 °C)  Pulse:  [] 81  Resp:  [17-18] 18  SpO2:  [95 %-100 %] 100 %  BP: (116-175)/(57-74) 175/74     Weight: 35.5 kg (78 lb 4.2 oz)  Body mass index is 13.86 kg/m².    Foot Exam    General  Orientation: (Able to communicate well, answer questions appropriately.)      Right Foot/Ankle     Inspection and Palpation  Skin Exam: skin changes and ulcer; no drainage, no cellulitis and no erythema     Comments  Right foot:5th mpj blister: no surrounding erythema, edema. No malodor, negative probe to bone.     Heel ulcer: partial thickness, granular fibrous base. Negative probe to bone, no malodor, drainage, crepitus or fluctuance.     Left Foot/Ankle      Inspection and Palpation  Skin Exam: ulcer;     Comments  Left foot: Heel ulcer: partial thickness, fibrous base, stable, no erythema, edema, negative probe to bone.                 Laboratory:  A1C: No results for input(s): "HGBA1C" in the last 4320 hours.  CBC:   Recent Labs   Lab 04/18/25  0237   WBC 7.38   RBC 3.55*   HGB 10.2*   HCT 32.0*      MCV 90   MCH 28.7   MCHC 31.9*     CMP:   Recent Labs   Lab 04/16/25  1912 04/17/25  0248 04/18/25  0237   CALCIUM 10.0   < > 8.9   ALBUMIN 2.5*  --   --       < > 140   K 3.7   < > 4.1   CO2 27   < > 24      < > 110   BUN 29*   < > 18   CREATININE 0.7   < > 0.6   ALKPHOS 105  --   --    ALT 13  --   --    AST 17  --   --    BILITOT 0.2  --   --     < > = values in this interval not displayed.     CRP: No results for input(s): "CRP" in the last 168 hours.  ESR: No results for input(s): "SEDRATE" in the last 168 hours.  Wound Cultures: No results for input(s): "LABAERO" in the last 4320 hours.  Microbiology Results (last 7 days)       Procedure Component Value " Units Date/Time    Blood culture x two cultures. Draw prior to antibiotics. [[id number]]  (Normal) Collected: 04/16/25 1912    Order Status: Completed Specimen: Blood from Peripheral, Antecubital, Left Updated: 04/18/25 0302     Blood Culture No Growth After 24 Hours    Blood culture x two cultures. Draw prior to antibiotics. [[id number]]  (Normal) Collected: 04/16/25 1920    Order Status: Completed Specimen: Blood Updated: 04/18/25 0302     Blood Culture No Growth After 24 Hours    Blood Culture #1 **CANNOT BE ORDERED STAT** [[id number]]     Order Status: Sent Specimen: Blood           Specimen (24h ago, onward)      None            Diagnostic Results:  I have reviewed all pertinent imaging results/findings within the past 24 hours.

## 2025-04-19 LAB
ABSOLUTE EOSINOPHIL (OHS): 0.22 K/UL
ABSOLUTE MONOCYTE (OHS): 0.6 K/UL (ref 0.3–1)
ABSOLUTE NEUTROPHIL COUNT (OHS): 4.81 K/UL (ref 1.8–7.7)
ANION GAP (OHS): 7 MMOL/L (ref 8–16)
BASOPHILS # BLD AUTO: 0.03 K/UL
BASOPHILS NFR BLD AUTO: 0.4 %
BUN SERPL-MCNC: 22 MG/DL (ref 8–23)
CALCIUM SERPL-MCNC: 9.1 MG/DL (ref 8.7–10.5)
CHLORIDE SERPL-SCNC: 110 MMOL/L (ref 95–110)
CO2 SERPL-SCNC: 23 MMOL/L (ref 23–29)
CREAT SERPL-MCNC: 0.6 MG/DL (ref 0.5–1.4)
ERYTHROCYTE [DISTWIDTH] IN BLOOD BY AUTOMATED COUNT: 14 % (ref 11.5–14.5)
GFR SERPLBLD CREATININE-BSD FMLA CKD-EPI: >60 ML/MIN/1.73/M2
GLUCOSE SERPL-MCNC: 98 MG/DL (ref 70–110)
HCT VFR BLD AUTO: 29.7 % (ref 37–48.5)
HGB BLD-MCNC: 9.5 GM/DL (ref 12–16)
IMM GRANULOCYTES # BLD AUTO: 0.04 K/UL (ref 0–0.04)
IMM GRANULOCYTES NFR BLD AUTO: 0.6 % (ref 0–0.5)
LYMPHOCYTES # BLD AUTO: 1.42 K/UL (ref 1–4.8)
MCH RBC QN AUTO: 29 PG (ref 27–31)
MCHC RBC AUTO-ENTMCNC: 32 G/DL (ref 32–36)
MCV RBC AUTO: 91 FL (ref 82–98)
NUCLEATED RBC (/100WBC) (OHS): 0 /100 WBC
PLATELET # BLD AUTO: 369 K/UL (ref 150–450)
PMV BLD AUTO: 10 FL (ref 9.2–12.9)
POTASSIUM SERPL-SCNC: 3.8 MMOL/L (ref 3.5–5.1)
RBC # BLD AUTO: 3.28 M/UL (ref 4–5.4)
RELATIVE EOSINOPHIL (OHS): 3.1 %
RELATIVE LYMPHOCYTE (OHS): 19.9 % (ref 18–48)
RELATIVE MONOCYTE (OHS): 8.4 % (ref 4–15)
RELATIVE NEUTROPHIL (OHS): 67.6 % (ref 38–73)
SODIUM SERPL-SCNC: 140 MMOL/L (ref 136–145)
WBC # BLD AUTO: 7.12 K/UL (ref 3.9–12.7)

## 2025-04-19 PROCEDURE — 25000003 PHARM REV CODE 250: Mod: HCNC | Performed by: FAMILY MEDICINE

## 2025-04-19 PROCEDURE — G0378 HOSPITAL OBSERVATION PER HR: HCPCS | Mod: HCNC

## 2025-04-19 PROCEDURE — 63600175 PHARM REV CODE 636 W HCPCS: Mod: HCNC | Performed by: FAMILY MEDICINE

## 2025-04-19 PROCEDURE — 85025 COMPLETE CBC W/AUTO DIFF WBC: CPT | Mod: HCNC | Performed by: FAMILY MEDICINE

## 2025-04-19 PROCEDURE — 80048 BASIC METABOLIC PNL TOTAL CA: CPT | Mod: HCNC | Performed by: FAMILY MEDICINE

## 2025-04-19 PROCEDURE — 96376 TX/PRO/DX INJ SAME DRUG ADON: CPT

## 2025-04-19 PROCEDURE — 25000003 PHARM REV CODE 250: Mod: HCNC | Performed by: NURSE PRACTITIONER

## 2025-04-19 PROCEDURE — 36415 COLL VENOUS BLD VENIPUNCTURE: CPT | Mod: HCNC | Performed by: FAMILY MEDICINE

## 2025-04-19 RX ORDER — AMLODIPINE BESYLATE 5 MG/1
5 TABLET ORAL 2 TIMES DAILY
Status: DISCONTINUED | OUTPATIENT
Start: 2025-04-19 | End: 2025-04-21 | Stop reason: HOSPADM

## 2025-04-19 RX ORDER — HYDRALAZINE HYDROCHLORIDE 25 MG/1
25 TABLET, FILM COATED ORAL EVERY 8 HOURS
Status: DISCONTINUED | OUTPATIENT
Start: 2025-04-19 | End: 2025-04-20

## 2025-04-19 RX ADMIN — VALSARTAN 320 MG: 160 TABLET, FILM COATED ORAL at 08:04

## 2025-04-19 RX ADMIN — CEFTRIAXONE SODIUM 1 G: 1 INJECTION, POWDER, FOR SOLUTION INTRAMUSCULAR; INTRAVENOUS at 07:04

## 2025-04-19 RX ADMIN — AMLODIPINE BESYLATE 5 MG: 5 TABLET ORAL at 08:04

## 2025-04-19 RX ADMIN — DICLOFENAC SODIUM 4 G: 10 GEL TOPICAL at 08:04

## 2025-04-19 RX ADMIN — DICLOFENAC SODIUM 4 G: 10 GEL TOPICAL at 09:04

## 2025-04-19 RX ADMIN — HYDRALAZINE HYDROCHLORIDE 25 MG: 25 TABLET ORAL at 10:04

## 2025-04-19 RX ADMIN — CALCIUM CARBONATE (ANTACID) CHEW TAB 500 MG 500 MG: 500 CHEW TAB at 08:04

## 2025-04-19 RX ADMIN — ASPIRIN 81 MG: 81 TABLET, COATED ORAL at 09:04

## 2025-04-19 RX ADMIN — FERROUS SULFATE TAB 325 MG (65 MG ELEMENTAL FE) 1 EACH: 325 (65 FE) TAB at 08:04

## 2025-04-19 RX ADMIN — HYDRALAZINE HYDROCHLORIDE 25 MG: 25 TABLET ORAL at 09:04

## 2025-04-19 RX ADMIN — BRIMONIDINE TARTRATE 1 DROP: 1.5 SOLUTION OPHTHALMIC at 08:04

## 2025-04-19 RX ADMIN — BRIMONIDINE TARTRATE 1 DROP: 1.5 SOLUTION OPHTHALMIC at 09:04

## 2025-04-19 RX ADMIN — AMLODIPINE BESYLATE 5 MG: 5 TABLET ORAL at 09:04

## 2025-04-19 RX ADMIN — DICLOFENAC SODIUM 4 G: 10 GEL TOPICAL at 03:04

## 2025-04-19 RX ADMIN — ACETAMINOPHEN 650 MG: 325 TABLET ORAL at 06:04

## 2025-04-19 RX ADMIN — BRIMONIDINE TARTRATE 1 DROP: 1.5 SOLUTION OPHTHALMIC at 03:04

## 2025-04-19 NOTE — PLAN OF CARE
Problem: Adult Inpatient Plan of Care  Goal: Plan of Care Review  Outcome: Progressing  Goal: Readiness for Transition of Care  Outcome: Progressing     Problem: UTI (Urinary Tract Infection)  Goal: Improved Infection Symptoms  Outcome: Progressing     Problem: Wound  Goal: Optimal Coping  Outcome: Progressing

## 2025-04-19 NOTE — PROGRESS NOTES
"      [facility] Medicine  Progress Note    Patient Name: Mj Summers  MRN: [id number]  Patient Class: OP- Observation   Admission Date: 4/16/2025  Length of Stay: 0 days  Attending Physician: Héctor Jasso MD  Primary Care Provider: Shasta Urbina MD        Subjective:     Principal Problem:Complicated UTI (urinary tract infection)      HPI:  Patient is a 89-year-old female who is a nursing home resident at [facility] with a past medical history of CAD, HTN, HLD, GERD, PAD and CKD-III, who presented due to complaints of weakness and declining health.. Pt states "they sent me here because I was resting while looking out the window and I guess they wanted to have me checked out for that". Pts only complaint is foul smelling urine. Pt states she has noticed this over the past few days. In ER urine analysis was nitrite positive.  Chest x-ray showed no acute abnormality.  Due to patient's presenting symptoms she will require admission for further evaluation and management. At time  of my examination patient denied any headache, fever, chills, chest pain or shortness of breath but complained of right knee pain and losing weight.       Interval History: no acute event overnight. On ABx for UTI and pending cx. Continue therapy. Fluctuating BP< titrating norvasc and adding hydralazine.    Review of Systems    Constitutional:  Positive for activity change and unexpected weight change. Negative for appetite change.   HENT:  Negative for trouble swallowing.    Respiratory:  Negative for shortness of breath.    Cardiovascular:  Negative for chest pain.   Gastrointestinal:  Negative for constipation, diarrhea, nausea and vomiting.   Genitourinary:  Negative for dysuria.   Musculoskeletal:  Negative for arthralgias and joint swelling.   Skin:  Positive for rash.   Neurological:  Positive for weakness.   Psychiatric/Behavioral:  Negative for confusion. The patient is not nervous/anxious.           Objective: "     Vital Signs (Most Recent):  Temp: 97.9 °F (36.6 °C) (04/19/25 0809)  Pulse: 82 (04/19/25 0809)  Resp: 20 (04/19/25 0809)  BP: (!) 155/66 (04/19/25 0809)  SpO2: 99 % (04/19/25 0809) Vital Signs (24h Range):  Temp:  [97.9 °F (36.6 °C)-99.1 °F (37.3 °C)] 97.9 °F (36.6 °C)  Pulse:  [77-91] 82  Resp:  [17-20] 20  SpO2:  [97 %-100 %] 99 %  BP: (151-175)/(66-74) 155/66     Weight: 35.5 kg (78 lb 4.2 oz)  Body mass index is 13.86 kg/m².    Intake/Output Summary (Last 24 hours) at 4/19/2025 0934  Last data filed at 4/18/2025 1718  Gross per 24 hour   Intake --   Output 250 ml   Net -250 ml      Physical Exam    HENT:      Head: Normocephalic and atraumatic.      Nose: Nose normal.      Mouth/Throat:      Mouth: Mucous membranes are dry.   Eyes:      Extraocular Movements: Extraocular movements intact.      Pupils: Pupils are equal, round, and reactive to light.   Cardiovascular:      Rate and Rhythm: Regular rhythm.   Pulmonary:      Breath sounds: Normal breath sounds.   Abdominal:      Palpations: Abdomen is soft.   Musculoskeletal:      Cervical back: Neck supple.      Comments: Rt knee tenderness.    Skin:     General: Skin is dry.   Neurological:      General: No focal deficit present.      Mental Status: She is alert.   Psychiatric:         Mood and Affect: Mood normal.       Significant Labs: All pertinent labs within the past 24 hours have been reviewed.  CBC:   Recent Labs   Lab 04/18/25  0237 04/19/25  0307   WBC 7.38 7.12   HGB 10.2* 9.5*   HCT 32.0* 29.7*    369     CMP:   Recent Labs   Lab 04/17/25  1426 04/18/25  0237 04/19/25  0307   NA  --  140 140   K 4.1 4.1 3.8   CL  --  110 110   CO2  --  24 23   BUN  --  18 22   CREATININE  --  0.6 0.6   CALCIUM  --  8.9 9.1   ANIONGAP  --  6* 7*       Significant Imaging: I have reviewed all pertinent imaging results/findings within the past 24 hours.  I have reviewed and interpreted all pertinent imaging results/findings within the past 24  hours.    Assessment/Plan:      Active Diagnoses:    Diagnosis Date Noted POA    PRINCIPAL PROBLEM:  Complicated UTI (urinary tract infection) [N39.0]  Yes    Decubitus ulcer of heel, bilateral [L89.619, L89.629] 04/18/2025 Yes    Knee pain, right [M25.561] 04/16/2025 Yes    Protein calorie malnutrition [E46] 08/24/2016 Yes    Debility [R53.81] 12/10/2015 Yes    HTN (hypertension) [I10]  Yes      Problems Resolved During this Admission:     VTE Risk Mitigation (From admission, onward)           Ordered     IP VTE HIGH RISK PATIENT  Once         04/16/25 2259     Place sequential compression device  Until discontinued         04/16/25 2259                  *Complicated UTI (urinary tract infection)  Admit to medical floor with telemetry.  Check urine cultures.    IV antibiotics.     HTN (hypertension)  Patient's blood pressure range in the last 24 hours was: BP  Min: 116/57  Max: 175/74.The patient's inpatient anti-hypertensive regimen is listed below:  Current Antihypertensives  , Every morning, Both Eyes  valsartan tablet 320 mg, Daily, Oral  amLODIPine tablet 5 mg increase to BID  -add hydralazine     Plan  - BP is uncontrolled, will adjust as follows:  Resume home dose of antihypertensive agents.  - monitor blood pressure closely.  Knee pain, right     X-ray of the right knee.  Inpatient orthopedic consultation.  Debility  Patient with Acute on chronic debility due to age-related physical debility. The patient's latest AMPAC (Activity Measure for Post Acute Care) Score is listed below.     AM-PAC Score - How much help does the patient need for each activity listed  Basic Mobility Total Score: 12  Turning over in bed (including adjusting bedclothes, sheets and blankets)?: A little  Sitting down on and standing up from a chair with arms (e.g., wheelchair, bedside commode, etc.): A little  Moving from lying on back to sitting on the side of the bed?: A lot  Moving to and from a bed to a chair (including a  wheelchair)?: A lot  Need to walk in hospital room?: Unable  Climbing 3-5 steps with a railing?: Unable     Plan  - PT/OT consulted  - fall precautions.           Protein calorie malnutrition  Nutrition consulted. Most recent weight and BMI monitored-      Measurements:      Wt Readings from Last 1 Encounters:   04/17/25 35.5 kg (78 lb 4.2 oz)   Body mass index is 13.86 kg/m².     Patient has been screened and assessed by JOSE C Jasso MD  Department of Hospital Medicine   [facility]

## 2025-04-19 NOTE — PLAN OF CARE
Patient resting in bed, AAOx4. Medications administered as ordered. Patient with some complaints of pain to knee, prn medication administered. Encouraged to call with needs or concerns. Will continue to monitor.

## 2025-04-20 PROCEDURE — 25000003 PHARM REV CODE 250: Mod: HCNC | Performed by: FAMILY MEDICINE

## 2025-04-20 PROCEDURE — 96376 TX/PRO/DX INJ SAME DRUG ADON: CPT

## 2025-04-20 PROCEDURE — G0378 HOSPITAL OBSERVATION PER HR: HCPCS | Mod: HCNC

## 2025-04-20 PROCEDURE — 63600175 PHARM REV CODE 636 W HCPCS: Mod: HCNC | Performed by: FAMILY MEDICINE

## 2025-04-20 PROCEDURE — 25000003 PHARM REV CODE 250: Mod: HCNC | Performed by: NURSE PRACTITIONER

## 2025-04-20 RX ORDER — HYDRALAZINE HYDROCHLORIDE 25 MG/1
75 TABLET, FILM COATED ORAL EVERY 8 HOURS
Status: DISCONTINUED | OUTPATIENT
Start: 2025-04-20 | End: 2025-04-21 | Stop reason: HOSPADM

## 2025-04-20 RX ADMIN — Medication 6 MG: at 01:04

## 2025-04-20 RX ADMIN — CEFTRIAXONE SODIUM 1 G: 1 INJECTION, POWDER, FOR SOLUTION INTRAMUSCULAR; INTRAVENOUS at 06:04

## 2025-04-20 RX ADMIN — AMLODIPINE BESYLATE 5 MG: 5 TABLET ORAL at 08:04

## 2025-04-20 RX ADMIN — BRIMONIDINE TARTRATE 1 DROP: 1.5 SOLUTION OPHTHALMIC at 09:04

## 2025-04-20 RX ADMIN — BRIMONIDINE TARTRATE 1 DROP: 1.5 SOLUTION OPHTHALMIC at 02:04

## 2025-04-20 RX ADMIN — ACETAMINOPHEN 650 MG: 325 TABLET ORAL at 02:04

## 2025-04-20 RX ADMIN — HYDRALAZINE HYDROCHLORIDE 75 MG: 25 TABLET ORAL at 09:04

## 2025-04-20 RX ADMIN — ACETAMINOPHEN 650 MG: 325 TABLET ORAL at 01:04

## 2025-04-20 RX ADMIN — CALCIUM CARBONATE (ANTACID) CHEW TAB 500 MG 500 MG: 500 CHEW TAB at 08:04

## 2025-04-20 RX ADMIN — HYDRALAZINE HYDROCHLORIDE 75 MG: 25 TABLET ORAL at 02:04

## 2025-04-20 RX ADMIN — HYDRALAZINE HYDROCHLORIDE 25 MG: 25 TABLET ORAL at 05:04

## 2025-04-20 RX ADMIN — FERROUS SULFATE TAB 325 MG (65 MG ELEMENTAL FE) 1 EACH: 325 (65 FE) TAB at 08:04

## 2025-04-20 RX ADMIN — ASPIRIN 81 MG: 81 TABLET, COATED ORAL at 09:04

## 2025-04-20 RX ADMIN — AMLODIPINE BESYLATE 5 MG: 5 TABLET ORAL at 09:04

## 2025-04-20 RX ADMIN — DICLOFENAC SODIUM 4 G: 10 GEL TOPICAL at 09:04

## 2025-04-20 RX ADMIN — DICLOFENAC SODIUM 4 G: 10 GEL TOPICAL at 08:04

## 2025-04-20 RX ADMIN — DICLOFENAC SODIUM 4 G: 10 GEL TOPICAL at 02:04

## 2025-04-20 RX ADMIN — VALSARTAN 320 MG: 160 TABLET, FILM COATED ORAL at 08:04

## 2025-04-20 NOTE — PLAN OF CARE
Pt vitals were maintained, pt c/o pain to r/foot. Pt is aaox4. Pt swallows meds with vanilla pudding. Pt is BB and have contracture to r/leg. wc   Problem: Adult Inpatient Plan of Care  Goal: Optimal Comfort and Wellbeing  Outcome: Progressing

## 2025-04-20 NOTE — PROGRESS NOTES
"      [facility] Medicine  Progress Note    Patient Name: Mj Summers  MRN: [id number]  Patient Class: OP- Observation   Admission Date: 4/16/2025  Length of Stay: 0 days  Attending Physician: Héctor Jasso MD  Primary Care Provider: Shasta Urbina MD        Subjective:     Principal Problem:Complicated UTI (urinary tract infection)    HPI:  Patient is a 89-year-old female who is a nursing home resident at [facility] with a past medical history of CAD, HTN, HLD, GERD, PAD and CKD-III, who presented due to complaints of weakness and declining health.. Pt states "they sent me here because I was resting while looking out the window and I guess they wanted to have me checked out for that". Pts only complaint is foul smelling urine. Pt states she has noticed this over the past few days. In ER urine analysis was nitrite positive.  Chest x-ray showed no acute abnormality.  Due to patient's presenting symptoms she will require admission for further evaluation and management. At time  of my examination patient denied any headache, fever, chills, chest pain or shortness of breath but complained of right knee pain and losing weight.        Interval History: no acute event overnight. On ABx for UTI and pending cx. Continue therapy. Fluctuating BP< titrating meds.     Review of Systems     Constitutional:  Positive for activity change and unexpected weight change. Negative for appetite change.   HENT:  Negative for trouble swallowing.    Respiratory:  Negative for shortness of breath.    Cardiovascular:  Negative for chest pain.   Gastrointestinal:  Negative for constipation, diarrhea, nausea and vomiting.   Genitourinary:  Negative for dysuria.   Musculoskeletal:  Negative for arthralgias and joint swelling.   Skin:  Positive for rash.   Neurological:  Positive for weakness.   Psychiatric/Behavioral:  Negative for confusion. The patient is not nervous/anxious.    Objective:     Vital Signs (Most " Recent):  Temp: 98 °F (36.7 °C) (04/20/25 0742)  Pulse: 85 (04/20/25 0742)  Resp: 20 (04/20/25 0742)  BP: (!) 141/63 (04/20/25 0742)  SpO2: 98 % (04/20/25 0742) Vital Signs (24h Range):  Temp:  [98 °F (36.7 °C)-98.7 °F (37.1 °C)] 98 °F (36.7 °C)  Pulse:  [69-95] 85  Resp:  [16-20] 20  SpO2:  [97 %-100 %] 98 %  BP: (141-185)/(60-78) 141/63     Weight: 35.5 kg (78 lb 4.2 oz)  Body mass index is 13.86 kg/m².    Intake/Output Summary (Last 24 hours) at 4/20/2025 0928  Last data filed at 4/20/2025 0617  Gross per 24 hour   Intake 300 ml   Output 450 ml   Net -150 ml      Physical Exam    HENT:      Head: Normocephalic and atraumatic.      Nose: Nose normal.      Mouth/Throat:      Mouth: Mucous membranes are dry.   Eyes:      Extraocular Movements: Extraocular movements intact.      Pupils: Pupils are equal, round, and reactive to light.   Cardiovascular:      Rate and Rhythm: Regular rhythm.   Pulmonary:      Breath sounds: Normal breath sounds.   Abdominal:      Palpations: Abdomen is soft.   Musculoskeletal:      Cervical back: Neck supple.      Comments: Rt knee tenderness.    Skin:     General: Skin is dry.   Neurological:      General: No focal deficit present.      Mental Status: She is alert.   Psychiatric:         Mood and Affect: Mood normal.     Significant Labs: All pertinent labs within the past 24 hours have been reviewed.  CBC:   Recent Labs   Lab 04/19/25  0307   WBC 7.12   HGB 9.5*   HCT 29.7*        CMP:   Recent Labs   Lab 04/19/25  0307      K 3.8      CO2 23   BUN 22   CREATININE 0.6   CALCIUM 9.1   ANIONGAP 7*       Significant Imaging: I have reviewed all pertinent imaging results/findings within the past 24 hours.  I have reviewed and interpreted all pertinent imaging results/findings within the past 24 hours.    Assessment/Plan:      Active Diagnoses:    Diagnosis Date Noted POA    PRINCIPAL PROBLEM:  Complicated UTI (urinary tract infection) [N39.0]  Yes    Decubitus ulcer of  heel, bilateral [L89.619, L89.629] 04/18/2025 Yes    Knee pain, right [M25.561] 04/16/2025 Yes    Protein calorie malnutrition [E46] 08/24/2016 Yes    Debility [R53.81] 12/10/2015 Yes    HTN (hypertension) [I10]  Yes      Problems Resolved During this Admission:     VTE Risk Mitigation (From admission, onward)           Ordered     IP VTE HIGH RISK PATIENT  Once         04/16/25 2259     Place sequential compression device  Until discontinued         04/16/25 2259                   *Complicated UTI (urinary tract infection)  Admit to medical floor with telemetry.  Check urine cultures.    IV antibiotics.     HTN (hypertension)  Patient's blood pressure range in the last 24 hours was: BP  Min: 116/57  Max: 175/74.The patient's inpatient anti-hypertensive regimen is listed below:  Current Antihypertensives  , Every morning, Both Eyes  valsartan tablet 320 mg, Daily, Oral  amLODIPine tablet 5 mg increase to BID  -add hydralazine 75 TID     Plan  - BP is uncontrolled, will adjust as follows:  Resume home dose of antihypertensive agents.  - monitor blood pressure closely.  Knee pain, right     X-ray of the right knee.  Inpatient orthopedic consultation.  Debility  Patient with Acute on chronic debility due to age-related physical debility. The patient's latest AMPAC (Activity Measure for Post Acute Care) Score is listed below.     AM-PAC Score - How much help does the patient need for each activity listed  Basic Mobility Total Score: 12  Turning over in bed (including adjusting bedclothes, sheets and blankets)?: A little  Sitting down on and standing up from a chair with arms (e.g., wheelchair, bedside commode, etc.): A little  Moving from lying on back to sitting on the side of the bed?: A lot  Moving to and from a bed to a chair (including a wheelchair)?: A lot  Need to walk in hospital room?: Unable  Climbing 3-5 steps with a railing?: Unable     Plan  - PT/OT consulted  - fall precautions.           Protein calorie  malnutrition  Nutrition consulted. Most recent weight and BMI monitored-      Measurements:        Wt Readings from Last 1 Encounters:   04/17/25 35.5 kg (78 lb 4.2 oz)   Body mass index is 13.86 kg/m².     Patient has been screened and assessed by JOSE C Jasso MD  Department of [facility] Medicine   [facility]

## 2025-04-21 VITALS
HEART RATE: 105 BPM | TEMPERATURE: 98 F | HEIGHT: 63 IN | DIASTOLIC BLOOD PRESSURE: 53 MMHG | WEIGHT: 78.25 LBS | RESPIRATION RATE: 19 BRPM | OXYGEN SATURATION: 97 % | SYSTOLIC BLOOD PRESSURE: 146 MMHG | BODY MASS INDEX: 13.86 KG/M2

## 2025-04-21 PROCEDURE — 25000003 PHARM REV CODE 250: Mod: HCNC | Performed by: NURSE PRACTITIONER

## 2025-04-21 PROCEDURE — 25000003 PHARM REV CODE 250: Mod: HCNC | Performed by: FAMILY MEDICINE

## 2025-04-21 PROCEDURE — G0378 HOSPITAL OBSERVATION PER HR: HCPCS | Mod: HCNC

## 2025-04-21 PROCEDURE — 25000003 PHARM REV CODE 250: Mod: HCNC | Performed by: INTERNAL MEDICINE

## 2025-04-21 RX ORDER — CEFDINIR 300 MG/1
300 CAPSULE ORAL 2 TIMES DAILY
Qty: 4 CAPSULE | Refills: 0 | Status: SHIPPED | OUTPATIENT
Start: 2025-04-21 | End: 2025-04-23

## 2025-04-21 RX ADMIN — HYDRALAZINE HYDROCHLORIDE 75 MG: 25 TABLET ORAL at 06:04

## 2025-04-21 RX ADMIN — CALCIUM CARBONATE (ANTACID) CHEW TAB 500 MG 500 MG: 500 CHEW TAB at 08:04

## 2025-04-21 RX ADMIN — HYDRALAZINE HYDROCHLORIDE 75 MG: 25 TABLET ORAL at 04:04

## 2025-04-21 RX ADMIN — BRIMONIDINE TARTRATE 1 DROP: 1.5 SOLUTION OPHTHALMIC at 08:04

## 2025-04-21 RX ADMIN — ACETAMINOPHEN 650 MG: 325 TABLET ORAL at 08:04

## 2025-04-21 RX ADMIN — AMLODIPINE BESYLATE 5 MG: 5 TABLET ORAL at 08:04

## 2025-04-21 RX ADMIN — DICLOFENAC SODIUM 4 G: 10 GEL TOPICAL at 08:04

## 2025-04-21 RX ADMIN — OXYCODONE 5 MG: 5 TABLET ORAL at 04:04

## 2025-04-21 RX ADMIN — VALSARTAN 320 MG: 160 TABLET, FILM COATED ORAL at 08:04

## 2025-04-21 RX ADMIN — DICLOFENAC SODIUM 4 G: 10 GEL TOPICAL at 04:04

## 2025-04-21 RX ADMIN — FERROUS SULFATE TAB 325 MG (65 MG ELEMENTAL FE) 1 EACH: 325 (65 FE) TAB at 08:04

## 2025-04-21 RX ADMIN — BRIMONIDINE TARTRATE 1 DROP: 1.5 SOLUTION OPHTHALMIC at 04:04

## 2025-04-21 NOTE — PLAN OF CARE
0839  NH referral sent to [facility] via [employer]. Awaiting response.        04/21/25 1030   Rounds   Attendance Provider;Nurse    Discharge Plan A Return to nursing home  ([facility])   Transition of Care Barriers Transportation     1030  CM was informed by Dr Barber that the pt is medically stable to discharge back to [facility] today. Awaiting NH orders. Transportation packet left at the nurse's station.     1215  Pt awake & alert eating lunch in bed when CM rounded. No family present. Patient in agreement with plan to discharge back to [facility] today, & will need assistance with ambulance transportation at time of discharge. Voicemail message left for the pt's daughter, Aimee Green ([phone]), informing of the pt's discharge status.     1230  NH orders sent to [facility] via [employer]. Awaiting response.     1505  Message received from Violette stated that the pt is cleared to discharge back to [facility] room 811B & requested that report be called to the [address] [place] [address] nurse at [phone]. Ambulance transportation scheduled with requested pickup at 1600.     Message sent to nurse Fontaine informing that the pt is cleared to discharge, of transportation scheduled, & requesting that [facility] call report.       Will continue to follow.

## 2025-04-21 NOTE — PLAN OF CARE
Problem: Adult Inpatient Plan of Care  Goal: Plan of Care Review  Outcome: Progressing  Goal: Patient-Specific Goal (Individualized)  Outcome: Progressing  Goal: Absence of Hospital-Acquired Illness or Injury  Outcome: Progressing  Goal: Readiness for Transition of Care  Outcome: Progressing     Problem: Fall Injury Risk  Goal: Absence of Fall and Fall-Related Injury  Outcome: Progressing     Problem: Wound  Goal: Absence of Infection Signs and Symptoms  Outcome: Progressing  Goal: Optimal Pain Control and Function  Outcome: Progressing     Problem: Skin Injury Risk Increased  Goal: Skin Health and Integrity  Outcome: Progressing

## 2025-04-21 NOTE — PLAN OF CARE
Problem: Adult Inpatient Plan of Care  Goal: Patient-Specific Goal (Individualized)  Outcome: Progressing     Problem: Fall Injury Risk  Goal: Absence of Fall and Fall-Related Injury  Outcome: Progressing     Problem: Wound  Goal: Optimal Functional Ability  Outcome: Progressing     Problem: Skin Injury Risk Increased  Goal: Skin Health and Integrity  Outcome: Progressing     Problem: Comorbidity Management  Goal: Blood Pressure in Desired Range  Outcome: Progressing     Problem: Infection  Goal: Absence of Infection Signs and Symptoms  Outcome: Progressing     Problem: Adult Inpatient Plan of Care  Goal: Plan of Care Review  Outcome: Progressing

## 2025-04-21 NOTE — DISCHARGE SUMMARY
"[facility] Medicine  Discharge Summary      Patient Name: Mj Summers  MRN: [id number]  Admission Date: 4/16/2025  Hospital Length of Stay: 0 days  Discharge Date and Time: 04/21/2025 3:11 PM  Attending Physician: Jaylan Botello MD   Discharging Provider: Jaylan Botello MD  Discharge Provider Team: Networked reference to record PCT   Primary Care Provider: Shasta Urbina MD        HPI: Patient is a 89-year-old female who is a nursing home resident at [facility] with a past medical history of CAD, HTN, HLD, GERD, PAD and CKD-III, who presented due to complaints of weakness and declining health.. Pt states "they sent me here because I was resting while looking out the window and I guess they wanted to have me checked out for that". Pts only complaint is foul smelling urine. Pt states she has noticed this over the past few days. In ER urine analysis was nitrite positive. Chest x-ray showed no acute abnormality. Due to patient's presenting symptoms she will require admission for further evaluation and management. At time of my examination patient denied any headache, fever, chills, chest pain or shortness of breath but complained of right knee pain and losing weight.     * No surgery found *      Hospital Course: Pt was started on Ceftriaxone for UTI. Urine and blood cultures unremarkable but pt had improvement in symptoms. Discharged on Cefdinir for total 7 day course. Last dose on 4/23/25     Ortho was consulted for right knee pain but no intervention was recommended.     Consults:   Consults (From admission, onward)          Status Ordering Provider     Inpatient consult to Podiatry  Once        Provider:  (Not yet assigned)    Completed INA DUKE     Inpatient consult to Orthopedic Surgery  Once        Provider:  (Not yet assigned)    Completed DEB PARRA     Inpatient consult to Registered Dietitian/Nutritionist  Once        Provider:  (Not yet assigned)    Completed " DEB PARRA            Final Active Diagnoses:    Diagnosis Date Noted POA    PRINCIPAL PROBLEM:  Complicated UTI (urinary tract infection) [N39.0]  Yes    Decubitus ulcer of heel, bilateral [L89.619, L89.629] 04/18/2025 Yes    Knee pain, right [M25.561] 04/16/2025 Yes    Protein calorie malnutrition [E46] 08/24/2016 Yes    Debility [R53.81] 12/10/2015 Yes    HTN (hypertension) [I10]  Yes      Problems Resolved During this Admission:      Discharged Condition: stable    Disposition: Nursing Facility    Follow Up:    Patient Instructions:      Activity as tolerated     Medications:  Reconciled Home Medications:      Medication List        START taking these medications      cefdinir 300 MG capsule  Commonly known as: OMNICEF  Take 1 capsule (300 mg total) by mouth 2 (two) times daily. for 2 days            CHANGE how you take these medications      esomeprazole 40 MG capsule  Commonly known as: NEXIUM  TAKE 1 CAPSULE BY MOUTH DAILY AS NEEDED.  What changed: See the new instructions.            CONTINUE taking these medications      acetaminophen 325 MG tablet  Commonly known as: TYLENOL  Take 2 tablets (650 mg total) by mouth every 6 (six) hours as needed.     amLODIPine 5 MG tablet  Commonly known as: NORVASC  Take 5 mg by mouth once daily.     aspirin 81 mg Tab  Take 81 mg by mouth every evening.     brimonidine 0.1% 0.1 % Drop  Commonly known as: ALPHAGAN P  Place 1 drop into the right eye 3 (three) times daily.     calcium carbonate 600 mg calcium (1,500 mg) Tab  Commonly known as: OS-KACI  Take 600 mg by mouth 2 (two) times daily with meals.     carboxymethylcellulose sodium 1 % Drop  Place 1 drop into both eyes every 6 (six) hours as needed (comfort).     ergocalciferol 50,000 unit Cap  Commonly known as: ERGOCALCIFEROL  TAKE 1 CAPSULE (50,000 UNITS TOTAL) BY MOUTH EVERY 30 DAYS     ferrous sulfate 325 (65 FE) MG EC tablet  Take 1 tablet (325 mg total) by mouth once daily.     LUMIGAN 0.01 % Drop  Generic drug:  bimatoprost  Place 1 drop into both eyes every evening. Can use every evening at about 6 pm     melatonin 3 mg tablet  Commonly known as: MELATIN  Take 2 tablets (6 mg total) by mouth nightly as needed for Insomnia.     methocarbamoL 500 MG Tab  Commonly known as: Robaxin  Take 500 mg by mouth 3 (three) times daily as needed (muscle spasms).     multivitamin per tablet  Commonly known as: THERAGRAN  Take 1 tablet by mouth once daily.     mupirocin 2 % ointment  Commonly known as: BACTROBAN  Apply topically every evening. Left great toenail bed     pilocarpine HCL 4% 4 % ophthalmic solution  Commonly known as: PILOCAR  Place 1 drop into the right eye 3 (three) times daily.     PROLIA 60 mg/mL Syrg  Generic drug: denosumab  Inject 60 mg into the skin every 6 (six) months.     senna-docusate 8.6-50 mg per tablet  Commonly known as: PERICOLACE  Take 1 tablet by mouth 2 (two) times daily.     timolol maleate 0.5% 0.5 % Drop  Commonly known as: TIMOPTIC  Place 1 drop into both eyes every morning.     valsartan 320 MG tablet  Commonly known as: DIOVAN  Take 1 tablet (320 mg total) by mouth once daily.              Significant Diagnostic Studies: N/A    Pending Diagnostic Studies:       None          Indwelling Lines/Drains at time of discharge:   Lines/Drains/Airways       Drain  Duration             Female External Urinary Catheter w/ Suction 04/18/25 1930 2 days                    Time spent on the discharge of patient: 30 minutes         Jaylan Botello MD  Department of Hospital Medicine  [facility]

## 2025-04-21 NOTE — PLAN OF CARE
[facility] Surg  Facility Transfer Orders        Admit to: [facility]     Diagnoses:   Active Hospital Problems    Diagnosis  POA    *Complicated UTI (urinary tract infection) [N39.0]  Yes    Decubitus ulcer of heel, bilateral [L89.619, L89.629]  Yes    Knee pain, right [M25.561]  Yes    Protein calorie malnutrition [E46]  Yes    Debility [R53.81]  Yes    HTN (hypertension) [I10]  Yes      Resolved Hospital Problems   No resolved problems to display.     Allergies:   Review of patient's allergies indicates:   Allergen Reactions    Strawberries [strawberry]      Blood pressure elevation    Chocolate flavor      Causes acid reflux    Atorvastatin      Myalgias    Pcn [penicillins] Rash    Pravastatin      Myalgias    Sulfa (sulfonamide antibiotics) Itching       Code Status: Full     Vitals: Routine       Diet: regular diet    Activity: Activity as tolerated    Nursing Precautions: Fall    Bed/Surface: Low Air Loss    Consults: PT to evaluate and treat- 3 times a week and OT to evaluate and treat- 3 times a week    Oxygen: room air    Dialysis: Patient is not on dialysis. \    Labs: None    Pending Diagnostic Studies:       None          Imaging: None    Miscellaneous Care: None    IV Access: Peripheral     Medications: Discontinue all previous medication orders, if any. See new list below.  Current Discharge Medication List        START taking these medications    Details   cefdinir (OMNICEF) 300 MG capsule Take 1 capsule (300 mg total) by mouth 2 (two) times daily. for 2 days  Qty: 4 capsule, Refills: 0           CONTINUE these medications which have NOT CHANGED    Details   acetaminophen (TYLENOL) 325 MG tablet Take 2 tablets (650 mg total) by mouth every 6 (six) hours as needed.      amLODIPine (NORVASC) 5 MG tablet Take 5 mg by mouth once daily.      aspirin 81 mg Tab Take 81 mg by mouth every evening.      bimatoprost (LUMIGAN) 0.01 % Drop Place 1 drop into both eyes every evening. 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      carboxymethylcellulose sodium 1 % Drop Place 1 drop into both eyes every 6 (six) hours as needed (comfort).      ergocalciferol (ERGOCALCIFEROL) 50,000 unit Cap TAKE 1 CAPSULE (50,000 UNITS TOTAL) BY MOUTH EVERY 30 DAYS  Qty: 3 capsule, Refills: 3    Associated Diagnoses: Osteoporosis, postmenopausal; Vitamin D deficiency disease      esomeprazole (NEXIUM) 40 MG capsule TAKE 1 CAPSULE BY MOUTH DAILY AS NEEDED.  Qty: 90 capsule, Refills: 1      ferrous sulfate 325 (65 FE) MG EC tablet Take 1 tablet (325 mg total) by mouth once daily.  Qty: 30 tablet, Refills: 0      melatonin (MELATIN) 3 mg tablet Take 2 tablets (6 mg total) by mouth nightly as needed for Insomnia.      methocarbamoL (ROBAXIN) 500 MG Tab Take 500 mg by mouth 3 (three) times daily as needed (muscle spasms).      multivitamin (THERAGRAN) per tablet Take 1 tablet by mouth once daily.      mupirocin (BACTROBAN) 2 % ointment Apply topically every evening. Left great toenail bed      pilocarpine HCL 4% (PILOCAR) 4 % ophthalmic solution Place 1 drop into the right eye 3 (three) times daily.  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      senna-docusate 8.6-50 mg (PERICOLACE) 8.6-50 mg per tablet Take 1 tablet by mouth 2 (two) times daily.      timolol maleate 0.5% (TIMOPTIC) 0.5 % Drop Place 1 drop into both eyes every morning.      valsartan (DIOVAN) 320 MG tablet Take 1 tablet (320 mg total) by mouth once daily.    Comments:  .           Follow up:       Immunizations Administered as of 4/21/2025       Name Date Dose VIS Date Route Exp Date    COVID-19, MRNA, LN-S, PF (Moderna) 4/14/2021  9:33 AM 0.5 mL 12/19/2020 Intramuscular 9/18/2021    Site: Right deltoid     Given By: Mary Solitario, PharmD     : Moderna [employer]     Lot: [id number]     COVID-19, MRNA, LN-S, PF (Moderna) 3/17/2021  9:03 AM 0.5 mL 12/19/2020 Intramuscular 9/3/2021    Site: Right deltoid     Given By: Mary Solitario, PharmD     : Moderna [employer]     Lot: [id number]             This patient has had both covid vaccinations    Some patients may experience side effects after vaccination.  These may include fever, headache, muscle or joint aches.  Most symptoms resolve with 24-48 hours and do not require urgent medical evaluation unless they persist for more than 72 hours or symptoms are concerning for an unrelated medical condition.          Jaylan Botello MD

## 2025-04-22 LAB
BACTERIA BLD CULT: NORMAL
BACTERIA BLD CULT: NORMAL

## 2025-04-22 NOTE — PLAN OF CARE
MOON Message     Medicare Outpatient and Observation Notification regarding financial responsibility Other (comments)Medicare Outpatient and Observation Notification regarding financial responsibility. Other (comments). Has comment. Taken on 4/22/25 0460   Date DICK was signed 4/21/2025   Time DICK was signed 1112

## 2025-04-22 NOTE — PLAN OF CARE
[facility] - Med S[facility]  Discharge Final Note    Primary Care Provider: Shasta Urbina MD    Expected Discharge Date: 4/21/2025    Final Discharge Note (most recent)       Final Note - 04/22/25 0632          Final Note    Assessment Type Final Discharge Note (P)      Anticipated Discharge Disposition [facility] (P)    [facility]       Post-Acute Status    Post-Acute Authorization Placement (P)      Post-Acute Placement Status Set-up Complete/Auth obtained (P)    [facility]    Discharge Delays [facility] Arranged Transportation (P)